# Patient Record
Sex: MALE | Race: OTHER | NOT HISPANIC OR LATINO | URBAN - METROPOLITAN AREA
[De-identification: names, ages, dates, MRNs, and addresses within clinical notes are randomized per-mention and may not be internally consistent; named-entity substitution may affect disease eponyms.]

---

## 2022-09-24 ENCOUNTER — INPATIENT (INPATIENT)
Facility: HOSPITAL | Age: 72
LOS: 12 days | Discharge: LEFT BEFORE DISCHARGE | End: 2022-10-07
Attending: INTERNAL MEDICINE | Admitting: INTERNAL MEDICINE

## 2022-09-24 VITALS
DIASTOLIC BLOOD PRESSURE: 70 MMHG | WEIGHT: 176.37 LBS | HEART RATE: 98 BPM | OXYGEN SATURATION: 98 % | RESPIRATION RATE: 17 BRPM | TEMPERATURE: 98 F | SYSTOLIC BLOOD PRESSURE: 113 MMHG

## 2022-09-24 LAB
ALBUMIN SERPL ELPH-MCNC: 2.5 G/DL — LOW (ref 3.5–5.2)
ALP SERPL-CCNC: 90 U/L — SIGNIFICANT CHANGE UP (ref 30–115)
ALT FLD-CCNC: 28 U/L — SIGNIFICANT CHANGE UP (ref 0–41)
ANION GAP SERPL CALC-SCNC: 9 MMOL/L — SIGNIFICANT CHANGE UP (ref 7–14)
ANISOCYTOSIS BLD QL: SLIGHT — SIGNIFICANT CHANGE UP
APTT BLD: 26.4 SEC — LOW (ref 27–39.2)
AST SERPL-CCNC: 31 U/L — SIGNIFICANT CHANGE UP (ref 0–41)
BASOPHILS # BLD AUTO: 0 K/UL — SIGNIFICANT CHANGE UP (ref 0–0.2)
BASOPHILS NFR BLD AUTO: 0 % — SIGNIFICANT CHANGE UP (ref 0–1)
BILIRUB DIRECT SERPL-MCNC: <0.2 MG/DL — SIGNIFICANT CHANGE UP (ref 0–0.3)
BILIRUB INDIRECT FLD-MCNC: SIGNIFICANT CHANGE UP MG/DL (ref 0.2–1.2)
BILIRUB SERPL-MCNC: <0.2 MG/DL — SIGNIFICANT CHANGE UP (ref 0.2–1.2)
BLD GP AB SCN SERPL QL: SIGNIFICANT CHANGE UP
BUN SERPL-MCNC: 21 MG/DL — HIGH (ref 10–20)
CALCIUM SERPL-MCNC: 8 MG/DL — LOW (ref 8.4–10.5)
CHLORIDE SERPL-SCNC: 91 MMOL/L — LOW (ref 98–110)
CO2 SERPL-SCNC: 34 MMOL/L — HIGH (ref 17–32)
CREAT SERPL-MCNC: 1 MG/DL — SIGNIFICANT CHANGE UP (ref 0.7–1.5)
EGFR: 79 ML/MIN/1.73M2 — SIGNIFICANT CHANGE UP
EOSINOPHIL # BLD AUTO: 0 K/UL — SIGNIFICANT CHANGE UP (ref 0–0.7)
EOSINOPHIL NFR BLD AUTO: 0 % — SIGNIFICANT CHANGE UP (ref 0–8)
GLUCOSE BLDC GLUCOMTR-MCNC: 178 MG/DL — HIGH (ref 70–99)
GLUCOSE SERPL-MCNC: 245 MG/DL — HIGH (ref 70–99)
HCT VFR BLD CALC: 13.9 % — LOW (ref 42–52)
HCT VFR BLD CALC: 20.1 % — LOW (ref 42–52)
HGB BLD-MCNC: 4.8 G/DL — CRITICAL LOW (ref 14–18)
HGB BLD-MCNC: 6.7 G/DL — CRITICAL LOW (ref 14–18)
INR BLD: 1.11 RATIO — SIGNIFICANT CHANGE UP (ref 0.65–1.3)
LACTATE SERPL-SCNC: 3.8 MMOL/L — HIGH (ref 0.7–2)
LACTATE SERPL-SCNC: 6.4 MMOL/L — CRITICAL HIGH (ref 0.7–2)
LIDOCAIN IGE QN: 38 U/L — SIGNIFICANT CHANGE UP (ref 7–60)
LYMPHOCYTES # BLD AUTO: 1.18 K/UL — LOW (ref 1.2–3.4)
LYMPHOCYTES # BLD AUTO: 9 % — LOW (ref 20.5–51.1)
MACROCYTES BLD QL: SLIGHT — SIGNIFICANT CHANGE UP
MANUAL SMEAR VERIFICATION: SIGNIFICANT CHANGE UP
MCHC RBC-ENTMCNC: 30.6 PG — SIGNIFICANT CHANGE UP (ref 27–31)
MCHC RBC-ENTMCNC: 32.4 PG — HIGH (ref 27–31)
MCHC RBC-ENTMCNC: 33.3 G/DL — SIGNIFICANT CHANGE UP (ref 32–37)
MCHC RBC-ENTMCNC: 34.5 G/DL — SIGNIFICANT CHANGE UP (ref 32–37)
MCV RBC AUTO: 91.8 FL — SIGNIFICANT CHANGE UP (ref 80–94)
MCV RBC AUTO: 93.9 FL — SIGNIFICANT CHANGE UP (ref 80–94)
MONOCYTES # BLD AUTO: 0.53 K/UL — SIGNIFICANT CHANGE UP (ref 0.1–0.6)
MONOCYTES NFR BLD AUTO: 4 % — SIGNIFICANT CHANGE UP (ref 1.7–9.3)
NEUTROPHILS # BLD AUTO: 11.43 K/UL — HIGH (ref 1.4–6.5)
NEUTROPHILS NFR BLD AUTO: 87 % — HIGH (ref 42.2–75.2)
NRBC # BLD: 0 /100 WBCS — SIGNIFICANT CHANGE UP (ref 0–0)
NRBC # BLD: 0 /100 — SIGNIFICANT CHANGE UP (ref 0–0)
NRBC # BLD: SIGNIFICANT CHANGE UP /100 WBCS (ref 0–0)
PLAT MORPH BLD: NORMAL — SIGNIFICANT CHANGE UP
PLATELET # BLD AUTO: 295 K/UL — SIGNIFICANT CHANGE UP (ref 130–400)
PLATELET # BLD AUTO: 305 K/UL — SIGNIFICANT CHANGE UP (ref 130–400)
POLYCHROMASIA BLD QL SMEAR: SIGNIFICANT CHANGE UP
POTASSIUM SERPL-MCNC: 4 MMOL/L — SIGNIFICANT CHANGE UP (ref 3.5–5)
POTASSIUM SERPL-SCNC: 4 MMOL/L — SIGNIFICANT CHANGE UP (ref 3.5–5)
PROT SERPL-MCNC: 4.5 G/DL — LOW (ref 6–8)
PROTHROM AB SERPL-ACNC: 12.7 SEC — SIGNIFICANT CHANGE UP (ref 9.95–12.87)
RBC # BLD: 1.48 M/UL — LOW (ref 4.7–6.1)
RBC # BLD: 2.19 M/UL — LOW (ref 4.7–6.1)
RBC # FLD: 14 % — SIGNIFICANT CHANGE UP (ref 11.5–14.5)
RBC # FLD: 14.4 % — SIGNIFICANT CHANGE UP (ref 11.5–14.5)
RBC BLD AUTO: ABNORMAL
ROULEAUX BLD QL SMEAR: PRESENT — SIGNIFICANT CHANGE UP
SARS-COV-2 RNA SPEC QL NAA+PROBE: SIGNIFICANT CHANGE UP
SODIUM SERPL-SCNC: 134 MMOL/L — LOW (ref 135–146)
WBC # BLD: 13.14 K/UL — HIGH (ref 4.8–10.8)
WBC # BLD: 9.97 K/UL — SIGNIFICANT CHANGE UP (ref 4.8–10.8)
WBC # FLD AUTO: 13.14 K/UL — HIGH (ref 4.8–10.8)
WBC # FLD AUTO: 9.97 K/UL — SIGNIFICANT CHANGE UP (ref 4.8–10.8)

## 2022-09-24 PROCEDURE — 93010 ELECTROCARDIOGRAM REPORT: CPT

## 2022-09-24 PROCEDURE — 71045 X-RAY EXAM CHEST 1 VIEW: CPT | Mod: 26

## 2022-09-24 PROCEDURE — 99291 CRITICAL CARE FIRST HOUR: CPT

## 2022-09-24 PROCEDURE — 74177 CT ABD & PELVIS W/CONTRAST: CPT | Mod: 26,MA

## 2022-09-24 RX ORDER — FOLIC ACID 0.8 MG
1 TABLET ORAL DAILY
Refills: 0 | Status: DISCONTINUED | OUTPATIENT
Start: 2022-09-24 | End: 2022-10-07

## 2022-09-24 RX ORDER — CEFTRIAXONE 500 MG/1
1000 INJECTION, POWDER, FOR SOLUTION INTRAMUSCULAR; INTRAVENOUS ONCE
Refills: 0 | Status: COMPLETED | OUTPATIENT
Start: 2022-09-24 | End: 2022-09-24

## 2022-09-24 RX ORDER — ONDANSETRON 8 MG/1
4 TABLET, FILM COATED ORAL EVERY 8 HOURS
Refills: 0 | Status: DISCONTINUED | OUTPATIENT
Start: 2022-09-24 | End: 2022-10-07

## 2022-09-24 RX ORDER — SENNA PLUS 8.6 MG/1
2 TABLET ORAL AT BEDTIME
Refills: 0 | Status: DISCONTINUED | OUTPATIENT
Start: 2022-09-24 | End: 2022-10-07

## 2022-09-24 RX ORDER — PANTOPRAZOLE SODIUM 20 MG/1
40 TABLET, DELAYED RELEASE ORAL EVERY 12 HOURS
Refills: 0 | Status: DISCONTINUED | OUTPATIENT
Start: 2022-09-24 | End: 2022-10-03

## 2022-09-24 RX ORDER — OCTREOTIDE ACETATE 200 UG/ML
50 INJECTION, SOLUTION INTRAVENOUS; SUBCUTANEOUS ONCE
Refills: 0 | Status: COMPLETED | OUTPATIENT
Start: 2022-09-24 | End: 2022-09-24

## 2022-09-24 RX ORDER — ATORVASTATIN CALCIUM 80 MG/1
20 TABLET, FILM COATED ORAL AT BEDTIME
Refills: 0 | Status: DISCONTINUED | OUTPATIENT
Start: 2022-09-24 | End: 2022-10-07

## 2022-09-24 RX ORDER — PANTOPRAZOLE SODIUM 20 MG/1
80 TABLET, DELAYED RELEASE ORAL ONCE
Refills: 0 | Status: COMPLETED | OUTPATIENT
Start: 2022-09-24 | End: 2022-09-24

## 2022-09-24 RX ORDER — METRONIDAZOLE 500 MG
500 TABLET ORAL ONCE
Refills: 0 | Status: COMPLETED | OUTPATIENT
Start: 2022-09-24 | End: 2022-09-24

## 2022-09-24 RX ORDER — THIAMINE MONONITRATE (VIT B1) 100 MG
100 TABLET ORAL DAILY
Refills: 0 | Status: COMPLETED | OUTPATIENT
Start: 2022-09-24 | End: 2022-09-27

## 2022-09-24 RX ORDER — LANOLIN ALCOHOL/MO/W.PET/CERES
3 CREAM (GRAM) TOPICAL AT BEDTIME
Refills: 0 | Status: DISCONTINUED | OUTPATIENT
Start: 2022-09-24 | End: 2022-10-07

## 2022-09-24 RX ORDER — OCTREOTIDE ACETATE 200 UG/ML
50 INJECTION, SOLUTION INTRAVENOUS; SUBCUTANEOUS
Qty: 500 | Refills: 0 | Status: DISCONTINUED | OUTPATIENT
Start: 2022-09-24 | End: 2022-09-27

## 2022-09-24 RX ORDER — CEFTRIAXONE 500 MG/1
1000 INJECTION, POWDER, FOR SOLUTION INTRAMUSCULAR; INTRAVENOUS EVERY 24 HOURS
Refills: 0 | Status: DISCONTINUED | OUTPATIENT
Start: 2022-09-25 | End: 2022-09-25

## 2022-09-24 RX ORDER — SODIUM CHLORIDE 9 MG/ML
1000 INJECTION, SOLUTION INTRAVENOUS ONCE
Refills: 0 | Status: COMPLETED | OUTPATIENT
Start: 2022-09-24 | End: 2022-09-24

## 2022-09-24 RX ORDER — CHLORHEXIDINE GLUCONATE 213 G/1000ML
1 SOLUTION TOPICAL DAILY
Refills: 0 | Status: DISCONTINUED | OUTPATIENT
Start: 2022-09-24 | End: 2022-10-07

## 2022-09-24 RX ORDER — METRONIDAZOLE 500 MG
500 TABLET ORAL EVERY 8 HOURS
Refills: 0 | Status: DISCONTINUED | OUTPATIENT
Start: 2022-09-24 | End: 2022-09-29

## 2022-09-24 RX ADMIN — OCTREOTIDE ACETATE 10 MICROGRAM(S)/HR: 200 INJECTION, SOLUTION INTRAVENOUS; SUBCUTANEOUS at 21:25

## 2022-09-24 RX ADMIN — ATORVASTATIN CALCIUM 20 MILLIGRAM(S): 80 TABLET, FILM COATED ORAL at 23:24

## 2022-09-24 RX ADMIN — CEFTRIAXONE 100 MILLIGRAM(S): 500 INJECTION, POWDER, FOR SOLUTION INTRAMUSCULAR; INTRAVENOUS at 11:25

## 2022-09-24 RX ADMIN — SODIUM CHLORIDE 1000 MILLILITER(S): 9 INJECTION, SOLUTION INTRAVENOUS at 11:26

## 2022-09-24 RX ADMIN — PANTOPRAZOLE SODIUM 40 MILLIGRAM(S): 20 TABLET, DELAYED RELEASE ORAL at 18:10

## 2022-09-24 RX ADMIN — Medication 100 MILLIGRAM(S): at 11:26

## 2022-09-24 RX ADMIN — PANTOPRAZOLE SODIUM 80 MILLIGRAM(S): 20 TABLET, DELAYED RELEASE ORAL at 12:00

## 2022-09-24 RX ADMIN — Medication 100 MILLIGRAM(S): at 21:15

## 2022-09-24 RX ADMIN — OCTREOTIDE ACETATE 50 MICROGRAM(S): 200 INJECTION, SOLUTION INTRAVENOUS; SUBCUTANEOUS at 17:43

## 2022-09-24 NOTE — CONSULT NOTE ADULT - SUBJECTIVE AND OBJECTIVE BOX
Gastroenterology Consultation:    Patient is a 72y old  Male who presents with a chief complaint of       Admitted on: 09-24-22      HPI:        Prior EGD:    Prior Colonoscopy:      PAST MEDICAL & SURGICAL HISTORY:        FAMILY HISTORY:      Social History:  Tobacco:  Alcohol:  Drugs:    Home Medications:        MEDICATIONS  (STANDING):  cefTRIAXone   IVPB 1000 milliGRAM(s) IV Intermittent once  lactated ringers Bolus 1000 milliLiter(s) IV Bolus once  metroNIDAZOLE  IVPB 500 milliGRAM(s) IV Intermittent Once  pantoprazole  Injectable 80 milliGRAM(s) IV Push Once    MEDICATIONS  (PRN):      Allergies  No Known Allergies      Review of Systems:   Constitutional:  No Fever, No Chills  ENT/Mouth:  No Hearing Changes,  No Difficulty Swallowing  Eyes:  No Eye Pain, No Vision Changes  Cardiovascular:  No Chest Pain, No Palpitations  Respiratory:  No Cough, No Dyspnea  Gastrointestinal:  As described in HPI  Musculoskeletal:  No Joint Swelling, No Back Pain  Skin:  No Skin Lesions, No Jaundice  Neuro:  No Syncope, No Dizziness  Heme/Lymph:  No Bruising, No Bleeding.          Physical Examination:  T(C): 36.9 (09-24-22 @ 08:45), Max: 36.9 (09-24-22 @ 08:45)  HR: 98 (09-24-22 @ 08:45) (98 - 98)  BP: 113/70 (09-24-22 @ 08:45) (113/70 - 113/70)  RR: 17 (09-24-22 @ 08:45) (17 - 17)  SpO2: 98% (09-24-22 @ 08:45) (98% - 98%)    Weight (kg): 80 (09-24-22 @ 08:45)        GENERAL: AAOx3, no acute distress.  HEAD:  Atraumatic, Normocephalic  EYES: conjunctiva and sclera clear  NECK: Supple, no JVD or thyromegaly  CHEST/LUNG: Clear to auscultation bilaterally; No wheeze, rhonchi, or rales  HEART: Regular rate and rhythm; normal S1, S2, No murmurs.  ABDOMEN: Soft, nontender, nondistended; Bowel sounds present  NEUROLOGY: No asterixis or tremor.   SKIN: Intact, no jaundice        Data:                        4.8    13.14 )-----------( 305      ( 24 Sep 2022 09:17 )             13.9     Hgb Trend:  4.8  09-24-22 @ 09:17      09-24    134<L>  |  91<L>  |  21<H>  ----------------------------<  245<H>  4.0   |  34<H>  |  1.0    Ca    8.0<L>      24 Sep 2022 09:17    TPro  4.5<L>  /  Alb  2.5<L>  /  TBili  <0.2  /  DBili  <0.2  /  AST  31  /  ALT  28  /  AlkPhos  90  09-24    Liver panel trend:  TBili <0.2   /   AST 31   /   ALT 28   /   AlkP 90   /   Tptn 4.5   /   Alb 2.5    /   DBili <0.2      09-24      PT/INR - ( 24 Sep 2022 09:17 )   PT: 12.70 sec;   INR: 1.11 ratio         PTT - ( 24 Sep 2022 09:17 )  PTT:26.4 sec        Radiology:  CT Abdomen and Pelvis w/ IV Cont:   ACC: [id number] EXAM:  CT ABDOMEN AND PELVIS IC                          PROCEDURE DATE:  09/24/2022          INTERPRETATION:  CLINICAL HISTORY: Abdominal pain.    TECHNIQUE: Contiguous axial CT images were obtained from the lower chest   to the pubic symphysis following administration of Optiray intravenous   contrast. Oral contrast was not administered. Reformatted images in the   coronal and sagittal planes were acquired.    COMPARISON: None.      FINDINGS:    LOWER CHEST: Subsegmental atelectasis    HEPATOBILIARY: Unremarkable.    SPLEEN: Unremarkable.    PANCREAS: Findings of necrotizing pancreatitis -patchy hypoenhancement of   the pancreatic parenchyma with multiple loculated peripancreatic fluid   collections. Examples include a 7.8 x3.8 cm collection with gas (4/88),   in the lesser sac adherent to the pancreas, and additional 6.3 x 2.9 cm   collection more anteriorly (4/96), and additional 2.5 x 1.7 cm collection   along the greater curvature of the stomach with gas (4/69). Additional   collection more inferiorly anterior to the transverse colon measuring 3.6   x 2.6 cm (4/123). Gas within these collections may represent   superinfection. Additional areas of hypoenhancement in the pancreatic   head, may also represent sequela of necrotizing pancreatitis, however   follow-up is recommended. Calcifications of the pancreatic head.    ADRENAL GLANDS: 0.8 cm left adrenal myelolipoma.    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 Bilateral   renal cysts and additional hypodensities too small to characterize. No   hydronephrosis.    ABDOMINOPELVIC NODES: Unremarkable    PELVIC ORGANS: Unremarkable.    PERITONEUM/MESENTERY/BOWEL:  No evidence of bowel obstruction.   Unremarkable appendix.    BONES/SOFT TISSUES: Degenerative changes of the spine.    OTHER: Atherosclerotic calcifications      IMPRESSION:    Findings of necrotizing pancreatitis. Multiple peripancreatic fluid   collections as described. Gas within some of these collections may   represent superinfection.    Additional areas of hypoenhancement in the pancreatic head, may also   represent sequela of necrotizing pancreatitis, however follow-up is   recommended.    Additional findings in the body of the report.    --- End of Report ---            JONY JEAN MD; Attending Radiologist  This document has been electronically signed. Sep 24 2022 11:32AM (09-24-22 @ 10:32)       Gastroenterology Consultation:    Patient is a 72y old  Male who presents with a chief complaint of       Admitted on: 09-24-22      HPI:  72-year-old gentleman with past medical history of hypertension, hyperlipidemia, extensive alcohol use in which he is currently actively using, and as of recent history of pancreatitis requiring hospitalization who presents to [facility] for rectal bleeding.  Patient notes that today he developed a large volume bloody bowel movement along with an episode of nausea and vomiting in which he also noted red blood.  Patient became dizzy after this episode and almost passed out.  Wife was present to supplement the history.  Patient was brought to the ER and still notes dizziness.  Patient had abdominal imaging in which she was found to have necrosis of the pancreas. Of note patient was admitted June 8 to [facility] in [place] in total for 1 week in which she was treated for an uncomplicated pancreatitis.  Patient has never had prior documented pancreatitis.  Patient's wife notes that he has been having on and off intermittent epigastric pain more so towards the left upper quadrant and this has been going on for years.  Patient has been treating this pain with over-the-counter NSAIDs at times.  Patient and wife deny him having prior endoscopic evaluation.    In the ED patient mainly complains of nausea.  Patient currently denies any abdominal pain to me.    Prior EGD: never    Prior Colonoscopy: never      PAST MEDICAL & SURGICAL HISTORY:        FAMILY HISTORY:  -ve    Social History:  Tobacco: -ve  Alcohol: daily drinker for many years (pint of vodka daily)  Drugs: -ve    Home Medications:        MEDICATIONS  (STANDING):  cefTRIAXone   IVPB 1000 milliGRAM(s) IV Intermittent once  lactated ringers Bolus 1000 milliLiter(s) IV Bolus once  metroNIDAZOLE  IVPB 500 milliGRAM(s) IV Intermittent Once  pantoprazole  Injectable 80 milliGRAM(s) IV Push Once    MEDICATIONS  (PRN):      Allergies  No Known Allergies      Review of Systems:   Constitutional:  No Fever, No Chills  ENT/Mouth:  No Hearing Changes,  No Difficulty Swallowing  Eyes:  No Eye Pain, No Vision Changes  Cardiovascular:  No Chest Pain, No Palpitations  Respiratory:  No Cough, No Dyspnea  Gastrointestinal:  As described in HPI  Musculoskeletal:  No Joint Swelling, No Back Pain  Skin:  No Skin Lesions, No Jaundice  Neuro:  No Syncope, No Dizziness  Heme/Lymph:  No Bruising, No Bleeding.          Physical Examination:  T(C): 36.9 (09-24-22 @ 08:45), Max: 36.9 (09-24-22 @ 08:45)  HR: 98 (09-24-22 @ 08:45) (98 - 98)  BP: 113/70 (09-24-22 @ 08:45) (113/70 - 113/70)  RR: 17 (09-24-22 @ 08:45) (17 - 17)  SpO2: 98% (09-24-22 @ 08:45) (98% - 98%)    Weight (kg): 80 (09-24-22 @ 08:45)        GENERAL: AAOx3, no acute distress.  HEAD:  Atraumatic, Normocephalic  EYES: conjunctiva and sclera clear  NECK: Supple, no JVD or thyromegaly  CHEST/LUNG: Clear to auscultation bilaterally; No wheeze, rhonchi, or rales  HEART: Regular rate and rhythm; normal S1, S2, No murmurs.  ABDOMEN: Soft, nontender, nondistended; Bowel sounds present  NEUROLOGY: No asterixis or tremor.   SKIN: Intact, no jaundice  KRISTOPHER: maroon colored stool         Data:                        4.8    13.14 )-----------( 305      ( 24 Sep 2022 09:17 )             13.9     Hgb Trend:  4.8  09-24-22 @ 09:17      09-24    134<L>  |  91<L>  |  21<H>  ----------------------------<  245<H>  4.0   |  34<H>  |  1.0    Ca    8.0<L>      24 Sep 2022 09:17    TPro  4.5<L>  /  Alb  2.5<L>  /  TBili  <0.2  /  DBili  <0.2  /  AST  31  /  ALT  28  /  AlkPhos  90  09-24    Liver panel trend:  TBili <0.2   /   AST 31   /   ALT 28   /   AlkP 90   /   Tptn 4.5   /   Alb 2.5    /   DBili <0.2      09-24      PT/INR - ( 24 Sep 2022 09:17 )   PT: 12.70 sec;   INR: 1.11 ratio         PTT - ( 24 Sep 2022 09:17 )  PTT:26.4 sec        Radiology:  CT Abdomen and Pelvis w/ IV Cont:   ACC: [id number] EXAM:  CT ABDOMEN AND PELVIS IC                          PROCEDURE DATE:  09/24/2022          INTERPRETATION:  CLINICAL HISTORY: Abdominal pain.    TECHNIQUE: Contiguous axial CT images were obtained from the lower chest   to the pubic symphysis following administration of Optiray intravenous   contrast. Oral contrast was not administered. Reformatted images in the   coronal and sagittal planes were acquired.    COMPARISON: None.      FINDINGS:    LOWER CHEST: Subsegmental atelectasis    HEPATOBILIARY: Unremarkable.    SPLEEN: Unremarkable.    PANCREAS: Findings of necrotizing pancreatitis -patchy hypoenhancement of   the pancreatic parenchyma with multiple loculated peripancreatic fluid   collections. Examples include a 7.8 x3.8 cm collection with gas (4/88),   in the lesser sac adherent to the pancreas, and additional 6.3 x 2.9 cm   collection more anteriorly (4/96), and additional 2.5 x 1.7 cm collection   along the greater curvature of the stomach with gas (4/69). Additional   collection more inferiorly anterior to the transverse colon measuring 3.6   x 2.6 cm (4/123). Gas within these collections may represent   superinfection. Additional areas of hypoenhancement in the pancreatic   head, may also represent sequela of necrotizing pancreatitis, however   follow-up is recommended. Calcifications of the pancreatic head.    ADRENAL GLANDS: 0.8 cm left adrenal myelolipoma.    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 Bilateral   renal cysts and additional hypodensities too small to characterize. No   hydronephrosis.    ABDOMINOPELVIC NODES: Unremarkable    PELVIC ORGANS: Unremarkable.    PERITONEUM/MESENTERY/BOWEL:  No evidence of bowel obstruction.   Unremarkable appendix.    BONES/SOFT TISSUES: Degenerative changes of the spine.    OTHER: Atherosclerotic calcifications      IMPRESSION:    Findings of necrotizing pancreatitis. Multiple peripancreatic fluid   collections as described. Gas within some of these collections may   represent superinfection.    Additional areas of hypoenhancement in the pancreatic head, may also   represent sequela of necrotizing pancreatitis, however follow-up is   recommended.    Additional findings in the body of the report.    --- End of Report ---            JONY JEAN MD; Attending Radiologist  This document has been electronically signed. Sep 24 2022 11:32AM (09-24-22 @ 10:32)

## 2022-09-24 NOTE — H&P ADULT - ASSESSMENT
IMPRESSION:    suspected UGIB- r.o variceal s/p 2 units of blood   necrotising pancreatitis   ETOH hx  HTN   HLD     PLAN:    CNS: CIWA protocol symptom triggered. SBRT. thiamine, folate. MDV    HEENT: Oral care    PULMONARY:  HOB @ 45 degrees.  Aspiration precautions     CARDIOVASCULAR: keep map > 65. hold bp meds. send CE. echo     GI: Clears liquid diet. Protonix IV BID. octreotide. ceftriaxone and flagyl. [facility] consult. Surgical FU.  Bowel regimen. elastase. calprotectin    RENAL:  Follow up lytes.  Correct as needed    INFECTIOUS DISEASE: check procal, blood cultures. id consult     HEMATOLOGICAL:  hold AC. SCDs for now. keep active type and screen. HGB > 8     ENDOCRINE:  Follow up FS.  Insulin protocol if needed.    MUSCULOSKELETAL: IAT    MICU

## 2022-09-24 NOTE — ED PROVIDER NOTE - OBJECTIVE STATEMENT
71 yo M with PMH of HTN, alcoholic induced pancreatitis presents to ED for 1 episode of rectal bleeding. Pt states he was using the bathroom today and had one episode of bright red blood per rectum. This is the first time this has happened. Pt has never had a colonoscopy. Pt has had generalized abdominal discomfort. Pt has felt weak. Pt states that over the past 2 days he has had low blood pressure the past 2 days. EMS arrived and found patient to be hypotensive and they gave NS (ACR unavailable at this time). No SOB, CP, palpitations, fever or chills. 71 yo M with PMH of HTN, alcoholic induced pancreatitis [place] 8 [place] [facility] [place] to ED for 1 episode of rectal bleeding. Pt states he was using the bathroom today and had one episode of bright red blood per rectum. This is the first time this has happened. Pt has never had a colonoscopy. Pt has had generalized abdominal discomfort. Pt has felt weak. Pt states that over the past 2 days he has had low blood pressure the past 2 days. EMS arrived and found patient to be hypotensive and they gave NS (ACR unavailable at this time). No SOB, CP, palpitations, fever or chills.

## 2022-09-24 NOTE — CONSULT NOTE ADULT - SUBJECTIVE AND OBJECTIVE BOX
Patient is a 72-year-old gentleman with past medical history of hypertension, hyperlipidemia, extensive alcohol use in which he is currently actively using, and as of recent history of pancreatitis requiring hospitalization who presents to [facility] for rectal bleeding.  Patient notes that today he developed a large volume bloody bowel movement along with an episode of nausea and vomiting in which he also noted red blood.  Patient became dizzy after this episode and almost passed out.  Wife was present to supplement the history.  Patient was brought to the ER and still notes dizziness.  Patient had abdominal imaging in which she was found to have necrosis of the pancreas.  Surgery consulted for pancreatic necrosis.    Of note patient was admitted June 8 to [facility] in [place] in total for 1 week in which she was treated for an uncomplicated pancreatitis.  Patient has never had prior documented pancreatitis.  Patient's wife notes that he has been having on and off intermittent epigastric pain more so towards the left upper quadrant and this has been going on for years.  Patient has been treating this pain with over-the-counter NSAIDs at times.  Patient and wife deny him having prior endoscopic evaluation.    In the ED patient mainly complains of nausea.  Patient currently denies any abdominal pain to me.      PAST MEDICAL & SURGICAL HISTORY:  HTN  HLD  Pancreatitis ( First Episode June 8th 2022- [facility])  ETOH use and abuse      Family Hx:  Father: Non Contributory   Mother: Non Contributory    Social History  Denies Current Tobacco use  Admits to current ETOH use- wife notes extensive use but would not quantify   Denies Current Illicit Drug use     MEDICATIONS  (STANDING):  cefTRIAXone   IVPB 1000 milliGRAM(s) IV Intermittent once  lactated ringers Bolus 1000 milliLiter(s) IV Bolus once  metroNIDAZOLE  IVPB 500 milliGRAM(s) IV Intermittent Once  pantoprazole  Injectable 80 milliGRAM(s) IV Push Once    Home Meds:  Lisinopril  Amlodipine  Atorvastatin     Allergies  No Known Allergies      Review of Systems  General:  Denies Fatigue, Denies Fever, Denies Weakness ,Denies Weight Loss   HEENT: Denies Trouble Swallowing ,Denies  Sore Throat , Denies Change in hearing/vision/speech ,Denies Dizziness    Cardio: Denies  Chest Pain , Palpitations    Respiratory: Denies worsening of SOB, Denies Cough  Abdomen: See detailed HPI  Neuro: Denies Headache Denies Dizziness, Denies Paresthesias  MSK: Denies pain in Bones/Joints/Muscles   Psych: Patient denies depression, denies suicidal or homicidal ideations  Integ: Patient Denies rash, or new skin lesions       Vital Signs Last 24 Hrs  T(C): 36.9 (24 Sep 2022 08:45), Max: 36.9 (24 Sep 2022 08:45)  T(F): 98.4 (24 Sep 2022 08:45), Max: 98.4 (24 Sep 2022 08:45)  HR: 98 (24 Sep 2022 08:45) (98 - 98)  BP: 113/70 (24 Sep 2022 08:45) (113/70 - 113/70)  BP(mean): --  RR: 17 (24 Sep 2022 08:45) (17 - 17)  SpO2: 98% (24 Sep 2022 08:45) (98% - 98%)    Parameters below as of 24 Sep 2022 08:45  Patient On (Oxygen Delivery Method): room air      Physical Exam  Gen: NAD  HEENT: NC/AT, Mucosal Membranes Dry, tongue fasciculation   Cardio: S1/S2 No S3/S4, Regular  Resp: CTA B/L  Abdomen: Soft, ND/NT, Rectal exam with good tone, residual scant Melena but vault relatively empty   Neuro: AAOx3  Extremities: FROM x 4  Skin: No jaundice     Labs:                            4.8    13.14 )-----------( 305      ( 24 Sep 2022 09:17 )             13.9       Auto Neutrophil %: 87.0 % (09-24-22 @ 09:17)    09-24    134<L>  |  91<L>  |  21<H>  ----------------------------<  245<H>  4.0   |  34<H>  |  1.0      Calcium, Total Serum: 8.0 mg/dL (09-24-22 @ 09:17)      LFTs:             4.5  | <0.2 | 31       ------------------[90      ( 24 Sep 2022 09:17 )  2.5  | <0.2 | 28          Lipase:38     Amylase:x         Lactate, Blood: 6.4 mmol/L (09-24-22 @ 09:17)      Coags:     12.70  ----< 1.11    ( 24 Sep 2022 09:17 )     26.4          RADIOLOGY & ADDITIONAL STUDIES:  CT Abdomen and Pelvis w/ IV Cont 09.24.22   IMPRESSION:    Findings of necrotizing pancreatitis. Multiple peripancreatic fluid   collections as described. Gas within some of these collections may   represent superinfection.    Additional areas of hypoenhancement in the pancreatic head, may also   represent sequela of necrotizing pancreatitis, however follow-up is   recommended.   GENERAL SURGERY CONSULT NOTE    Patient: CHRIS KHAN , 72y ([birth year])Male   MRN: [id number]  Location: [facility] ER Hold 030 A  Visit: 09-24-22 Inpatient  Date: 09-24-22 @ 13:28    HPI:  Patient is a 72-year-old gentleman with past medical history of hypertension, hyperlipidemia, extensive alcohol use in which he is currently actively using, and as of recent history of pancreatitis requiring hospitalization who presents to [facility] for rectal bleeding.  Patient notes that today he developed a large volume bloody bowel movement along with an episode of nausea and vomiting in which he also noted red blood.  Patient became dizzy after this episode and almost passed out.  Wife was present to supplement the history.  Patient was brought to the ER and still notes dizziness.  Patient had abdominal imaging in which she was found to have necrosis of the pancreas.  Surgery consulted for pancreatic necrosis.    Of note patient was admitted June 8 to [facility] in [place] in total for 1 week in which she was treated for an uncomplicated pancreatitis.  Patient has never had prior documented pancreatitis.  Patient's wife notes that he has been having on and off intermittent epigastric pain more so towards the left upper quadrant and this has been going on for years.  Patient has been treating this pain with over-the-counter NSAIDs at times.  Patient and wife deny him having prior endoscopic evaluation.    In the ED patient mainly complains of nausea.  Patient currently denies any abdominal pain to me.      PAST MEDICAL & SURGICAL HISTORY:  HTN  HLD  Pancreatitis ( First Episode June 8th 2022- [facility])  ETOH use and abuse    Family Hx:  Father: Non Contributory   Mother: Non Contributory    Social History  Denies Current Tobacco use  Admits to current ETOH use- wife notes extensive use but would not quantify   Denies Current Illicit Drug use     MEDICATIONS  (STANDING):  cefTRIAXone   IVPB 1000 milliGRAM(s) IV Intermittent once  lactated ringers Bolus 1000 milliLiter(s) IV Bolus once  metroNIDAZOLE  IVPB 500 milliGRAM(s) IV Intermittent Once  pantoprazole  Injectable 80 milliGRAM(s) IV Push Once    Home Meds:  Lisinopril  Amlodipine  Atorvastatin     Allergies  No Known Allergies    Review of Systems  General:  Denies Fatigue, Denies Fever, Denies Weakness ,Denies Weight Loss   HEENT: Denies Trouble Swallowing ,Denies  Sore Throat , Denies Change in hearing/vision/speech ,Denies Dizziness    Cardio: Denies  Chest Pain , Palpitations    Respiratory: Denies worsening of SOB, Denies Cough  Abdomen: See detailed HPI  Neuro: Denies Headache Denies Dizziness, Denies Paresthesias  MSK: Denies pain in Bones/Joints/Muscles   Psych: Patient denies depression, denies suicidal or homicidal ideations  Integ: Patient Denies rash, or new skin lesions       Vital Signs Last 24 Hrs  T(C): 36.9 (24 Sep 2022 08:45), Max: 36.9 (24 Sep 2022 08:45)  T(F): 98.4 (24 Sep 2022 08:45), Max: 98.4 (24 Sep 2022 08:45)  HR: 98 (24 Sep 2022 08:45) (98 - 98)  BP: 113/70 (24 Sep 2022 08:45) (113/70 - 113/70)  BP(mean): --  RR: 17 (24 Sep 2022 08:45) (17 - 17)  SpO2: 98% (24 Sep 2022 08:45) (98% - 98%)    Parameters below as of 24 Sep 2022 08:45  Patient On (Oxygen Delivery Method): room air      Physical Exam  Gen: NAD  HEENT: NC/AT, Mucosal Membranes Dry, tongue fasciculation   Cardio: S1/S2 No S3/S4, Regular  Resp: CTA B/L  Abdomen: Soft, ND/NT, Rectal exam with good tone, residual scant Melena but vault relatively empty   Neuro: AAOx3  Extremities: FROM x 4  Skin: No jaundice     Labs:                        4.8    13.14 )-----------( 305      ( 24 Sep 2022 09:17 )             13.9       Auto Neutrophil %: 87.0 % (09-24-22 @ 09:17)    09-24    134<L>  |  91<L>  |  21<H>  ----------------------------<  245<H>  4.0   |  34<H>  |  1.0    Calcium, Total Serum: 8.0 mg/dL (09-24-22 @ 09:17)    LFTs:             4.5  | <0.2 | 31       ------------------[90      ( 24 Sep 2022 09:17 )  2.5  | <0.2 | 28          Lipase:38     Amylase:x         Lactate, Blood: 6.4 mmol/L (09-24-22 @ 09:17)    Coags:     12.70  ----< 1.11    ( 24 Sep 2022 09:17 )     26.4        RADIOLOGY & ADDITIONAL STUDIES:  CT Abdomen and Pelvis w/ IV Cont 09.24.22   IMPRESSION:    Findings of necrotizing pancreatitis. Multiple peripancreatic fluid   collections as described. Gas within some of these collections may   represent superinfection.    Additional areas of hypoenhancement in the pancreatic head, may also   represent sequela of necrotizing pancreatitis, however follow-up is   recommended.

## 2022-09-24 NOTE — ED ADULT NURSE REASSESSMENT NOTE - NS ED NURSE REASSESS COMMENT FT1
REPORT GIVEN TO FAISAL GUZMAN, @ 2203, PT AAOX4, PLACED ON CONT CARDIAC MONITOR VSS, NAD, TRANSPORTED TO CCU WITH TRANSPORTER AND NURSE.

## 2022-09-24 NOTE — ED ADULT TRIAGE NOTE - CHIEF COMPLAINT QUOTE
Pt BIBA complaining of rectal bleeding x 1 episode and abdominal pain. Pt BIBA complaining of rectal bleeding x 1 episode and abdominal pain. IVF given by EMS

## 2022-09-24 NOTE — H&P ADULT - NSHPPHYSICALEXAM_GEN_ALL_CORE
GENERAL: NAD, lying in bed comfortably  CHEST/LUNG: Clear to auscultation bilaterally; No rales, rhonchi, wheezing, or rubs. Unlabored respirations  HEART: Regular rate and rhythm; No murmurs, rubs, or gallops  ABDOMEN: Bowel sounds present; Soft, Nontender, Nondistended.   EXTREMITIES:  2+ Peripheral Pulses, brisk capillary refill. No clubbing, cyanosis, or edema  NERVOUS SYSTEM:  Alert & Oriented X3, speech clear. No deficits   MSK: FROM all 4 extremities, full and equal strength  SKIN: No rashes or lesions

## 2022-09-24 NOTE — H&P ADULT - HISTORY OF PRESENT ILLNESS
72-year-old gentleman with PMHX  of HTN, HLD , recent hx of pancreatitis, extensive alcohol use daily, presents for episodes of Hemetemesis and hematochezia. g.  Patient notes that today he developed a large volume bloody bowel movement along with an episode of blood vomitting.  Patient became dizzy after this episode and almost passed out.  Wife was present to supplement the history.  Patient was brought to the ER and still notes dizziness.  Patient had abdominal imaging in which she was found to have necrosis of the pancreas.  Surgery consulted for pancreatic necrosis.    Of note patient was admitted June 8 to [place] in [place] in total for 1 week in which she was treated for an uncomplicated pancreatitis.  Patient has never had prior documented pancreatitis.  Patient's wife notes that he has been having on and off intermittent epigastric pain more so towards the left upper quadrant and this has been going on for years.  Patient has been treating this pain with over-the-counter NSAIDs at times.   He drinking 5 ounces of vodka a day. Patient and wife deny him having prior endoscopic evaluation.    In the ED   T(F): 100.1 (24 Sep 2022 16:50), Max: 100.1 (24 Sep 2022 16:50)  HR: 95 (24 Sep 2022 16:50) (73 - 98)  BP: 112/58 (24 Sep 2022 16:50) (112/58 - 133/81)      LABS:  cret                        6.7    9.97  )-----------( 295      ( 24 Sep 2022 17:30 )             20.1     09-24    134<L>  |  91<L>  |  21<H>  ----------------------------<  245<H>  4.0   |  34<H>  |  1.0    Ca    8.0<L>      24 Sep 2022 09:17    TPro  4.5<L>  /  Alb  2.5<L>  /  TBili  <0.2  /  DBili  <0.2  /  AST  31  /  ALT  28  /  AlkPhos  90  09-24    PT/INR - ( 24 Sep 2022 09:17 )   PT: 12.70 sec;   INR: 1.11 ratio         PTT - ( 24 Sep 2022 09:17 )  PTT:26.4 sec      < from: CT Abdomen and Pelvis w/ IV Cont (09.24.22 @ 10:32) >  Findings of necrotizing pancreatitis. Multiple peripancreatic fluid   collections as described. Gas within some of these collections may   represent superinfection.    < end of copied text >

## 2022-09-24 NOTE — CONSULT NOTE ADULT - ASSESSMENT
# painless Hematochezia:  - Hb on admission 4.8, MCV 98    recommendations:  - Active TnS, would give 2 units of PRBC now as is symptomatic  - Consider MICU eval  - PPI BID  - Octreotide drip   - ABx coverage    Pancreatic Necrosis  - Admitted June 8th [facility] in [facility] [place]  - Likely CT findings are a sequela from prior attack as has no current pain, and exam is reassuring  - Lipase 38  - Not septic, asymptomatic so surgical intervention not being considered at this time      ETOH Hx  - Needs to be on CIWA protocol when admitted       monitor H/H  Target Hb>7  monitor for alcohol withdrawal  ABx coverage   72-year-old gentleman with past medical history of hypertension, hyperlipidemia, extensive alcohol use in which he is currently actively using, and as of recent history of pancreatitis requiring hospitalization who presents to [facility] for rectal bleeding.    # Hematochezia:  # Acute normocytic anemia:  # Alcohol abuse:   - Hb on admission 4.8, MCV 98  - hemodynamically stable  - KRISTOPHER: Maroon colored stool  - no blood thinners  - never had EGD/Colonoscopy     Rec:  - Keep clear liquid   - Maintain active Type and screen  - Trend H&H BID  - Please place x2 18G IVs  - Please start IV fluids (SBP >90)   - will need Colonoscopy and EGD  - If any unstable bleed please get stat CTA and call IR  - Consider MICU eval  - PPI BID  - Octreotide drip     # Pancreatic Necrosis  - Admitted June 8th [facility] in [facility]  - Likely CT findings are a sequela from prior attack as has no current pain, and exam is reassuring  - Lipase 38  - ABx coverage  - will need evaluation by advance team for EUS +/- necrosectomy     # ETOH Hx  - Needs to be on CIWA protocol when admitted

## 2022-09-24 NOTE — ED ADULT NURSE REASSESSMENT NOTE - REASSESS COMMUNICATION
Notified ICU physician about temp - 100.1, after and pre-blood transfusion, He stated that it's ok./ED physician notified

## 2022-09-24 NOTE — CONSULT NOTE ADULT - ASSESSMENT
Patient is a 72-year-old gentleman with past medical history of hypertension, hyperlipidemia, extensive alcohol use in which he is currently actively using, and as of recent history of pancreatitis requiring hospitalization who presents to [facility] for rectal bleeding.  Patient notes that today he developed a large volume bloody bowel movement along with an episode of nausea and vomiting in which he also noted red blood.  Patient became dizzy after this episode and almost passed out.  Wife was present to supplement the history.  Patient was brought to the ER and still notes dizziness.  Patient had abdominal imaging in which she was found to have necrosis of the pancreas.  Surgery consulted for pancreatic necrosis. Of note patient was admitted June 8 to [place] in [place] in total for 1 week in which she was treated for an uncomplicated pancreatitis.  Patient has never had prior documented pancreatitis.  Patient's wife notes that he has been having on and off intermittent epigastric pain more so towards the left upper quadrant and this has been going on for years.  Patient has been treating this pain with over-the-counter NSAIDs at times.  Patient and wife deny him having prior endoscopic evaluation.In the ED patient is dizzy and upon posturing becomes very weak ( Likely Orthostatic). Given history and description of bleeding episode that occurred today would consider medical ICU admission and would give 2 units of PRBC.  Given history of significant alcohol use would also start PPI drip and consider octreotide despite no varices noted on CT and despite unremarkable views of the liver.  Patient needs to be monitored utilizing CIWA protocol as has a high risk of developing alcohol withdrawal given extensive use.  From a pancreatic perspective collections noted and largely asymptomatic.  Patient is not septic and has no abdominal pain on exam currently.  Would not consider surgery as an option given largely asymptomatic.  We will consult advanced GI for follow-up on these necrotic collections as well as calcifications of the head of the pancreas.  Patient will ultimately benefit from endoscopic ultrasound evaluation of the pancreas, as well as evaluation of the functionality of the pancreas which if clinical scenario remains the same can be worked up as an outpatient.    Hematemesis/ Melena with Hematochezia  - Active TnS, would give 2 units of PRBC now as is symptomatic  - Consider MICU eval  - Start Protonix drip, consider Octreotide drip and cephalosporin  ( Despite No Cirrhotic appearance of the Liver on CT- extensive ETOH Hx and significant bleeding at home)   - GI consult    Pancreatic Necrosis  - Admitted June 8th [facility] in [place]  - Likely CT findings are a sequela from prior attack as has no current pain, and exam is reassuring  - Lipase 38  - Not septic, asymptomatic so surgical intervention not being considered at this time  - Advanced GI team aware of case and will follow on Monday - Will ultimately benefit from though EUS evaluation of Pancreas- (can also consider sampling of necrosis and further endoscopic drainage if becomes septic), markers ( do not get now) and evaluation of functionality of the Pancreas ( Fecal Elastase. calprotectin)     ETOH Hx  - Needs to be on CIWA protocol when admitted                ASSESSMENT:  Patient is a 72-year-old gentleman with past medical history of hypertension, hyperlipidemia, extensive alcohol use in which he is currently actively using, and as of recent history of pancreatitis requiring hospitalization who presents to [facility] for rectal bleeding.  Patient notes that today he developed a large volume bloody bowel movement along with an episode of nausea and vomiting in which he also noted red blood.  Patient became dizzy after this episode and almost passed out.  Wife was present to supplement the history.  Patient was brought to the ER and still notes dizziness.  Patient had abdominal imaging in which she was found to have necrosis of the pancreas.  Surgery consulted for pancreatic necrosis. Of note patient was admitted June 8 to [facility] in [place] in total for 1 week in which she was treated for an uncomplicated pancreatitis.  Patient has never had prior documented pancreatitis.  Patient's wife notes that he has been having on and off intermittent epigastric pain more so towards the left upper quadrant and this has been going on for years.  Patient has been treating this pain with over-the-counter NSAIDs at times.  Patient and wife deny him having prior endoscopic evaluation.In the ED patient is dizzy and upon posturing becomes very weak ( Likely Orthostatic). Given history and description of bleeding episode that occurred today would consider medical ICU admission and would give 2 units of PRBC.  Given history of significant alcohol use would also start PPI drip and consider octreotide despite no varices noted on CT and despite unremarkable views of the liver.  Patient needs to be monitored utilizing CIWA protocol as has a high risk of developing alcohol withdrawal given extensive use.  From a pancreatic perspective collections noted and largely asymptomatic.  Patient is not septic and has no abdominal pain on exam currently.  Would not consider surgery as an option given largely asymptomatic.  We will consult advanced GI for follow-up on these necrotic collections as well as calcifications of the head of the pancreas.  Patient will ultimately benefit from endoscopic ultrasound evaluation of the pancreas, as well as evaluation of the functionality of the pancreas which if clinical scenario remains the same can be worked up as an outpatient.    Hematemesis/ Melena with Hematochezia  - Active TnS, would give 2 units of PRBC now as is symptomatic  - Consider MICU eval  - Start Protonix drip, consider Octreotide drip and cephalosporin  ( Despite No Cirrhotic appearance of the Liver on CT- extensive ETOH Hx and significant bleeding at home)   - GI consult    Pancreatic Necrosis  - Admitted June 8th [facility] in [facility]  - Likely CT findings are a sequela from prior attack as has no current pain, and exam is reassuring  - Lipase 38  - Not septic, asymptomatic so surgical intervention not being considered at this time  - Advanced GI team aware of case and will follow on Monday - Will ultimately benefit from though EUS evaluation of Pancreas- (can also consider sampling of necrosis and further endoscopic drainage if becomes septic), markers ( do not get now) and evaluation of functionality of the Pancreas ( Fecal Elastase. calprotectin)     ETOH Hx  - Needs to be on CIWA protocol when admitted     Above plan discussed with Attending Surgeon Dr. Steward, patient, patient family, and Primary team  09-24-22    Green Team Spectra: [phone]

## 2022-09-24 NOTE — ED PROVIDER NOTE - CLINICAL SUMMARY MEDICAL DECISION MAKING FREE TEXT BOX
73 yo M presented to ED for GI bleed. PT was found to have hgb of 4.8 and blood was ordered. gi was consulted. pt was also found to have necrotizing pancreatitis and antibiotics and surgery was consulted. pt was started on fluids and made npo . icu was consulted and pt admitted for further evaluation and management

## 2022-09-24 NOTE — PATIENT PROFILE ADULT - FALL HARM RISK - HARM RISK INTERVENTIONS
Assistance with ambulation/Assistance OOB with selected safe patient handling equipment/Communicate Risk of Fall with Harm to all staff/Monitor for mental status changes/Monitor gait and stability/Reinforce activity limits and safety measures with patient and family/Tailored Fall Risk Interventions/Toileting schedule using arm’s reach rule for commode and bathroom/Use of alarms - bed, chair and/or voice tab/Visual Cue: Yellow wristband and red socks/Bed in lowest position, wheels locked, appropriate side rails in place/Call bell, personal items and telephone in reach/Instruct patient to call for assistance before getting out of bed or chair/Non-slip footwear when patient is out of bed/[place] to call system/Physically safe environment - no spills, clutter or unnecessary equipment/Purposeful Proactive Rounding/Room/bathroom lighting operational, light cord in reach

## 2022-09-24 NOTE — ED PROVIDER NOTE - PHYSICAL EXAMINATION
Const: Well nourished, well developed, appears stated age  Eyes: PERRL, no conjunctival injection  HENT:  Neck supple without meningismus   CV: RRR, Warm, well-perfused extremities  RESP: CTA B/L, no tachypnea   GI: soft, non-tender, non-distended  Rectal (nurse nancy) empty rectal vault no blood   MSK: No gross deformities appreciated  Skin: Warm, dry. No rashes  Neuro: Alert, CNs II-XII grossly intact. Sensation and motor function of extremities grossly intact.  Psych: Appropriate mood and affect.

## 2022-09-24 NOTE — ED PROVIDER NOTE - NS ED ROS FT
Review of Systems   Constitutional:  No Weight Change, No Fever, No Chills, + weakness    ENT/Mouth:  No Nasal Congestion, No Hoarseness, No sore throat, No Rhinorrhea, No Swallowing Difficulty  Eyes:  No Eye Pain, No Swelling, No Redness, No Discharge, No Vision Changes  Cardiovascular:  No Chest Pain, No palpitations No Dyspnea on Exertion, No Orthopnea  Respiratory:  No SOB, No Cough, No Wheezing  Gastrointestinal:  No Nausea, No Vomiting, No Diarrhea, No Constipation, + abdominal pain,+ red blood per rectum  Genitourinary:  No Dysuria, No Urinary Frequency, No Hematuria, No Urinary Incontinence,  Musculoskeletal:  No Arthralgias, No Myalgias, No Joint Swelling, No Joint Stiffness,  Skin:  No rashes

## 2022-09-25 LAB
ALBUMIN SERPL ELPH-MCNC: 2.5 G/DL — LOW (ref 3.5–5.2)
ALP SERPL-CCNC: 85 U/L — SIGNIFICANT CHANGE UP (ref 30–115)
ALT FLD-CCNC: 18 U/L — SIGNIFICANT CHANGE UP (ref 0–41)
ANION GAP SERPL CALC-SCNC: 5 MMOL/L — LOW (ref 7–14)
ANION GAP SERPL CALC-SCNC: 9 MMOL/L — SIGNIFICANT CHANGE UP (ref 7–14)
APTT BLD: 28 SEC — SIGNIFICANT CHANGE UP (ref 27–39.2)
AST SERPL-CCNC: 18 U/L — SIGNIFICANT CHANGE UP (ref 0–41)
BASOPHILS # BLD AUTO: 0.04 K/UL — SIGNIFICANT CHANGE UP (ref 0–0.2)
BASOPHILS NFR BLD AUTO: 0.5 % — SIGNIFICANT CHANGE UP (ref 0–1)
BILIRUB SERPL-MCNC: 0.5 MG/DL — SIGNIFICANT CHANGE UP (ref 0.2–1.2)
BLD GP AB SCN SERPL QL: SIGNIFICANT CHANGE UP
BUN SERPL-MCNC: 11 MG/DL — SIGNIFICANT CHANGE UP (ref 10–20)
BUN SERPL-MCNC: 17 MG/DL — SIGNIFICANT CHANGE UP (ref 10–20)
CALCIUM SERPL-MCNC: 7.6 MG/DL — LOW (ref 8.4–10.5)
CALCIUM SERPL-MCNC: 7.8 MG/DL — LOW (ref 8.4–10.5)
CHLORIDE SERPL-SCNC: 95 MMOL/L — LOW (ref 98–110)
CHLORIDE SERPL-SCNC: 95 MMOL/L — LOW (ref 98–110)
CHOLEST SERPL-MCNC: 96 MG/DL — SIGNIFICANT CHANGE UP
CO2 SERPL-SCNC: 26 MMOL/L — SIGNIFICANT CHANGE UP (ref 17–32)
CO2 SERPL-SCNC: 33 MMOL/L — HIGH (ref 17–32)
CREAT SERPL-MCNC: 0.8 MG/DL — SIGNIFICANT CHANGE UP (ref 0.7–1.5)
CREAT SERPL-MCNC: 0.9 MG/DL — SIGNIFICANT CHANGE UP (ref 0.7–1.5)
EGFR: 90 ML/MIN/1.73M2 — SIGNIFICANT CHANGE UP
EGFR: 93 ML/MIN/1.73M2 — SIGNIFICANT CHANGE UP
EOSINOPHIL # BLD AUTO: 0.01 K/UL — SIGNIFICANT CHANGE UP (ref 0–0.7)
EOSINOPHIL NFR BLD AUTO: 0.1 % — SIGNIFICANT CHANGE UP (ref 0–8)
GLUCOSE SERPL-MCNC: 156 MG/DL — HIGH (ref 70–99)
GLUCOSE SERPL-MCNC: 190 MG/DL — HIGH (ref 70–99)
HCT VFR BLD CALC: 19.7 % — LOW (ref 42–52)
HCT VFR BLD CALC: 21.5 % — LOW (ref 42–52)
HCT VFR BLD CALC: 23.2 % — LOW (ref 42–52)
HCT VFR BLD CALC: 24.6 % — LOW (ref 42–52)
HDLC SERPL-MCNC: 29 MG/DL — LOW
HGB BLD-MCNC: 6.8 G/DL — CRITICAL LOW (ref 14–18)
HGB BLD-MCNC: 7.5 G/DL — LOW (ref 14–18)
HGB BLD-MCNC: 8.3 G/DL — LOW (ref 14–18)
HGB BLD-MCNC: 8.6 G/DL — LOW (ref 14–18)
IMM GRANULOCYTES NFR BLD AUTO: 0.7 % — HIGH (ref 0.1–0.3)
INR BLD: 1.04 RATIO — SIGNIFICANT CHANGE UP (ref 0.65–1.3)
LIPID PNL WITH DIRECT LDL SERPL: 46 MG/DL — SIGNIFICANT CHANGE UP
LYMPHOCYTES # BLD AUTO: 1.49 K/UL — SIGNIFICANT CHANGE UP (ref 1.2–3.4)
LYMPHOCYTES # BLD AUTO: 16.9 % — LOW (ref 20.5–51.1)
MAGNESIUM SERPL-MCNC: 2.1 MG/DL — SIGNIFICANT CHANGE UP (ref 1.8–2.4)
MAGNESIUM SERPL-MCNC: 2.5 MG/DL — HIGH (ref 1.8–2.4)
MCHC RBC-ENTMCNC: 30.9 PG — SIGNIFICANT CHANGE UP (ref 27–31)
MCHC RBC-ENTMCNC: 31 PG — SIGNIFICANT CHANGE UP (ref 27–31)
MCHC RBC-ENTMCNC: 31.2 PG — HIGH (ref 27–31)
MCHC RBC-ENTMCNC: 31.8 PG — HIGH (ref 27–31)
MCHC RBC-ENTMCNC: 34.5 G/DL — SIGNIFICANT CHANGE UP (ref 32–37)
MCHC RBC-ENTMCNC: 34.9 G/DL — SIGNIFICANT CHANGE UP (ref 32–37)
MCHC RBC-ENTMCNC: 35 G/DL — SIGNIFICANT CHANGE UP (ref 32–37)
MCHC RBC-ENTMCNC: 35.8 G/DL — SIGNIFICANT CHANGE UP (ref 32–37)
MCV RBC AUTO: 88.8 FL — SIGNIFICANT CHANGE UP (ref 80–94)
MCV RBC AUTO: 88.9 FL — SIGNIFICANT CHANGE UP (ref 80–94)
MCV RBC AUTO: 89.1 FL — SIGNIFICANT CHANGE UP (ref 80–94)
MCV RBC AUTO: 89.5 FL — SIGNIFICANT CHANGE UP (ref 80–94)
MONOCYTES # BLD AUTO: 0.85 K/UL — HIGH (ref 0.1–0.6)
MONOCYTES NFR BLD AUTO: 9.6 % — HIGH (ref 1.7–9.3)
NEUTROPHILS # BLD AUTO: 6.39 K/UL — SIGNIFICANT CHANGE UP (ref 1.4–6.5)
NEUTROPHILS NFR BLD AUTO: 72.2 % — SIGNIFICANT CHANGE UP (ref 42.2–75.2)
NON HDL CHOLESTEROL: 67 MG/DL — SIGNIFICANT CHANGE UP
NRBC # BLD: 0 /100 WBCS — SIGNIFICANT CHANGE UP (ref 0–0)
PLATELET # BLD AUTO: 235 K/UL — SIGNIFICANT CHANGE UP (ref 130–400)
PLATELET # BLD AUTO: 240 K/UL — SIGNIFICANT CHANGE UP (ref 130–400)
PLATELET # BLD AUTO: 246 K/UL — SIGNIFICANT CHANGE UP (ref 130–400)
PLATELET # BLD AUTO: 271 K/UL — SIGNIFICANT CHANGE UP (ref 130–400)
POTASSIUM SERPL-MCNC: 4 MMOL/L — SIGNIFICANT CHANGE UP (ref 3.5–5)
POTASSIUM SERPL-MCNC: 4.1 MMOL/L — SIGNIFICANT CHANGE UP (ref 3.5–5)
POTASSIUM SERPL-SCNC: 4 MMOL/L — SIGNIFICANT CHANGE UP (ref 3.5–5)
POTASSIUM SERPL-SCNC: 4.1 MMOL/L — SIGNIFICANT CHANGE UP (ref 3.5–5)
PROT SERPL-MCNC: 4.6 G/DL — LOW (ref 6–8)
PROTHROM AB SERPL-ACNC: 12 SEC — SIGNIFICANT CHANGE UP (ref 9.95–12.87)
RBC # BLD: 2.2 M/UL — LOW (ref 4.7–6.1)
RBC # BLD: 2.42 M/UL — LOW (ref 4.7–6.1)
RBC # BLD: 2.61 M/UL — LOW (ref 4.7–6.1)
RBC # BLD: 2.76 M/UL — LOW (ref 4.7–6.1)
RBC # FLD: 13.8 % — SIGNIFICANT CHANGE UP (ref 11.5–14.5)
RBC # FLD: 13.9 % — SIGNIFICANT CHANGE UP (ref 11.5–14.5)
RBC # FLD: 14 % — SIGNIFICANT CHANGE UP (ref 11.5–14.5)
RBC # FLD: 14.1 % — SIGNIFICANT CHANGE UP (ref 11.5–14.5)
SODIUM SERPL-SCNC: 130 MMOL/L — LOW (ref 135–146)
SODIUM SERPL-SCNC: 133 MMOL/L — LOW (ref 135–146)
TRIGL SERPL-MCNC: 101 MG/DL — SIGNIFICANT CHANGE UP
WBC # BLD: 10.23 K/UL — SIGNIFICANT CHANGE UP (ref 4.8–10.8)
WBC # BLD: 11.14 K/UL — HIGH (ref 4.8–10.8)
WBC # BLD: 8.84 K/UL — SIGNIFICANT CHANGE UP (ref 4.8–10.8)
WBC # BLD: 9 K/UL — SIGNIFICANT CHANGE UP (ref 4.8–10.8)
WBC # FLD AUTO: 10.23 K/UL — SIGNIFICANT CHANGE UP (ref 4.8–10.8)
WBC # FLD AUTO: 11.14 K/UL — HIGH (ref 4.8–10.8)
WBC # FLD AUTO: 8.84 K/UL — SIGNIFICANT CHANGE UP (ref 4.8–10.8)
WBC # FLD AUTO: 9 K/UL — SIGNIFICANT CHANGE UP (ref 4.8–10.8)

## 2022-09-25 PROCEDURE — 99291 CRITICAL CARE FIRST HOUR: CPT

## 2022-09-25 PROCEDURE — 71045 X-RAY EXAM CHEST 1 VIEW: CPT | Mod: 26

## 2022-09-25 PROCEDURE — 93306 TTE W/DOPPLER COMPLETE: CPT | Mod: 26

## 2022-09-25 PROCEDURE — 74174 CTA ABD&PLVS W/CONTRAST: CPT | Mod: 26

## 2022-09-25 RX ORDER — ACETAMINOPHEN 500 MG
650 TABLET ORAL ONCE
Refills: 0 | Status: COMPLETED | OUTPATIENT
Start: 2022-09-25 | End: 2022-09-25

## 2022-09-25 RX ORDER — CEFTRIAXONE 500 MG/1
1000 INJECTION, POWDER, FOR SOLUTION INTRAMUSCULAR; INTRAVENOUS ONCE
Refills: 0 | Status: COMPLETED | OUTPATIENT
Start: 2022-09-25 | End: 2022-09-25

## 2022-09-25 RX ORDER — SOD SULF/SODIUM/NAHCO3/KCL/PEG
4000 SOLUTION, RECONSTITUTED, ORAL ORAL ONCE
Refills: 0 | Status: COMPLETED | OUTPATIENT
Start: 2022-09-25 | End: 2022-09-25

## 2022-09-25 RX ORDER — CEFTRIAXONE 500 MG/1
2000 INJECTION, POWDER, FOR SOLUTION INTRAMUSCULAR; INTRAVENOUS EVERY 24 HOURS
Refills: 0 | Status: DISCONTINUED | OUTPATIENT
Start: 2022-09-26 | End: 2022-09-29

## 2022-09-25 RX ADMIN — OCTREOTIDE ACETATE 10 MICROGRAM(S)/HR: 200 INJECTION, SOLUTION INTRAVENOUS; SUBCUTANEOUS at 17:12

## 2022-09-25 RX ADMIN — ATORVASTATIN CALCIUM 20 MILLIGRAM(S): 80 TABLET, FILM COATED ORAL at 21:02

## 2022-09-25 RX ADMIN — CHLORHEXIDINE GLUCONATE 1 APPLICATION(S): 213 SOLUTION TOPICAL at 11:42

## 2022-09-25 RX ADMIN — Medication 100 MILLIGRAM(S): at 15:47

## 2022-09-25 RX ADMIN — Medication 100 MILLIGRAM(S): at 21:02

## 2022-09-25 RX ADMIN — CEFTRIAXONE 100 MILLIGRAM(S): 500 INJECTION, POWDER, FOR SOLUTION INTRAMUSCULAR; INTRAVENOUS at 11:17

## 2022-09-25 RX ADMIN — Medication 650 MILLIGRAM(S): at 21:02

## 2022-09-25 RX ADMIN — Medication 1 TABLET(S): at 11:16

## 2022-09-25 RX ADMIN — PANTOPRAZOLE SODIUM 40 MILLIGRAM(S): 20 TABLET, DELAYED RELEASE ORAL at 17:12

## 2022-09-25 RX ADMIN — Medication 100 MILLIGRAM(S): at 11:16

## 2022-09-25 RX ADMIN — Medication 1 MILLIGRAM(S): at 11:16

## 2022-09-25 RX ADMIN — Medication 4000 MILLILITER(S): at 19:59

## 2022-09-25 RX ADMIN — Medication 100 MILLIGRAM(S): at 05:53

## 2022-09-25 RX ADMIN — PANTOPRAZOLE SODIUM 40 MILLIGRAM(S): 20 TABLET, DELAYED RELEASE ORAL at 05:53

## 2022-09-25 RX ADMIN — Medication 20 MILLIGRAM(S): at 19:59

## 2022-09-25 RX ADMIN — SENNA PLUS 2 TABLET(S): 8.6 TABLET ORAL at 21:03

## 2022-09-25 RX ADMIN — Medication 650 MILLIGRAM(S): at 19:59

## 2022-09-25 NOTE — PROGRESS NOTE ADULT - SUBJECTIVE AND OBJECTIVE BOX
Gastroenterology progress note:     Patient is a 72y old  Male who presents with a chief complaint of UGIB (25 Sep 2022 09:39)       Admitted on: 09-24-22    We are following the patient for: hematochezia and pancreatic necrosis        Interval History:    No acute events overnight.   remains on clear  denies abdominal pain       PAST MEDICAL & SURGICAL HISTORY:  HTN (hypertension)          MEDICATIONS  (STANDING):  atorvastatin 20 milliGRAM(s) Oral at bedtime  cefTRIAXone   IVPB 1000 milliGRAM(s) IV Intermittent every 24 hours  chlorhexidine 2% Cloths 1 Application(s) Topical daily  folic acid 1 milliGRAM(s) Oral daily  metroNIDAZOLE  IVPB 500 milliGRAM(s) IV Intermittent every 8 hours  multivitamin 1 Tablet(s) Oral daily  octreotide  Infusion 50 MICROgram(s)/Hr (10 mL/Hr) IV Continuous <Continuous>  pantoprazole  Injectable 40 milliGRAM(s) IV Push every 12 hours  senna 2 Tablet(s) Oral at bedtime  thiamine 100 milliGRAM(s) Oral daily    MEDICATIONS  (PRN):  aluminum hydroxide/magnesium hydroxide/simethicone Suspension 30 milliLiter(s) Oral every 4 hours PRN Dyspepsia  LORazepam     Tablet 2 milliGRAM(s) Oral every 4 hours PRN Symptom-triggered: 2 point increase in CIWA -Ar score and a total score of 7 or LESS  melatonin 3 milliGRAM(s) Oral at bedtime PRN Insomnia  ondansetron Injectable 4 milliGRAM(s) IV Push every 8 hours PRN Nausea and/or Vomiting      Allergies  No Known Allergies      Review of Systems:   Cardiovascular:  No Chest Pain, No Palpitations  Respiratory:  No Cough, No Dyspnea  Gastrointestinal:  As described in HPI  Skin:  No Skin Lesions, No Jaundice  Neuro:  No Syncope, No Dizziness    Physical Examination:  T(C): 36.1 (09-25-22 @ 12:00), Max: 37.8 (09-24-22 @ 16:50)  HR: 79 (09-25-22 @ 11:25) (69 - 95)  BP: 134/77 (09-25-22 @ 11:25) (99/62 - 134/77)  RR: 18 (09-25-22 @ 11:25) (15 - 33)  SpO2: 94% (09-25-22 @ 11:25) (91% - 99%)  Weight (kg): 69 (09-24-22 @ 22:20)    09-24-22 @ 07:01  -  09-25-22 @ 07:00  --------------------------------------------------------  IN: 540 mL / OUT: 150 mL / NET: 390 mL    09-25-22 @ 07:01  -  09-25-22 @ 11:47  --------------------------------------------------------  IN: 50 mL / OUT: 200 mL / NET: -150 mL        GENERAL: AAOx3, no acute distress.  HEAD:  Atraumatic, Normocephalic  EYES: conjunctiva and sclera clear  NECK: Supple, no JVD or thyromegaly  CHEST/LUNG: Clear to auscultation bilaterally; No wheeze, rhonchi, or rales  HEART: Regular rate and rhythm; normal S1, S2, No murmurs.  ABDOMEN: Soft, nontender, nondistended; Bowel sounds present  NEUROLOGY: No asterixis or tremor.   SKIN: Intact, no jaundice     Data:                        8.3    8.84  )-----------( 240      ( 25 Sep 2022 06:55 )             23.2     Hgb trend:  8.3  09-25-22 @ 06:55  6.8  09-25-22 @ 00:39  6.7  09-24-22 @ 17:30  4.8  09-24-22 @ 09:17      09-24-22 @ 07:01  -  09-25-22 @ 07:00  --------------------------------------------------------  IN: 350 mL      09-25    133<L>  |  95<L>  |  17  ----------------------------<  156<H>  4.1   |  33<H>  |  0.9    Ca    7.6<L>      25 Sep 2022 06:55  Mg     2.5     09-25    TPro  4.6<L>  /  Alb  2.5<L>  /  TBili  0.5  /  DBili  x   /  AST  18  /  ALT  18  /  AlkPhos  85  09-25    Liver panel trend:  TBili 0.5   /   AST 18   /   ALT 18   /   AlkP 85   /   Tptn 4.6   /   Alb 2.5    /   DBili --      09-25  TBili <0.2   /   AST 31   /   ALT 28   /   AlkP 90   /   Tptn 4.5   /   Alb 2.5    /   DBili <0.2      09-24      PT/INR - ( 24 Sep 2022 09:17 )   PT: 12.70 sec;   INR: 1.11 ratio         PTT - ( 24 Sep 2022 09:17 )  PTT:26.4 sec       Radiology:

## 2022-09-25 NOTE — PROGRESS NOTE ADULT - ASSESSMENT
72-year-old w PMH of hypertension, hyperlipidemia, extensive alcohol use in which he is currently actively using, and as of recent history of pancreatitis requiring hospitalization who presents to [facility] for rectal bleeding.      PLAN:  -care by primary team  -monitor H&H   -transfuse as needed  -no acute surgical intervention  -GI following  -CT Angio Abd/Pelvis  -[facility] protocol    spectra [phone]

## 2022-09-25 NOTE — PROGRESS NOTE ADULT - SUBJECTIVE AND OBJECTIVE BOX
HPI:  72-year-old gentleman with PMHX  of HTN, HLD , recent hx of pancreatitis, extensive alcohol use daily, presents for episodes of Hemetemesis and hematochezia. g.  Patient notes that today he developed a large volume bloody bowel movement along with an episode of blood vomitting.  Patient became dizzy after this episode and almost passed out.  Wife was present to supplement the history.  Patient was brought to the ER and still notes dizziness.  Patient had abdominal imaging in which she was found to have necrosis of the pancreas.  Surgery consulted for pancreatic necrosis.    Of note patient was admitted June 8 to [facility] in [place] in total for 1 week in which she was treated for an uncomplicated pancreatitis.  Patient has never had prior documented pancreatitis.  Patient's wife notes that he has been having on and off intermittent epigastric pain more so towards the left upper quadrant and this has been going on for years.  Patient has been treating this pain with over-the-counter NSAIDs at times.   He drinking 5 ounces of vodka a day. Patient and wife deny him having prior endoscopic evaluation.    In the ED   T(F): 100.1 (24 Sep 2022 16:50), Max: 100.1 (24 Sep 2022 16:50)  HR: 95 (24 Sep 2022 16:50) (73 - 98)  BP: 112/58 (24 Sep 2022 16:50) (112/58 - 133/81)      LABS:  cret                        6.7    9.97  )-----------( 295      ( 24 Sep 2022 17:30 )             20.1     09-24    134<L>  |  91<L>  |  21<H>  ----------------------------<  245<H>  4.0   |  34<H>  |  1.0    Ca    8.0<L>      24 Sep 2022 09:17    TPro  4.5<L>  /  Alb  2.5<L>  /  TBili  <0.2  /  DBili  <0.2  /  AST  31  /  ALT  28  /  AlkPhos  90  09-24    PT/INR - ( 24 Sep 2022 09:17 )   PT: 12.70 sec;   INR: 1.11 ratio         PTT - ( 24 Sep 2022 09:17 )  PTT:26.4 sec      < from: CT Abdomen and Pelvis w/ IV Cont (09.24.22 @ 10:32) >  Findings of necrotizing pancreatitis. Multiple peripancreatic fluid   collections as described. Gas within some of these collections may   represent superinfection.    < end of copied text >   (24 Sep 2022 18:11)      INTERVAL HISTORY:    Yesterday when admitted, his hgb was 4.8. Transfused 2 units PRBC.    PAST MEDICAL & SURGICAL HISTORY  HTN (hypertension)    ALLERGIES:  No Known Allergies      MEDICATIONS:  MEDICATIONS  (STANDING):  atorvastatin 20 milliGRAM(s) Oral at bedtime  cefTRIAXone   IVPB 1000 milliGRAM(s) IV Intermittent every 24 hours  chlorhexidine 2% Cloths 1 Application(s) Topical daily  folic acid 1 milliGRAM(s) Oral daily  metroNIDAZOLE  IVPB 500 milliGRAM(s) IV Intermittent every 8 hours  multivitamin 1 Tablet(s) Oral daily  octreotide  Infusion 50 MICROgram(s)/Hr (10 mL/Hr) IV Continuous <Continuous>  pantoprazole  Injectable 40 milliGRAM(s) IV Push every 12 hours  senna 2 Tablet(s) Oral at bedtime  thiamine 100 milliGRAM(s) Oral daily    MEDICATIONS  (PRN):  aluminum hydroxide/magnesium hydroxide/simethicone Suspension 30 milliLiter(s) Oral every 4 hours PRN Dyspepsia  LORazepam     Tablet 2 milliGRAM(s) Oral every 4 hours PRN Symptom-triggered: 2 point increase in CIWA -Ar score and a total score of 7 or LESS  melatonin 3 milliGRAM(s) Oral at bedtime PRN Insomnia  ondansetron Injectable 4 milliGRAM(s) IV Push every 8 hours PRN Nausea and/or Vomiting      HOME MEDICATIONS:  Home Medications:  AMLODIPINE BESYLATE 5MG TAB:  (24 Sep 2022 18:16)  ATORVASTATIN CALCIUM 20MG TAB:  (24 Sep 2022 18:16)  LISINOPRIL 20 MG TABLET: TAKE 1 TABLET BY MOUTH EVERY DAY (24 Sep 2022 18:16)        OBJECTIVE:  ICU Vital Signs Last 24 Hrs  T(C): 37.4 (24 Sep 2022 22:20), Max: 37.8 (24 Sep 2022 16:50)  T(F): 99.3 (24 Sep 2022 22:20), Max: 100.1 (24 Sep 2022 16:50)  HR: 80 (24 Sep 2022 23:00) (73 - 98)  BP: 129/63 (24 Sep 2022 23:00) (112/58 - 133/81)  BP(mean): 90 (24 Sep 2022 23:00) (90 - 91)  ABP: --  ABP(mean): --  RR: 33 (24 Sep 2022 23:00) (15 - 33)  SpO2: 93% (24 Sep 2022 23:00) (93% - 99%)    O2 Parameters below as of 24 Sep 2022 23:00  Patient On (Oxygen Delivery Method): room air      Adult Advanced Hemodynamics Last 24 Hrs  CVP(mm Hg): --  CVP(cm H2O): --  CO: --  CI: --  PA: --  PA(mean): --  PCWP: --  SVR: --  SVRI: --  PVR: --  PVRI: --  I&O's Summary    24 Sep 2022 07:01  -  25 Sep 2022 00:06  --------------------------------------------------------  IN: 20 mL / OUT: 0 mL / NET: 20 mL      Daily Height in cm: 172.72 (24 Sep 2022 22:20)    Daily     PHYSICAL EXAM:  NEURO: patient is awake , alert and oriented  GEN: Not in acute distress  NECK: supple  LUNGS: Clear to auscultation bilaterally   CARDIOVASCULAR: S1/S2 present, RRR , no murmurs  ABD: Soft, non-tender, non-distended, +BS  EXT: No KOFI      LABS:                        6.7    9.97  )-----------( 295      ( 24 Sep 2022 17:30 )             20.1     09-24    134<L>  |  91<L>  |  21<H>  ----------------------------<  245<H>  4.0   |  34<H>  |  1.0    Ca    8.0<L>      24 Sep 2022 09:17    TPro  4.5<L>  /  Alb  2.5<L>  /  TBili  <0.2  /  DBili  <0.2  /  AST  31  /  ALT  28  /  AlkPhos  90  09-24    PT/INR - ( 24 Sep 2022 09:17 )   PT: 12.70 sec;   INR: 1.11 ratio         PTT - ( 24 Sep 2022 09:17 )  PTT:26.4 sec  Lactate, Blood: 3.8 mmol/L *H* (09-24-22 @ 14:59)  Lactate, Blood: 6.4 mmol/L *HH* (09-24-22 @ 09:17)        RADIOLOGY:  CT Abdomen and Pelvis w/ IV Cont (09.24.22 @ 10:32)  Findings of necrotizing pancreatitis. Multiple peripancreatic fluid   collections as described. Gas within some of these collections may   represent superinfection.    Additional areas of hypoenhancement in the pancreatic head, may also   represent sequela of necrotizing pancreatitis, however follow-up is   recommended.    ECG:    TELEMETRY EVENTS:       HPI:  72-year-old gentleman with PMHX  of HTN, HLD , recent hx of pancreatitis, extensive alcohol use daily, presents for episodes of Hemetemesis and hematochezia. g.  Patient notes that today he developed a large volume bloody bowel movement along with an episode of blood vomitting.  Patient became dizzy after this episode and almost passed out.  Wife was present to supplement the history.  Patient was brought to the ER and still notes dizziness.  Patient had abdominal imaging in which she was found to have necrosis of the pancreas.  Surgery consulted for pancreatic necrosis.    Of note patient was admitted June 8 to [facility] in [place] in total for 1 week in which she was treated for an uncomplicated pancreatitis.  Patient has never had prior documented pancreatitis.  Patient's wife notes that he has been having on and off intermittent epigastric pain more so towards the left upper quadrant and this has been going on for years.  Patient has been treating this pain with over-the-counter NSAIDs at times.   He drinking 5 ounces of vodka a day. Patient and wife deny him having prior endoscopic evaluation.    In the ED   T(F): 100.1 (24 Sep 2022 16:50), Max: 100.1 (24 Sep 2022 16:50)  HR: 95 (24 Sep 2022 16:50) (73 - 98)  BP: 112/58 (24 Sep 2022 16:50) (112/58 - 133/81)      LABS:  cret                        6.7    9.97  )-----------( 295      ( 24 Sep 2022 17:30 )             20.1     09-24    134<L>  |  91<L>  |  21<H>  ----------------------------<  245<H>  4.0   |  34<H>  |  1.0    Ca    8.0<L>      24 Sep 2022 09:17    TPro  4.5<L>  /  Alb  2.5<L>  /  TBili  <0.2  /  DBili  <0.2  /  AST  31  /  ALT  28  /  AlkPhos  90  09-24    PT/INR - ( 24 Sep 2022 09:17 )   PT: 12.70 sec;   INR: 1.11 ratio         PTT - ( 24 Sep 2022 09:17 )  PTT:26.4 sec      < from: CT Abdomen and Pelvis w/ IV Cont (09.24.22 @ 10:32) >  Findings of necrotizing pancreatitis. Multiple peripancreatic fluid   collections as described. Gas within some of these collections may   represent superinfection.    < end of copied text >   (24 Sep 2022 18:11)      INTERVAL HISTORY:    Yesterday when admitted, his hgb was 4.8. Transfused 2 units PRBC. Repeat hgb 6.8. 3rd unit PRBC transfused    PAST MEDICAL & SURGICAL HISTORY  HTN (hypertension)    ALLERGIES:  No Known Allergies      MEDICATIONS:  MEDICATIONS  (STANDING):  atorvastatin 20 milliGRAM(s) Oral at bedtime  cefTRIAXone   IVPB 1000 milliGRAM(s) IV Intermittent every 24 hours  chlorhexidine 2% Cloths 1 Application(s) Topical daily  folic acid 1 milliGRAM(s) Oral daily  metroNIDAZOLE  IVPB 500 milliGRAM(s) IV Intermittent every 8 hours  multivitamin 1 Tablet(s) Oral daily  octreotide  Infusion 50 MICROgram(s)/Hr (10 mL/Hr) IV Continuous <Continuous>  pantoprazole  Injectable 40 milliGRAM(s) IV Push every 12 hours  senna 2 Tablet(s) Oral at bedtime  thiamine 100 milliGRAM(s) Oral daily    MEDICATIONS  (PRN):  aluminum hydroxide/magnesium hydroxide/simethicone Suspension 30 milliLiter(s) Oral every 4 hours PRN Dyspepsia  LORazepam     Tablet 2 milliGRAM(s) Oral every 4 hours PRN Symptom-triggered: 2 point increase in CIWA -Ar score and a total score of 7 or LESS  melatonin 3 milliGRAM(s) Oral at bedtime PRN Insomnia  ondansetron Injectable 4 milliGRAM(s) IV Push every 8 hours PRN Nausea and/or Vomiting      HOME MEDICATIONS:  Home Medications:  AMLODIPINE BESYLATE 5MG TAB:  (24 Sep 2022 18:16)  ATORVASTATIN CALCIUM 20MG TAB:  (24 Sep 2022 18:16)  LISINOPRIL 20 MG TABLET: TAKE 1 TABLET BY MOUTH EVERY DAY (24 Sep 2022 18:16)        OBJECTIVE:  ICU Vital Signs Last 24 Hrs  T(C): 37.4 (24 Sep 2022 22:20), Max: 37.8 (24 Sep 2022 16:50)  T(F): 99.3 (24 Sep 2022 22:20), Max: 100.1 (24 Sep 2022 16:50)  HR: 80 (24 Sep 2022 23:00) (73 - 98)  BP: 129/63 (24 Sep 2022 23:00) (112/58 - 133/81)  BP(mean): 90 (24 Sep 2022 23:00) (90 - 91)  ABP: --  ABP(mean): --  RR: 33 (24 Sep 2022 23:00) (15 - 33)  SpO2: 93% (24 Sep 2022 23:00) (93% - 99%)    O2 Parameters below as of 24 Sep 2022 23:00  Patient On (Oxygen Delivery Method): room air      Adult Advanced Hemodynamics Last 24 Hrs  CVP(mm Hg): --  CVP(cm H2O): --  CO: --  CI: --  PA: --  PA(mean): --  PCWP: --  SVR: --  SVRI: --  PVR: --  PVRI: --  I&O's Summary    24 Sep 2022 07:01  -  25 Sep 2022 00:06  --------------------------------------------------------  IN: 20 mL / OUT: 0 mL / NET: 20 mL      Daily Height in cm: 172.72 (24 Sep 2022 22:20)    Daily     PHYSICAL EXAM:  NEURO: patient is awake , alert and oriented  GEN: Not in acute distress  NECK: supple  LUNGS: Clear to auscultation bilaterally   CARDIOVASCULAR: S1/S2 present, RRR , no murmurs  ABD: Soft, non-tender, non-distended, +BS  EXT: No KOFI      LABS:                        6.7    9.97  )-----------( 295      ( 24 Sep 2022 17:30 )             20.1     09-24    134<L>  |  91<L>  |  21<H>  ----------------------------<  245<H>  4.0   |  34<H>  |  1.0    Ca    8.0<L>      24 Sep 2022 09:17    TPro  4.5<L>  /  Alb  2.5<L>  /  TBili  <0.2  /  DBili  <0.2  /  AST  31  /  ALT  28  /  AlkPhos  90  09-24    PT/INR - ( 24 Sep 2022 09:17 )   PT: 12.70 sec;   INR: 1.11 ratio         PTT - ( 24 Sep 2022 09:17 )  PTT:26.4 sec  Lactate, Blood: 3.8 mmol/L *H* (09-24-22 @ 14:59)  Lactate, Blood: 6.4 mmol/L *HH* (09-24-22 @ 09:17)        RADIOLOGY:  CT Abdomen and Pelvis w/ IV Cont (09.24.22 @ 10:32)  Findings of necrotizing pancreatitis. Multiple peripancreatic fluid   collections as described. Gas within some of these collections may   represent superinfection.    Additional areas of hypoenhancement in the pancreatic head, may also   represent sequela of necrotizing pancreatitis, however follow-up is   recommended.    ECG:    TELEMETRY EVENTS:       HPI:  72-year-old gentleman with PMHX  of HTN, HLD , recent hx of pancreatitis, extensive alcohol use daily, presents for episodes of Hemetemesis and hematochezia. g.  Patient notes that today he developed a large volume bloody bowel movement along with an episode of blood vomitting.  Patient became dizzy after this episode and almost passed out.  Wife was present to supplement the history.  Patient was brought to the ER and still notes dizziness.  Patient had abdominal imaging in which she was found to have necrosis of the pancreas.  Surgery consulted for pancreatic necrosis.    Of note patient was admitted June 8 to [facility] in [place] in total for 1 week in which she was treated for an uncomplicated pancreatitis.  Patient has never had prior documented pancreatitis.  Patient's wife notes that he has been having on and off intermittent epigastric pain more so towards the left upper quadrant and this has been going on for years.  Patient has been treating this pain with over-the-counter NSAIDs at times.   He drinking 5 ounces of vodka a day. Patient and wife deny him having prior endoscopic evaluation.    In the ED   T(F): 100.1 (24 Sep 2022 16:50), Max: 100.1 (24 Sep 2022 16:50)  HR: 95 (24 Sep 2022 16:50) (73 - 98)  BP: 112/58 (24 Sep 2022 16:50) (112/58 - 133/81)      LABS:  cret                        6.7    9.97  )-----------( 295      ( 24 Sep 2022 17:30 )             20.1     09-24    134<L>  |  91<L>  |  21<H>  ----------------------------<  245<H>  4.0   |  34<H>  |  1.0    Ca    8.0<L>      24 Sep 2022 09:17    TPro  4.5<L>  /  Alb  2.5<L>  /  TBili  <0.2  /  DBili  <0.2  /  AST  31  /  ALT  28  /  AlkPhos  90  09-24    PT/INR - ( 24 Sep 2022 09:17 )   PT: 12.70 sec;   INR: 1.11 ratio         PTT - ( 24 Sep 2022 09:17 )  PTT:26.4 sec      < from: CT Abdomen and Pelvis w/ IV Cont (09.24.22 @ 10:32) >  Findings of necrotizing pancreatitis. Multiple peripancreatic fluid   collections as described. Gas within some of these collections may   represent superinfection.    < end of copied text >   (24 Sep 2022 18:11)      INTERVAL HISTORY:    Yesterday when admitted, his hgb was 4.8. Transfused 2 units PRBC. Repeat hgb 6.8. 3rd unit PRBC transfused. f/u AM CBC. No other acute events overnight    PAST MEDICAL & SURGICAL HISTORY  HTN (hypertension)    ALLERGIES:  No Known Allergies      MEDICATIONS:  MEDICATIONS  (STANDING):  atorvastatin 20 milliGRAM(s) Oral at bedtime  cefTRIAXone   IVPB 1000 milliGRAM(s) IV Intermittent every 24 hours  chlorhexidine 2% Cloths 1 Application(s) Topical daily  folic acid 1 milliGRAM(s) Oral daily  metroNIDAZOLE  IVPB 500 milliGRAM(s) IV Intermittent every 8 hours  multivitamin 1 Tablet(s) Oral daily  octreotide  Infusion 50 MICROgram(s)/Hr (10 mL/Hr) IV Continuous <Continuous>  pantoprazole  Injectable 40 milliGRAM(s) IV Push every 12 hours  senna 2 Tablet(s) Oral at bedtime  thiamine 100 milliGRAM(s) Oral daily    MEDICATIONS  (PRN):  aluminum hydroxide/magnesium hydroxide/simethicone Suspension 30 milliLiter(s) Oral every 4 hours PRN Dyspepsia  LORazepam     Tablet 2 milliGRAM(s) Oral every 4 hours PRN Symptom-triggered: 2 point increase in CIWA -Ar score and a total score of 7 or LESS  melatonin 3 milliGRAM(s) Oral at bedtime PRN Insomnia  ondansetron Injectable 4 milliGRAM(s) IV Push every 8 hours PRN Nausea and/or Vomiting      HOME MEDICATIONS:  Home Medications:  AMLODIPINE BESYLATE 5MG TAB:  (24 Sep 2022 18:16)  ATORVASTATIN CALCIUM 20MG TAB:  (24 Sep 2022 18:16)  LISINOPRIL 20 MG TABLET: TAKE 1 TABLET BY MOUTH EVERY DAY (24 Sep 2022 18:16)        OBJECTIVE:  ICU Vital Signs Last 24 Hrs  T(C): 37.4 (24 Sep 2022 22:20), Max: 37.8 (24 Sep 2022 16:50)  T(F): 99.3 (24 Sep 2022 22:20), Max: 100.1 (24 Sep 2022 16:50)  HR: 80 (24 Sep 2022 23:00) (73 - 98)  BP: 129/63 (24 Sep 2022 23:00) (112/58 - 133/81)  BP(mean): 90 (24 Sep 2022 23:00) (90 - 91)  ABP: --  ABP(mean): --  RR: 33 (24 Sep 2022 23:00) (15 - 33)  SpO2: 93% (24 Sep 2022 23:00) (93% - 99%)    O2 Parameters below as of 24 Sep 2022 23:00  Patient On (Oxygen Delivery Method): room air      Adult Advanced Hemodynamics Last 24 Hrs  CVP(mm Hg): --  CVP(cm H2O): --  CO: --  CI: --  PA: --  PA(mean): --  PCWP: --  SVR: --  SVRI: --  PVR: --  PVRI: --  I&O's Summary    24 Sep 2022 07:01  -  25 Sep 2022 00:06  --------------------------------------------------------  IN: 20 mL / OUT: 0 mL / NET: 20 mL      Daily Height in cm: 172.72 (24 Sep 2022 22:20)    Daily     PHYSICAL EXAM:  NEURO: patient is awake , alert and oriented  GEN: Not in acute distress  NECK: supple  LUNGS: Clear to auscultation bilaterally   CARDIOVASCULAR: S1/S2 present, RRR , no murmurs  ABD: Soft, non-tender, non-distended, +BS  EXT: No KOFI      LABS:                        6.7    9.97  )-----------( 295      ( 24 Sep 2022 17:30 )             20.1     09-24    134<L>  |  91<L>  |  21<H>  ----------------------------<  245<H>  4.0   |  34<H>  |  1.0    Ca    8.0<L>      24 Sep 2022 09:17    TPro  4.5<L>  /  Alb  2.5<L>  /  TBili  <0.2  /  DBili  <0.2  /  AST  31  /  ALT  28  /  AlkPhos  90  09-24    PT/INR - ( 24 Sep 2022 09:17 )   PT: 12.70 sec;   INR: 1.11 ratio         PTT - ( 24 Sep 2022 09:17 )  PTT:26.4 sec  Lactate, Blood: 3.8 mmol/L *H* (09-24-22 @ 14:59)  Lactate, Blood: 6.4 mmol/L *HH* (09-24-22 @ 09:17)        RADIOLOGY:  CT Abdomen and Pelvis w/ IV Cont (09.24.22 @ 10:32)  Findings of necrotizing pancreatitis. Multiple peripancreatic fluid   collections as described. Gas within some of these collections may   represent superinfection.    Additional areas of hypoenhancement in the pancreatic head, may also   represent sequela of necrotizing pancreatitis, however follow-up is   recommended.    ECG:    TELEMETRY EVENTS:    No tele events overnight   HPI:  72-year-old gentleman with PMHX  of HTN, HLD , recent hx of pancreatitis, extensive alcohol use daily, presents for episodes of Hemetemesis and hematochezia. g.  Patient notes that today he developed a large volume bloody bowel movement along with an episode of blood vomitting.  Patient became dizzy after this episode and almost passed out.  Wife was present to supplement the history.  Patient was brought to the ER and still notes dizziness.  Patient had abdominal imaging in which she was found to have necrosis of the pancreas.  Surgery consulted for pancreatic necrosis.    Of note patient was admitted June 8 to [facility] in [place] in total for 1 week in which she was treated for an uncomplicated pancreatitis.  Patient has never had prior documented pancreatitis.  Patient's wife notes that he has been having on and off intermittent epigastric pain more so towards the left upper quadrant and this has been going on for years.  Patient has been treating this pain with over-the-counter NSAIDs at times.   He drinking 5 ounces of vodka a day. Patient and wife deny him having prior endoscopic evaluation.    In the ED   T(F): 100.1 (24 Sep 2022 16:50), Max: 100.1 (24 Sep 2022 16:50)  HR: 95 (24 Sep 2022 16:50) (73 - 98)  BP: 112/58 (24 Sep 2022 16:50) (112/58 - 133/81)      LABS:  cret                        6.7    9.97  )-----------( 295      ( 24 Sep 2022 17:30 )             20.1     09-24    134<L>  |  91<L>  |  21<H>  ----------------------------<  245<H>  4.0   |  34<H>  |  1.0    Ca    8.0<L>      24 Sep 2022 09:17    TPro  4.5<L>  /  Alb  2.5<L>  /  TBili  <0.2  /  DBili  <0.2  /  AST  31  /  ALT  28  /  AlkPhos  90  09-24    PT/INR - ( 24 Sep 2022 09:17 )   PT: 12.70 sec;   INR: 1.11 ratio         PTT - ( 24 Sep 2022 09:17 )  PTT:26.4 sec      < from: CT Abdomen and Pelvis w/ IV Cont (09.24.22 @ 10:32) >  Findings of necrotizing pancreatitis. Multiple peripancreatic fluid   collections as described. Gas within some of these collections may   represent superinfection.    < end of copied text >   (24 Sep 2022 18:11)      INTERVAL HISTORY:    Yesterday when admitted, his hgb was 4.8. Transfused 2 units PRBC. Repeat hgb 6.8. 3rd unit PRBC transfused. f/u AM CBC. No other acute events overnight    PAST MEDICAL & SURGICAL HISTORY  HTN (hypertension)    ALLERGIES:  No Known Allergies      MEDICATIONS:  MEDICATIONS  (STANDING):  atorvastatin 20 milliGRAM(s) Oral at bedtime  cefTRIAXone   IVPB 1000 milliGRAM(s) IV Intermittent every 24 hours  chlorhexidine 2% Cloths 1 Application(s) Topical daily  folic acid 1 milliGRAM(s) Oral daily  metroNIDAZOLE  IVPB 500 milliGRAM(s) IV Intermittent every 8 hours  multivitamin 1 Tablet(s) Oral daily  octreotide  Infusion 50 MICROgram(s)/Hr (10 mL/Hr) IV Continuous <Continuous>  pantoprazole  Injectable 40 milliGRAM(s) IV Push every 12 hours  senna 2 Tablet(s) Oral at bedtime  thiamine 100 milliGRAM(s) Oral daily    MEDICATIONS  (PRN):  aluminum hydroxide/magnesium hydroxide/simethicone Suspension 30 milliLiter(s) Oral every 4 hours PRN Dyspepsia  LORazepam     Tablet 2 milliGRAM(s) Oral every 4 hours PRN Symptom-triggered: 2 point increase in CIWA -Ar score and a total score of 7 or LESS  melatonin 3 milliGRAM(s) Oral at bedtime PRN Insomnia  ondansetron Injectable 4 milliGRAM(s) IV Push every 8 hours PRN Nausea and/or Vomiting      HOME MEDICATIONS:  Home Medications:  AMLODIPINE BESYLATE 5MG TAB:  (24 Sep 2022 18:16)  ATORVASTATIN CALCIUM 20MG TAB:  (24 Sep 2022 18:16)  LISINOPRIL 20 MG TABLET: TAKE 1 TABLET BY MOUTH EVERY DAY (24 Sep 2022 18:16)        OBJECTIVE:  ICU Vital Signs Last 24 Hrs  T(C): 37.4 (24 Sep 2022 22:20), Max: 37.8 (24 Sep 2022 16:50)  T(F): 99.3 (24 Sep 2022 22:20), Max: 100.1 (24 Sep 2022 16:50)  HR: 80 (24 Sep 2022 23:00) (73 - 98)  BP: 129/63 (24 Sep 2022 23:00) (112/58 - 133/81)  BP(mean): 90 (24 Sep 2022 23:00) (90 - 91)  ABP: --  ABP(mean): --  RR: 33 (24 Sep 2022 23:00) (15 - 33)  SpO2: 93% (24 Sep 2022 23:00) (93% - 99%)    O2 Parameters below as of 24 Sep 2022 23:00  Patient On (Oxygen Delivery Method): room air      24 Sep 2022 07:01  -  25 Sep 2022 00:06  --------------------------------------------------------  IN: 20 mL / OUT: 0 mL / NET: 20 mL      Daily Height in cm: 172.72 (24 Sep 2022 22:20)    Daily     PHYSICAL EXAM:  NEURO: patient is awake , alert and oriented  GEN: Not in acute distress  NECK: supple  LUNGS: Clear to auscultation bilaterally   CARDIOVASCULAR: S1/S2 present, RRR , no murmurs  ABD: Soft, non-tender, non-distended, +BS  EXT: No KOFI      LABS:                        6.7    9.97  )-----------( 295      ( 24 Sep 2022 17:30 )             20.1     09-24    134<L>  |  91<L>  |  21<H>  ----------------------------<  245<H>  4.0   |  34<H>  |  1.0    Ca    8.0<L>      24 Sep 2022 09:17    TPro  4.5<L>  /  Alb  2.5<L>  /  TBili  <0.2  /  DBili  <0.2  /  AST  31  /  ALT  28  /  AlkPhos  90  09-24    PT/INR - ( 24 Sep 2022 09:17 )   PT: 12.70 sec;   INR: 1.11 ratio         PTT - ( 24 Sep 2022 09:17 )  PTT:26.4 sec  Lactate, Blood: 3.8 mmol/L *H* (09-24-22 @ 14:59)  Lactate, Blood: 6.4 mmol/L *HH* (09-24-22 @ 09:17)        RADIOLOGY:  CT Abdomen and Pelvis w/ IV Cont (09.24.22 @ 10:32)  Findings of necrotizing pancreatitis. Multiple peripancreatic fluid   collections as described. Gas within some of these collections may   represent superinfection.    Additional areas of hypoenhancement in the pancreatic head, may also   represent sequela of necrotizing pancreatitis, however follow-up is   recommended.    Echo:    1. Left ventricular ejection fraction, by visual estimation, is 65 to   70%.   2. Normal global left ventricular systolic function.   3. Mildly dilated left atrium.   4. Mild anterior mitral leaflet prolapse.   5. Moderate mitral regurgitation.    ECG:    TELEMETRY EVENTS:    No tele events overnight

## 2022-09-25 NOTE — CONSULT NOTE ADULT - SUBJECTIVE AND OBJECTIVE BOX
CHRIS KHAN  72y, Male  Allergy: No Known Allergies      CHIEF COMPLAINT: UGIB (25 Sep 2022 00:05)      LOS  1d    HPI:  72-year-old gentleman with PMHX  of HTN, HLD , recent hx of pancreatitis, extensive alcohol use daily, presents for episodes of Hemetemesis and hematochezia. g.  Patient notes that today he developed a large volume bloody bowel movement along with an episode of blood vomitting.  Patient became dizzy after this episode and almost passed out.  Wife was present to supplement the history.  Patient was brought to the ER and still notes dizziness.  Patient had abdominal imaging in which she was found to have necrosis of the pancreas.  Surgery consulted for pancreatic necrosis.    Of note patient was admitted June 8 to [facility] in [place] in total for 1 week in which she was treated for an uncomplicated pancreatitis.  Patient has never had prior documented pancreatitis.  Patient's wife notes that he has been having on and off intermittent epigastric pain more so towards the left upper quadrant and this has been going on for years.  Patient has been treating this pain with over-the-counter NSAIDs at times.   He drinking 5 ounces of vodka a day. Patient and wife deny him having prior endoscopic evaluation.    In the ED  T(F): 100.1 (24 Sep 2022 16:50), Max: 100.1 (24 Sep 2022 16:50)  HR: 95 (24 Sep 2022 16:50) (73 - 98)  BP: 112/58 (24 Sep 2022 16:50) (112/58 - 133/81)      LABS:  cret                        6.7    9.97  )-----------( 295      ( 24 Sep 2022 17:30 )             20.1    09-24    134<L>  |  91<L>  |  21<H>  ----------------------------<  245<H>  4.0   |  34<H>  |  1.0    Ca    8.0<L>      24 Sep 2022 09:17    TPro  4.5<L>  /  Alb  2.5<L>  /  TBili  <0.2  /  DBili  <0.2  /  AST  31  /  ALT  28  /  AlkPhos  90  09-24    PT/INR - ( 24 Sep 2022 09:17 )   PT: 12.70 sec;   INR: 1.11 ratio         PTT - ( 24 Sep 2022 09:17 )  PTT:26.4 sec      < from: CT Abdomen and Pelvis w/ IV Cont (09.24.22 @ 10:32) >  Findings of necrotizing pancreatitis. Multiple peripancreatic fluid  collections as described. Gas within some of these collections may  represent superinfection.    < end of copied text >   (24 Sep 2022 18:11)      INFECTIOUS DISEASE HISTORY:  History as above.    PAST MEDICAL & SURGICAL HISTORY:  HTN (hypertension)          FAMILY HISTORY  Family history reviewed and non-contributory      SOCIAL HISTORY  Social History:        ROS  General: Denies rigors, nightsweats  HEENT: Denies headache, rhinorrhea, sore throat, eye pain  CV: Denies CP, palpitations  PULM: Denies wheezing, hemoptysis  GI: Denies hematemesis, hematochezia, melena  : Denies discharge, hematuria  MSK: Denies arthralgias, myalgias  SKIN: Denies rash, lesions  NEURO: Denies paresthesias, weakness  PSYCH: Denies depression, anxiety    VITALS:  T(F): 98.2, Max: 100.1 (09-24-22 @ 16:50)  HR: 73  BP: 119/64  RR: 20Vital Signs Last 24 Hrs  T(C): 36.8 (25 Sep 2022 03:00), Max: 37.8 (24 Sep 2022 16:50)  T(F): 98.2 (25 Sep 2022 03:00), Max: 100.1 (24 Sep 2022 16:50)  HR: 73 (25 Sep 2022 03:00) (72 - 98)  BP: 119/64 (25 Sep 2022 03:00) (99/62 - 133/81)  BP(mean): 86 (25 Sep 2022 03:00) (76 - 92)  RR: 20 (25 Sep 2022 03:00) (15 - 33)  SpO2: 92% (25 Sep 2022 03:00) (91% - 99%)    Parameters below as of 25 Sep 2022 03:00  Patient On (Oxygen Delivery Method): room air        PHYSICAL EXAM:  Gen: NAD, resting in bed  HEENT: Normocephalic, atraumatic  Neck: supple, no lymphadenopathy  CV: Regular rate & regular rhythm  Lungs: decreased BS at bases, no fremitus  Abdomen: Soft, BS present  Ext: Warm, well perfused  Neuro: non focal, awake  Skin: no rash, no erythema  Lines: no phlebitis    TESTS & MEASUREMENTS:                        6.8    9.00  )-----------( 246      ( 25 Sep 2022 00:39 )             19.7    09-24    134<L>  |  91<L>  |  21<H>  ----------------------------<  245<H>  4.0   |  34<H>  |  1.0    Ca    8.0<L>      24 Sep 2022 09:17    TPro  4.5<L>  /  Alb  2.5<L>  /  TBili  <0.2  /  DBili  <0.2  /  AST  31  /  ALT  28  /  AlkPhos  90  09-24      LIVER FUNCTIONS - ( 24 Sep 2022 09:17 )  Alb: 2.5 g/dL / Pro: 4.5 g/dL / ALK PHOS: 90 U/L / ALT: 28 U/L / AST: 31 U/L / GGT: x                Lactate, Blood: 3.8 mmol/L (09-24-22 @ 14:59)  Lactate, Blood: 6.4 mmol/L (09-24-22 @ 09:17)      INFECTIOUS DISEASES TESTING  COVID-19 PCR: NotDetec (09-24-22 @ 09:17)      RADIOLOGY & ADDITIONAL TESTS:  I have personally reviewed the last Chest xray  CXR  Xray Chest 1 View- PORTABLE-Urgent:  ACC: [id number] EXAM:  XR CHEST PORTABLE URGENT 1V                          PROCEDURE DATE:  09/24/2022          INTERPRETATION:  STUDY INDICATION: anemia    Comparison: None.    Technique/Positioning: Frontal view of the chest were obtained.    Findings:    Support devices: None.    Cardiac/mediastinum/hilum: Within normal limits.    Lung parenchyma/Pleura: No consolidation, effusion or pneumothorax.    Skeleton/soft tissues:  No radiographically evident acute displaced  fracture within the limitations of this exam.    Impression:    No acute abnormality on frontal chest radiograph.    --- End of Report ---            JOVANNI MCGREGOR MD; Attending Radiologist  This document has been electronically signed. Sep 24 2022  2:27PM (09-24-22 @ 09:55)      CT  CT Abdomen and Pelvis w/ IV Cont:  ACC: [id number] EXAM:  CT ABDOMEN AND PELVIS IC                          PROCEDURE DATE:  09/24/2022          INTERPRETATION:  CLINICAL HISTORY: Abdominal pain.    TECHNIQUE: Contiguous axial CT images were obtained from the lower chest  to the pubic symphysis following administration of Optiray intravenous  contrast. Oral contrast was not administered. Reformatted images in the  coronal and sagittal planes were acquired.    COMPARISON: None.      FINDINGS:    LOWER CHEST: Subsegmental atelectasis    HEPATOBILIARY: Unremarkable.    SPLEEN: Unremarkable.    PANCREAS: Findings of necrotizing pancreatitis -patchy hypoenhancement of  the pancreatic parenchyma with multiple loculated peripancreatic fluid  collections. Examples include a 7.8 x3.8 cm collection with gas (4/88),  in the lesser sac adherent to the pancreas, and additional 6.3 x 2.9 cm  collection more anteriorly (4/96), and additional 2.5 x 1.7 cm collection  along the greater curvature of the stomach with gas (4/69). Additional  collection more inferiorly anterior to the transverse colon measuring 3.6  x 2.6 cm (4/123). Gas within these collections may represent  superinfection. Additional areas of hypoenhancement in the pancreatic  head, may also represent sequela of necrotizing pancreatitis, however  follow-up is recommended. Calcifications of the pancreatic head.    ADRENAL GLANDS: 0.8 cm left adrenal myelolipoma.    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 Bilateral  renal cysts and additional hypodensities too small to characterize. No  hydronephrosis.    ABDOMINOPELVIC NODES: Unremarkable    PELVIC ORGANS: Unremarkable.    PERITONEUM/MESENTERY/BOWEL:  No evidence of bowel obstruction.  Unremarkable appendix.    BONES/SOFT TISSUES: Degenerative changes of the spine.    OTHER: Atherosclerotic calcifications      IMPRESSION:    Findings of necrotizing pancreatitis. Multiple peripancreatic fluid  collections as described. Gas within some of these collections may  represent superinfection.    Additional areas of hypoenhancement in the pancreatic head, may also  represent sequela of necrotizing pancreatitis, however follow-up is  recommended.    Additional findings in the body of the report.    --- End of Report ---            JONY JEAN MD; Attending Radiologist  This document has been electronically signed. Sep 24 2022 11:32AM (09-24-22 @ 10:32)      CARDIOLOGY TESTING      MEDICATIONS  atorvastatin 20 Oral at bedtime  cefTRIAXone   IVPB 1000 IV Intermittent every 24 hours  chlorhexidine 2% Cloths 1 Topical daily  folic acid 1 Oral daily  metroNIDAZOLE  IVPB 500 IV Intermittent every 8 hours  multivitamin 1 Oral daily  octreotide  Infusion 50 IV Continuous <Continuous>  pantoprazole  Injectable 40 IV Push every 12 hours  senna 2 Oral at bedtime  thiamine 100 Oral daily      Weight  Weight (kg): 69 (09-24-22 @ 22:20)    ANTIBIOTICS:  cefTRIAXone   IVPB 1000 milliGRAM(s) IV Intermittent every 24 hours  metroNIDAZOLE  IVPB 500 milliGRAM(s) IV Intermittent every 8 hours      ALLERGIES:  No Known Allergies

## 2022-09-25 NOTE — CONSULT NOTE ADULT - ASSESSMENT
ASSESSMENT  72-year-old gentleman with PMHX  of HTN, HLD , recent hx of pancreatitis, extensive alcohol use daily, presents for episodes of Hemetemesis and hematochezia.    IMPRESSION  #Necrotizing Pancreatitis  - CT Abdomen and Pelvis w/ IV Cont (09.24.22 @ 10:32): Findings of necrotizing pancreatitis -patchy hypoenhancement of the pancreatic parenchyma with multiple loculated peripancreatic fluid  collections. Examples include a 7.8 x3.8 cm collection with gas (4/88),  in the lesser sac adherent to the pancreas, and additional 6.3 x 2.9 cm  collection more anteriorly (4/96), and additional 2.5 x 1.7 cm collection  along the greater curvature of the stomach with gas (4/69). Additional  collection more inferiorly anterior to the transverse colon measuring 3.6  x 2.6 cm (4/123). Gas within these collections may represent  superinfection.    #Acute Anemia/UGIB    #Alcohol use disorder  #Obesity BMI (kg/m2): 23.1  #Abx allergy: NKDA      RECOMMENDATIONS  - no worsening WBC, no fevesr while on current regimen   - continue ceftriaxone - increase to 2g daily   - contiue flagyl 500 mg TID   - GI evaluation for EUS for possible drainage of pancreatic collections  - repeat blood cx, change to zosyn only  if febrile     Please call or message on [employer] Teams if with any questions.  Spectra [phone]
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
## 2022-09-25 NOTE — PROGRESS NOTE ADULT - SUBJECTIVE AND OBJECTIVE BOX
GENERAL SURGERY PROGRESS NOTE    Patient: CHRIS KHAN , 72y ([birth year])Male   MRN: [id number]  Location: [facility] A  Visit: 09-24-22 Inpatient  Date: 09-25-22 @ 03:37      Procedure/Dx/Injuries: necrotic pancreatitis    Events of past 24 hours: admitted, GI evaluated    PAST MEDICAL & SURGICAL HISTORY:  HTN (hypertension)          Vitals:   T(F): 98.2 (09-25-22 @ 03:00), Max: 100.1 (09-24-22 @ 16:50)  HR: 73 (09-25-22 @ 03:00)  BP: 119/64 (09-25-22 @ 03:00)  RR: 20 (09-25-22 @ 03:00)  SpO2: 92% (09-25-22 @ 03:00)      Diet, Clear Liquid      Fluids:     I & O's:        PHYSICAL EXAM:  General Appearance: AAOX3, NAD  Heart: RRR  Lungs: CTAX2  Abdomen:  soft, non tender, non distended  MSK/Extremities:  mobile    MEDICATIONS  (STANDING):  atorvastatin 20 milliGRAM(s) Oral at bedtime  cefTRIAXone   IVPB 1000 milliGRAM(s) IV Intermittent every 24 hours  chlorhexidine 2% Cloths 1 Application(s) Topical daily  folic acid 1 milliGRAM(s) Oral daily  metroNIDAZOLE  IVPB 500 milliGRAM(s) IV Intermittent every 8 hours  multivitamin 1 Tablet(s) Oral daily  octreotide  Infusion 50 MICROgram(s)/Hr (10 mL/Hr) IV Continuous <Continuous>  pantoprazole  Injectable 40 milliGRAM(s) IV Push every 12 hours  senna 2 Tablet(s) Oral at bedtime  thiamine 100 milliGRAM(s) Oral daily    MEDICATIONS  (PRN):  aluminum hydroxide/magnesium hydroxide/simethicone Suspension 30 milliLiter(s) Oral every 4 hours PRN Dyspepsia  LORazepam     Tablet 2 milliGRAM(s) Oral every 4 hours PRN Symptom-triggered: 2 point increase in CIWA -Ar score and a total score of 7 or LESS  melatonin 3 milliGRAM(s) Oral at bedtime PRN Insomnia  ondansetron Injectable 4 milliGRAM(s) IV Push every 8 hours PRN Nausea and/or Vomiting      DVT PROPHYLAXIS:   GI PROPHYLAXIS: pantoprazole  Injectable 40 milliGRAM(s) IV Push every 12 hours    ANTICOAGULATION:   ANTIBIOTICS:  cefTRIAXone   IVPB 1000 milliGRAM(s)  metroNIDAZOLE  IVPB 500 milliGRAM(s)            LAB/STUDIES:  Labs:  CAPILLARY BLOOD GLUCOSE      POCT Blood Glucose.: 178 mg/dL (24 Sep 2022 22:20)                          6.8    9.00  )-----------( 246      ( 25 Sep 2022 00:39 )             19.7       Auto Neutrophil %: 87.0 % (09-24-22 @ 09:17)    09-24    134<L>  |  91<L>  |  21<H>  ----------------------------<  245<H>  4.0   |  34<H>  |  1.0      Calcium, Total Serum: 8.0 mg/dL (09-24-22 @ 09:17)      LFTs:             4.5  | <0.2 | 31       ------------------[90      ( 24 Sep 2022 09:17 )  2.5  | <0.2 | 28          Lipase:38     Amylase:x         Lactate, Blood: 3.8 mmol/L (09-24-22 @ 14:59)  Lactate, Blood: 6.4 mmol/L (09-24-22 @ 09:17)      Coags:     12.70  ----< 1.11    ( 24 Sep 2022 09:17 )     26.4                            IMAGING:      ACCESS/ DEVICES:  [x ] Peripheral IV  [ ] Central Venous Line	[ ] R	[ ] L	[ ] IJ	[ ] Fem	[ ] SC	Placed:   [ ] Arterial Line		[ ] R	[ ] L	[ ] Fem	[ ] Rad	[ ] Ax	Placed:   [ ] PICC:					[ ] Mediport  [ ] Urinary Catheter,  Date Placed:   [ ] Chest tube: [ ] Right, [ ] Left  [ ] BANDAR/Porter Drains

## 2022-09-25 NOTE — CONSULT NOTE ADULT - ASSESSMENT
IMPRESSION:  GIB  necrotizing pancreatitis  ETOH abuse  blood loss anemia:    SUGGEST:   ICU admit  serial HH  keep Hgb >7  GI management  surg f/u  IR evaluation  abx  NPO  will need endoscopy  CIWA protocol   watch DTs

## 2022-09-25 NOTE — CONSULT NOTE ADULT - SUBJECTIVE AND OBJECTIVE BOX
HPI:  72-year-old gentleman with PMHX  of HTN, HLD , recent hx of pancreatitis, extensive alcohol use daily, presents for episodes of Hemetemesis and hematochezia. g.  Patient notes that today he developed a large volume bloody bowel movement along with an episode of blood vomitting.  Patient became dizzy after this episode and almost passed out.  Wife was present to supplement the history.  Patient was brought to the ER and still notes dizziness.  Patient had abdominal imaging in which she was found to have necrosis of the pancreas.  Surgery consulted for pancreatic necrosis.    Of note patient was admitted June 8 to [facility] in [place] in total for 1 week in which she was treated for an uncomplicated pancreatitis.  Patient has never had prior documented pancreatitis.  Patient's wife notes that he has been having on and off intermittent epigastric pain more so towards the left upper quadrant and this has been going on for years.  Patient has been treating this pain with over-the-counter NSAIDs at times.   He drinking 5 ounces of vodka a day. Patient and wife deny him having prior endoscopic evaluation.    In the ED   T(F): 100.1 (24 Sep 2022 16:50), Max: 100.1 (24 Sep 2022 16:50)  HR: 95 (24 Sep 2022 16:50) (73 - 98)  BP: 112/58 (24 Sep 2022 16:50) (112/58 - 133/81)      LABS:  cret                        6.7    9.97  )-----------( 295      ( 24 Sep 2022 17:30 )             20.1     09-24    134<L>  |  91<L>  |  21<H>  ----------------------------<  245<H>  4.0   |  34<H>  |  1.0    Ca    8.0<L>      24 Sep 2022 09:17    TPro  4.5<L>  /  Alb  2.5<L>  /  TBili  <0.2  /  DBili  <0.2  /  AST  31  /  ALT  28  /  AlkPhos  90  09-24    PT/INR - ( 24 Sep 2022 09:17 )   PT: 12.70 sec;   INR: 1.11 ratio         PTT - ( 24 Sep 2022 09:17 )  PTT:26.4 sec      < from: CT Abdomen and Pelvis w/ IV Cont (09.24.22 @ 10:32) >  Findings of necrotizing pancreatitis. Multiple peripancreatic fluid   collections as described. Gas within some of these collections may   represent superinfection.    < end of copied text >   (24 Sep 2022 18:11)         I reviewed the radiology tests and hospital record including the ED chart.    I reviewed the other consultants comments that are available in the chart.    CC/ HPI Patient is a 72y old  Male who presents with a chief complaint of UGIB (25 Sep 2022 03:36)      ANEMIA; PANCREATITIS, NECROTIZING      HTN (hypertension)    Anemia    GI BLEED        Pancreatitis, necrotizing            FAMILY HISTORY:      No Known Allergies      Soc:  see Hpi.    Home prescriptions  AMLODIPINE BESYLATE 5MG TAB:   ATORVASTATIN CALCIUM 20MG TAB:   LISINOPRIL 20 MG TABLET: TAKE 1 TABLET BY MOUTH EVERY DAY      MEDICATIONS  (STANDING):  atorvastatin 20 milliGRAM(s) Oral at bedtime  cefTRIAXone   IVPB 1000 milliGRAM(s) IV Intermittent every 24 hours  chlorhexidine 2% Cloths 1 Application(s) Topical daily  folic acid 1 milliGRAM(s) Oral daily  metroNIDAZOLE  IVPB 500 milliGRAM(s) IV Intermittent every 8 hours  multivitamin 1 Tablet(s) Oral daily  octreotide  Infusion 50 MICROgram(s)/Hr (10 mL/Hr) IV Continuous <Continuous>  pantoprazole  Injectable 40 milliGRAM(s) IV Push every 12 hours  senna 2 Tablet(s) Oral at bedtime  thiamine 100 milliGRAM(s) Oral daily    MEDICATIONS  (PRN):  aluminum hydroxide/magnesium hydroxide/simethicone Suspension 30 milliLiter(s) Oral every 4 hours PRN Dyspepsia  LORazepam     Tablet 2 milliGRAM(s) Oral every 4 hours PRN Symptom-triggered: 2 point increase in CIWA -Ar score and a total score of 7 or LESS  melatonin 3 milliGRAM(s) Oral at bedtime PRN Insomnia  ondansetron Injectable 4 milliGRAM(s) IV Push every 8 hours PRN Nausea and/or Vomiting      lactated ringers Bolus:   1000 milliLiter(s), IV Bolus, once, infuse over 60 Minute(s), Stop After 1 Doses  Provider's Contact #: [phone]      T(C): 36.7 (09-25-22 @ 05:00), Max: 37.8 (09-24-22 @ 16:50)  HR: 73 (09-25-22 @ 07:00) (70 - 98)  BP: 132/68 (09-25-22 @ 07:00) (99/62 - 133/81)  RR: 21 (09-25-22 @ 07:00) (15 - 33)  SpO2: 95% (09-25-22 @ 07:00) (91% - 99%)  ICU Vital Signs Last 24 Hrs  T(C): 36.7 (25 Sep 2022 05:00), Max: 37.8 (24 Sep 2022 16:50)  T(F): 98 (25 Sep 2022 05:00), Max: 100.1 (24 Sep 2022 16:50)  HR: 73 (25 Sep 2022 07:00) (70 - 98)  BP: 132/68 (25 Sep 2022 07:00) (99/62 - 133/81)  BP(mean): 95 (25 Sep 2022 07:00) (76 - 95)  RR: 21 (25 Sep 2022 07:00) (15 - 33)  SpO2: 95% (25 Sep 2022 07:00) (91% - 99%)    O2 Parameters below as of 25 Sep 2022 08:00  Patient On (Oxygen Delivery Method): room air            I&O's Summary    24 Sep 2022 07:01  -  25 Sep 2022 07:00  --------------------------------------------------------  IN: 540 mL / OUT: 150 mL / NET: 390 mL    25 Sep 2022 07:01  -  25 Sep 2022 07:33  --------------------------------------------------------  IN: 10 mL / OUT: 200 mL / NET: -190 mL        I&O's Detail    24 Sep 2022 07:01  -  25 Sep 2022 07:00  --------------------------------------------------------  IN:    IV PiggyBack: 100 mL    Octreotide: 90 mL    PRBCs (Packed Red Blood Cells): 350 mL  Total IN: 540 mL    OUT:    Voided (mL): 150 mL  Total OUT: 150 mL    Total NET: 390 mL      25 Sep 2022 07:01  -  25 Sep 2022 07:33  --------------------------------------------------------  IN:    Octreotide: 10 mL  Total IN: 10 mL    OUT:    Voided (mL): 200 mL  Total OUT: 200 mL    Total NET: -190 mL          Drug Dosing Weight  Height (cm): 172.7 (24 Sep 2022 22:20)  Weight (kg): 69 (24 Sep 2022 22:20)  BMI (kg/m2): 23.1 (24 Sep 2022 22:20)  BSA (m2): 1.82 (24 Sep 2022 22:20)    LABS:                          6.8    9.00  )-----------( 246      ( 25 Sep 2022 00:39 )             19.7       09-24    134<L>  |  91<L>  |  21<H>  ----------------------------<  245<H>  4.0   |  34<H>  |  1.0    Ca    8.0<L>      24 Sep 2022 09:17    TPro  4.5<L>  /  Alb  2.5<L>  /  TBili  <0.2  /  DBili  <0.2  /  AST  31  /  ALT  28  /  AlkPhos  90  09-24      LIVER FUNCTIONS - ( 24 Sep 2022 09:17 )  Alb: 2.5 g/dL / Pro: 4.5 g/dL / ALK PHOS: 90 U/L / ALT: 28 U/L / AST: 31 U/L / GGT: x                           Lactate, Blood: 3.8 mmol/L (09-24-22 @ 14:59)  Lactate, Blood: 6.4 mmol/L (09-24-22 @ 09:17)          COVID-19 PCR: Suni (24 Sep 2022 09:17)          cefTRIAXone   IVPB 1000 milliGRAM(s) IV Intermittent every 24 hours  metroNIDAZOLE  IVPB 500 milliGRAM(s) IV Intermittent every 8 hours        RADIOLOGY:  I have personally reviewed all chest and other pertinent radiology films.         REVIEW OF SYSTEMS:   see Hpi.    PHYSICAL EXAM:       · ENMT:   Airway patent,   Nasal mucosa clear.  Mouth with normal mucosa.   No thrush    · EYES:   Clear bilaterally,   pupils equal,   round and reactive to light.    · CARDIAC:   Normal rate,   regular rhythm.    Heart sounds S1, S2.   no thrills or bruits on palpitation  normal  cardiac impulse  No murmurs, no rubs or gallops on auscultation  no edema        CAROTID:   normal systolic impulse  no bruits    · RESPIRATORY:   no w/r/r/,   normal chest expansion  not tachypneic,  no retractions or use of accessory muscles  palpation of chest is normal with no fremitus  percussion of chest demonstrates no hyperresonance or dullness    · GASTROINTESTINAL:  Abdomen soft,   non-tender,   no guarding,   + BS  liver spleen not palpable      · MUSCULOSKELETAL:   range of motion is not limited,  no clubbing, cyanosis  no petechiae      · SKIN:   Skin normal color for race,   warm,   dry and intact.       · HEME LYMPH:   no splenomegaly.  No cervical  lymphadenopathy.  no inguinal lymphadenopathy

## 2022-09-25 NOTE — PROGRESS NOTE ADULT - ATTENDING COMMENTS
Patient seen and examined with GI Fellow Agree with HPI/ PMH/ Soc History / Meds ROS/ PE. Labs and imaging reviewed. Agree with assessment and Plan Per GI Fellow note.

## 2022-09-25 NOTE — PROGRESS NOTE ADULT - ASSESSMENT
IMPRESSION:    suspected UGIB- r.o variceal s/p 2 units of blood   necrotising pancreatitis   ETOH hx  HTN   HLD     PLAN:    CNS: CIWA protocol symptom triggered. SBRT. thiamine, folate. MDV    HEENT: Oral care    PULMONARY:  HOB @ 45 degrees.  Aspiration precautions     CARDIOVASCULAR: keep map > 65. hold bp meds. f/u CE. f/u echo     GI: Clears liquid diet. Protonix IV BID. octreotide drip. ceftriaxone and flagyl. Advance GI consult. Surgical FU.  Bowel regimen. elastase. calprotectin  -f/u CTA A/P per surgery    RENAL:  Follow up lytes.  Correct as needed    INFECTIOUS DISEASE: f/u procal, blood cultures. id consult     HEMATOLOGICAL:  hold AC. SCDs for now. keep active type and screen. HGB > 8   - Hgb 4.8 in the ED. Transfused 2u PRBC. F/u hgb 6.7  - Trend H/H BID    ENDOCRINE:  Follow up FS.  Insulin protocol if needed.    MUSCULOSKELETAL: IAT   IMPRESSION:    suspected UGIB- r.o variceal s/p 2 units of blood   necrotising pancreatitis   ETOH hx  HTN   HLD     PLAN:    CNS: CIWA protocol symptom triggered. SBRT. thiamine, folate. MDV    HEENT: Oral care    PULMONARY:  HOB @ 45 degrees.  Aspiration precautions     CARDIOVASCULAR: keep map > 65. hold bp meds. f/u CE. f/u echo     GI: Clears liquid diet. Protonix IV BID. octreotide drip. ceftriaxone and flagyl. Advance GI consult. Surgical FU.  Bowel regimen. elastase. calprotectin  -f/u CTA A/P per surgery  - IR consult for pancreatic tail cyst    RENAL:  Follow up lytes.  Correct as needed    INFECTIOUS DISEASE: f/u procal, blood cultures. id consult     HEMATOLOGICAL:  hold AC. SCDs for now. keep active type and screen. HGB > 8   - Hgb 4.8 in the ED. Transfused 3u PRBC. F/u hgb 8.3  - Trend H/H BID    ENDOCRINE:  Follow up FS.  Insulin protocol if needed.    MUSCULOSKELETAL: IAT

## 2022-09-26 LAB
A1C WITH ESTIMATED AVERAGE GLUCOSE RESULT: 5.7 % — HIGH (ref 4–5.6)
ALBUMIN SERPL ELPH-MCNC: 2.5 G/DL — LOW (ref 3.5–5.2)
ALP SERPL-CCNC: 85 U/L — SIGNIFICANT CHANGE UP (ref 30–115)
ALT FLD-CCNC: 13 U/L — SIGNIFICANT CHANGE UP (ref 0–41)
ANION GAP SERPL CALC-SCNC: 12 MMOL/L — SIGNIFICANT CHANGE UP (ref 7–14)
ANION GAP SERPL CALC-SCNC: 7 MMOL/L — SIGNIFICANT CHANGE UP (ref 7–14)
APTT BLD: 28.5 SEC — SIGNIFICANT CHANGE UP (ref 27–39.2)
AST SERPL-CCNC: 14 U/L — SIGNIFICANT CHANGE UP (ref 0–41)
BASOPHILS # BLD AUTO: 0.03 K/UL — SIGNIFICANT CHANGE UP (ref 0–0.2)
BASOPHILS NFR BLD AUTO: 0.3 % — SIGNIFICANT CHANGE UP (ref 0–1)
BILIRUB SERPL-MCNC: 0.4 MG/DL — SIGNIFICANT CHANGE UP (ref 0.2–1.2)
BUN SERPL-MCNC: 6 MG/DL — LOW (ref 10–20)
BUN SERPL-MCNC: 8 MG/DL — LOW (ref 10–20)
CALCIUM SERPL-MCNC: 7.8 MG/DL — LOW (ref 8.4–10.4)
CALCIUM SERPL-MCNC: 8.2 MG/DL — LOW (ref 8.4–10.4)
CHLORIDE SERPL-SCNC: 98 MMOL/L — SIGNIFICANT CHANGE UP (ref 98–110)
CHLORIDE SERPL-SCNC: 99 MMOL/L — SIGNIFICANT CHANGE UP (ref 98–110)
CO2 SERPL-SCNC: 23 MMOL/L — SIGNIFICANT CHANGE UP (ref 17–32)
CO2 SERPL-SCNC: 28 MMOL/L — SIGNIFICANT CHANGE UP (ref 17–32)
CREAT SERPL-MCNC: 0.7 MG/DL — SIGNIFICANT CHANGE UP (ref 0.7–1.5)
CREAT SERPL-MCNC: 0.8 MG/DL — SIGNIFICANT CHANGE UP (ref 0.7–1.5)
EGFR: 93 ML/MIN/1.73M2 — SIGNIFICANT CHANGE UP
EGFR: 97 ML/MIN/1.73M2 — SIGNIFICANT CHANGE UP
EOSINOPHIL # BLD AUTO: 0.04 K/UL — SIGNIFICANT CHANGE UP (ref 0–0.7)
EOSINOPHIL NFR BLD AUTO: 0.4 % — SIGNIFICANT CHANGE UP (ref 0–8)
ESTIMATED AVERAGE GLUCOSE: 117 MG/DL — HIGH (ref 68–114)
GLUCOSE SERPL-MCNC: 131 MG/DL — HIGH (ref 70–99)
GLUCOSE SERPL-MCNC: 148 MG/DL — HIGH (ref 70–99)
HAV IGM SER-ACNC: SIGNIFICANT CHANGE UP
HBV CORE IGM SER-ACNC: SIGNIFICANT CHANGE UP
HBV SURFACE AG SER-ACNC: SIGNIFICANT CHANGE UP
HCT VFR BLD CALC: 21.8 % — LOW (ref 42–52)
HCT VFR BLD CALC: 23.2 % — LOW (ref 42–52)
HCT VFR BLD CALC: 26.7 % — LOW (ref 42–52)
HCV AB S/CO SERPL IA: 0.03 COI — SIGNIFICANT CHANGE UP
HCV AB S/CO SERPL IA: 0.08 S/CO — SIGNIFICANT CHANGE UP (ref 0–0.99)
HCV AB SERPL-IMP: SIGNIFICANT CHANGE UP
HCV AB SERPL-IMP: SIGNIFICANT CHANGE UP
HGB BLD-MCNC: 7.8 G/DL — LOW (ref 14–18)
HGB BLD-MCNC: 8.3 G/DL — LOW (ref 14–18)
HGB BLD-MCNC: 9.2 G/DL — LOW (ref 14–18)
IMM GRANULOCYTES NFR BLD AUTO: 0.8 % — HIGH (ref 0.1–0.3)
INR BLD: 1.16 RATIO — SIGNIFICANT CHANGE UP (ref 0.65–1.3)
LYMPHOCYTES # BLD AUTO: 1.44 K/UL — SIGNIFICANT CHANGE UP (ref 1.2–3.4)
LYMPHOCYTES # BLD AUTO: 12.7 % — LOW (ref 20.5–51.1)
MAGNESIUM SERPL-MCNC: 1.9 MG/DL — SIGNIFICANT CHANGE UP (ref 1.8–2.4)
MCHC RBC-ENTMCNC: 31.3 PG — HIGH (ref 27–31)
MCHC RBC-ENTMCNC: 31.3 PG — HIGH (ref 27–31)
MCHC RBC-ENTMCNC: 31.6 PG — HIGH (ref 27–31)
MCHC RBC-ENTMCNC: 34.5 G/DL — SIGNIFICANT CHANGE UP (ref 32–37)
MCHC RBC-ENTMCNC: 35.8 G/DL — SIGNIFICANT CHANGE UP (ref 32–37)
MCHC RBC-ENTMCNC: 35.8 G/DL — SIGNIFICANT CHANGE UP (ref 32–37)
MCV RBC AUTO: 87.5 FL — SIGNIFICANT CHANGE UP (ref 80–94)
MCV RBC AUTO: 87.6 FL — SIGNIFICANT CHANGE UP (ref 80–94)
MCV RBC AUTO: 91.8 FL — SIGNIFICANT CHANGE UP (ref 80–94)
MONOCYTES # BLD AUTO: 1.05 K/UL — HIGH (ref 0.1–0.6)
MONOCYTES NFR BLD AUTO: 9.3 % — SIGNIFICANT CHANGE UP (ref 1.7–9.3)
NEUTROPHILS # BLD AUTO: 8.68 K/UL — HIGH (ref 1.4–6.5)
NEUTROPHILS NFR BLD AUTO: 76.5 % — HIGH (ref 42.2–75.2)
NRBC # BLD: 0 /100 WBCS — SIGNIFICANT CHANGE UP (ref 0–0)
PLATELET # BLD AUTO: 240 K/UL — SIGNIFICANT CHANGE UP (ref 130–400)
PLATELET # BLD AUTO: 246 K/UL — SIGNIFICANT CHANGE UP (ref 130–400)
PLATELET # BLD AUTO: 285 K/UL — SIGNIFICANT CHANGE UP (ref 130–400)
POTASSIUM SERPL-MCNC: 3.8 MMOL/L — SIGNIFICANT CHANGE UP (ref 3.5–5)
POTASSIUM SERPL-MCNC: 4.7 MMOL/L — SIGNIFICANT CHANGE UP (ref 3.5–5)
POTASSIUM SERPL-SCNC: 3.8 MMOL/L — SIGNIFICANT CHANGE UP (ref 3.5–5)
POTASSIUM SERPL-SCNC: 4.7 MMOL/L — SIGNIFICANT CHANGE UP (ref 3.5–5)
PROCALCITONIN SERPL-MCNC: 25.9 NG/ML — HIGH (ref 0.02–0.1)
PROT SERPL-MCNC: 4.7 G/DL — LOW (ref 6–8)
PROTHROM AB SERPL-ACNC: 13.3 SEC — HIGH (ref 9.95–12.87)
RBC # BLD: 2.49 M/UL — LOW (ref 4.7–6.1)
RBC # BLD: 2.65 M/UL — LOW (ref 4.7–6.1)
RBC # BLD: 2.91 M/UL — LOW (ref 4.7–6.1)
RBC # FLD: 13.5 % — SIGNIFICANT CHANGE UP (ref 11.5–14.5)
RBC # FLD: 13.6 % — SIGNIFICANT CHANGE UP (ref 11.5–14.5)
RBC # FLD: 13.6 % — SIGNIFICANT CHANGE UP (ref 11.5–14.5)
SODIUM SERPL-SCNC: 133 MMOL/L — LOW (ref 135–146)
SODIUM SERPL-SCNC: 134 MMOL/L — LOW (ref 135–146)
TROPONIN T SERPL-MCNC: <0.01 NG/ML — SIGNIFICANT CHANGE UP
WBC # BLD: 10.46 K/UL — SIGNIFICANT CHANGE UP (ref 4.8–10.8)
WBC # BLD: 10.84 K/UL — HIGH (ref 4.8–10.8)
WBC # BLD: 11.33 K/UL — HIGH (ref 4.8–10.8)
WBC # FLD AUTO: 10.46 K/UL — SIGNIFICANT CHANGE UP (ref 4.8–10.8)
WBC # FLD AUTO: 10.84 K/UL — HIGH (ref 4.8–10.8)
WBC # FLD AUTO: 11.33 K/UL — HIGH (ref 4.8–10.8)

## 2022-09-26 PROCEDURE — 99233 SBSQ HOSP IP/OBS HIGH 50: CPT

## 2022-09-26 PROCEDURE — 71045 X-RAY EXAM CHEST 1 VIEW: CPT | Mod: 26

## 2022-09-26 PROCEDURE — 99447 NTRPROF PH1/NTRNET/EHR 11-20: CPT | Mod: 59

## 2022-09-26 RX ORDER — ACETAMINOPHEN 500 MG
325 TABLET ORAL ONCE
Refills: 0 | Status: COMPLETED | OUTPATIENT
Start: 2022-09-26 | End: 2022-09-26

## 2022-09-26 RX ORDER — SODIUM CHLORIDE 9 MG/ML
1000 INJECTION, SOLUTION INTRAVENOUS
Refills: 0 | Status: DISCONTINUED | OUTPATIENT
Start: 2022-09-26 | End: 2022-09-28

## 2022-09-26 RX ORDER — SOD SULF/SODIUM/NAHCO3/KCL/PEG
4000 SOLUTION, RECONSTITUTED, ORAL ORAL ONCE
Refills: 0 | Status: COMPLETED | OUTPATIENT
Start: 2022-09-26 | End: 2022-09-26

## 2022-09-26 RX ADMIN — Medication 1 MILLIGRAM(S): at 11:42

## 2022-09-26 RX ADMIN — ATORVASTATIN CALCIUM 20 MILLIGRAM(S): 80 TABLET, FILM COATED ORAL at 22:45

## 2022-09-26 RX ADMIN — Medication 4000 MILLILITER(S): at 16:29

## 2022-09-26 RX ADMIN — Medication 100 MILLIGRAM(S): at 22:45

## 2022-09-26 RX ADMIN — Medication 325 MILLIGRAM(S): at 18:00

## 2022-09-26 RX ADMIN — Medication 20 MILLIGRAM(S): at 19:48

## 2022-09-26 RX ADMIN — CHLORHEXIDINE GLUCONATE 1 APPLICATION(S): 213 SOLUTION TOPICAL at 11:42

## 2022-09-26 RX ADMIN — PANTOPRAZOLE SODIUM 40 MILLIGRAM(S): 20 TABLET, DELAYED RELEASE ORAL at 05:11

## 2022-09-26 RX ADMIN — SODIUM CHLORIDE 100 MILLILITER(S): 9 INJECTION, SOLUTION INTRAVENOUS at 06:51

## 2022-09-26 RX ADMIN — Medication 325 MILLIGRAM(S): at 17:00

## 2022-09-26 RX ADMIN — Medication 1 TABLET(S): at 11:42

## 2022-09-26 RX ADMIN — SENNA PLUS 2 TABLET(S): 8.6 TABLET ORAL at 22:45

## 2022-09-26 RX ADMIN — Medication 100 MILLIGRAM(S): at 05:10

## 2022-09-26 RX ADMIN — Medication 100 MILLIGRAM(S): at 11:42

## 2022-09-26 RX ADMIN — PANTOPRAZOLE SODIUM 40 MILLIGRAM(S): 20 TABLET, DELAYED RELEASE ORAL at 17:16

## 2022-09-26 RX ADMIN — Medication 100 MILLIGRAM(S): at 13:25

## 2022-09-26 RX ADMIN — CEFTRIAXONE 100 MILLIGRAM(S): 500 INJECTION, POWDER, FOR SOLUTION INTRAMUSCULAR; INTRAVENOUS at 13:25

## 2022-09-26 NOTE — CHART NOTE - NSCHARTNOTEFT_GEN_A_CORE
EGD and colonoscopy rescheduled tomorrow   patient didn't finish the prep yesterday     clear liquids diet today   NPO after midnight   D/C AM Labs unless clinically required  ORDER bowel prep - 1 Gallon of Golytely, dulcolax 20mg at night

## 2022-09-26 NOTE — PROGRESS NOTE ADULT - ASSESSMENT
ASSESSMENT:  72-year-old w PMH of hypertension, hyperlipidemia, extensive alcohol use in which he is currently actively using, and as of recent history of pancreatitis requiring hospitalization who presents to [facility] for rectal bleeding. CT findings suspicious for active GI hemorrhage and necrotizing pancreatitis.     PLAN:  - No acute surgical intervention indicated at this time, also do not recommend necrosectomy - patient has benign abd exam  - F/u EGD/Colonoscopy with GI today  - Monitor H&H   - Transfuse as needed (Hgb > 7)  - GI following  - CT Angio Abd/Pelvis  - CIWA protocol  - Care by primary team    GREEN SURGERY  SPECTRA [id number] ASSESSMENT:  72-year-old w PMH of hypertension, hyperlipidemia, extensive alcohol use in which he is currently actively using, and as of recent history of pancreatitis requiring hospitalization who presents to [facility] for rectal bleeding. 9/25 CT findings demonstrate no active GI hemorrhage and findings suspicious for necrotizing pancreatitis.     PLAN:  - No acute surgical intervention indicated at this time, also do not recommend necrosectomy - patient has benign abd exam  - F/u EGD/Colonoscopy with GI today  - Monitor H&H   - Transfuse as needed (Hgb > 7)  - GI following  - [facility] protocol  - Care by primary team    GREEN SURGERY  SPECTRA [id number]

## 2022-09-26 NOTE — CONSULT NOTE ADULT - NSCONSULTADDITIONALINFOA_GEN_ALL_CORE
Attending Attestation: 72-year-old male with past medical history of recent pancreatitis presenting with peripancreatic fluid collection. After review of the images there is a suboptimal percutaneous window. Recommend advance GI for further evaluation.
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
## 2022-09-26 NOTE — CONSULT NOTE ADULT - SUBJECTIVE AND OBJECTIVE BOX
INTERVENTIONAL RADIOLOGY CONSULT:     Procedure Requested: Peripancreatic fluid collection drainage    HPI:  72-year-old gentleman with PMHX  of HTN, HLD , recent hx of pancreatitis, extensive alcohol use daily, presents for episodes of Hemetemesis and hematochezia. g.  Patient notes that today he developed a large volume bloody bowel movement along with an episode of blood vomitting.  Patient became dizzy after this episode and almost passed out.  Wife was present to supplement the history.  Patient was brought to the ER and still notes dizziness.  Patient had abdominal imaging in which she was found to have necrosis of the pancreas.  Surgery consulted for pancreatic necrosis.    Of note patient was admitted June 8 to [facility] in [place] in total for 1 week in which she was treated for an uncomplicated pancreatitis.  Patient has never had prior documented pancreatitis.  Patient's wife notes that he has been having on and off intermittent epigastric pain more so towards the left upper quadrant and this has been going on for years.  Patient has been treating this pain with over-the-counter NSAIDs at times.   He drinking 5 ounces of vodka a day. Patient and wife deny him having prior endoscopic evaluation.    In the ED   T(F): 100.1 (24 Sep 2022 16:50), Max: 100.1 (24 Sep 2022 16:50)  HR: 95 (24 Sep 2022 16:50) (73 - 98)  BP: 112/58 (24 Sep 2022 16:50) (112/58 - 133/81)       (24 Sep 2022 18:11)      PAST MEDICAL & SURGICAL HISTORY:  HTN (hypertension)      MEDICATIONS  (STANDING):  atorvastatin 20 milliGRAM(s) Oral at bedtime  cefTRIAXone   IVPB 2000 milliGRAM(s) IV Intermittent every 24 hours  chlorhexidine 2% Cloths 1 Application(s) Topical daily  folic acid 1 milliGRAM(s) Oral daily  lactated ringers. 1000 milliLiter(s) (100 mL/Hr) IV Continuous <Continuous>  metroNIDAZOLE  IVPB 500 milliGRAM(s) IV Intermittent every 8 hours  multivitamin 1 Tablet(s) Oral daily  octreotide  Infusion 50 MICROgram(s)/Hr (10 mL/Hr) IV Continuous <Continuous>  pantoprazole  Injectable 40 milliGRAM(s) IV Push every 12 hours  senna 2 Tablet(s) Oral at bedtime  thiamine 100 milliGRAM(s) Oral daily    MEDICATIONS  (PRN):  aluminum hydroxide/magnesium hydroxide/simethicone Suspension 30 milliLiter(s) Oral every 4 hours PRN Dyspepsia  LORazepam     Tablet 2 milliGRAM(s) Oral every 4 hours PRN Symptom-triggered: 2 point increase in CIWA -Ar score and a total score of 7 or LESS  melatonin 3 milliGRAM(s) Oral at bedtime PRN Insomnia  ondansetron Injectable 4 milliGRAM(s) IV Push every 8 hours PRN Nausea and/or Vomiting      Allergies  No Known Allergies    Intolerances      FAMILY HISTORY:      Physical Exam:   Vital Signs Last 24 Hrs  T(C): 36.6 (26 Sep 2022 08:00), Max: 38 (25 Sep 2022 20:00)  T(F): 97.8 (26 Sep 2022 08:00), Max: 100.4 (25 Sep 2022 20:00)  HR: 79 (26 Sep 2022 09:00) (72 - 84)  BP: 128/74 (26 Sep 2022 09:00) (105/59 - 141/67)  BP(mean): 96 (26 Sep 2022 09:00) (76 - 101)  RR: 19 (26 Sep 2022 09:00) (17 - 26)  SpO2: 94% (26 Sep 2022 09:00) (93% - 96%)    Parameters below as of 26 Sep 2022 09:00  Patient On (Oxygen Delivery Method): room air        Labs:                         8.3    11.33 )-----------( 246      ( 26 Sep 2022 04:27 )             23.2     09-26    133<L>  |  98  |  8<L>  ----------------------------<  148<H>  3.8   |  28  |  0.8    Ca    7.8<L>      26 Sep 2022 04:27  Mg     1.9     09-26    TPro  4.7<L>  /  Alb  2.5<L>  /  TBili  0.4  /  DBili  x   /  AST  14  /  ALT  13  /  AlkPhos  85  09-26    PT/INR - ( 25 Sep 2022 22:27 )   PT: 12.00 sec;   INR: 1.04 ratio         PTT - ( 25 Sep 2022 22:27 )  PTT:28.0 sec    Pertinent labs:                      8.3    11.33 )-----------( 246      ( 26 Sep 2022 04:27 )             23.2       09-26    133<L>  |  98  |  8<L>  ----------------------------<  148<H>  3.8   |  28  |  0.8    Ca    7.8<L>      26 Sep 2022 04:27  Mg     1.9     09-26    TPro  4.7<L>  /  Alb  2.5<L>  /  TBili  0.4  /  DBili  x   /  AST  14  /  ALT  13  /  AlkPhos  85  09-26      PT/INR - ( 25 Sep 2022 22:27 )   PT: 12.00 sec;   INR: 1.04 ratio         PTT - ( 25 Sep 2022 22:27 )  PTT:28.0 sec    Radiology & Additional Studies:   Radiology imaging reviewed.       ASSESSMENT/ PLAN:           Thank you for the courtesy of this consult, please call [phone] with any further questions.    INTERVENTIONAL RADIOLOGY CONSULT:     Procedure Requested: Peripancreatic fluid collection drainage    HPI:  72-year-old gentleman with PMHX  of HTN, HLD , recent hx of pancreatitis, extensive alcohol use daily, presents for episodes of Hemetemesis and hematochezia. g.  Patient notes that today he developed a large volume bloody bowel movement along with an episode of blood vomitting.  Patient became dizzy after this episode and almost passed out.  Wife was present to supplement the history.  Patient was brought to the ER and still notes dizziness.  Patient had abdominal imaging in which she was found to have necrosis of the pancreas.  Surgery consulted for pancreatic necrosis.    Of note patient was admitted June 8 to [facility] in [place] in total for 1 week in which she was treated for an uncomplicated pancreatitis.  Patient has never had prior documented pancreatitis.  Patient's wife notes that he has been having on and off intermittent epigastric pain more so towards the left upper quadrant and this has been going on for years.  Patient has been treating this pain with over-the-counter NSAIDs at times.   He drinking 5 ounces of vodka a day. Patient and wife deny him having prior endoscopic evaluation.    In the ED   T(F): 100.1 (24 Sep 2022 16:50), Max: 100.1 (24 Sep 2022 16:50)  HR: 95 (24 Sep 2022 16:50) (73 - 98)  BP: 112/58 (24 Sep 2022 16:50) (112/58 - 133/81)    (24 Sep 2022 18:11)      PAST MEDICAL & SURGICAL HISTORY:  HTN (hypertension)      MEDICATIONS  (STANDING):  atorvastatin 20 milliGRAM(s) Oral at bedtime  cefTRIAXone   IVPB 2000 milliGRAM(s) IV Intermittent every 24 hours  chlorhexidine 2% Cloths 1 Application(s) Topical daily  folic acid 1 milliGRAM(s) Oral daily  lactated ringers. 1000 milliLiter(s) (100 mL/Hr) IV Continuous <Continuous>  metroNIDAZOLE  IVPB 500 milliGRAM(s) IV Intermittent every 8 hours  multivitamin 1 Tablet(s) Oral daily  octreotide  Infusion 50 MICROgram(s)/Hr (10 mL/Hr) IV Continuous <Continuous>  pantoprazole  Injectable 40 milliGRAM(s) IV Push every 12 hours  senna 2 Tablet(s) Oral at bedtime  thiamine 100 milliGRAM(s) Oral daily    MEDICATIONS  (PRN):  aluminum hydroxide/magnesium hydroxide/simethicone Suspension 30 milliLiter(s) Oral every 4 hours PRN Dyspepsia  LORazepam     Tablet 2 milliGRAM(s) Oral every 4 hours PRN Symptom-triggered: 2 point increase in CIWA -Ar score and a total score of 7 or LESS  melatonin 3 milliGRAM(s) Oral at bedtime PRN Insomnia  ondansetron Injectable 4 milliGRAM(s) IV Push every 8 hours PRN Nausea and/or Vomiting      Allergies  No Known Allergies    Intolerances      FAMILY HISTORY:      Physical Exam:   Vital Signs Last 24 Hrs  T(C): 36.6 (26 Sep 2022 08:00), Max: 38 (25 Sep 2022 20:00)  T(F): 97.8 (26 Sep 2022 08:00), Max: 100.4 (25 Sep 2022 20:00)  HR: 79 (26 Sep 2022 09:00) (72 - 84)  BP: 128/74 (26 Sep 2022 09:00) (105/59 - 141/67)  BP(mean): 96 (26 Sep 2022 09:00) (76 - 101)  RR: 19 (26 Sep 2022 09:00) (17 - 26)  SpO2: 94% (26 Sep 2022 09:00) (93% - 96%)    Parameters below as of 26 Sep 2022 09:00  Patient On (Oxygen Delivery Method): room air        Labs:                         8.3    11.33 )-----------( 246      ( 26 Sep 2022 04:27 )             23.2     09-26    133<L>  |  98  |  8<L>  ----------------------------<  148<H>  3.8   |  28  |  0.8    Ca    7.8<L>      26 Sep 2022 04:27  Mg     1.9     09-26    TPro  4.7<L>  /  Alb  2.5<L>  /  TBili  0.4  /  DBili  x   /  AST  14  /  ALT  13  /  AlkPhos  85  09-26    PT/INR - ( 25 Sep 2022 22:27 )   PT: 12.00 sec;   INR: 1.04 ratio         PTT - ( 25 Sep 2022 22:27 )  PTT:28.0 sec    Pertinent labs:                      8.3    11.33 )-----------( 246      ( 26 Sep 2022 04:27 )             23.2       09-26    133<L>  |  98  |  8<L>  ----------------------------<  148<H>  3.8   |  28  |  0.8    Ca    7.8<L>      26 Sep 2022 04:27  Mg     1.9     09-26    TPro  4.7<L>  /  Alb  2.5<L>  /  TBili  0.4  /  DBili  x   /  AST  14  /  ALT  13  /  AlkPhos  85  09-26      PT/INR - ( 25 Sep 2022 22:27 )   PT: 12.00 sec;   INR: 1.04 ratio         PTT - ( 25 Sep 2022 22:27 )  PTT:28.0 sec    Radiology & Additional Studies:   Radiology imaging reviewed.       ASSESSMENT/ PLAN:     73 y/o M with PMH of recent pancreatitis presenting for hematochezia/hematemesis. Imaging showed peripancreatic fluid collection.    -IR consulted for peripancreatic fluid collection drainage  -Imaging reviewed:       Thank you for the courtesy of this consult, please call [phone] with any further questions.    INTERVENTIONAL RADIOLOGY CONSULT:     Procedure Requested: Peripancreatic fluid collection drainage    HPI:  72-year-old gentleman with PMHX  of HTN, HLD , recent hx of pancreatitis, extensive alcohol use daily, presents for episodes of Hemetemesis and hematochezia. g.  Patient notes that today he developed a large volume bloody bowel movement along with an episode of blood vomitting.  Patient became dizzy after this episode and almost passed out.  Wife was present to supplement the history.  Patient was brought to the ER and still notes dizziness.  Patient had abdominal imaging in which she was found to have necrosis of the pancreas.  Surgery consulted for pancreatic necrosis.    Of note patient was admitted June 8 to [facility] in [place] in total for 1 week in which she was treated for an uncomplicated pancreatitis.  Patient has never had prior documented pancreatitis.  Patient's wife notes that he has been having on and off intermittent epigastric pain more so towards the left upper quadrant and this has been going on for years.  Patient has been treating this pain with over-the-counter NSAIDs at times.   He drinking 5 ounces of vodka a day. Patient and wife deny him having prior endoscopic evaluation.    In the ED   T(F): 100.1 (24 Sep 2022 16:50), Max: 100.1 (24 Sep 2022 16:50)  HR: 95 (24 Sep 2022 16:50) (73 - 98)  BP: 112/58 (24 Sep 2022 16:50) (112/58 - 133/81)    (24 Sep 2022 18:11)      PAST MEDICAL & SURGICAL HISTORY:  HTN (hypertension)      MEDICATIONS  (STANDING):  atorvastatin 20 milliGRAM(s) Oral at bedtime  cefTRIAXone   IVPB 2000 milliGRAM(s) IV Intermittent every 24 hours  chlorhexidine 2% Cloths 1 Application(s) Topical daily  folic acid 1 milliGRAM(s) Oral daily  lactated ringers. 1000 milliLiter(s) (100 mL/Hr) IV Continuous <Continuous>  metroNIDAZOLE  IVPB 500 milliGRAM(s) IV Intermittent every 8 hours  multivitamin 1 Tablet(s) Oral daily  octreotide  Infusion 50 MICROgram(s)/Hr (10 mL/Hr) IV Continuous <Continuous>  pantoprazole  Injectable 40 milliGRAM(s) IV Push every 12 hours  senna 2 Tablet(s) Oral at bedtime  thiamine 100 milliGRAM(s) Oral daily    MEDICATIONS  (PRN):  aluminum hydroxide/magnesium hydroxide/simethicone Suspension 30 milliLiter(s) Oral every 4 hours PRN Dyspepsia  LORazepam     Tablet 2 milliGRAM(s) Oral every 4 hours PRN Symptom-triggered: 2 point increase in CIWA -Ar score and a total score of 7 or LESS  melatonin 3 milliGRAM(s) Oral at bedtime PRN Insomnia  ondansetron Injectable 4 milliGRAM(s) IV Push every 8 hours PRN Nausea and/or Vomiting      Allergies  No Known Allergies    Intolerances      FAMILY HISTORY:      Physical Exam:   Vital Signs Last 24 Hrs  T(C): 36.6 (26 Sep 2022 08:00), Max: 38 (25 Sep 2022 20:00)  T(F): 97.8 (26 Sep 2022 08:00), Max: 100.4 (25 Sep 2022 20:00)  HR: 79 (26 Sep 2022 09:00) (72 - 84)  BP: 128/74 (26 Sep 2022 09:00) (105/59 - 141/67)  BP(mean): 96 (26 Sep 2022 09:00) (76 - 101)  RR: 19 (26 Sep 2022 09:00) (17 - 26)  SpO2: 94% (26 Sep 2022 09:00) (93% - 96%)    Parameters below as of 26 Sep 2022 09:00  Patient On (Oxygen Delivery Method): room air        Labs:                         8.3    11.33 )-----------( 246      ( 26 Sep 2022 04:27 )             23.2     09-26    133<L>  |  98  |  8<L>  ----------------------------<  148<H>  3.8   |  28  |  0.8    Ca    7.8<L>      26 Sep 2022 04:27  Mg     1.9     09-26    TPro  4.7<L>  /  Alb  2.5<L>  /  TBili  0.4  /  DBili  x   /  AST  14  /  ALT  13  /  AlkPhos  85  09-26    PT/INR - ( 25 Sep 2022 22:27 )   PT: 12.00 sec;   INR: 1.04 ratio         PTT - ( 25 Sep 2022 22:27 )  PTT:28.0 sec    Pertinent labs:                      8.3    11.33 )-----------( 246      ( 26 Sep 2022 04:27 )             23.2       09-26    133<L>  |  98  |  8<L>  ----------------------------<  148<H>  3.8   |  28  |  0.8    Ca    7.8<L>      26 Sep 2022 04:27  Mg     1.9     09-26    TPro  4.7<L>  /  Alb  2.5<L>  /  TBili  0.4  /  DBili  x   /  AST  14  /  ALT  13  /  AlkPhos  85  09-26      PT/INR - ( 25 Sep 2022 22:27 )   PT: 12.00 sec;   INR: 1.04 ratio         PTT - ( 25 Sep 2022 22:27 )  PTT:28.0 sec    Radiology & Additional Studies:   Radiology imaging reviewed.       ASSESSMENT/ PLAN:     73 y/o M with PMH of recent pancreatitis/necrotizing pancreatitis presenting for hematochezia/hematemesis. Imaging showed peripancreatic fluid collection.    -IR consulted for peripancreatic fluid collection drainage  -Imaging reviewed-Multiple peripancreatic fluid collections on CT  -There is a suboptimal percutaneous window for IR management  -No IR intervention at this time  -Patient would likely benefit from Advanced GI consult    Thank you for the courtesy of this consult, please call [phone] with any further questions.    INTERVENTIONAL RADIOLOGY CONSULT:     Procedure Requested: Peripancreatic fluid collection drainage    HPI:  72-year-old gentleman with PMHX  of HTN, HLD , recent hx of pancreatitis, extensive alcohol use daily, presents for episodes of Hemetemesis and hematochezia. g.  Patient notes that today he developed a large volume bloody bowel movement along with an episode of blood vomitting.  Patient became dizzy after this episode and almost passed out.  Wife was present to supplement the history.  Patient was brought to the ER and still notes dizziness.  Patient had abdominal imaging in which she was found to have necrosis of the pancreas.  Surgery consulted for pancreatic necrosis.72-year-old male with past medical history of recent pancreatitis presenting with peripancreatic fluid collection.  After review of the images there is a suboptimal percutaneous window. Recommend advance GI for further evaluation.        Of note patient was admitted June 8 to [facility] in [place] in total for 1 week in which she was treated for an uncomplicated pancreatitis.  Patient has never had prior documented pancreatitis.  Patient's wife notes that he has been having on and off intermittent epigastric pain more so towards the left upper quadrant and this has been going on for years.  Patient has been treating this pain with over-the-counter NSAIDs at times.   He drinking 5 ounces of vodka a day. Patient and wife deny him having prior endoscopic evaluation.    In the ED   T(F): 100.1 (24 Sep 2022 16:50), Max: 100.1 (24 Sep 2022 16:50)  HR: 95 (24 Sep 2022 16:50) (73 - 98)  BP: 112/58 (24 Sep 2022 16:50) (112/58 - 133/81)    (24 Sep 2022 18:11)      PAST MEDICAL & SURGICAL HISTORY:  HTN (hypertension)      MEDICATIONS  (STANDING):  atorvastatin 20 milliGRAM(s) Oral at bedtime  cefTRIAXone   IVPB 2000 milliGRAM(s) IV Intermittent every 24 hours  chlorhexidine 2% Cloths 1 Application(s) Topical daily  folic acid 1 milliGRAM(s) Oral daily  lactated ringers. 1000 milliLiter(s) (100 mL/Hr) IV Continuous <Continuous>  metroNIDAZOLE  IVPB 500 milliGRAM(s) IV Intermittent every 8 hours  multivitamin 1 Tablet(s) Oral daily  octreotide  Infusion 50 MICROgram(s)/Hr (10 mL/Hr) IV Continuous <Continuous>  pantoprazole  Injectable 40 milliGRAM(s) IV Push every 12 hours  senna 2 Tablet(s) Oral at bedtime  thiamine 100 milliGRAM(s) Oral daily    MEDICATIONS  (PRN):  aluminum hydroxide/magnesium hydroxide/simethicone Suspension 30 milliLiter(s) Oral every 4 hours PRN Dyspepsia  LORazepam     Tablet 2 milliGRAM(s) Oral every 4 hours PRN Symptom-triggered: 2 point increase in CIWA -Ar score and a total score of 7 or LESS  melatonin 3 milliGRAM(s) Oral at bedtime PRN Insomnia  ondansetron Injectable 4 milliGRAM(s) IV Push every 8 hours PRN Nausea and/or Vomiting      Allergies  No Known Allergies    Intolerances      FAMILY HISTORY:      Physical Exam:   Vital Signs Last 24 Hrs  T(C): 36.6 (26 Sep 2022 08:00), Max: 38 (25 Sep 2022 20:00)  T(F): 97.8 (26 Sep 2022 08:00), Max: 100.4 (25 Sep 2022 20:00)  HR: 79 (26 Sep 2022 09:00) (72 - 84)  BP: 128/74 (26 Sep 2022 09:00) (105/59 - 141/67)  BP(mean): 96 (26 Sep 2022 09:00) (76 - 101)  RR: 19 (26 Sep 2022 09:00) (17 - 26)  SpO2: 94% (26 Sep 2022 09:00) (93% - 96%)    Parameters below as of 26 Sep 2022 09:00  Patient On (Oxygen Delivery Method): room air        Labs:                         8.3    11.33 )-----------( 246      ( 26 Sep 2022 04:27 )             23.2     09-26    133<L>  |  98  |  8<L>  ----------------------------<  148<H>  3.8   |  28  |  0.8    Ca    7.8<L>      26 Sep 2022 04:27  Mg     1.9     09-26    TPro  4.7<L>  /  Alb  2.5<L>  /  TBili  0.4  /  DBili  x   /  AST  14  /  ALT  13  /  AlkPhos  85  09-26    PT/INR - ( 25 Sep 2022 22:27 )   PT: 12.00 sec;   INR: 1.04 ratio         PTT - ( 25 Sep 2022 22:27 )  PTT:28.0 sec    Pertinent labs:                      8.3    11.33 )-----------( 246      ( 26 Sep 2022 04:27 )             23.2       09-26    133<L>  |  98  |  8<L>  ----------------------------<  148<H>  3.8   |  28  |  0.8    Ca    7.8<L>      26 Sep 2022 04:27  Mg     1.9     09-26    TPro  4.7<L>  /  Alb  2.5<L>  /  TBili  0.4  /  DBili  x   /  AST  14  /  ALT  13  /  AlkPhos  85  09-26      PT/INR - ( 25 Sep 2022 22:27 )   PT: 12.00 sec;   INR: 1.04 ratio         PTT - ( 25 Sep 2022 22:27 )  PTT:28.0 sec    Radiology & Additional Studies:   Radiology imaging reviewed.       ASSESSMENT/ PLAN:     73 y/o M with PMH of recent pancreatitis/necrotizing pancreatitis presenting for hematochezia/hematemesis. Imaging showed peripancreatic fluid collection.    -IR consulted for peripancreatic fluid collection drainage  -Imaging reviewed-Multiple peripancreatic fluid collections on CT  -There is a suboptimal percutaneous window for IR management  -No IR intervention at this time  -Patient would likely benefit from Advanced GI consult    Thank you for the courtesy of this consult, please call [phone] with any further questions.

## 2022-09-26 NOTE — PROGRESS NOTE ADULT - ASSESSMENT
IMPRESSION:  GIB  necrotizing pancreatitis  ETOH abuse  blood loss anemia:    IMPRESSION:        PLAN:    CNS: thiamine and folate   [facility] protocol     HEENT: oral care     PULMONARY: keep pox > 92 %     CARDIOVASCULAR: continue IV fluid   echo     GI: GI prophylaxis.  iv octreotide , PPI   GI flow up for scope     RENAL: follow bmp, is and os     INFECTIOUS DISEASE: on abx follow ID   repeat bld cx     HEMATOLOGICAL:  DVT prophylaxis. sequential   follow h/h     ENDOCRINE:  Follow up FS.  Insulin protocol if needed.    oob to chair   MICU         CRITICAL CARE TIME SPENT: ***

## 2022-09-26 NOTE — PROGRESS NOTE ADULT - SUBJECTIVE AND OBJECTIVE BOX
GENERAL SURGERY PROGRESS NOTE    Hospital Day: 3    24 Hour Events:  Patient prepped with golytely and 20 mg of dulcolax.    No acute events.     Vitals:  T(F): 97.6 (09-26-22 @ 12:00), Max: 100.4 (09-25-22 @ 20:00)  HR: 78 (09-26-22 @ 12:00)  BP: 147/74 (09-26-22 @ 12:00)  RR: 22 (09-26-22 @ 12:00)  SpO2: 96% (09-26-22 @ 12:00)    Diet, NPO after Midnight:      NPO Start Date: 26-Sep-2022,   NPO Start Time: 23:59  Except Medications  Diet, Clear Liquid    Fluids: lactated ringers.: Solution, 1000 milliLiter(s) infuse at 100 mL/Hr    I & O's:    09-25-22 @ 07:01  -  09-26-22 @ 07:00  --------------------------------------------------------  IN:    IV PiggyBack: 250 mL    Lactated Ringers: 100 mL    Octreotide: 240 mL    Oral Fluid: 120 mL  Total IN: 710 mL    OUT:    Blood Loss (mL): 1 mL    Voided (mL): 1050 mL  Total OUT: 1051 mL    Total NET: -341 mL    PHYSICAL EXAM:  General: NAD  Cardiac: RRR, S1/S2 identified, nmrg  Respiratory: unlabored breathing at rest, clear to auscultation bilaterally  Abdomen: Soft, non-distended, non-tender, no rebound, no guarding.  Musculoskeletal: FROM in b/l UE and LE  Neuro: Sensation grossly intact and equal throughout, no focal deficits  Skin: Warm/dry, normal color, no jaundice    MEDICATIONS  (STANDING):  atorvastatin 20 milliGRAM(s) Oral at bedtime  cefTRIAXone   IVPB 2000 milliGRAM(s) IV Intermittent every 24 hours  chlorhexidine 2% Cloths 1 Application(s) Topical daily  folic acid 1 milliGRAM(s) Oral daily  lactated ringers. 1000 milliLiter(s) (100 mL/Hr) IV Continuous <Continuous>  metroNIDAZOLE  IVPB 500 milliGRAM(s) IV Intermittent every 8 hours  multivitamin 1 Tablet(s) Oral daily  octreotide  Infusion 50 MICROgram(s)/Hr (10 mL/Hr) IV Continuous <Continuous>  pantoprazole  Injectable 40 milliGRAM(s) IV Push every 12 hours  polyethylene glycol/electrolyte Solution. 4000 milliLiter(s) Oral once  senna 2 Tablet(s) Oral at bedtime  thiamine 100 milliGRAM(s) Oral daily    MEDICATIONS  (PRN):  aluminum hydroxide/magnesium hydroxide/simethicone Suspension 30 milliLiter(s) Oral every 4 hours PRN Dyspepsia  LORazepam     Tablet 2 milliGRAM(s) Oral every 4 hours PRN Symptom-triggered: 2 point increase in CIWA -Ar score and a total score of 7 or LESS  melatonin 3 milliGRAM(s) Oral at bedtime PRN Insomnia  ondansetron Injectable 4 milliGRAM(s) IV Push every 8 hours PRN Nausea and/or Vomiting    DVT PROPHYLAXIS:   GI PROPHYLAXIS: pantoprazole  Injectable 40 milliGRAM(s) IV Push every 12 hours    ANTICOAGULATION:   ANTIBIOTICS:  cefTRIAXone   IVPB 2000 milliGRAM(s)  metroNIDAZOLE  IVPB 500 milliGRAM(s)    LAB/STUDIES:  Labs:  CAPILLARY BLOOD GLUCOSE                        8.3    11.33 )-----------( 246      ( 26 Sep 2022 04:27 )             23.2       Auto Neutrophil %: 76.5 % (09-26-22 @ 04:27)  Auto Immature Granulocyte %: 0.8 % (09-26-22 @ 04:27)    09-26    133<L>  |  98  |  8<L>  ----------------------------<  148<H>  3.8   |  28  |  0.8      Calcium, Total Serum: 7.8 mg/dL (09-26-22 @ 04:27)    LFTs:             4.7  | 0.4  | 14       ------------------[85      ( 26 Sep 2022 04:27 )  2.5  | x    | 13          Lipase:x      Amylase:x         Lactate, Blood: 3.8 mmol/L (09-24-22 @ 14:59)  Lactate, Blood: 6.4 mmol/L (09-24-22 @ 09:17)      Coags:     12.00  ----< 1.04    ( 25 Sep 2022 22:27 )     28.0

## 2022-09-26 NOTE — PROGRESS NOTE ADULT - SUBJECTIVE AND OBJECTIVE BOX
Patient is a 72y old  Male who presents with a chief complaint of UGIB (25 Sep 2022 11:47)      Over Night Events:  Patient seen and examined.   on octreotide and IV fluid   s/p 3 unit prbc   follow GI   ROS:  See HPI    PHYSICAL EXAM    ICU Vital Signs Last 24 Hrs  T(C): 37.1 (26 Sep 2022 06:00), Max: 38 (25 Sep 2022 20:00)  T(F): 98.7 (26 Sep 2022 06:00), Max: 100.4 (25 Sep 2022 20:00)  HR: 74 (26 Sep 2022 06:00) (69 - 84)  BP: 121/68 (26 Sep 2022 06:00) (105/59 - 141/67)  BP(mean): 89 (26 Sep 2022 06:00) (76 - 101)  ABP: --  ABP(mean): --  RR: 17 (26 Sep 2022 06:00) (17 - 26)  SpO2: 95% (26 Sep 2022 06:00) (92% - 96%)    O2 Parameters below as of 26 Sep 2022 06:00  Patient On (Oxygen Delivery Method): room air            General: AOx3  HEENT:   azael            Lymph Nodes: NO cervical LN   Lungs: Bilateral BS  Cardiovascular: Regular   Abdomen: Soft, Positive BS  Extremities: No clubbing   Skin: warm   Neurological: no focal  Musculoskeletal: move all ext     I&O's Detail    25 Sep 2022 07:01  -  26 Sep 2022 07:00  --------------------------------------------------------  IN:    IV PiggyBack: 250 mL    Octreotide: 240 mL    Oral Fluid: 120 mL  Total IN: 610 mL    OUT:    Blood Loss (mL): 1 mL    Voided (mL): 1050 mL  Total OUT: 1051 mL    Total NET: -441 mL          LABS:                          8.3    11.33 )-----------( 246      ( 26 Sep 2022 04:27 )             23.2         26 Sep 2022 04:27    133    |  98     |  8      ----------------------------<  148    3.8     |  28     |  0.8      Ca    7.8        26 Sep 2022 04:27  Mg     1.9       26 Sep 2022 04:27    TPro  4.7    /  Alb  2.5    /  TBili  0.4    /  DBili  x      /  AST  14     /  ALT  13     /  AlkPhos  85     26 Sep 2022 04:27  Amylase x     lipase x                                                 PT/INR - ( 25 Sep 2022 22:27 )   PT: 12.00 sec;   INR: 1.04 ratio         PTT - ( 25 Sep 2022 22:27 )  PTT:28.0 sec                                           Lactate, Blood: 3.8 mmol/L (09-24-22 @ 14:59)  Lactate, Blood: 6.4 mmol/L (09-24-22 @ 09:17)                                                                                                                                               MEDICATIONS  (STANDING):  atorvastatin 20 milliGRAM(s) Oral at bedtime  cefTRIAXone   IVPB 2000 milliGRAM(s) IV Intermittent every 24 hours  chlorhexidine 2% Cloths 1 Application(s) Topical daily  folic acid 1 milliGRAM(s) Oral daily  lactated ringers. 1000 milliLiter(s) (100 mL/Hr) IV Continuous <Continuous>  metroNIDAZOLE  IVPB 500 milliGRAM(s) IV Intermittent every 8 hours  multivitamin 1 Tablet(s) Oral daily  octreotide  Infusion 50 MICROgram(s)/Hr (10 mL/Hr) IV Continuous <Continuous>  pantoprazole  Injectable 40 milliGRAM(s) IV Push every 12 hours  senna 2 Tablet(s) Oral at bedtime  thiamine 100 milliGRAM(s) Oral daily    MEDICATIONS  (PRN):  aluminum hydroxide/magnesium hydroxide/simethicone Suspension 30 milliLiter(s) Oral every 4 hours PRN Dyspepsia  LORazepam     Tablet 2 milliGRAM(s) Oral every 4 hours PRN Symptom-triggered: 2 point increase in CIWA -Ar score and a total score of 7 or LESS  melatonin 3 milliGRAM(s) Oral at bedtime PRN Insomnia  ondansetron Injectable 4 milliGRAM(s) IV Push every 8 hours PRN Nausea and/or Vomiting          Xrays:  TLC:  OG:  ET tube:                                                                                    small left atelectasis    ECHO:  [facility] ICU:

## 2022-09-26 NOTE — PROGRESS NOTE ADULT - SUBJECTIVE AND OBJECTIVE BOX
HPI:  72-year-old gentleman with PMHX  of HTN, HLD , recent hx of pancreatitis, extensive alcohol use daily, presents for episodes of Hemetemesis and hematochezia. g.  Patient notes that today he developed a large volume bloody bowel movement along with an episode of blood vomitting.  Patient became dizzy after this episode and almost passed out.  Wife was present to supplement the history.  Patient was brought to the ER and still notes dizziness.  Patient had abdominal imaging in which she was found to have necrosis of the pancreas.  Surgery consulted for pancreatic necrosis.    Of note patient was admitted  to [facility] in [place] in total for 1 week in which she was treated for an uncomplicated pancreatitis.  Patient has never had prior documented pancreatitis.  Patient's wife notes that he has been having on and off intermittent epigastric pain more so towards the left upper quadrant and this has been going on for years.  Patient has been treating this pain with over-the-counter NSAIDs at times.   He drinking 5 ounces of vodka a day. Patient and wife deny him having prior endoscopic evaluation.    In the ED   T(F): 100.1 (24 Sep 2022 16:50), Max: 100.1 (24 Sep 2022 16:50)  HR: 95 (24 Sep 2022 16:50) (73 - 98)  BP: 112/58 (24 Sep 2022 16:50) (112/58 - 133/81)      LABS:  cret                        6.7    9.97  )-----------( 295      ( 24 Sep 2022 17:30 )             20.1     09-24    134<L>  |  91<L>  |  21<H>  ----------------------------<  245<H>  4.0   |  34<H>  |  1.0    Ca    8.0<L>      24 Sep 2022 09:17    TPro  4.5<L>  /  Alb  2.5<L>  /  TBili  <0.2  /  DBili  <0.2  /  AST  31  /  ALT  28  /  AlkPhos  90  09-24    PT/INR - ( 24 Sep 2022 09:17 )   PT: 12.70 sec;   INR: 1.11 ratio         PTT - ( 24 Sep 2022 09:17 )  PTT:26.4 sec      < from: CT Abdomen and Pelvis w/ IV Cont (22 @ 10:32) >  Findings of necrotizing pancreatitis. Multiple peripancreatic fluid   collections as described. Gas within some of these collections may   represent superinfection.    < end of copied text >   (24 Sep 2022 18:11)      INTERVAL HISTORY:  No acute events overnight. Was to get EGD/colonoscopy today but did not finish the bowel prep due to a misunderstanding. Will remain on clears today and try the bowel prep tonight in preparation for EGD/colonoscopy tomorrow. Pt otherwise feels well.    PAST MEDICAL & SURGICAL HISTORY  HTN (hypertension)      ALLERGIES:  No Known Allergies      MEDICATIONS:  MEDICATIONS  (STANDING):  atorvastatin 20 milliGRAM(s) Oral at bedtime  cefTRIAXone   IVPB 2000 milliGRAM(s) IV Intermittent every 24 hours  chlorhexidine 2% Cloths 1 Application(s) Topical daily  folic acid 1 milliGRAM(s) Oral daily  lactated ringers. 1000 milliLiter(s) (100 mL/Hr) IV Continuous <Continuous>  metroNIDAZOLE  IVPB 500 milliGRAM(s) IV Intermittent every 8 hours  multivitamin 1 Tablet(s) Oral daily  octreotide  Infusion 50 MICROgram(s)/Hr (10 mL/Hr) IV Continuous <Continuous>  pantoprazole  Injectable 40 milliGRAM(s) IV Push every 12 hours  polyethylene glycol/electrolyte Solution. 4000 milliLiter(s) Oral once  senna 2 Tablet(s) Oral at bedtime  thiamine 100 milliGRAM(s) Oral daily    MEDICATIONS  (PRN):  aluminum hydroxide/magnesium hydroxide/simethicone Suspension 30 milliLiter(s) Oral every 4 hours PRN Dyspepsia  LORazepam     Tablet 2 milliGRAM(s) Oral every 4 hours PRN Symptom-triggered: 2 point increase in CIWA -Ar score and a total score of 7 or LESS  melatonin 3 milliGRAM(s) Oral at bedtime PRN Insomnia  ondansetron Injectable 4 milliGRAM(s) IV Push every 8 hours PRN Nausea and/or Vomiting      HOME MEDICATIONS:  Home Medications:  AMLODIPINE BESYLATE 5MG TAB:  (24 Sep 2022 18:16)  ATORVASTATIN CALCIUM 20MG TAB:  (24 Sep 2022 18:16)  LISINOPRIL 20 MG TABLET: TAKE 1 TABLET BY MOUTH EVERY DAY (24 Sep 2022 18:16)    OBJECTIVE:  ICU Vital Signs Last 24 Hrs  T(C): 36.4 (26 Sep 2022 12:00), Max: 38 (25 Sep 2022 20:00)  T(F): 97.6 (26 Sep 2022 12:00), Max: 100.4 (25 Sep 2022 20:00)  HR: 77 (26 Sep 2022 13:) (72 - 84)  BP: 128/73 (26 Sep 2022 13:) (105/59 - 147/74)  BP(mean): 95 (26 Sep 2022 13:) (76 - 104)  ABP: --  ABP(mean): --  RR: 24 (26 Sep 2022 13:) (17 - 26)  SpO2: 97% (26 Sep 2022 13:) (93% - 97%)    O2 Parameters below as of 26 Sep 2022 13:  Patient On (Oxygen Delivery Method): room air    Adult Advanced Hemodynamics Last 24 Hrs  CVP(mm Hg): --  CVP(cm H2O): --  CO: --  CI: --  PA: --  PA(mean): --  PCWP: --  SVR: --  SVRI: --  PVR: --  PVRI: --  I&O's Summary    25 Sep 2022 07:01  -  26 Sep 2022 07:00  --------------------------------------------------------  IN: 710 mL / OUT: 1051 mL / NET: -341 mL    26 Sep 2022 07:01  -  26 Sep 2022 13:37  --------------------------------------------------------  IN: 1040 mL / OUT: 550 mL / NET: 490 mL      Daily     Daily Weight in k.2 (26 Sep 2022 06:00)    PHYSICAL EXAM:  NEURO: patient is awake , alert and oriented  GEN: Not in acute distress  NECK: supple  LUNGS: Clear to auscultation bilaterally   CARDIOVASCULAR: S1/S2 present, RRR , no murmurs  ABD: Soft, non-tender, non-distended, +BS  EXT: No KOFI      LABS:                        8.3    11.33 )-----------( 246      ( 26 Sep 2022 04:27 )             23.2         133<L>  |  98  |  8<L>  ----------------------------<  148<H>  3.8   |  28  |  0.8    Ca    7.8<L>      26 Sep 2022 04:27  Mg     1.9         TPro  4.7<L>  /  Alb  2.5<L>  /  TBili  0.4  /  DBili  x   /  AST  14  /  ALT  13  /  AlkPhos  85      PT/INR - ( 25 Sep 2022 22:27 )   PT: 12.00 sec;   INR: 1.04 ratio         PTT - ( 25 Sep 2022 22:27 )  PTT:28.0 sec       Chol 96 LDL -- HDL 29<L> Trig 101      RADIOLOGY:  -CXR:   -TTE:    1. Left ventricular ejection fraction, by visual estimation, is 65 to 70%.   2. Normal global left ventricular systolic function.   3. Mildly dilated left atrium.   4. Mild anterior mitral leaflet prolapse.   5. Moderate mitral regurgitation.    -STRESS TEST:  -CATHETERIZATION:    CTA A/P   IMPRESSION:  Since 2022 at 10:19 AM:  1.  No evidence of active gastrointestinal hemorrhage.  2.  Findings of necrotizing pancreatitis with numerous peripancreatic   collections not significantly changed on this short interval follow-up.   See prior report for further details.  3.  New small bilateral pleural effusions.      EC/24  Sinus rhythm with occasional Premature atrial complexes  Right bundle branch block  Left anterior fascicular block    TELEMETRY EVENTS:  No tele events overnight

## 2022-09-27 ENCOUNTER — TRANSCRIPTION ENCOUNTER (OUTPATIENT)
Age: 72
End: 2022-09-27

## 2022-09-27 DIAGNOSIS — K86.89 OTHER SPECIFIED DISEASES OF PANCREAS: ICD-10-CM

## 2022-09-27 LAB
ALBUMIN SERPL ELPH-MCNC: 2.5 G/DL — LOW (ref 3.5–5.2)
ALP SERPL-CCNC: 91 U/L — SIGNIFICANT CHANGE UP (ref 30–115)
ALT FLD-CCNC: 10 U/L — SIGNIFICANT CHANGE UP (ref 0–41)
ANION GAP SERPL CALC-SCNC: 18 MMOL/L — HIGH (ref 7–14)
AST SERPL-CCNC: 18 U/L — SIGNIFICANT CHANGE UP (ref 0–41)
BASOPHILS # BLD AUTO: 0.03 K/UL — SIGNIFICANT CHANGE UP (ref 0–0.2)
BASOPHILS # BLD AUTO: 0.04 K/UL — SIGNIFICANT CHANGE UP (ref 0–0.2)
BASOPHILS NFR BLD AUTO: 0.3 % — SIGNIFICANT CHANGE UP (ref 0–1)
BASOPHILS NFR BLD AUTO: 0.4 % — SIGNIFICANT CHANGE UP (ref 0–1)
BILIRUB SERPL-MCNC: 0.3 MG/DL — SIGNIFICANT CHANGE UP (ref 0.2–1.2)
BUN SERPL-MCNC: 7 MG/DL — LOW (ref 10–20)
CALCIUM SERPL-MCNC: 8.1 MG/DL — LOW (ref 8.4–10.5)
CHLORIDE SERPL-SCNC: 100 MMOL/L — SIGNIFICANT CHANGE UP (ref 98–110)
CO2 SERPL-SCNC: 18 MMOL/L — SIGNIFICANT CHANGE UP (ref 17–32)
CREAT SERPL-MCNC: 0.7 MG/DL — SIGNIFICANT CHANGE UP (ref 0.7–1.5)
EGFR: 97 ML/MIN/1.73M2 — SIGNIFICANT CHANGE UP
EOSINOPHIL # BLD AUTO: 0.07 K/UL — SIGNIFICANT CHANGE UP (ref 0–0.7)
EOSINOPHIL # BLD AUTO: 0.09 K/UL — SIGNIFICANT CHANGE UP (ref 0–0.7)
EOSINOPHIL NFR BLD AUTO: 0.7 % — SIGNIFICANT CHANGE UP (ref 0–8)
EOSINOPHIL NFR BLD AUTO: 0.8 % — SIGNIFICANT CHANGE UP (ref 0–8)
GLUCOSE SERPL-MCNC: 109 MG/DL — HIGH (ref 70–99)
HCT VFR BLD CALC: 24.4 % — LOW (ref 42–52)
HCT VFR BLD CALC: 26.2 % — LOW (ref 42–52)
HGB BLD-MCNC: 8.4 G/DL — LOW (ref 14–18)
HGB BLD-MCNC: 8.8 G/DL — LOW (ref 14–18)
IMM GRANULOCYTES NFR BLD AUTO: 1 % — HIGH (ref 0.1–0.3)
IMM GRANULOCYTES NFR BLD AUTO: 1.1 % — HIGH (ref 0.1–0.3)
LYMPHOCYTES # BLD AUTO: 1.06 K/UL — LOW (ref 1.2–3.4)
LYMPHOCYTES # BLD AUTO: 1.28 K/UL — SIGNIFICANT CHANGE UP (ref 1.2–3.4)
LYMPHOCYTES # BLD AUTO: 11.3 % — LOW (ref 20.5–51.1)
LYMPHOCYTES # BLD AUTO: 11.8 % — LOW (ref 20.5–51.1)
MAGNESIUM SERPL-MCNC: 1.7 MG/DL — LOW (ref 1.8–2.4)
MCHC RBC-ENTMCNC: 31.1 PG — HIGH (ref 27–31)
MCHC RBC-ENTMCNC: 31.1 PG — HIGH (ref 27–31)
MCHC RBC-ENTMCNC: 33.6 G/DL — SIGNIFICANT CHANGE UP (ref 32–37)
MCHC RBC-ENTMCNC: 34.4 G/DL — SIGNIFICANT CHANGE UP (ref 32–37)
MCV RBC AUTO: 90.4 FL — SIGNIFICANT CHANGE UP (ref 80–94)
MCV RBC AUTO: 92.6 FL — SIGNIFICANT CHANGE UP (ref 80–94)
MONOCYTES # BLD AUTO: 0.76 K/UL — HIGH (ref 0.1–0.6)
MONOCYTES # BLD AUTO: 1.01 K/UL — HIGH (ref 0.1–0.6)
MONOCYTES NFR BLD AUTO: 10.8 % — HIGH (ref 1.7–9.3)
MONOCYTES NFR BLD AUTO: 7 % — SIGNIFICANT CHANGE UP (ref 1.7–9.3)
NEUTROPHILS # BLD AUTO: 7.08 K/UL — HIGH (ref 1.4–6.5)
NEUTROPHILS # BLD AUTO: 8.53 K/UL — HIGH (ref 1.4–6.5)
NEUTROPHILS NFR BLD AUTO: 75.8 % — HIGH (ref 42.2–75.2)
NEUTROPHILS NFR BLD AUTO: 79 % — HIGH (ref 42.2–75.2)
NRBC # BLD: 0 /100 WBCS — SIGNIFICANT CHANGE UP (ref 0–0)
NRBC # BLD: 0 /100 WBCS — SIGNIFICANT CHANGE UP (ref 0–0)
PLATELET # BLD AUTO: 236 K/UL — SIGNIFICANT CHANGE UP (ref 130–400)
PLATELET # BLD AUTO: 270 K/UL — SIGNIFICANT CHANGE UP (ref 130–400)
POTASSIUM SERPL-MCNC: 4.8 MMOL/L — SIGNIFICANT CHANGE UP (ref 3.5–5)
POTASSIUM SERPL-SCNC: 4.8 MMOL/L — SIGNIFICANT CHANGE UP (ref 3.5–5)
PROT SERPL-MCNC: 5.2 G/DL — LOW (ref 6–8)
RBC # BLD: 2.7 M/UL — LOW (ref 4.7–6.1)
RBC # BLD: 2.83 M/UL — LOW (ref 4.7–6.1)
RBC # FLD: 13.4 % — SIGNIFICANT CHANGE UP (ref 11.5–14.5)
RBC # FLD: 13.9 % — SIGNIFICANT CHANGE UP (ref 11.5–14.5)
SODIUM SERPL-SCNC: 136 MMOL/L — SIGNIFICANT CHANGE UP (ref 135–146)
TROPONIN T SERPL-MCNC: <0.01 NG/ML — SIGNIFICANT CHANGE UP
WBC # BLD: 10.81 K/UL — HIGH (ref 4.8–10.8)
WBC # BLD: 9.35 K/UL — SIGNIFICANT CHANGE UP (ref 4.8–10.8)
WBC # FLD AUTO: 10.81 K/UL — HIGH (ref 4.8–10.8)
WBC # FLD AUTO: 9.35 K/UL — SIGNIFICANT CHANGE UP (ref 4.8–10.8)

## 2022-09-27 PROCEDURE — 71045 X-RAY EXAM CHEST 1 VIEW: CPT | Mod: 26

## 2022-09-27 PROCEDURE — 45378 DIAGNOSTIC COLONOSCOPY: CPT | Mod: XS

## 2022-09-27 PROCEDURE — 43255 EGD CONTROL BLEEDING ANY: CPT

## 2022-09-27 PROCEDURE — 99233 SBSQ HOSP IP/OBS HIGH 50: CPT

## 2022-09-27 RX ADMIN — OCTREOTIDE ACETATE 10 MICROGRAM(S)/HR: 200 INJECTION, SOLUTION INTRAVENOUS; SUBCUTANEOUS at 02:10

## 2022-09-27 RX ADMIN — Medication 1 MILLIGRAM(S): at 11:47

## 2022-09-27 RX ADMIN — Medication 100 MILLIGRAM(S): at 11:46

## 2022-09-27 RX ADMIN — Medication 100 MILLIGRAM(S): at 21:27

## 2022-09-27 RX ADMIN — CHLORHEXIDINE GLUCONATE 1 APPLICATION(S): 213 SOLUTION TOPICAL at 11:46

## 2022-09-27 RX ADMIN — CEFTRIAXONE 100 MILLIGRAM(S): 500 INJECTION, POWDER, FOR SOLUTION INTRAMUSCULAR; INTRAVENOUS at 13:30

## 2022-09-27 RX ADMIN — PANTOPRAZOLE SODIUM 40 MILLIGRAM(S): 20 TABLET, DELAYED RELEASE ORAL at 05:07

## 2022-09-27 RX ADMIN — PANTOPRAZOLE SODIUM 40 MILLIGRAM(S): 20 TABLET, DELAYED RELEASE ORAL at 21:14

## 2022-09-27 RX ADMIN — Medication 100 MILLIGRAM(S): at 13:30

## 2022-09-27 RX ADMIN — Medication 100 MILLIGRAM(S): at 05:07

## 2022-09-27 RX ADMIN — SODIUM CHLORIDE 100 MILLILITER(S): 9 INJECTION, SOLUTION INTRAVENOUS at 05:07

## 2022-09-27 RX ADMIN — Medication 1 TABLET(S): at 11:46

## 2022-09-27 NOTE — PROGRESS NOTE ADULT - ASSESSMENT
IMPRESSION:  GIB  necrotizing pancreatitis  ETOH abuse  blood loss anemia:    IMPRESSION:        PLAN:    CNS: thiamine and folate   [facility] protocol     HEENT: oral care     PULMONARY: keep pox > 92 %     CARDIOVASCULAR: continue IV fluid   echo     GI: GI prophylaxis.  iv octreotide , PPI   GI flow up for scope     RENAL: follow bmp, is and os     INFECTIOUS DISEASE: on abx follow ID   repeat bld cx   procal elevated repeat in 48 hrs   HEMATOLOGICAL:  DVT prophylaxis. sequential   follow h/h     ENDOCRINE:  Follow up FS.  Insulin protocol if needed.    oob to chair   if h/h remain stable no on scope to active bleed downgrade to floor if off octreotide         CRITICAL CARE TIME SPENT: ***
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
## 2022-09-27 NOTE — CHART NOTE - NSCHARTNOTEFT_GEN_A_CORE
EGD impression:                   Ulcer in the gastroesophageal junction.  	Irregularity in the Z-line and gastroesophageal junction.  	Ulcers in the antrum.  	Erythema in the antrum and stomach body compatible with erosive gastritis.  	large Ulcer in the duodenal bulb with visible vessel. (Injection, Thermal Therapy).  	clean based smaller Ulcers in the duodenal bulb.    Colonoscopy Impressions:    	Mild diverticulosis of the sigmoid colon.  	Internal and external hemorrhoids.  	The colon and TI were otherwise unremarkable but prep was inadequate to evaluate for polyps < 1 cm.      Plan     Continue IV PPI q12h for now  Please keep the patient NPO today   Monitor Hb q12h  Monitor for recurrent bleeding   Patient will need PO PPI q12h x 8 weeks at least then once daily after    Repeat EGD in 8 weeks    Alcohol cessation recommended    AVOID NSAIDs and tobacco  Screening colonoscopy with primary GI physician    Will consult advanced GI for EUS evaluation of WOPN seen on CT for possible LAMS drainage
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
## 2022-09-27 NOTE — PROGRESS NOTE ADULT - SUBJECTIVE AND OBJECTIVE BOX
GENERAL SURGERY PROGRESS NOTE    Patient: CHRIS KHAN , 72y ([birth year])Male   MRN: [id number]  Location: [facility] 102 A  Visit: 09-24-22 Inpatient  Date: 09-27-22 @ 00:24    Procedure/Dx/Injuries: patient completed the colonoscopy prep.     Events of past 24 hours: necrotizing pancreatitis    PAST MEDICAL & SURGICAL HISTORY:  HTN (hypertension)    Vitals:   T(F): 98.9 (09-26-22 @ 20:00), Max: 100.5 (09-26-22 @ 18:00)  HR: 74 (09-26-22 @ 22:00)  BP: 117/69 (09-26-22 @ 22:00)  RR: 23 (09-26-22 @ 22:00)  SpO2: 95% (09-26-22 @ 22:00)      Diet, NPO after Midnight:      NPO Start Date: 26-Sep-2022,   NPO Start Time: 23:59  Except Medications  Diet, NPO after Midnight:      NPO Start Date: 26-Sep-2022,   NPO Start Time: 23:59  Except Medications  Diet, Clear Liquid    Fluids: lactated ringers.: Solution, 1000 milliLiter(s) infuse at 100 mL/Hr      I & O's:    09-25-22 @ 07:01  -  09-26-22 @ 07:00  --------------------------------------------------------  IN:    IV PiggyBack: 250 mL    Lactated Ringers: 100 mL    Octreotide: 240 mL    Oral Fluid: 120 mL  Total IN: 710 mL    OUT:    Blood Loss (mL): 1 mL    Voided (mL): 1050 mL  Total OUT: 1051 mL    Total NET: -341 mL    Bowel Movement: : [] YES [] NO  Flatus: : [] YES [] NO    PHYSICAL EXAM:  General: NAD, AAOx3,   Cardiac: RRR S1, S2,   Respiratory: CTAB,   Abdomen: Soft, non-distended, non-tender,   Vascular: extremities well perfused    MEDICATIONS  (STANDING):  atorvastatin 20 milliGRAM(s) Oral at bedtime  cefTRIAXone   IVPB 2000 milliGRAM(s) IV Intermittent every 24 hours  chlorhexidine 2% Cloths 1 Application(s) Topical daily  folic acid 1 milliGRAM(s) Oral daily  lactated ringers. 1000 milliLiter(s) (100 mL/Hr) IV Continuous <Continuous>  metroNIDAZOLE  IVPB 500 milliGRAM(s) IV Intermittent every 8 hours  multivitamin 1 Tablet(s) Oral daily  octreotide  Infusion 50 MICROgram(s)/Hr (10 mL/Hr) IV Continuous <Continuous>  pantoprazole  Injectable 40 milliGRAM(s) IV Push every 12 hours  senna 2 Tablet(s) Oral at bedtime  thiamine 100 milliGRAM(s) Oral daily    MEDICATIONS  (PRN):  aluminum hydroxide/magnesium hydroxide/simethicone Suspension 30 milliLiter(s) Oral every 4 hours PRN Dyspepsia  LORazepam     Tablet 2 milliGRAM(s) Oral every 4 hours PRN Symptom-triggered: 2 point increase in CIWA -Ar score and a total score of 7 or LESS  melatonin 3 milliGRAM(s) Oral at bedtime PRN Insomnia  ondansetron Injectable 4 milliGRAM(s) IV Push every 8 hours PRN Nausea and/or Vomiting    DVT PROPHYLAXIS:   GI PROPHYLAXIS: pantoprazole  Injectable 40 milliGRAM(s) IV Push every 12 hours    ANTICOAGULATION:   ANTIBIOTICS:  cefTRIAXone   IVPB 2000 milliGRAM(s)  metroNIDAZOLE  IVPB 500 milliGRAM(s)    LAB/STUDIES:  Labs:  CAPILLARY BLOOD GLUCOSE                        9.2    10.46 )-----------( 285      ( 26 Sep 2022 16:40 )             26.7       Auto Neutrophil %: 76.5 % (09-26-22 @ 04:27)  Auto Immature Granulocyte %: 0.8 % (09-26-22 @ 04:27)    09-26    134<L>  |  99  |  6<L>  ----------------------------<  131<H>  4.7   |  23  |  0.7      Calcium, Total Serum: 8.2 mg/dL (09-26-22 @ 16:40)      LFTs:             4.7  | 0.4  | 14       ------------------[85      ( 26 Sep 2022 04:27 )  2.5  | x    | 13          Lipase:x      Amylase:x         Lactate, Blood: 3.8 mmol/L (09-24-22 @ 14:59)  Lactate, Blood: 6.4 mmol/L (09-24-22 @ 09:17)      Coags:     13.30  ----< 1.16    ( 26 Sep 2022 16:40 )     28.5        CARDIAC MARKERS ( 26 Sep 2022 16:40 )  x     / <0.01 ng/mL / x     / x     / x        IMAGING:  < from: Xray Chest 1 View- PORTABLE-Routine (Xray Chest 1 View- PORTABLE-Routine in AM.) (09.26.22 @ 06:01) >  FINDINGS/  IMPRESSION:    Mild left basal effusion/opacities similar to prior exam. Right effusion   not well delineated on the frontal view. No pneumothorax.    Stable cardiomediastinal silhouette.    Unchanged osseous structures.    < end of copied text >      ACCESS/ DEVICES:  [x ] Peripheral IV  [ ] Central Venous Line	[ ] R	[ ] L	[ ] IJ	[ ] Fem	[ ] SC	Placed:   [ ] Arterial Line		[ ] R	[ ] L	[ ] Fem	[ ] Rad	[ ] Ax	Placed:   [ ] PICC:					[ ] Mediport  [ ] Urinary Catheter,  Date Placed:   [ ] Chest tube: [ ] Right, [ ] Left  [ ] BANDAR/Porter Drains

## 2022-09-27 NOTE — ASU PREOP CHECKLIST - NSWEIGHTCALCTOOLDRUG_GEN_A_CORE
Airway    Date/Time: 12/3/2021 12:51 PM  Performed by: Marilin Vazquez M.D.  Authorized by: Marilin Vazquez M.D.     Location:  OR  Urgency:  Elective  Difficult Airway: No    Indications for Airway Management:  Anesthesia      Spontaneous Ventilation: absent    Sedation Level:  Deep  Preoxygenated: Yes    Patient Position:  Sniffing  Mask Difficulty Assessment:  1 - vent by mask  Final Airway Type:  Endotracheal airway  Final Endotracheal Airway:  ETT  Cuffed: Yes    Technique Used for Successful ETT Placement:  Direct laryngoscopy    Insertion Site:  Oral  Blade Type:  Rich  Laryngoscope Blade/Videolaryngoscope Blade Size:  3  ETT Size (mm):  7.0  Measured from:  Teeth  ETT to Teeth (cm):  21  Placement Verified by: auscultation and capnometry    Cormack-Lehane Classification:  Grade I - full view of glottis  Number of Attempts at Approach:  1          
 used

## 2022-09-27 NOTE — PROGRESS NOTE ADULT - ASSESSMENT
ASSESSMENT:  72y M w/ necrotizing pancreatitis     PLAN:   No acute surgical intervention indicated at this time,  F/u EGD/Colonoscopy with GI today  Trend CBC, transfuse as needed  per IR there is a suboptimal window for peripancreatic fluid collection drainage      spectra [id number]

## 2022-09-27 NOTE — PROGRESS NOTE ADULT - SUBJECTIVE AND OBJECTIVE BOX
Patient is a 72y old  Male who presents with a chief complaint of UGIB (27 Sep 2022 00:24)      Over Night Events:  Patient seen and examined.   still pending scope     ROS:  See HPI    PHYSICAL EXAM    ICU Vital Signs Last 24 Hrs  T(C): 37.2 (27 Sep 2022 07:27), Max: 38.1 (26 Sep 2022 18:00)  T(F): 99 (27 Sep 2022 07:27), Max: 100.5 (26 Sep 2022 18:00)  HR: 72 (27 Sep 2022 07:27) (70 - 85)  BP: 115/72 (27 Sep 2022 07:27) (111/66 - 153/78)  BP(mean): 90 (27 Sep 2022 07:00) (84 - 109)  ABP: --  ABP(mean): --  RR: 20 (27 Sep 2022 07:27) (19 - 26)  SpO2: 96% (27 Sep 2022 07:27) (94% - 97%)    O2 Parameters below as of 27 Sep 2022 05:00  Patient On (Oxygen Delivery Method): room air            General: AOx3  HEENT:     azael           Lymph Nodes: NO cervical LN   Lungs: Bilateral BS  Cardiovascular: Regular   Abdomen: Soft, Positive BS  Extremities: No clubbing   Skin: warm   Neurological: no focal   Musculoskeletal: move all ext     I&O's Detail    26 Sep 2022 07:01  -  27 Sep 2022 07:00  --------------------------------------------------------  IN:    IV PiggyBack: 350 mL    Lactated Ringers: 2400 mL    Octreotide: 240 mL    Oral Fluid: 420 mL  Total IN: 3410 mL    OUT:    Voided (mL): 900 mL  Total OUT: 900 mL    Total NET: 2510 mL          LABS:                          8.4    9.35  )-----------( 236      ( 27 Sep 2022 05:33 )             24.4         26 Sep 2022 16:40    134    |  99     |  6      ----------------------------<  131    4.7     |  23     |  0.7      Ca    8.2        26 Sep 2022 16:40  Mg     1.9       26 Sep 2022 04:27                                               PT/INR - ( 26 Sep 2022 16:40 )   PT: 13.30 sec;   INR: 1.16 ratio         PTT - ( 26 Sep 2022 16:40 )  PTT:28.5 sec                                           Lactate, Blood: 3.8 mmol/L (09-24-22 @ 14:59)  Lactate, Blood: 6.4 mmol/L (09-24-22 @ 09:17)    CARDIAC MARKERS ( 27 Sep 2022 05:33 )  x     / <0.01 ng/mL / x     / x     / x      CARDIAC MARKERS ( 26 Sep 2022 16:40 )  x     / <0.01 ng/mL / x     / x     / x                                                            Culture - Blood (collected 25 Sep 2022 17:09)  Source: .Blood Blood  Preliminary Report (27 Sep 2022 01:01):    No growth to date.    Culture - Blood (collected 25 Sep 2022 17:09)  Source: .Blood Blood  Preliminary Report (27 Sep 2022 01:01):    No growth to date.                                                                                           MEDICATIONS  (STANDING):  atorvastatin 20 milliGRAM(s) Oral at bedtime  cefTRIAXone   IVPB 2000 milliGRAM(s) IV Intermittent every 24 hours  chlorhexidine 2% Cloths 1 Application(s) Topical daily  folic acid 1 milliGRAM(s) Oral daily  lactated ringers. 1000 milliLiter(s) (100 mL/Hr) IV Continuous <Continuous>  metroNIDAZOLE  IVPB 500 milliGRAM(s) IV Intermittent every 8 hours  multivitamin 1 Tablet(s) Oral daily  octreotide  Infusion 50 MICROgram(s)/Hr (10 mL/Hr) IV Continuous <Continuous>  pantoprazole  Injectable 40 milliGRAM(s) IV Push every 12 hours  senna 2 Tablet(s) Oral at bedtime  thiamine 100 milliGRAM(s) Oral daily    MEDICATIONS  (PRN):  aluminum hydroxide/magnesium hydroxide/simethicone Suspension 30 milliLiter(s) Oral every 4 hours PRN Dyspepsia  LORazepam     Tablet 2 milliGRAM(s) Oral every 4 hours PRN Symptom-triggered: 2 point increase in CIWA -Ar score and a total score of 7 or LESS  melatonin 3 milliGRAM(s) Oral at bedtime PRN Insomnia  ondansetron Injectable 4 milliGRAM(s) IV Push every 8 hours PRN Nausea and/or Vomiting          Xrays:  TLC:  OG:  ET tube:                                                                                    no infiltrate    ECHO:  [facility] ICU:

## 2022-09-27 NOTE — PROGRESS NOTE ADULT - SUBJECTIVE AND OBJECTIVE BOX
HPI:  72-year-old gentleman with PMHX  of HTN, HLD , recent hx of pancreatitis, extensive alcohol use daily, presents for episodes of Hemetemesis and hematochezia. g.  Patient notes that today he developed a large volume bloody bowel movement along with an episode of blood vomitting.  Patient became dizzy after this episode and almost passed out.  Wife was present to supplement the history.  Patient was brought to the ER and still notes dizziness.  Patient had abdominal imaging in which she was found to have necrosis of the pancreas.  Surgery consulted for pancreatic necrosis.    Of note patient was admitted June 8 to [facility] in [place] in total for 1 week in which she was treated for an uncomplicated pancreatitis.  Patient has never had prior documented pancreatitis.  Patient's wife notes that he has been having on and off intermittent epigastric pain more so towards the left upper quadrant and this has been going on for years.  Patient has been treating this pain with over-the-counter NSAIDs at times.   He drinking 5 ounces of vodka a day. Patient and wife deny him having prior endoscopic evaluation.    In the ED   T(F): 100.1 (24 Sep 2022 16:50), Max: 100.1 (24 Sep 2022 16:50)  HR: 95 (24 Sep 2022 16:50) (73 - 98)  BP: 112/58 (24 Sep 2022 16:50) (112/58 - 133/81)      LABS:  cret                        6.7    9.97  )-----------( 295      ( 24 Sep 2022 17:30 )             20.1     09-24    134<L>  |  91<L>  |  21<H>  ----------------------------<  245<H>  4.0   |  34<H>  |  1.0    Ca    8.0<L>      24 Sep 2022 09:17    TPro  4.5<L>  /  Alb  2.5<L>  /  TBili  <0.2  /  DBili  <0.2  /  AST  31  /  ALT  28  /  AlkPhos  90  09-24    PT/INR - ( 24 Sep 2022 09:17 )   PT: 12.70 sec;   INR: 1.11 ratio         PTT - ( 24 Sep 2022 09:17 )  PTT:26.4 sec      < from: CT Abdomen and Pelvis w/ IV Cont (09.24.22 @ 10:32) >  Findings of necrotizing pancreatitis. Multiple peripancreatic fluid   collections as described. Gas within some of these collections may   represent superinfection.    < end of copied text >   (24 Sep 2022 18:11)      INTERVAL HISTORY:    No acute events overnight. Did not want to continue drinking bowel prep. GI will still take patient as he was on clears since 2 nights ago.    PAST MEDICAL & SURGICAL HISTORY  HTN (hypertension)        ALLERGIES:  No Known Allergies      MEDICATIONS:  MEDICATIONS  (STANDING):  atorvastatin 20 milliGRAM(s) Oral at bedtime  cefTRIAXone   IVPB 2000 milliGRAM(s) IV Intermittent every 24 hours  chlorhexidine 2% Cloths 1 Application(s) Topical daily  folic acid 1 milliGRAM(s) Oral daily  lactated ringers. 1000 milliLiter(s) (100 mL/Hr) IV Continuous <Continuous>  metroNIDAZOLE  IVPB 500 milliGRAM(s) IV Intermittent every 8 hours  multivitamin 1 Tablet(s) Oral daily  pantoprazole  Injectable 40 milliGRAM(s) IV Push every 12 hours  senna 2 Tablet(s) Oral at bedtime  thiamine 100 milliGRAM(s) Oral daily    MEDICATIONS  (PRN):  aluminum hydroxide/magnesium hydroxide/simethicone Suspension 30 milliLiter(s) Oral every 4 hours PRN Dyspepsia  LORazepam     Tablet 2 milliGRAM(s) Oral every 4 hours PRN Symptom-triggered: 2 point increase in CIWA -Ar score and a total score of 7 or LESS  melatonin 3 milliGRAM(s) Oral at bedtime PRN Insomnia  ondansetron Injectable 4 milliGRAM(s) IV Push every 8 hours PRN Nausea and/or Vomiting      HOME MEDICATIONS:  Home Medications:  AMLODIPINE BESYLATE 5MG TAB:  (24 Sep 2022 18:16)  ATORVASTATIN CALCIUM 20MG TAB:  (24 Sep 2022 18:16)  LISINOPRIL 20 MG TABLET: TAKE 1 TABLET BY MOUTH EVERY DAY (24 Sep 2022 18:16)        OBJECTIVE:  ICU Vital Signs Last 24 Hrs  T(C): 37.3 (27 Sep 2022 08:00), Max: 38.1 (26 Sep 2022 18:00)  T(F): 99.2 (27 Sep 2022 08:00), Max: 100.5 (26 Sep 2022 18:00)  HR: 78 (27 Sep 2022 09:00) (70 - 85)  BP: 134/70 (27 Sep 2022 09:00) (111/66 - 153/78)  BP(mean): 96 (27 Sep 2022 09:00) (84 - 109)  ABP: --  ABP(mean): --  RR: 23 (27 Sep 2022 09:00) (19 - 26)  SpO2: 95% (27 Sep 2022 09:00) (94% - 97%)    O2 Parameters below as of 27 Sep 2022 09:00  Patient On (Oxygen Delivery Method): room air    Adult Advanced Hemodynamics Last 24 Hrs  CVP(mm Hg): --  CVP(cm H2O): --  CO: --  CI: --  PA: --  PA(mean): --  PCWP: --  SVR: --  SVRI: --  PVR: --  PVRI: --  I&O's Summary    26 Sep 2022 07:01  -  27 Sep 2022 07:00  --------------------------------------------------------  IN: 3410 mL / OUT: 900 mL / NET: 2510 mL    27 Sep 2022 07:01  -  27 Sep 2022 09:50  --------------------------------------------------------  IN: 220 mL / OUT: 0 mL / NET: 220 mL      Daily Height in cm: 172.72 (27 Sep 2022 07:27)    Daily     PHYSICAL EXAM:  NEURO: patient is awake , alert and oriented  GEN: Not in acute distress  NECK: supple  LUNGS: Clear to auscultation bilaterally   CARDIOVASCULAR: S1/S2 present, RRR , no murmurs  ABD: Soft, non-tender, non-distended, +BS  EXT: No KFOI    LABS:                        8.4    9.35  )-----------( 236      ( 27 Sep 2022 05:33 )             24.4     09-26    134<L>  |  99  |  6<L>  ----------------------------<  131<H>  4.7   |  23  |  0.7    Ca    8.2<L>      26 Sep 2022 16:40  Mg     1.9     09-26    TPro  4.7<L>  /  Alb  2.5<L>  /  TBili  0.4  /  DBili  x   /  AST  14  /  ALT  13  /  AlkPhos  85  09-26    PT/INR - ( 26 Sep 2022 16:40 )   PT: 13.30 sec;   INR: 1.16 ratio         PTT - ( 26 Sep 2022 16:40 )  PTT:28.5 sec  Troponin T, Serum: <0.01 ng/mL (09-27-22 @ 05:33)  Troponin T, Serum: <0.01 ng/mL (09-26-22 @ 16:40)    CARDIAC MARKERS ( 27 Sep 2022 05:33 )  x     / <0.01 ng/mL / x     / x     / x      CARDIAC MARKERS ( 26 Sep 2022 16:40 )  x     / <0.01 ng/mL / x     / x     / x            Troponin trend:      09-25 Chol 96 LDL -- HDL 29<L> Trig 101      RADIOLOGY:  -CXR:  -TTE: 9/25   1. Left ventricular ejection fraction, by visual estimation, is 65 to   70%.   2. Normal global left ventricular systolic function.   3. Mildly dilated left atrium.   4. Mild anterior mitral leaflet prolapse.   5. Moderate mitral regurgitation.    -STRESS TEST:  -CATHETERIZATION:    ECG:    TELEMETRY EVENTS:  No tele events overnight

## 2022-09-28 ENCOUNTER — TRANSCRIPTION ENCOUNTER (OUTPATIENT)
Age: 72
End: 2022-09-28

## 2022-09-28 LAB
ALBUMIN SERPL ELPH-MCNC: 2.6 G/DL — LOW (ref 3.5–5.2)
ALP SERPL-CCNC: 90 U/L — SIGNIFICANT CHANGE UP (ref 30–115)
ALT FLD-CCNC: 9 U/L — SIGNIFICANT CHANGE UP (ref 0–41)
ANION GAP SERPL CALC-SCNC: 15 MMOL/L — HIGH (ref 7–14)
AST SERPL-CCNC: 13 U/L — SIGNIFICANT CHANGE UP (ref 0–41)
BASOPHILS # BLD AUTO: 0.03 K/UL — SIGNIFICANT CHANGE UP (ref 0–0.2)
BASOPHILS NFR BLD AUTO: 0.3 % — SIGNIFICANT CHANGE UP (ref 0–1)
BILIRUB SERPL-MCNC: 0.3 MG/DL — SIGNIFICANT CHANGE UP (ref 0.2–1.2)
BUN SERPL-MCNC: 7 MG/DL — LOW (ref 10–20)
CALCIUM SERPL-MCNC: 8 MG/DL — LOW (ref 8.4–10.4)
CHLORIDE SERPL-SCNC: 99 MMOL/L — SIGNIFICANT CHANGE UP (ref 98–110)
CO2 SERPL-SCNC: 21 MMOL/L — SIGNIFICANT CHANGE UP (ref 17–32)
CREAT SERPL-MCNC: 0.6 MG/DL — LOW (ref 0.7–1.5)
EGFR: 102 ML/MIN/1.73M2 — SIGNIFICANT CHANGE UP
EOSINOPHIL # BLD AUTO: 0.06 K/UL — SIGNIFICANT CHANGE UP (ref 0–0.7)
EOSINOPHIL NFR BLD AUTO: 0.6 % — SIGNIFICANT CHANGE UP (ref 0–8)
GLUCOSE SERPL-MCNC: 116 MG/DL — HIGH (ref 70–99)
HCT VFR BLD CALC: 22.1 % — LOW (ref 42–52)
HCT VFR BLD CALC: 23.4 % — LOW (ref 42–52)
HCT VFR BLD CALC: 25.8 % — LOW (ref 42–52)
HGB BLD-MCNC: 7.6 G/DL — LOW (ref 14–18)
HGB BLD-MCNC: 8 G/DL — LOW (ref 14–18)
HGB BLD-MCNC: 8.7 G/DL — LOW (ref 14–18)
IMM GRANULOCYTES NFR BLD AUTO: 1.2 % — HIGH (ref 0.1–0.3)
LYMPHOCYTES # BLD AUTO: 1.21 K/UL — SIGNIFICANT CHANGE UP (ref 1.2–3.4)
LYMPHOCYTES # BLD AUTO: 12.9 % — LOW (ref 20.5–51.1)
MAGNESIUM SERPL-MCNC: 2.4 MG/DL — SIGNIFICANT CHANGE UP (ref 1.8–2.4)
MCHC RBC-ENTMCNC: 30.8 PG — SIGNIFICANT CHANGE UP (ref 27–31)
MCHC RBC-ENTMCNC: 31.3 PG — HIGH (ref 27–31)
MCHC RBC-ENTMCNC: 31.3 PG — HIGH (ref 27–31)
MCHC RBC-ENTMCNC: 33.7 G/DL — SIGNIFICANT CHANGE UP (ref 32–37)
MCHC RBC-ENTMCNC: 34.2 G/DL — SIGNIFICANT CHANGE UP (ref 32–37)
MCHC RBC-ENTMCNC: 34.4 G/DL — SIGNIFICANT CHANGE UP (ref 32–37)
MCV RBC AUTO: 90 FL — SIGNIFICANT CHANGE UP (ref 80–94)
MCV RBC AUTO: 90.9 FL — SIGNIFICANT CHANGE UP (ref 80–94)
MCV RBC AUTO: 92.8 FL — SIGNIFICANT CHANGE UP (ref 80–94)
MONOCYTES # BLD AUTO: 0.72 K/UL — HIGH (ref 0.1–0.6)
MONOCYTES NFR BLD AUTO: 7.7 % — SIGNIFICANT CHANGE UP (ref 1.7–9.3)
NEUTROPHILS # BLD AUTO: 7.25 K/UL — HIGH (ref 1.4–6.5)
NEUTROPHILS NFR BLD AUTO: 77.3 % — HIGH (ref 42.2–75.2)
NRBC # BLD: 0 /100 WBCS — SIGNIFICANT CHANGE UP (ref 0–0)
PLATELET # BLD AUTO: 284 K/UL — SIGNIFICANT CHANGE UP (ref 130–400)
PLATELET # BLD AUTO: 298 K/UL — SIGNIFICANT CHANGE UP (ref 130–400)
PLATELET # BLD AUTO: 356 K/UL — SIGNIFICANT CHANGE UP (ref 130–400)
POTASSIUM SERPL-MCNC: 4.1 MMOL/L — SIGNIFICANT CHANGE UP (ref 3.5–5)
POTASSIUM SERPL-SCNC: 4.1 MMOL/L — SIGNIFICANT CHANGE UP (ref 3.5–5)
PROCALCITONIN SERPL-MCNC: 2.73 NG/ML — HIGH (ref 0.02–0.1)
PROT SERPL-MCNC: 5.3 G/DL — LOW (ref 6–8)
RBC # BLD: 2.43 M/UL — LOW (ref 4.7–6.1)
RBC # BLD: 2.6 M/UL — LOW (ref 4.7–6.1)
RBC # BLD: 2.78 M/UL — LOW (ref 4.7–6.1)
RBC # FLD: 13.5 % — SIGNIFICANT CHANGE UP (ref 11.5–14.5)
RBC # FLD: 13.8 % — SIGNIFICANT CHANGE UP (ref 11.5–14.5)
RBC # FLD: 13.8 % — SIGNIFICANT CHANGE UP (ref 11.5–14.5)
SODIUM SERPL-SCNC: 135 MMOL/L — SIGNIFICANT CHANGE UP (ref 135–146)
WBC # BLD: 8.03 K/UL — SIGNIFICANT CHANGE UP (ref 4.8–10.8)
WBC # BLD: 8.9 K/UL — SIGNIFICANT CHANGE UP (ref 4.8–10.8)
WBC # BLD: 9.38 K/UL — SIGNIFICANT CHANGE UP (ref 4.8–10.8)
WBC # FLD AUTO: 8.03 K/UL — SIGNIFICANT CHANGE UP (ref 4.8–10.8)
WBC # FLD AUTO: 8.9 K/UL — SIGNIFICANT CHANGE UP (ref 4.8–10.8)
WBC # FLD AUTO: 9.38 K/UL — SIGNIFICANT CHANGE UP (ref 4.8–10.8)

## 2022-09-28 PROCEDURE — 99233 SBSQ HOSP IP/OBS HIGH 50: CPT | Mod: 25

## 2022-09-28 PROCEDURE — 43240 EGD W/TRANSMURAL DRAIN CYST: CPT | Mod: XU

## 2022-09-28 PROCEDURE — 99233 SBSQ HOSP IP/OBS HIGH 50: CPT

## 2022-09-28 RX ORDER — SODIUM CHLORIDE 9 MG/ML
1000 INJECTION, SOLUTION INTRAVENOUS
Refills: 0 | Status: DISCONTINUED | OUTPATIENT
Start: 2022-09-28 | End: 2022-09-29

## 2022-09-28 RX ORDER — PIPERACILLIN AND TAZOBACTAM 4; .5 G/20ML; G/20ML
3.38 INJECTION, POWDER, LYOPHILIZED, FOR SOLUTION INTRAVENOUS ONCE
Refills: 0 | Status: COMPLETED | OUTPATIENT
Start: 2022-09-28 | End: 2022-09-28

## 2022-09-28 RX ORDER — PIPERACILLIN AND TAZOBACTAM 4; .5 G/20ML; G/20ML
3.38 INJECTION, POWDER, LYOPHILIZED, FOR SOLUTION INTRAVENOUS ONCE
Refills: 0 | Status: COMPLETED | OUTPATIENT
Start: 2022-09-28 | End: 2022-09-29

## 2022-09-28 RX ORDER — PIPERACILLIN AND TAZOBACTAM 4; .5 G/20ML; G/20ML
3.38 INJECTION, POWDER, LYOPHILIZED, FOR SOLUTION INTRAVENOUS EVERY 6 HOURS
Refills: 0 | Status: DISCONTINUED | OUTPATIENT
Start: 2022-09-29 | End: 2022-10-03

## 2022-09-28 RX ORDER — MAGNESIUM SULFATE 500 MG/ML
2 VIAL (ML) INJECTION ONCE
Refills: 0 | Status: COMPLETED | OUTPATIENT
Start: 2022-09-28 | End: 2022-09-28

## 2022-09-28 RX ADMIN — CHLORHEXIDINE GLUCONATE 1 APPLICATION(S): 213 SOLUTION TOPICAL at 11:32

## 2022-09-28 RX ADMIN — Medication 100 MILLIGRAM(S): at 21:34

## 2022-09-28 RX ADMIN — SODIUM CHLORIDE 75 MILLILITER(S): 9 INJECTION, SOLUTION INTRAVENOUS at 08:30

## 2022-09-28 RX ADMIN — Medication 1 MILLIGRAM(S): at 11:32

## 2022-09-28 RX ADMIN — PANTOPRAZOLE SODIUM 40 MILLIGRAM(S): 20 TABLET, DELAYED RELEASE ORAL at 05:17

## 2022-09-28 RX ADMIN — Medication 100 MILLIGRAM(S): at 05:17

## 2022-09-28 RX ADMIN — Medication 1 TABLET(S): at 11:31

## 2022-09-28 RX ADMIN — ATORVASTATIN CALCIUM 20 MILLIGRAM(S): 80 TABLET, FILM COATED ORAL at 21:34

## 2022-09-28 RX ADMIN — PIPERACILLIN AND TAZOBACTAM 200 GRAM(S): 4; .5 INJECTION, POWDER, LYOPHILIZED, FOR SOLUTION INTRAVENOUS at 21:38

## 2022-09-28 RX ADMIN — Medication 25 GRAM(S): at 06:44

## 2022-09-28 RX ADMIN — Medication 100 MILLIGRAM(S): at 14:11

## 2022-09-28 RX ADMIN — CEFTRIAXONE 100 MILLIGRAM(S): 500 INJECTION, POWDER, FOR SOLUTION INTRAMUSCULAR; INTRAVENOUS at 14:10

## 2022-09-28 NOTE — SBIRT NOTE ADULT - NSSBIRTBRIEFINTDET_GEN_A_CORE
Psychoeducation and supportive counseling provided. Pt reports past treatment, declines referrals from [facility]. CATCH team following
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
## 2022-09-28 NOTE — SBIRT NOTE ADULT - NSSBIRTDRGPOSREINDET_GEN_A_CORE
Pt denies past or current drug use, does not require any intervention. Supportive counseling provided regarding impact of drugs on health and wellbeing.

## 2022-09-28 NOTE — PROGRESS NOTE ADULT - SUBJECTIVE AND OBJECTIVE BOX
Patient is a 72y old  Male who presents with a chief complaint of UGIB (27 Sep 2022 09:49)      Over Night Events:  Patient seen and examined.   s/p EGD and colonoscopy   result reviewed multiple ulceration   injection  thermotherapy    h/h   ROS:  See HPI    PHYSICAL EXAM    ICU Vital Signs Last 24 Hrs  T(C): 36.9 (28 Sep 2022 04:00), Max: 37.5 (27 Sep 2022 12:00)  T(F): 98.4 (28 Sep 2022 04:00), Max: 99.5 (27 Sep 2022 12:00)  HR: 82 (28 Sep 2022 07:00) (69 - 82)  BP: 116/66 (28 Sep 2022 07:00) (109/62 - 156/76)  BP(mean): 84 (28 Sep 2022 07:00) (80 - 108)  ABP: --  ABP(mean): --  RR: 25 (28 Sep 2022 07:00) (17 - 25)  SpO2: 97% (28 Sep 2022 07:00) (94% - 98%)    O2 Parameters below as of 28 Sep 2022 07:00  Patient On (Oxygen Delivery Method): room air            General: AOx3  HEENT:  azael              Lymph Nodes: NO cervical LN   Lungs: Bilateral BS  Cardiovascular: Regular   Abdomen: Soft, Positive BS  Extremities: No clubbing   Skin: warm   Neurological: azael  Musculoskeletal: move all ext     I&O's Detail    27 Sep 2022 07:01  -  28 Sep 2022 07:00  --------------------------------------------------------  IN:    IV PiggyBack: 150 mL    Lactated Ringers: 1600 mL    Octreotide: 50 mL  Total IN: 1800 mL    OUT:    Voided (mL): 750 mL  Total OUT: 750 mL    Total NET: 1050 mL      28 Sep 2022 07:01  -  28 Sep 2022 07:34  --------------------------------------------------------  IN:    Lactated Ringers: 75 mL  Total IN: 75 mL    OUT:  Total OUT: 0 mL    Total NET: 75 mL          LABS:                          7.6    8.90  )-----------( 284      ( 27 Sep 2022 23:33 )             22.1         27 Sep 2022 22:10    136    |  100    |  7      ----------------------------<  109    4.8     |  18     |  0.7      Ca    8.1        27 Sep 2022 22:10  Mg     1.7       27 Sep 2022 22:10    TPro  5.2    /  Alb  2.5    /  TBili  0.3    /  DBili  x      /  AST  18     /  ALT  10     /  AlkPhos  91     27 Sep 2022 22:10  Amylase x     lipase x                                                 PT/INR - ( 26 Sep 2022 16:40 )   PT: 13.30 sec;   INR: 1.16 ratio         PTT - ( 26 Sep 2022 16:40 )  PTT:28.5 sec                                             CARDIAC MARKERS ( 27 Sep 2022 05:33 )  x     / <0.01 ng/mL / x     / x     / x      CARDIAC MARKERS ( 26 Sep 2022 16:40 )  x     / <0.01 ng/mL / x     / x     / x                                                            Culture - Blood (collected 25 Sep 2022 17:09)  Source: .Blood Blood  Preliminary Report (27 Sep 2022 01:01):    No growth to date.    Culture - Blood (collected 25 Sep 2022 17:09)  Source: .Blood Blood  Preliminary Report (27 Sep 2022 01:01):    No growth to date.                                                                                           MEDICATIONS  (STANDING):  atorvastatin 20 milliGRAM(s) Oral at bedtime  cefTRIAXone   IVPB 2000 milliGRAM(s) IV Intermittent every 24 hours  chlorhexidine 2% Cloths 1 Application(s) Topical daily  folic acid 1 milliGRAM(s) Oral daily  lactated ringers. 1000 milliLiter(s) (75 mL/Hr) IV Continuous <Continuous>  metroNIDAZOLE  IVPB 500 milliGRAM(s) IV Intermittent every 8 hours  multivitamin 1 Tablet(s) Oral daily  pantoprazole  Injectable 40 milliGRAM(s) IV Push every 12 hours  senna 2 Tablet(s) Oral at bedtime    MEDICATIONS  (PRN):  aluminum hydroxide/magnesium hydroxide/simethicone Suspension 30 milliLiter(s) Oral every 4 hours PRN Dyspepsia  LORazepam     Tablet 2 milliGRAM(s) Oral every 4 hours PRN Symptom-triggered: 2 point increase in CIWA -Ar score and a total score of 7 or LESS  melatonin 3 milliGRAM(s) Oral at bedtime PRN Insomnia  ondansetron Injectable 4 milliGRAM(s) IV Push every 8 hours PRN Nausea and/or Vomiting          Xrays:  TLC:  OG:  ET tube:                                                                                       ECHO:  CAM ICU:

## 2022-09-28 NOTE — PROGRESS NOTE ADULT - ASSESSMENT
IMPRESSION:    suspected UGIB- r.o variceal s/p 2 units of blood   necrotising pancreatitis   ETOH hx  HTN   HLD     PLAN:    CNS:   - CIWA protocol symptom triggered   - c/w thiamine, folate    HEENT: Oral care    PULMONARY:  HOB @ 45 degrees. keep pox>92%. Aspiration precautions     CARDIOVASCULAR: keep map > 65.   - Echo 9/25: EF 65-70%. Mildly dilated LA, mild ant mitral leaflet prolapse, mod MVR    GI: NPO  - Protonix IV BID. Bowel regimen.   - d/c octreotide per GI  - f/u elastase. calprotectin - pending collection  - Surgery: no intervention at this time  - IR: no intervention at this time, suboptimal window  - GI: EGD/Colonoscopy 9/27:   ---- EGD: GE junction ulcer, ulcer in antrum, erosive gastritis, large ulcer in duodenal bulb with visible vessel (injection, thermal therapy), clean based smaller ulcer in duodenal bulb.   ---- Colonoscopy: mild diverticulosis of sigmoid colon, internal and external hemorrhoids  >>c/w IV PPI q12h x 8weeks on discharge, keep NPO for today, monitor hgb q12 (CBC q12), repeat EGD in 8 weeks. rec alcohol cessation, avoid NSAIDs  - Advanced GI team will follow for EUS +/- necrosectomy     RENAL:  Follow up lytes.  Correct as needed  - Monitor I&Os  - F/u afternoon BMP    INFECTIOUS DISEASE:   - procal 25.9  - f/u blood cultures - no growth  - Per ID, c/w ceftriaxone 2g QD, flagyl 500mg TID  - f/u repeat procal 9/28 AM  - repeat blood cx    HEMATOLOGICAL: s/p 3 units PRBC  - hold AC. SCDs for now.   - trend H/H. Keep active type and screen. Keep Hgb > 8     ENDOCRINE:  Follow up FS.  Insulin protocol if needed.    MUSCULOSKELETAL: IAT, OOB to chair    DISPO: if H/H stable and no intervention by GI downgrade to floor this evening

## 2022-09-28 NOTE — PROGRESS NOTE ADULT - SUBJECTIVE AND OBJECTIVE BOX
Procedure/ diagnosis:   suspected UGIB  necrotising pancreatitis   ETOH hx  HTN   HLD       PAST MEDICAL & SURGICAL HISTORY:  HTN (hypertension)    24H EVENTS    Vital Signs Last 24 Hrs  T(C): 36.8 (28 Sep 2022 08:00), Max: 37.5 (27 Sep 2022 12:00)  T(F): 98.3 (28 Sep 2022 08:00), Max: 99.5 (27 Sep 2022 12:00)  HR: 78 (28 Sep 2022 09:00) (69 - 82)  BP: 120/64 (28 Sep 2022 09:00) (109/62 - 156/76)  BP(mean): 86 (28 Sep 2022 09:) (80 - 108)  RR: 20 (28 Sep 2022 09:) (17 - 25)  SpO2: 96% (28 Sep 2022 09:00) (94% - 98%)    Parameters below as of 28 Sep 2022 09:00  Patient On (Oxygen Delivery Method): room air    I&O's Detail    27 Sep 2022 07:01  -  28 Sep 2022 07:00  --------------------------------------------------------  IN:    IV PiggyBack: 150 mL    Lactated Ringers: 1600 mL    Octreotide: 50 mL  Total IN: 1800 mL    OUT:    Voided (mL): 750 mL  Total OUT: 750 mL    Total NET: 1050 mL      28 Sep 2022 07:01  -  28 Sep 2022 11:39  --------------------------------------------------------  IN:    dextrose 5% + lactated ringers: 150 mL    Lactated Ringers: 75 mL  Total IN: 225 mL    OUT:  Total OUT: 0 mL    Total NET: 225 mL                 8.0    8.03  )-----------( 298      (  @ 07:55 )             23.4                8.7    9.38  )-----------( 356      (  @ 04:18 )             25.8                7.6    8.90  )-----------( 284      (  @ 23:33 )             22.1                8.8    10.81 )-----------( 270      (  @ 22:10 )             26.2                8.4    9.35  )-----------( 236      (  @ 05:33 )             24.4                9.2    10.46 )-----------( 285      (  @ 16:40 )             26.7                    135   |  99    |  7                  Ca: 8.0    BMP:   ----------------------------< 116    M.4   (22 @ 04:18)             4.1    |  21    | 0.6                Ph: x        LFT:     TPro: 5.3 / Alb: 2.6 / TBili: 0.3 / DBili: x / AST: 13 / ALT: 9 / AlkPhos: 90   (22 @ 04:18)          PT/INR - ( 26 Sep 2022 16:40 )   PT: 13.30 sec;   INR: 1.16 ratio         PTT - ( 26 Sep 2022 16:40 )  PTT:28.5 sec    CARDIAC MARKERS ( 27 Sep 2022 05:33 )  x     / <0.01 ng/mL / x     / x     / x      CARDIAC MARKERS ( 26 Sep 2022 16:40 )  x     / <0.01 ng/mL / x     / x     / x          Culture - Blood (collected 25 Sep 2022 17:09)  Source: .Blood Blood  Preliminary Report (27 Sep 2022 01:01):    No growth to date.    Culture - Blood (collected 25 Sep 2022 17:09)  Source: .Blood Blood  Preliminary Report (27 Sep 2022 01:01):    No growth to date.      MEDICATIONS  (STANDING):  atorvastatin 20 milliGRAM(s) Oral at bedtime  cefTRIAXone   IVPB 2000 milliGRAM(s) IV Intermittent every 24 hours  chlorhexidine 2% Cloths 1 Application(s) Topical daily  dextrose 5% + lactated ringers. 1000 milliLiter(s) (75 mL/Hr) IV Continuous <Continuous>  folic acid 1 milliGRAM(s) Oral daily  metroNIDAZOLE  IVPB 500 milliGRAM(s) IV Intermittent every 8 hours  multivitamin 1 Tablet(s) Oral daily  pantoprazole  Injectable 40 milliGRAM(s) IV Push every 12 hours  senna 2 Tablet(s) Oral at bedtime    MEDICATIONS  (PRN):  aluminum hydroxide/magnesium hydroxide/simethicone Suspension 30 milliLiter(s) Oral every 4 hours PRN Dyspepsia  LORazepam     Tablet 2 milliGRAM(s) Oral every 4 hours PRN Symptom-triggered: 2 point increase in CIWA -Ar score and a total score of 7 or LESS  melatonin 3 milliGRAM(s) Oral at bedtime PRN Insomnia  ondansetron Injectable 4 milliGRAM(s) IV Push every 8 hours PRN Nausea and/or Vomiting    EGD impression:                   Ulcer in the gastroesophageal junction.  	Irregularity in the Z-line and gastroesophageal junction.  	Ulcers in the antrum.  	Erythema in the antrum and stomach body compatible with erosive gastritis.  	large Ulcer in the duodenal bulb with visible vessel. (Injection, Thermal Therapy).  	clean based smaller Ulcers in the duodenal bulb.    Colonoscopy Impressions:    	Mild diverticulosis of the sigmoid colon.  	Internal and external hemorrhoids.  	The colon and TI were otherwise unremarkable but prep was inadequate to evaluate for polyps < 1 cm.          Diet, NPO:   Except Medications (22 @ 18:55)      PHYSICAL EXAM:  General Appearance: pt in NAD  Chest: + air entry bilat  CV: S1, S2, RRR  Abdomen: Soft, NT, ND  no rebound tenderness no guarding  Extremities: + PROM  Neuro: A&Ox3

## 2022-09-28 NOTE — PROGRESS NOTE ADULT - ATTENDING COMMENTS
I edited the note .     pt w/ large duodenal bulb ulcer s/p hemostasis w/ heat probe and epi injection - hb stable and no objective evidence of bleed at this time.  rest of recs as above.    Time-based billing (NON-critical care).   35 minutes spent on total encounter; more than 50% of the visit was spent counseling and / or coordinating care by the attending physician.  The necessity of the time spent during the encounter on this date of service was due to:     Coordination of care.

## 2022-09-28 NOTE — PROGRESS NOTE ADULT - ASSESSMENT
A/P 71y/o male with necrotising pancreatitis, upper GI bleed s/p above EGD and colonoscopy findings   pt has no complaints   pt scheduled for EUS +/- necrosectomy with advanced GI today  no surgical intervention @ this time.  trend CBC  f/u GI   continue care per primary team   surgery will follow

## 2022-09-28 NOTE — PROGRESS NOTE ADULT - ASSESSMENT
IMPRESSION:  GIB  necrotizing pancreatitis  ETOH abuse  blood loss anemia:    IMPRESSION:        PLAN:    CNS: thiamine and folate   [facility] protocol     HEENT: oral care     PULMONARY: keep pox > 92 %     CARDIOVASCULAR: continue IV fluid while add dextrose       GI: GI prophylaxis. , PPI follow GI regarding feed and if they will proceed with EUS   GI flow up for scope     RENAL: follow bmp, is and os     INFECTIOUS DISEASE: on abx follow ID   repeat bld cx   procal elevated repeat in 48 hrs   HEMATOLOGICAL:  DVT prophylaxis. sequential   follow h/h stat cbc    ENDOCRINE:  Follow up FS.  Insulin protocol if needed.    oob to chair   if h/h stable and no intervention by GI downgrade to floor evening         CRITICAL CARE TIME SPENT: ***

## 2022-09-28 NOTE — PROGRESS NOTE ADULT - SUBJECTIVE AND OBJECTIVE BOX
Gastroenterology progress note:     Patient is a 72y old  Male who presents with a chief complaint of UGIB (28 Sep 2022 07:33)       Admitted on: 09-24-22    We are following the patient for anemia and hematochezia     No acute events overnight.   no blood per rectum   denies pain   remains NPO     PAST MEDICAL & SURGICAL HISTORY:  HTN (hypertension)          MEDICATIONS  (STANDING):  atorvastatin 20 milliGRAM(s) Oral at bedtime  cefTRIAXone   IVPB 2000 milliGRAM(s) IV Intermittent every 24 hours  chlorhexidine 2% Cloths 1 Application(s) Topical daily  dextrose 5% + lactated ringers. 1000 milliLiter(s) (75 mL/Hr) IV Continuous <Continuous>  folic acid 1 milliGRAM(s) Oral daily  metroNIDAZOLE  IVPB 500 milliGRAM(s) IV Intermittent every 8 hours  multivitamin 1 Tablet(s) Oral daily  pantoprazole  Injectable 40 milliGRAM(s) IV Push every 12 hours  senna 2 Tablet(s) Oral at bedtime    MEDICATIONS  (PRN):  aluminum hydroxide/magnesium hydroxide/simethicone Suspension 30 milliLiter(s) Oral every 4 hours PRN Dyspepsia  LORazepam     Tablet 2 milliGRAM(s) Oral every 4 hours PRN Symptom-triggered: 2 point increase in CIWA -Ar score and a total score of 7 or LESS  melatonin 3 milliGRAM(s) Oral at bedtime PRN Insomnia  ondansetron Injectable 4 milliGRAM(s) IV Push every 8 hours PRN Nausea and/or Vomiting      Allergies  No Known Allergies      Review of Systems:   Cardiovascular:  No Chest Pain, No Palpitations  Respiratory:  No Cough, No Dyspnea  Gastrointestinal:  As described in HPI  Skin:  No Skin Lesions, No Jaundice  Neuro:  No Syncope, No Dizziness    Physical Examination:  T(C): 36.8 (09-28-22 @ 08:00), Max: 37.5 (09-27-22 @ 12:00)  HR: 79 (09-28-22 @ 08:00) (69 - 82)  BP: 123/62 (09-28-22 @ 08:00) (109/62 - 156/76)  RR: 22 (09-28-22 @ 08:00) (17 - 25)  SpO2: 97% (09-28-22 @ 08:00) (94% - 98%)  Weight (kg): 70 (09-27-22 @ 16:45)    09-27-22 @ 07:01  -  09-28-22 @ 07:00  --------------------------------------------------------  IN: 1800 mL / OUT: 750 mL / NET: 1050 mL    09-28-22 @ 07:01  -  09-28-22 @ 08:50  --------------------------------------------------------  IN: 75 mL / OUT: 0 mL / NET: 75 mL        GENERAL: AAOx3, no acute distress.  HEAD:  Atraumatic, Normocephalic  EYES: conjunctiva and sclera clear  NECK: Supple, no JVD or thyromegaly  CHEST/LUNG: Clear to auscultation bilaterally; No wheeze, rhonchi, or rales  HEART: Regular rate and rhythm; normal S1, S2, No murmurs.  ABDOMEN: Soft, nontender, nondistended; Bowel sounds present  NEUROLOGY: No asterixis or tremor.   SKIN: Intact, no jaundice     Data:                        8.0    8.03  )-----------( 298      ( 28 Sep 2022 07:55 )             23.4     Hgb trend:  8.0  09-28-22 @ 07:55  7.6  09-27-22 @ 23:33  8.8  09-27-22 @ 22:10  8.4  09-27-22 @ 05:33  9.2  09-26-22 @ 16:40  8.3  09-26-22 @ 04:27  7.8  09-26-22 @ 01:44  7.5  09-25-22 @ 22:27  8.6  09-25-22 @ 18:48      09-25-22 @ 07:01  -  09-26-22 @ 07:00  --------------------------------------------------------  IN: 0 mL      09-27    136  |  100  |  7<L>  ----------------------------<  109<H>  4.8   |  18  |  0.7    Ca    8.1<L>      27 Sep 2022 22:10  Mg     1.7     09-27    TPro  5.2<L>  /  Alb  2.5<L>  /  TBili  0.3  /  DBili  x   /  AST  18  /  ALT  10  /  AlkPhos  91  09-27    Liver panel trend:  TBili 0.3   /   AST 18   /   ALT 10   /   AlkP 91   /   Tptn 5.2   /   Alb 2.5    /   DBili --      09-27  TBili 0.4   /   AST 14   /   ALT 13   /   AlkP 85   /   Tptn 4.7   /   Alb 2.5    /   DBili --      09-26  TBili 0.5   /   AST 18   /   ALT 18   /   AlkP 85   /   Tptn 4.6   /   Alb 2.5    /   DBili --      09-25  TBili <0.2   /   AST 31   /   ALT 28   /   AlkP 90   /   Tptn 4.5   /   Alb 2.5    /   DBili <0.2      09-24      PT/INR - ( 26 Sep 2022 16:40 )   PT: 13.30 sec;   INR: 1.16 ratio         PTT - ( 26 Sep 2022 16:40 )  PTT:28.5 sec    Culture - Blood (collected 25 Sep 2022 17:09)  Source: .Blood Blood  Preliminary Report (27 Sep 2022 01:01):    No growth to date.    Culture - Blood (collected 25 Sep 2022 17:09)  Source: .Blood Blood  Preliminary Report (27 Sep 2022 01:01):    No growth to date.         Radiology:    < from: EGD-Colonoscopy (09.27.22 @ 16:30) >  EGD Findings:    Esophagus Excavated lesions A single non-bleeding ulcer that started at 15 cm  from the incisor was found in the gastroesophageal junction.    Mucosa Localized continuous irregularity of the mucosa with no bleeding was  noted in the Z-line and gastroesophageal junction. These findings raise  suspicion for Cano's intestinal metaplasia.    Stomach Excavated lesions Multiple superficial non-bleeding ulcers ranging in  size from 4 mm to 8 mm were found in the antrum.    Mucosa Diffuse discontinuous erythema of the mucosa with no bleeding was noted  in the antrum and stomach body. These findings are compatiblewith erosive  gastritis.    Duodenum Excavated lesions A single cratered 40 mm ulcer was found in the  duodenal bulb. A visible vessel suggested recent bleeding.3 4 ml epinephrine  1/12348 injections were successfully applied for hemostasis. Mono polar cautery  unit was successfully applied for hemostasis.    Multiple superficial non-bleeding ulcers ranging in size from 10 mm to 15 mm  were found in the duodenal bulb.    < end of copied text >  < from: EGD-Colonoscopy (09.27.22 @ 16:30) >  Colonoscopy Findings:    Excavated lesions Multiple non-bleeding diverticula with small openings were  seen in the sigmoid colon. Diverticulosis appeared to be of mild severity.    Protruding lesions Medium non-bleeding internal and external hemorrhoids were  noted.    Additional findings The colon and TI were otherwise unremarkable but prep was  inadequate to evaluate for polyps < 1 cm.

## 2022-09-28 NOTE — PROGRESS NOTE ADULT - SUBJECTIVE AND OBJECTIVE BOX
HPI:  72-year-old gentleman with PMHX  of HTN, HLD , recent hx of pancreatitis, extensive alcohol use daily, presents for episodes of Hemetemesis and hematochezia. g.  Patient notes that today he developed a large volume bloody bowel movement along with an episode of blood vomitting.  Patient became dizzy after this episode and almost passed out.  Wife was present to supplement the history.  Patient was brought to the ER and still notes dizziness.  Patient had abdominal imaging in which she was found to have necrosis of the pancreas.  Surgery consulted for pancreatic necrosis.    Of note patient was admitted  to [facility] in [place] in total for 1 week in which she was treated for an uncomplicated pancreatitis.  Patient has never had prior documented pancreatitis.  Patient's wife notes that he has been having on and off intermittent epigastric pain more so towards the left upper quadrant and this has been going on for years.  Patient has been treating this pain with over-the-counter NSAIDs at times.   He drinking 5 ounces of vodka a day. Patient and wife deny him having prior endoscopic evaluation.    In the ED   T(F): 100.1 (24 Sep 2022 16:50), Max: 100.1 (24 Sep 2022 16:50)  HR: 95 (24 Sep 2022 16:50) (73 - 98)  BP: 112/58 (24 Sep 2022 16:50) (112/58 - 133/81)      LABS:  cret                        6.7    9.97  )-----------( 295      ( 24 Sep 2022 17:30 )             20.1     09-24    134<L>  |  91<L>  |  21<H>  ----------------------------<  245<H>  4.0   |  34<H>  |  1.0    Ca    8.0<L>      24 Sep 2022 09:17    TPro  4.5<L>  /  Alb  2.5<L>  /  TBili  <0.2  /  DBili  <0.2  /  AST  31  /  ALT  28  /  AlkPhos  90  09-24    PT/INR - ( 24 Sep 2022 09:17 )   PT: 12.70 sec;   INR: 1.11 ratio         PTT - ( 24 Sep 2022 09:17 )  PTT:26.4 sec      < from: CT Abdomen and Pelvis w/ IV Cont (22 @ 10:32) >  Findings of necrotizing pancreatitis. Multiple peripancreatic fluid   collections as described. Gas within some of these collections may   represent superinfection.    < end of copied text >   (24 Sep 2022 18:11)      INTERVAL HISTORY:  No acute events overnight    PAST MEDICAL & SURGICAL HISTORY  HTN (hypertension)        ALLERGIES:  No Known Allergies      MEDICATIONS:  MEDICATIONS  (STANDING):  atorvastatin 20 milliGRAM(s) Oral at bedtime  cefTRIAXone   IVPB 2000 milliGRAM(s) IV Intermittent every 24 hours  chlorhexidine 2% Cloths 1 Application(s) Topical daily  dextrose 5% + lactated ringers. 1000 milliLiter(s) (75 mL/Hr) IV Continuous <Continuous>  folic acid 1 milliGRAM(s) Oral daily  metroNIDAZOLE  IVPB 500 milliGRAM(s) IV Intermittent every 8 hours  multivitamin 1 Tablet(s) Oral daily  pantoprazole  Injectable 40 milliGRAM(s) IV Push every 12 hours  senna 2 Tablet(s) Oral at bedtime    MEDICATIONS  (PRN):  aluminum hydroxide/magnesium hydroxide/simethicone Suspension 30 milliLiter(s) Oral every 4 hours PRN Dyspepsia  LORazepam     Tablet 2 milliGRAM(s) Oral every 4 hours PRN Symptom-triggered: 2 point increase in CIWA -Ar score and a total score of 7 or LESS  melatonin 3 milliGRAM(s) Oral at bedtime PRN Insomnia  ondansetron Injectable 4 milliGRAM(s) IV Push every 8 hours PRN Nausea and/or Vomiting      HOME MEDICATIONS:  Home Medications:  AMLODIPINE BESYLATE 5MG TAB:  (24 Sep 2022 18:16)  ATORVASTATIN CALCIUM 20MG TAB:  (24 Sep 2022 18:16)  LISINOPRIL 20 MG TABLET: TAKE 1 TABLET BY MOUTH EVERY DAY (24 Sep 2022 18:16)    OBJECTIVE:  ICU Vital Signs Last 24 Hrs  T(C): 36.8 (28 Sep 2022 08:00), Max: 37.5 (27 Sep 2022 12:00)  T(F): 98.3 (28 Sep 2022 08:00), Max: 99.5 (27 Sep 2022 12:00)  HR: 78 (28 Sep 2022 09:00) (69 - 82)  BP: 120/64 (28 Sep 2022 09:00) (109/62 - 156/76)  BP(mean): 86 (28 Sep 2022 09:00) (80 - 108)  ABP: --  ABP(mean): --  RR: 20 (28 Sep 2022 09:00) (17 - 25)  SpO2: 96% (28 Sep 2022 09:00) (94% - 98%)    O2 Parameters below as of 28 Sep 2022 09:00  Patient On (Oxygen Delivery Method): room air    Adult Advanced Hemodynamics Last 24 Hrs  CVP(mm Hg): --  CVP(cm H2O): --  CO: --  CI: --  PA: --  PA(mean): --  PCWP: --  SVR: --  SVRI: --  PVR: --  PVRI: --  I&O's Summary    27 Sep 2022 07:01  -  28 Sep 2022 07:00  --------------------------------------------------------  IN: 1800 mL / OUT: 750 mL / NET: 1050 mL    28 Sep 2022 07:01  -  28 Sep 2022 10:07  --------------------------------------------------------  IN: 225 mL / OUT: 0 mL / NET: 225 mL      Daily Height in cm: 172.72 (27 Sep 2022 16:45)    Daily Weight in k.7 (28 Sep 2022 06:00)    PHYSICAL EXAM:  NEURO: patient is awake , alert and oriented  GEN: Not in acute distress  NECK: supple  LUNGS: Clear to auscultation bilaterally   CARDIOVASCULAR: S1/S2 present, RRR , no murmurs  ABD: Soft, non-tender, non-distended, +BS  EXT: No KOFI    LABS:                        8.0    8.03  )-----------( 298      ( 28 Sep 2022 07:55 )             23.4     -    135  |  99  |  7<L>  ----------------------------<  116<H>  4.1   |  21  |  0.6<L>    Ca    8.0<L>      28 Sep 2022 04:18  Mg     2.4         TPro  5.3<L>  /  Alb  2.6<L>  /  TBili  0.3  /  DBili  x   /  AST  13  /  ALT  9   /  AlkPhos  90      PT/INR - ( 26 Sep 2022 16:40 )   PT: 13.30 sec;   INR: 1.16 ratio         PTT - ( 26 Sep 2022 16:40 )  PTT:28.5 sec    CARDIAC MARKERS ( 27 Sep 2022 05:33 )  x     / <0.01 ng/mL / x     / x     / x      CARDIAC MARKERS ( 26 Sep 2022 16:40 )  x     / <0.01 ng/mL / x     / x     / x            Troponin trend:       Chol 96 LDL -- HDL 29<L> Trig 101      RADIOLOGY:  -CXR:  -TTE:  -STRESS TEST:  -CATHETERIZATION:    ECG:    TELEMETRY EVENTS:  No tele events overnight

## 2022-09-28 NOTE — CHART NOTE - NSCHARTNOTEFT_GEN_A_CORE
EUS guided drainage of WON  EUS showed 7 cm x 6 cm fluid collection with debris consistent with walled off necrosis  Successful placement of lumen apposing metal stent (Axios) stent 15mm x 10mm     Plan:   - IV PPI BID  - IV antibiotics with Zosyn  - Monitor CBC   - Clear liquid diet EUS guided drainage of WON  EUS showed 7 cm x 6 cm fluid collection with debris consistent with walled off necrosis with drainage of pus  Successful placement of lumen apposing metal stent (Axios) stent 15mm x 10mm     Plan:   - IV PPI BID  - IV antibiotics with Zosyn  - Monitor CBC   - Clear liquid diet EUS guided drainage of WON  EUS showed 7 cm x 6 cm fluid collection with debris consistent with walled off necrosis   Successful placement of lumen apposing metal stent (Axios) stent 15mm x 10mm with drainage of pus    Plan:   - IV PPI BID  - IV antibiotics with Zosyn  - Monitor CBC   - Clear liquid diet

## 2022-09-29 LAB
ALBUMIN SERPL ELPH-MCNC: 2.3 G/DL — LOW (ref 3.5–5.2)
ALP SERPL-CCNC: 74 U/L — SIGNIFICANT CHANGE UP (ref 30–115)
ALT FLD-CCNC: 6 U/L — SIGNIFICANT CHANGE UP (ref 0–41)
ANION GAP SERPL CALC-SCNC: 9 MMOL/L — SIGNIFICANT CHANGE UP (ref 7–14)
AST SERPL-CCNC: 10 U/L — SIGNIFICANT CHANGE UP (ref 0–41)
BASOPHILS # BLD AUTO: 0.03 K/UL — SIGNIFICANT CHANGE UP (ref 0–0.2)
BASOPHILS NFR BLD AUTO: 0.5 % — SIGNIFICANT CHANGE UP (ref 0–1)
BILIRUB SERPL-MCNC: 0.3 MG/DL — SIGNIFICANT CHANGE UP (ref 0.2–1.2)
BLD GP AB SCN SERPL QL: SIGNIFICANT CHANGE UP
BUN SERPL-MCNC: 6 MG/DL — LOW (ref 10–20)
CALCIUM SERPL-MCNC: 7.4 MG/DL — LOW (ref 8.4–10.5)
CHLORIDE SERPL-SCNC: 104 MMOL/L — SIGNIFICANT CHANGE UP (ref 98–110)
CO2 SERPL-SCNC: 24 MMOL/L — SIGNIFICANT CHANGE UP (ref 17–32)
CREAT SERPL-MCNC: 0.6 MG/DL — LOW (ref 0.7–1.5)
EGFR: 102 ML/MIN/1.73M2 — SIGNIFICANT CHANGE UP
EOSINOPHIL # BLD AUTO: 0.1 K/UL — SIGNIFICANT CHANGE UP (ref 0–0.7)
EOSINOPHIL NFR BLD AUTO: 1.5 % — SIGNIFICANT CHANGE UP (ref 0–8)
GLUCOSE SERPL-MCNC: 155 MG/DL — HIGH (ref 70–99)
HCT VFR BLD CALC: 24 % — LOW (ref 42–52)
HCT VFR BLD CALC: 25.9 % — LOW (ref 42–52)
HGB BLD-MCNC: 8.4 G/DL — LOW (ref 14–18)
HGB BLD-MCNC: 9 G/DL — LOW (ref 14–18)
IMM GRANULOCYTES NFR BLD AUTO: 1.1 % — HIGH (ref 0.1–0.3)
LYMPHOCYTES # BLD AUTO: 1.16 K/UL — LOW (ref 1.2–3.4)
LYMPHOCYTES # BLD AUTO: 17.6 % — LOW (ref 20.5–51.1)
MAGNESIUM SERPL-MCNC: 1.9 MG/DL — SIGNIFICANT CHANGE UP (ref 1.8–2.4)
MCHC RBC-ENTMCNC: 30.8 PG — SIGNIFICANT CHANGE UP (ref 27–31)
MCHC RBC-ENTMCNC: 30.9 PG — SIGNIFICANT CHANGE UP (ref 27–31)
MCHC RBC-ENTMCNC: 34.7 G/DL — SIGNIFICANT CHANGE UP (ref 32–37)
MCHC RBC-ENTMCNC: 35 G/DL — SIGNIFICANT CHANGE UP (ref 32–37)
MCV RBC AUTO: 88.2 FL — SIGNIFICANT CHANGE UP (ref 80–94)
MCV RBC AUTO: 88.7 FL — SIGNIFICANT CHANGE UP (ref 80–94)
MONOCYTES # BLD AUTO: 0.77 K/UL — HIGH (ref 0.1–0.6)
MONOCYTES NFR BLD AUTO: 11.7 % — HIGH (ref 1.7–9.3)
NEUTROPHILS # BLD AUTO: 4.45 K/UL — SIGNIFICANT CHANGE UP (ref 1.4–6.5)
NEUTROPHILS NFR BLD AUTO: 67.6 % — SIGNIFICANT CHANGE UP (ref 42.2–75.2)
NRBC # BLD: 0 /100 WBCS — SIGNIFICANT CHANGE UP (ref 0–0)
NRBC # BLD: 0 /100 WBCS — SIGNIFICANT CHANGE UP (ref 0–0)
PLATELET # BLD AUTO: 318 K/UL — SIGNIFICANT CHANGE UP (ref 130–400)
PLATELET # BLD AUTO: 350 K/UL — SIGNIFICANT CHANGE UP (ref 130–400)
POTASSIUM SERPL-MCNC: 3.9 MMOL/L — SIGNIFICANT CHANGE UP (ref 3.5–5)
POTASSIUM SERPL-SCNC: 3.9 MMOL/L — SIGNIFICANT CHANGE UP (ref 3.5–5)
PROT SERPL-MCNC: 4.5 G/DL — LOW (ref 6–8)
RBC # BLD: 2.72 M/UL — LOW (ref 4.7–6.1)
RBC # BLD: 2.92 M/UL — LOW (ref 4.7–6.1)
RBC # FLD: 14.7 % — HIGH (ref 11.5–14.5)
RBC # FLD: 14.8 % — HIGH (ref 11.5–14.5)
SODIUM SERPL-SCNC: 137 MMOL/L — SIGNIFICANT CHANGE UP (ref 135–146)
WBC # BLD: 6.58 K/UL — SIGNIFICANT CHANGE UP (ref 4.8–10.8)
WBC # BLD: 7.65 K/UL — SIGNIFICANT CHANGE UP (ref 4.8–10.8)
WBC # FLD AUTO: 6.58 K/UL — SIGNIFICANT CHANGE UP (ref 4.8–10.8)
WBC # FLD AUTO: 7.65 K/UL — SIGNIFICANT CHANGE UP (ref 4.8–10.8)

## 2022-09-29 PROCEDURE — 99221 1ST HOSP IP/OBS SF/LOW 40: CPT

## 2022-09-29 PROCEDURE — 71045 X-RAY EXAM CHEST 1 VIEW: CPT | Mod: 26

## 2022-09-29 PROCEDURE — 99233 SBSQ HOSP IP/OBS HIGH 50: CPT | Mod: 25

## 2022-09-29 PROCEDURE — 99233 SBSQ HOSP IP/OBS HIGH 50: CPT

## 2022-09-29 RX ADMIN — PIPERACILLIN AND TAZOBACTAM 25 GRAM(S): 4; .5 INJECTION, POWDER, LYOPHILIZED, FOR SOLUTION INTRAVENOUS at 14:14

## 2022-09-29 RX ADMIN — CHLORHEXIDINE GLUCONATE 1 APPLICATION(S): 213 SOLUTION TOPICAL at 11:33

## 2022-09-29 RX ADMIN — ATORVASTATIN CALCIUM 20 MILLIGRAM(S): 80 TABLET, FILM COATED ORAL at 21:44

## 2022-09-29 RX ADMIN — PIPERACILLIN AND TAZOBACTAM 25 GRAM(S): 4; .5 INJECTION, POWDER, LYOPHILIZED, FOR SOLUTION INTRAVENOUS at 06:44

## 2022-09-29 RX ADMIN — PIPERACILLIN AND TAZOBACTAM 25 GRAM(S): 4; .5 INJECTION, POWDER, LYOPHILIZED, FOR SOLUTION INTRAVENOUS at 01:15

## 2022-09-29 RX ADMIN — Medication 1 TABLET(S): at 11:42

## 2022-09-29 RX ADMIN — SENNA PLUS 2 TABLET(S): 8.6 TABLET ORAL at 21:44

## 2022-09-29 RX ADMIN — PANTOPRAZOLE SODIUM 40 MILLIGRAM(S): 20 TABLET, DELAYED RELEASE ORAL at 17:17

## 2022-09-29 RX ADMIN — PANTOPRAZOLE SODIUM 40 MILLIGRAM(S): 20 TABLET, DELAYED RELEASE ORAL at 06:53

## 2022-09-29 RX ADMIN — Medication 1 MILLIGRAM(S): at 11:43

## 2022-09-29 RX ADMIN — Medication 100 MILLIGRAM(S): at 06:45

## 2022-09-29 RX ADMIN — PIPERACILLIN AND TAZOBACTAM 25 GRAM(S): 4; .5 INJECTION, POWDER, LYOPHILIZED, FOR SOLUTION INTRAVENOUS at 19:00

## 2022-09-29 NOTE — PROGRESS NOTE ADULT - SUBJECTIVE AND OBJECTIVE BOX
GENERAL SURGERY PROGRESS NOTE    Patient: CHRIS KHAN , 72y ([birth year])Male   MRN: [id number]  Location: [address]  Visit: 09-24-22 Inpatient  Date: 09-29-22 @ 21:47    Vitals:   T(F): 98.9 (09-30-22 @ 21:38), Max: 98.9 (09-30-22 @ 21:38)  HR: 75 (09-30-22 @ 21:38)  BP: 106/59 (09-30-22 @ 21:38)  RR: 19 (09-30-22 @ 21:38)  SpO2: 97% (09-30-22 @ 11:14)          Fluids:     I & O's:    09-29-22 @ 07:01  -  09-30-22 @ 07:00  --------------------------------------------------------  IN:    IV PiggyBack: 100 mL    Oral Fluid: 600 mL  Total IN: 700 mL    OUT:    Voided (mL): 350 mL  Total OUT: 350 mL    Total NET: 350 mL      PHYSICAL EXAM:  General: NAD, AAOx3, calm and cooperative  HEENT: NCAT, NORI, EOMI, Trachea ML, Neck supple  Cardiac: RRR S1, S2, no Murmurs, rubs or gallops  Respiratory: CTAB, normal respiratory effort, breath sounds equal BL, no wheeze, rhonchi or crackles  Abdomen: Soft, non-distended, non-tender, no rebound, no guarding. +BS.  Musculoskeletal: Strength 5/5 BL UE/LE, ROM intact, compartments soft  Neuro: Sensation grossly intact and equal throughout, no focal deficits  Vascular: Pulses 2+ throughout, extremities well perfused  Skin: Warm/dry, normal color, no jaundice      MEDICATIONS  (STANDING):  atorvastatin 20 milliGRAM(s) Oral at bedtime  chlorhexidine 2% Cloths 1 Application(s) Topical daily  folic acid 1 milliGRAM(s) Oral daily  multivitamin 1 Tablet(s) Oral daily  pantoprazole  Injectable 40 milliGRAM(s) IV Push every 12 hours  piperacillin/tazobactam IVPB.. 3.375 Gram(s) IV Intermittent every 6 hours  senna 2 Tablet(s) Oral at bedtime  thiamine IVPB 500 milliGRAM(s) IV Intermittent every 8 hours    MEDICATIONS  (PRN):  aluminum hydroxide/magnesium hydroxide/simethicone Suspension 30 milliLiter(s) Oral every 4 hours PRN Dyspepsia  LORazepam     Tablet 2 milliGRAM(s) Oral every 4 hours PRN for CIWA score 8 or above  melatonin 3 milliGRAM(s) Oral at bedtime PRN Insomnia  ondansetron Injectable 4 milliGRAM(s) IV Push every 8 hours PRN Nausea and/or Vomiting      DVT PROPHYLAXIS:   GI PROPHYLAXIS: pantoprazole  Injectable 40 milliGRAM(s) IV Push every 12 hours    ANTICOAGULATION:   ANTIBIOTICS:  piperacillin/tazobactam IVPB.. 3.375 Gram(s)            LAB/STUDIES:  Labs:  CAPILLARY BLOOD GLUCOSE                              10.0   7.47  )-----------( 418      ( 30 Sep 2022 18:00 )             28.6       Auto Neutrophil %: 66.2 % (09-30-22 @ 07:36)  Auto Immature Granulocyte %: 0.9 % (09-30-22 @ 07:36)    09-30    139  |  104  |  3<L>  ----------------------------<  125<H>  4.3   |  24  |  0.6<L>      Calcium, Total Serum: 7.9 mg/dL (09-30-22 @ 07:36)      LFTs:             4.7  | 0.2  | 14       ------------------[73      ( 30 Sep 2022 07:36 )  2.3  | x    | 6           Lipase:x      Amylase:x             Coags:      Culture - Body Fluid with Gram Stain (collected 28 Sep 2022 17:50)  Source: Paracentesis None  Gram Stain (30 Sep 2022 02:04):    Rare polymorphonuclear leukocytes seen per low power field    No organisms seen per oil power field    Limited volume of specimen received, Unable to centrifuge/concentrate    specimen. Culture results may be compromised.    Culture - Body Fluid with Gram Stain (collected 28 Sep 2022 17:50)  Source: Paracentesis None  Gram Stain (30 Sep 2022 02:04):    No polymorphonuclear cells seen per low power field    No organisms seen per oil power field  Preliminary Report (30 Sep 2022 18:32):    No growth      IMAGING:  < from: CT Angio Abdomen and Pelvis w/ IV Cont (09.25.22 @ 11:12) >  IMPRESSION:  Since 9/24/2022 at 10:19 AM:  1.  No evidence of active gastrointestinal hemorrhage.  2.  Findings of necrotizing pancreatitis with numerous peripancreatic   collections not significantly changed on this short interval follow-up.   See prior report for further details.  3.  New small bilateral pleural effusions.    < end of copied text >      ACCESS/ DEVICES:  [ X] Peripheral IV

## 2022-09-29 NOTE — PROGRESS NOTE ADULT - ASSESSMENT
71y/o male with necrotising pancreatitis, upper GI bleed, found to have large duodenal ulcer with visible vessel. After stabilization from GI bleeding patient had EUS drainage of collection by utilizing a trans-gastric luminal opposing stent.      - pt has no complaints    - GI planning for EGD with necrosectomy later this admission   - no surgical intervention @ this time.   - Trend hgb, transfuse as needed   - Continue care per primary team     If you have any remaining concerns or questions, please contact us.   [id number]

## 2022-09-29 NOTE — PROGRESS NOTE ADULT - ASSESSMENT
IMPRESSION:    GI ulcers  necrotising pancreatitis   ETOH hx  HTN   HLD     PLAN:    CNS:   - CIWA protocol symptom triggered   - c/w thiamine, folate    HEENT: Oral care    PULMONARY:  HOB @ 45 degrees. keep pox>92%. Aspiration precautions     CARDIOVASCULAR: keep map > 65.   - Echo 9/25: EF 65-70%. Mildly dilated LA, mild ant mitral leaflet prolapse, mod MVR    GI: NPO  - Protonix IV BID. Bowel regimen.   - f/u elastase. calprotectin - pending collection  - Surgery: no intervention at this time  - IR: no intervention at this time, suboptimal window  - GI: EGD/Colonoscopy 9/27:   ---- EGD: GE junction ulcer, ulcer in antrum, erosive gastritis, large ulcer in duodenal bulb with visible vessel (injection, thermal therapy), clean based smaller ulcer in duodenal bulb.   ---- Colonoscopy: mild diverticulosis of sigmoid colon, internal and external hemorrhoids  >>c/w IV PPI q12h x 8weeks on discharge, keep NPO for today, monitor hgb q12 (CBC q12), repeat EGD in 8 weeks. rec alcohol cessation, avoid NSAIDs  >EUS 9/28:  ---- EUS showed 7 cm x 6 cm fluid collection with debris consistent with walled off necrosis  Successful placement of lumen apposing metal stent (Axios) stent 15mm x 10mm   ---- rec full liquid diet, start zosyn, c/w PPI BID  - f/u advanced gi for outpatient necrosectomy     RENAL:  Follow up lytes.  Correct as needed  - Monitor I&Os    INFECTIOUS DISEASE:   - f/u blood cultures - no growth  - d/c ceftriaxone 2g QD, flagyl 500mg TID  - f/u repeat procal 9/28 AM - 2.73  - started zosyn per GI    HEMATOLOGICAL: s/p 3 units PRBC  - hold AC. SCDs for now.   - trend H/H. Keep active type and screen. Keep Hgb > 8     ENDOCRINE:  Follow up FS.  Insulin protocol if needed.    MUSCULOSKELETAL: IAT, OOB to chair

## 2022-09-29 NOTE — CONSULT NOTE ADULT - SUBJECTIVE AND OBJECTIVE BOX
Per primary team, patient for CATCH team SW for referrals only. Primary team can offer patient naltrexone 50 mg daily (on-label) and gabapentin 300 mg TID (off-label) to assist patient with alcohol cravings, as liver and renal function is WNL. Please re-consult addiction medicine if needed.    Vitals    Vital Signs Last 24 Hrs  T(C): 36.3 (29 Sep 2022 12:00), Max: 37 (28 Sep 2022 18:30)  T(F): 97.4 (29 Sep 2022 12:00), Max: 98.6 (28 Sep 2022 18:30)  HR: 70 (29 Sep 2022 12:00) (64 - 98)  BP: 147/70 (29 Sep 2022 12:00) (110/65 - 147/70)  BP(mean): 99 (29 Sep 2022 12:00) (81 - 99)  RR: 24 (29 Sep 2022 12:00) (11 - 30)  SpO2: 98% (29 Sep 2022 12:00) (93% - 98%)    Parameters below as of 29 Sep 2022 12:00  Patient On (Oxygen Delivery Method): room air    Labs    AST 10  ALT 6  Cr 0.6    Plan    1. Alcohol Use Disorder  - clear addiction medicine consult, as primary team needs CATCH team coordination for referrals at this time only  - please change symptom-triggered alcohol withdrawal management instructions to lorazepam 2 mg q4h PRN for CIWA scores 8 and above (rather than current instruction for 2 point increase in CIWA for scores 7 or less)  - primary team can offer patient naltrexone 50 mg daily (on-label) and gabapentin 300 mg TID (off-label) to assist patient with alcohol cravings, as liver and renal function is WNL  - CATCH team coordination for psychosocial/discharge resources/referrals   Per primary team, patient for CATCH team SW for referrals only. Primary team can offer patient naltrexone 50 mg daily (on-label) and gabapentin 300 mg TID (off-label) to assist patient with alcohol cravings, as liver and renal function is WNL. Please re-consult addiction medicine if needed.    Vitals    Vital Signs Last 24 Hrs  T(C): 36.3 (29 Sep 2022 12:00), Max: 37 (28 Sep 2022 18:30)  T(F): 97.4 (29 Sep 2022 12:00), Max: 98.6 (28 Sep 2022 18:30)  HR: 70 (29 Sep 2022 12:00) (64 - 98)  BP: 147/70 (29 Sep 2022 12:00) (110/65 - 147/70)  BP(mean): 99 (29 Sep 2022 12:00) (81 - 99)  RR: 24 (29 Sep 2022 12:00) (11 - 30)  SpO2: 98% (29 Sep 2022 12:00) (93% - 98%)    Parameters below as of 29 Sep 2022 12:00  Patient On (Oxygen Delivery Method): room air    Labs    AST 10  ALT 6  Cr 0.6    Plan    1. Alcohol Use Disorder  - clear addiction medicine consult, as primary team needs CATCH team coordination for referrals at this time only  - please change symptom-triggered alcohol withdrawal management instructions to lorazepam 2 mg q4h PRN for CIWA scores 8 and above (rather than current instruction for 2 point increase in CIWA for scores 7 or less)  - recommend thiamine 500 mg IVP q8h x 9 doses  - primary team can offer patient naltrexone 50 mg daily (on-label) and gabapentin 300 mg TID (off-label) to assist patient with alcohol cravings, as liver and renal function is WNL  - CATCH team coordination for psychosocial/discharge resources/referrals

## 2022-09-29 NOTE — PROGRESS NOTE ADULT - SUBJECTIVE AND OBJECTIVE BOX
Patient is a 72y old  Male who presents with a chief complaint of UGIB (28 Sep 2022 11:38)        Over Night Events:  S/p EUS, found large fluid collection s/p stent.  Feels good.      ROS:     All pertinent ROS are negative except HPI         PHYSICAL EXAM    ICU Vital Signs Last 24 Hrs  T(C): 36.5 (29 Sep 2022 06:00), Max: 37 (28 Sep 2022 18:30)  T(F): 97.7 (29 Sep 2022 06:00), Max: 98.6 (28 Sep 2022 18:30)  HR: 69 (29 Sep 2022 06:00) (64 - 86)  BP: 123/70 (29 Sep 2022 06:00) (111/64 - 137/77)  BP(mean): 92 (29 Sep 2022 06:00) (79 - 96)  RR: 18 (29 Sep 2022 06:00) (15 - 27)  SpO2: 96% (29 Sep 2022 06:00) (93% - 98%)    O2 Parameters below as of 29 Sep 2022 06:00  Patient On (Oxygen Delivery Method): room air            CONSTITUTIONAL:  Ill appearing.  Well nourished.  NAD    ENT:   Airway patent  Mouth with normal mucosa.   No thrush    EYES:   Pupils equal,   Round and reactive to light.    CARDIAC:   Normal rate,   Regular rhythm.    No edema    RESPIRATORY:   No wheezing  Bilateral BS  Normal chest expansion  Not tachypneic,  No use of accessory muscles    GASTROINTESTINAL:  Abdomen soft,   Non-tender,   No guarding,   + BS    MUSCULOSKELETAL:   Range of motion is not limited,  No clubbing, cyanosis    NEUROLOGICAL:   Alert and oriented   No motor deficits.    SKIN:   Warm and dry    PSYCHIATRIC:   Normal mood and affect.   No apparent risk to self or others.      09-28-22 @ 07:01  -  09-29-22 @ 07:00  --------------------------------------------------------  IN:    dextrose 5% + lactated ringers: 1650 mL    IV PiggyBack: 250 mL    Lactated Ringers: 75 mL    PRBCs (Packed Red Blood Cells): 317 mL  Total IN: 2292 mL    OUT:    Voided (mL): 1375 mL  Total OUT: 1375 mL    Total NET: 917 mL          LABS:                            8.4    6.58  )-----------( 350      ( 29 Sep 2022 04:54 )             24.0                                               09-29    137  |  104  |  6<L>  ----------------------------<  155<H>  3.9   |  24  |  0.6<L>    Ca    7.4<L>      29 Sep 2022 04:54  Mg     1.9     09-29    TPro  4.5<L>  /  Alb  2.3<L>  /  TBili  0.3  /  DBili  x   /  AST  10  /  ALT  6   /  AlkPhos  74  09-29                                                                                           LIVER FUNCTIONS - ( 29 Sep 2022 04:54 )  Alb: 2.3 g/dL / Pro: 4.5 g/dL / ALK PHOS: 74 U/L / ALT: 6 U/L / AST: 10 U/L / GGT: x                                                  Culture - Blood (collected 27 Sep 2022 12:16)  Source: .Blood None  Preliminary Report (28 Sep 2022 23:01):    No growth to date.                                                                                           MEDICATIONS  (STANDING):  atorvastatin 20 milliGRAM(s) Oral at bedtime  cefTRIAXone   IVPB 2000 milliGRAM(s) IV Intermittent every 24 hours  chlorhexidine 2% Cloths 1 Application(s) Topical daily  dextrose 5% + lactated ringers. 1000 milliLiter(s) (75 mL/Hr) IV Continuous <Continuous>  folic acid 1 milliGRAM(s) Oral daily  metroNIDAZOLE  IVPB 500 milliGRAM(s) IV Intermittent every 8 hours  multivitamin 1 Tablet(s) Oral daily  pantoprazole  Injectable 40 milliGRAM(s) IV Push every 12 hours  piperacillin/tazobactam IVPB.. 3.375 Gram(s) IV Intermittent every 6 hours  senna 2 Tablet(s) Oral at bedtime    MEDICATIONS  (PRN):  aluminum hydroxide/magnesium hydroxide/simethicone Suspension 30 milliLiter(s) Oral every 4 hours PRN Dyspepsia  LORazepam     Tablet 2 milliGRAM(s) Oral every 4 hours PRN Symptom-triggered: 2 point increase in CIWA -Ar score and a total score of 7 or LESS  melatonin 3 milliGRAM(s) Oral at bedtime PRN Insomnia  ondansetron Injectable 4 milliGRAM(s) IV Push every 8 hours PRN Nausea and/or Vomiting

## 2022-09-29 NOTE — PROGRESS NOTE ADULT - SUBJECTIVE AND OBJECTIVE BOX
HPI:  72-year-old gentleman with PMHX  of HTN, HLD , recent hx of pancreatitis, extensive alcohol use daily, presents for episodes of Hemetemesis and hematochezia. g.  Patient notes that today he developed a large volume bloody bowel movement along with an episode of blood vomitting.  Patient became dizzy after this episode and almost passed out.  Wife was present to supplement the history.  Patient was brought to the ER and still notes dizziness.  Patient had abdominal imaging in which she was found to have necrosis of the pancreas.  Surgery consulted for pancreatic necrosis.    Of note patient was admitted June 8 to [place] in [place] in total for 1 week in which she was treated for an uncomplicated pancreatitis.  Patient has never had prior documented pancreatitis.  Patient's wife notes that he has been having on and off intermittent epigastric pain more so towards the left upper quadrant and this has been going on for years.  Patient has been treating this pain with over-the-counter NSAIDs at times.   He drinking 5 ounces of vodka a day. Patient and wife deny him having prior endoscopic evaluation.    In the ED   T(F): 100.1 (24 Sep 2022 16:50), Max: 100.1 (24 Sep 2022 16:50)  HR: 95 (24 Sep 2022 16:50) (73 - 98)  BP: 112/58 (24 Sep 2022 16:50) (112/58 - 133/81)      LABS:  cret                        6.7    9.97  )-----------( 295      ( 24 Sep 2022 17:30 )             20.1     09-24    134<L>  |  91<L>  |  21<H>  ----------------------------<  245<H>  4.0   |  34<H>  |  1.0    Ca    8.0<L>      24 Sep 2022 09:17    TPro  4.5<L>  /  Alb  2.5<L>  /  TBili  <0.2  /  DBili  <0.2  /  AST  31  /  ALT  28  /  AlkPhos  90  09-24    PT/INR - ( 24 Sep 2022 09:17 )   PT: 12.70 sec;   INR: 1.11 ratio         PTT - ( 24 Sep 2022 09:17 )  PTT:26.4 sec      < from: CT Abdomen and Pelvis w/ IV Cont (09.24.22 @ 10:32) >  Findings of necrotizing pancreatitis. Multiple peripancreatic fluid   collections as described. Gas within some of these collections may   represent superinfection.    < end of copied text >   (24 Sep 2022 18:11)      INTERVAL HISTORY:  No acute events overnight    PAST MEDICAL & SURGICAL HISTORY  HTN (hypertension)        ALLERGIES:  No Known Allergies      MEDICATIONS:  MEDICATIONS  (STANDING):  atorvastatin 20 milliGRAM(s) Oral at bedtime  chlorhexidine 2% Cloths 1 Application(s) Topical daily  dextrose 5% + lactated ringers. 1000 milliLiter(s) (75 mL/Hr) IV Continuous <Continuous>  folic acid 1 milliGRAM(s) Oral daily  multivitamin 1 Tablet(s) Oral daily  pantoprazole  Injectable 40 milliGRAM(s) IV Push every 12 hours  piperacillin/tazobactam IVPB.. 3.375 Gram(s) IV Intermittent every 6 hours  senna 2 Tablet(s) Oral at bedtime    MEDICATIONS  (PRN):  aluminum hydroxide/magnesium hydroxide/simethicone Suspension 30 milliLiter(s) Oral every 4 hours PRN Dyspepsia  LORazepam     Tablet 2 milliGRAM(s) Oral every 4 hours PRN for CIWA score 8 or above  melatonin 3 milliGRAM(s) Oral at bedtime PRN Insomnia  ondansetron Injectable 4 milliGRAM(s) IV Push every 8 hours PRN Nausea and/or Vomiting      HOME MEDICATIONS:  Home Medications:  AMLODIPINE BESYLATE 5MG TAB:  (24 Sep 2022 18:16)  ATORVASTATIN CALCIUM 20MG TAB:  (24 Sep 2022 18:16)  LISINOPRIL 20 MG TABLET: TAKE 1 TABLET BY MOUTH EVERY DAY (24 Sep 2022 18:16)        OBJECTIVE:  ICU Vital Signs Last 24 Hrs  T(C): 36.3 (29 Sep 2022 12:00), Max: 37 (28 Sep 2022 18:30)  T(F): 97.4 (29 Sep 2022 12:00), Max: 98.6 (28 Sep 2022 18:30)  HR: 70 (29 Sep 2022 12:00) (64 - 98)  BP: 147/70 (29 Sep 2022 12:00) (110/65 - 147/70)  BP(mean): 99 (29 Sep 2022 12:00) (81 - 99)  ABP: --  ABP(mean): --  RR: 24 (29 Sep 2022 12:00) (11 - 30)  SpO2: 98% (29 Sep 2022 12:00) (93% - 98%)    O2 Parameters below as of 29 Sep 2022 12:00  Patient On (Oxygen Delivery Method): room air            Adult Advanced Hemodynamics Last 24 Hrs  CVP(mm Hg): --  CVP(cm H2O): --  CO: --  CI: --  PA: --  PA(mean): --  PCWP: --  SVR: --  SVRI: --  PVR: --  PVRI: --  I&O's Summary    28 Sep 2022 07:01  -  29 Sep 2022 07:00  --------------------------------------------------------  IN: 2292 mL / OUT: 1375 mL / NET: 917 mL    29 Sep 2022 07:01  -  29 Sep 2022 13:13  --------------------------------------------------------  IN: 480 mL / OUT: 350 mL / NET: 130 mL      Daily Height in cm: 172.72 (28 Sep 2022 16:10)    Daily     PHYSICAL EXAM:  NEURO: patient is awake , alert and oriented  GEN: Not in acute distress  NECK: supple  LUNGS: Clear to auscultation bilaterally   CARDIOVASCULAR: S1/S2 present, RRR , no murmurs or rubs, no carotid bruits,  + PP bilaterally  ABD: Soft, non-tender, non-distended, +BS  EXT: No KOFI  SKIN: Intact  ACCESS Site:    LABS:                        8.4    6.58  )-----------( 350      ( 29 Sep 2022 04:54 )             24.0     09-29    137  |  104  |  6<L>  ----------------------------<  155<H>  3.9   |  24  |  0.6<L>    Ca    7.4<L>      29 Sep 2022 04:54  Mg     1.9     09-29    TPro  4.5<L>  /  Alb  2.3<L>  /  TBili  0.3  /  DBili  x   /  AST  10  /  ALT  6   /  AlkPhos  74  09-29              Troponin trend:      09-25 Chol 96 LDL -- HDL 29<L> Trig 101      RADIOLOGY:  -CXR:  -TTE:  -STRESS TEST:  -CATHETERIZATION:    ECG:    TELEMETRY EVENTS:       HPI:  72-year-old gentleman with PMHX  of HTN, HLD , recent hx of pancreatitis, extensive alcohol use daily, presents for episodes of Hemetemesis and hematochezia. g.  Patient notes that today he developed a large volume bloody bowel movement along with an episode of blood vomitting.  Patient became dizzy after this episode and almost passed out.  Wife was present to supplement the history.  Patient was brought to the ER and still notes dizziness.  Patient had abdominal imaging in which she was found to have necrosis of the pancreas.  Surgery consulted for pancreatic necrosis.    Of note patient was admitted June 8 to [place] in [place] in total for 1 week in which she was treated for an uncomplicated pancreatitis.  Patient has never had prior documented pancreatitis.  Patient's wife notes that he has been having on and off intermittent epigastric pain more so towards the left upper quadrant and this has been going on for years.  Patient has been treating this pain with over-the-counter NSAIDs at times.   He drinking 5 ounces of vodka a day. Patient and wife deny him having prior endoscopic evaluation.    In the ED   T(F): 100.1 (24 Sep 2022 16:50), Max: 100.1 (24 Sep 2022 16:50)  HR: 95 (24 Sep 2022 16:50) (73 - 98)  BP: 112/58 (24 Sep 2022 16:50) (112/58 - 133/81)      LABS:  cret                        6.7    9.97  )-----------( 295      ( 24 Sep 2022 17:30 )             20.1     09-24    134<L>  |  91<L>  |  21<H>  ----------------------------<  245<H>  4.0   |  34<H>  |  1.0    Ca    8.0<L>      24 Sep 2022 09:17    TPro  4.5<L>  /  Alb  2.5<L>  /  TBili  <0.2  /  DBili  <0.2  /  AST  31  /  ALT  28  /  AlkPhos  90  09-24    PT/INR - ( 24 Sep 2022 09:17 )   PT: 12.70 sec;   INR: 1.11 ratio         PTT - ( 24 Sep 2022 09:17 )  PTT:26.4 sec      < from: CT Abdomen and Pelvis w/ IV Cont (09.24.22 @ 10:32) >  Findings of necrotizing pancreatitis. Multiple peripancreatic fluid   collections as described. Gas within some of these collections may   represent superinfection.    < end of copied text >   (24 Sep 2022 18:11)      INTERVAL HISTORY:  No acute events overnight    PAST MEDICAL & SURGICAL HISTORY  HTN (hypertension)        ALLERGIES:  No Known Allergies      MEDICATIONS:  MEDICATIONS  (STANDING):  atorvastatin 20 milliGRAM(s) Oral at bedtime  chlorhexidine 2% Cloths 1 Application(s) Topical daily  dextrose 5% + lactated ringers. 1000 milliLiter(s) (75 mL/Hr) IV Continuous <Continuous>  folic acid 1 milliGRAM(s) Oral daily  multivitamin 1 Tablet(s) Oral daily  pantoprazole  Injectable 40 milliGRAM(s) IV Push every 12 hours  piperacillin/tazobactam IVPB.. 3.375 Gram(s) IV Intermittent every 6 hours  senna 2 Tablet(s) Oral at bedtime    MEDICATIONS  (PRN):  aluminum hydroxide/magnesium hydroxide/simethicone Suspension 30 milliLiter(s) Oral every 4 hours PRN Dyspepsia  LORazepam     Tablet 2 milliGRAM(s) Oral every 4 hours PRN for CIWA score 8 or above  melatonin 3 milliGRAM(s) Oral at bedtime PRN Insomnia  ondansetron Injectable 4 milliGRAM(s) IV Push every 8 hours PRN Nausea and/or Vomiting      HOME MEDICATIONS:  Home Medications:  AMLODIPINE BESYLATE 5MG TAB:  (24 Sep 2022 18:16)  ATORVASTATIN CALCIUM 20MG TAB:  (24 Sep 2022 18:16)  LISINOPRIL 20 MG TABLET: TAKE 1 TABLET BY MOUTH EVERY DAY (24 Sep 2022 18:16)        OBJECTIVE:  ICU Vital Signs Last 24 Hrs  T(C): 36.3 (29 Sep 2022 12:00), Max: 37 (28 Sep 2022 18:30)  T(F): 97.4 (29 Sep 2022 12:00), Max: 98.6 (28 Sep 2022 18:30)  HR: 70 (29 Sep 2022 12:00) (64 - 98)  BP: 147/70 (29 Sep 2022 12:00) (110/65 - 147/70)  BP(mean): 99 (29 Sep 2022 12:00) (81 - 99)  ABP: --  ABP(mean): --  RR: 24 (29 Sep 2022 12:00) (11 - 30)  SpO2: 98% (29 Sep 2022 12:00) (93% - 98%)    O2 Parameters below as of 29 Sep 2022 12:00  Patient On (Oxygen Delivery Method): room air            Adult Advanced Hemodynamics Last 24 Hrs  CVP(mm Hg): --  CVP(cm H2O): --  CO: --  CI: --  PA: --  PA(mean): --  PCWP: --  SVR: --  SVRI: --  PVR: --  PVRI: --  I&O's Summary    28 Sep 2022 07:01  -  29 Sep 2022 07:00  --------------------------------------------------------  IN: 2292 mL / OUT: 1375 mL / NET: 917 mL    29 Sep 2022 07:01  -  29 Sep 2022 13:13  --------------------------------------------------------  IN: 480 mL / OUT: 350 mL / NET: 130 mL      Daily Height in cm: 172.72 (28 Sep 2022 16:10)    Daily     PHYSICAL EXAM:  NEURO: patient is awake , alert and oriented  GEN: Not in acute distress  NECK: supple  LUNGS: Clear to auscultation bilaterally   CARDIOVASCULAR: S1/S2 present, RRR , no murmurs  ABD: Soft, non-tender, non-distended, +BS  EXT: No KOFI  SKIN: Intact    LABS:                        8.4    6.58  )-----------( 350      ( 29 Sep 2022 04:54 )             24.0     09-29    137  |  104  |  6<L>  ----------------------------<  155<H>  3.9   |  24  |  0.6<L>    Ca    7.4<L>      29 Sep 2022 04:54  Mg     1.9     09-29    TPro  4.5<L>  /  Alb  2.3<L>  /  TBili  0.3  /  DBili  x   /  AST  10  /  ALT  6   /  AlkPhos  74  09-29    Troponin trend:      09-25 Chol 96 LDL -- HDL 29<L> Trig 101      RADIOLOGY:  -CXR: No radiographic evidence of acute cardiopulmonary disease.    -TTE:  -STRESS TEST:  -CATHETERIZATION:    ECG:    TELEMETRY EVENTS:  No tele events overnight
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
## 2022-09-29 NOTE — PROGRESS NOTE ADULT - ATTENDING COMMENTS
patient seen and examined   agree above note   follow h/h   advacne diet as per GI   if no intervention by GI   downgrade to floor

## 2022-09-29 NOTE — PROGRESS NOTE ADULT - ASSESSMENT
IMPRESSION:  GIB, s/p EGD colonoscopy w/ thermal therapy  necrotizing pancreatitis, s/p EUS with drainage of fluid collection and stent placement  ETOH abuse  blood loss anemia:    IMPRESSION:        PLAN:    CNS: thiamine and folate   [facility] protocol     HEENT: oral care     PULMONARY: keep pox > 92 %     CARDIOVASCULAR: Goal directed fluid management.    GI: GI prophylaxis PPI. Feeding per GI, currently clear liquid diet    RENAL: f/u lytes    INFECTIOUS DISEASE: F/u cultures. Procal downtrending. On zosyn per GI    HEMATOLOGICAL:  DVT prophylaxis. sequential. Follow CBC    ENDOCRINE:  Follow up FS.  Insulin protocol if needed.    oob to chair   if h/h stable and OK by GI downgrade to floor evening    IMPRESSION:  GIB, s/p EGD colonoscopy w/ thermal therapy  necrotizing pancreatitis, s/p EUS with drainage of fluid collection and stent placement  ETOH abuse  blood loss anemia:    IMPRESSION:        PLAN:    CNS: thiamine and folate   [facility] protocol     HEENT: oral care     PULMONARY: keep pox > 92 %     CARDIOVASCULAR:  keep is = os     GI: GI prophylaxis PPI. Feeding per GI, currently clear liquid diet    RENAL: f/u lytes    INFECTIOUS DISEASE: F/u cultures. Procal downtrending. On zosyn per GI    HEMATOLOGICAL:  DVT prophylaxis. sequential. Follow CBC    ENDOCRINE:  Follow up FS.  Insulin protocol if needed.    oob to chair   if h/h stable and OK by GI downgrade to floor evening

## 2022-09-29 NOTE — PROGRESS NOTE ADULT - SUBJECTIVE AND OBJECTIVE BOX
Patient is a 72-year-old gentleman with past medical history of hypertension, hyperlipidemia, extensive alcohol use in which he is currently actively using, and as of recent history of pancreatitis requiring hospitalization who presents to [facility] for rectal bleeding.  Of note patient was admitted June 8 to [place] in [place] in total for 1 week in which she was treated for an uncomplicated pancreatitis.  Patient admitted and had general GI work-up revealing large Duodenal ulcer with visible vessel. After stabilization from GI bleeding patient had EUS drainage of Collection by utilizing a trans-gastric luminal opposing stent. Patient admitted to CCU no overnight events, spouse present. Feels well without pain.       PAST MEDICAL & SURGICAL HISTORY:  HTN  HLD  Pancreatitis ( First Episode June 8th 2022- [facility])  ETOH use and abuse      MEDICATIONS  (STANDING):  cefTRIAXone   IVPB 1000 milliGRAM(s) IV Intermittent once  lactated ringers Bolus 1000 milliLiter(s) IV Bolus once  metroNIDAZOLE  IVPB 500 milliGRAM(s) IV Intermittent Once  pantoprazole  Injectable 80 milliGRAM(s) IV Push Once    Home Meds:  Lisinopril  Amlodipine  Atorvastatin     Allergies  No Known Allergies    Review of Systems  General:  Denies Fatigue, Denies Fever, Denies Weakness ,Denies Weight Loss   HEENT: Denies Trouble Swallowing ,Denies  Sore Throat , Denies Change in hearing/vision/speech ,Denies Dizziness    Cardio: Denies  Chest Pain , Palpitations    Respiratory: Denies worsening of SOB, Denies Cough  Abdomen: See detailed HPI  Neuro: Denies Headache Denies Dizziness, Denies Paresthesias  MSK: Denies pain in Bones/Joints/Muscles   Psych: Patient denies depression, denies suicidal or homicidal ideations  Integ: Patient Denies rash, or new skin lesions       Vital Signs Last 24 Hrs  T(C): 36.4 (29 Sep 2022 09:00), Max: 37 (28 Sep 2022 18:30)  T(F): 97.5 (29 Sep 2022 09:00), Max: 98.6 (28 Sep 2022 18:30)  HR: 98 (29 Sep 2022 09:00) (64 - 98)  BP: 110/65 (29 Sep 2022 09:00) (110/65 - 137/77)  BP(mean): 81 (29 Sep 2022 09:00) (79 - 96)  RR: 30 (29 Sep 2022 09:00) (11 - 30)  SpO2: 95% (29 Sep 2022 09:00) (93% - 98%)    Parameters below as of 29 Sep 2022 09:00  Patient On (Oxygen Delivery Method): room air      Physical Exam  Gen: NAD  HEENT: NC/AT, Mucosal Membranes Moist  Cardio: S1/S2 No S3/S4, Regular  Resp: CTA B/L  Abdomen: Soft, ND/NT,   Neuro: AAOx3  Extremities: FROM x 4  Skin: No jaundice       Labs:                        8.4    6.58  )-----------( 350      ( 29 Sep 2022 04:54 )             24.0     09-29    137  |  104  |  6<L>  ----------------------------<  155<H>  3.9   |  24  |  0.6<L>    Ca    7.4<L>      29 Sep 2022 04:54  Mg     1.9     09-29    TPro  4.5<L>  /  Alb  2.3<L>  /  TBili  0.3  /  DBili  x   /  AST  10  /  ALT  6   /  AlkPhos  74  09-29         RADIOLOGY & ADDITIONAL STUDIES:  CT Abdomen and Pelvis w/ IV Cont 09.24.22   IMPRESSION:    Findings of necrotizing pancreatitis. Multiple peripancreatic fluid   collections as described. Gas within some of these collections may   represent superinfection.    Additional areas of hypoenhancement in the pancreatic head, may also   represent sequela of necrotizing pancreatitis, however follow-up is   recommended.

## 2022-09-29 NOTE — CHART NOTE - NSCHARTNOTEFT_GEN_A_CORE
73yo Mw/ a PMH of HTN, HLD, recent hx of pancreatitis, and extensive daily alcohol use presents for episodes of hematemesis and hematochezia.   On the day of admission, pt had a large bloody BM along with an episode of bloody vomiting. Patient became dizzy and almost passed out. Abd imaging showed pancreatic necrosis, for which surgery was consulted for.     Of note, pt was recently admitted to [facility] for 1 week in June for uncomplicated pancreatitis. He had no hx of pancreatitis but has had intermittent epigastric/LUQ pain for years. He drinks about 5oz of vodka a day.     Hgb 4.8 in the ED. Transfused 2u PRBC. F/u hgb 6.7. Transfused 3rd unit -> 8.3    EGD/Colonoscopy 9/27:   -EGD: GE junction ulcer, ulcer in antrum, erosive gastritis, large ulcer in duodenal bulb with visible vessel (injection, thermal therapy), clean based smaller ulcer in duodenal bulb.   -Colonoscopy: mild diverticulosis of sigmoid colon, internal and external hemorrhoids  >>c/w IV PPI q12h x 8weeks on discharge, keep NPO for today, monitor hgb q12 (CBC q12), repeat EGD in 8 weeks. rec alcohol cessation, avoid NSAIDs    >EUS 9/28:  -EUS showed 7 cm x 6 cm fluid collection with debris consistent with walled off necrosis  Successful placement of lumen apposing metal stent ([employer]) stent 15mm x 10mm   -rec clear liquid diet and now advanced to full liquid diet, start zosyn, c/w PPI BID    #GI bleed  #ulcers at GE junction, antrum, duodenal bulb with visible vessel s/p injection and thermal therapy  #erosive gastritis  #necrotising pancreatitis  - presented with hematemasis and hematochezia. has not had episodes of either since  - s/p 3 units PRBC for hgb 4.8  - EGD showed GE junction ulcer, ulcer in antrum, erosive gastritis, large ulcer in duodenal bulb with visible vessel (injection, thermal therapy), clean based smaller ulcer in duodenal bulb.   - colonoscopy showed mild diverticulosis of sigmoid colon, internal and external hemorrhoids  - Advanced GI did EUS showing 7 cm x 6 cm fluid collection with debris consistent with walled off necrosis  - May plan for advanced GI to do necrosectomy outpatient   - full liquid diet now  - c/w zosyn  - protonix iv bid, continue ppi for 8 weeks after discharge, monitor hgb q12, repeat EGD in 8 weeks     #H/O EtOH abuse  - CIWA protocol  - Addiction medicine/CATCH team following  - Recommend offering naltrexone and gabapentin for anti-craving  - thiamine, folate    #HLD  - atorvastatin    #HTN  - controlled off medications

## 2022-09-29 NOTE — CONSULT NOTE ADULT - CONSULT REQUESTED DATE/TIME
29-Sep-2022 12:15
24-Sep-2022 12:13
26-Sep-2022 10:36
24-Sep-2022 12:56
25-Sep-2022 07:33
25-Sep-2022 09:43

## 2022-09-29 NOTE — CONSULT NOTE ADULT - CONSULT REASON
Hematochezia
Pancreatic Necrosis
pancreatitis
alcohol use disorder
Necrotizing Pancreatitis
Peripancreatic fluid collection drainage

## 2022-09-30 LAB
ALBUMIN SERPL ELPH-MCNC: 2.3 G/DL — LOW (ref 3.5–5.2)
ALP SERPL-CCNC: 73 U/L — SIGNIFICANT CHANGE UP (ref 30–115)
ALT FLD-CCNC: 6 U/L — SIGNIFICANT CHANGE UP (ref 0–41)
ANION GAP SERPL CALC-SCNC: 11 MMOL/L — SIGNIFICANT CHANGE UP (ref 7–14)
AST SERPL-CCNC: 14 U/L — SIGNIFICANT CHANGE UP (ref 0–41)
BASOPHILS # BLD AUTO: 0.03 K/UL — SIGNIFICANT CHANGE UP (ref 0–0.2)
BASOPHILS NFR BLD AUTO: 0.5 % — SIGNIFICANT CHANGE UP (ref 0–1)
BILIRUB SERPL-MCNC: 0.2 MG/DL — SIGNIFICANT CHANGE UP (ref 0.2–1.2)
BUN SERPL-MCNC: 3 MG/DL — LOW (ref 10–20)
CALCIUM SERPL-MCNC: 7.9 MG/DL — LOW (ref 8.4–10.5)
CHLORIDE SERPL-SCNC: 104 MMOL/L — SIGNIFICANT CHANGE UP (ref 98–110)
CO2 SERPL-SCNC: 24 MMOL/L — SIGNIFICANT CHANGE UP (ref 17–32)
CREAT SERPL-MCNC: 0.6 MG/DL — LOW (ref 0.7–1.5)
EGFR: 102 ML/MIN/1.73M2 — SIGNIFICANT CHANGE UP
EOSINOPHIL # BLD AUTO: 0.19 K/UL — SIGNIFICANT CHANGE UP (ref 0–0.7)
EOSINOPHIL NFR BLD AUTO: 3 % — SIGNIFICANT CHANGE UP (ref 0–8)
GLUCOSE SERPL-MCNC: 125 MG/DL — HIGH (ref 70–99)
HCT VFR BLD CALC: 25.2 % — LOW (ref 42–52)
HCT VFR BLD CALC: 28.6 % — LOW (ref 42–52)
HGB BLD-MCNC: 10 G/DL — LOW (ref 14–18)
HGB BLD-MCNC: 8.6 G/DL — LOW (ref 14–18)
IMM GRANULOCYTES NFR BLD AUTO: 0.9 % — HIGH (ref 0.1–0.3)
LYMPHOCYTES # BLD AUTO: 1.32 K/UL — SIGNIFICANT CHANGE UP (ref 1.2–3.4)
LYMPHOCYTES # BLD AUTO: 20.5 % — SIGNIFICANT CHANGE UP (ref 20.5–51.1)
MAGNESIUM SERPL-MCNC: 2 MG/DL — SIGNIFICANT CHANGE UP (ref 1.8–2.4)
MCHC RBC-ENTMCNC: 30.9 PG — SIGNIFICANT CHANGE UP (ref 27–31)
MCHC RBC-ENTMCNC: 31 PG — SIGNIFICANT CHANGE UP (ref 27–31)
MCHC RBC-ENTMCNC: 34.1 G/DL — SIGNIFICANT CHANGE UP (ref 32–37)
MCHC RBC-ENTMCNC: 35 G/DL — SIGNIFICANT CHANGE UP (ref 32–37)
MCV RBC AUTO: 88.5 FL — SIGNIFICANT CHANGE UP (ref 80–94)
MCV RBC AUTO: 90.6 FL — SIGNIFICANT CHANGE UP (ref 80–94)
MONOCYTES # BLD AUTO: 0.57 K/UL — SIGNIFICANT CHANGE UP (ref 0.1–0.6)
MONOCYTES NFR BLD AUTO: 8.9 % — SIGNIFICANT CHANGE UP (ref 1.7–9.3)
NEUTROPHILS # BLD AUTO: 4.27 K/UL — SIGNIFICANT CHANGE UP (ref 1.4–6.5)
NEUTROPHILS NFR BLD AUTO: 66.2 % — SIGNIFICANT CHANGE UP (ref 42.2–75.2)
NRBC # BLD: 0 /100 WBCS — SIGNIFICANT CHANGE UP (ref 0–0)
NRBC # BLD: 0 /100 WBCS — SIGNIFICANT CHANGE UP (ref 0–0)
PLATELET # BLD AUTO: 415 K/UL — HIGH (ref 130–400)
PLATELET # BLD AUTO: 418 K/UL — HIGH (ref 130–400)
POTASSIUM SERPL-MCNC: 4.3 MMOL/L — SIGNIFICANT CHANGE UP (ref 3.5–5)
POTASSIUM SERPL-SCNC: 4.3 MMOL/L — SIGNIFICANT CHANGE UP (ref 3.5–5)
PROT SERPL-MCNC: 4.7 G/DL — LOW (ref 6–8)
RBC # BLD: 2.78 M/UL — LOW (ref 4.7–6.1)
RBC # BLD: 3.23 M/UL — LOW (ref 4.7–6.1)
RBC # FLD: 14.7 % — HIGH (ref 11.5–14.5)
RBC # FLD: 15.3 % — HIGH (ref 11.5–14.5)
SODIUM SERPL-SCNC: 139 MMOL/L — SIGNIFICANT CHANGE UP (ref 135–146)
WBC # BLD: 6.44 K/UL — SIGNIFICANT CHANGE UP (ref 4.8–10.8)
WBC # BLD: 7.47 K/UL — SIGNIFICANT CHANGE UP (ref 4.8–10.8)
WBC # FLD AUTO: 6.44 K/UL — SIGNIFICANT CHANGE UP (ref 4.8–10.8)
WBC # FLD AUTO: 7.47 K/UL — SIGNIFICANT CHANGE UP (ref 4.8–10.8)

## 2022-09-30 PROCEDURE — 99233 SBSQ HOSP IP/OBS HIGH 50: CPT

## 2022-09-30 PROCEDURE — 99233 SBSQ HOSP IP/OBS HIGH 50: CPT | Mod: 25

## 2022-09-30 RX ORDER — THIAMINE MONONITRATE (VIT B1) 100 MG
500 TABLET ORAL EVERY 8 HOURS
Refills: 0 | Status: COMPLETED | OUTPATIENT
Start: 2022-09-30 | End: 2022-10-03

## 2022-09-30 RX ADMIN — Medication 105 MILLIGRAM(S): at 21:08

## 2022-09-30 RX ADMIN — Medication 105 MILLIGRAM(S): at 16:14

## 2022-09-30 RX ADMIN — CHLORHEXIDINE GLUCONATE 1 APPLICATION(S): 213 SOLUTION TOPICAL at 13:01

## 2022-09-30 RX ADMIN — PIPERACILLIN AND TAZOBACTAM 25 GRAM(S): 4; .5 INJECTION, POWDER, LYOPHILIZED, FOR SOLUTION INTRAVENOUS at 01:01

## 2022-09-30 RX ADMIN — PIPERACILLIN AND TAZOBACTAM 25 GRAM(S): 4; .5 INJECTION, POWDER, LYOPHILIZED, FOR SOLUTION INTRAVENOUS at 05:56

## 2022-09-30 RX ADMIN — Medication 1 TABLET(S): at 13:02

## 2022-09-30 RX ADMIN — Medication 1 MILLIGRAM(S): at 13:02

## 2022-09-30 RX ADMIN — PANTOPRAZOLE SODIUM 40 MILLIGRAM(S): 20 TABLET, DELAYED RELEASE ORAL at 05:56

## 2022-09-30 RX ADMIN — ATORVASTATIN CALCIUM 20 MILLIGRAM(S): 80 TABLET, FILM COATED ORAL at 21:07

## 2022-09-30 RX ADMIN — PIPERACILLIN AND TAZOBACTAM 25 GRAM(S): 4; .5 INJECTION, POWDER, LYOPHILIZED, FOR SOLUTION INTRAVENOUS at 12:30

## 2022-09-30 RX ADMIN — SENNA PLUS 2 TABLET(S): 8.6 TABLET ORAL at 21:08

## 2022-09-30 RX ADMIN — PANTOPRAZOLE SODIUM 40 MILLIGRAM(S): 20 TABLET, DELAYED RELEASE ORAL at 18:09

## 2022-09-30 RX ADMIN — PIPERACILLIN AND TAZOBACTAM 25 GRAM(S): 4; .5 INJECTION, POWDER, LYOPHILIZED, FOR SOLUTION INTRAVENOUS at 18:09

## 2022-09-30 NOTE — PROGRESS NOTE ADULT - NS ATTEND AMEND GEN_ALL_CORE FT
Patient seen and examined, case discussed in teaching rounds with the [facility] PA and team, I have made changes to the note as necessary; assessment plan discussed as above
Patient seen and examined, case discussed in teaching rounds with the [facility] PA and team, I have made changes to the note as necessary; assessment plan discussed as above

## 2022-09-30 NOTE — PROGRESS NOTE ADULT - SUBJECTIVE AND OBJECTIVE BOX
GENERAL SURGERY PROGRESS NOTE    Hospital Day: 7    Vitals:  T(F): 98.2 (09-30-22 @ 11:14), Max: 98.2 (09-30-22 @ 11:14)  HR: 78 (09-30-22 @ 11:14)  BP: 108/59 (09-30-22 @ 11:14)  RR: 18 (09-30-22 @ 11:14)  SpO2: 97% (09-30-22 @ 11:14)    Fluids:     I & O's:    09-29-22 @ 07:01  -  09-30-22 @ 07:00  --------------------------------------------------------  IN:    IV PiggyBack: 100 mL    Oral Fluid: 600 mL  Total IN: 700 mL    OUT:    Voided (mL): 350 mL  Total OUT: 350 mL    Total NET: 350 mL    PHYSICAL EXAM:  General: NAD  Cardiac: RRR, S1/S2 identified, nmrg  Respiratory: unlabored breathing at rest, clear to auscultation bilaterally  Abdomen: Soft, non-distended, non-tender, no rebound, no guarding.  Musculoskeletal: FROM in b/l UE and LE  Neuro: Sensation grossly intact and equal throughout, no focal deficits  Skin: Warm/dry, normal color, no jaundice    MEDICATIONS  (STANDING):  atorvastatin 20 milliGRAM(s) Oral at bedtime  chlorhexidine 2% Cloths 1 Application(s) Topical daily  folic acid 1 milliGRAM(s) Oral daily  multivitamin 1 Tablet(s) Oral daily  pantoprazole  Injectable 40 milliGRAM(s) IV Push every 12 hours  piperacillin/tazobactam IVPB.. 3.375 Gram(s) IV Intermittent every 6 hours  senna 2 Tablet(s) Oral at bedtime  thiamine IVPB 500 milliGRAM(s) IV Intermittent every 8 hours    MEDICATIONS  (PRN):  aluminum hydroxide/magnesium hydroxide/simethicone Suspension 30 milliLiter(s) Oral every 4 hours PRN Dyspepsia  LORazepam     Tablet 2 milliGRAM(s) Oral every 4 hours PRN for CIWA score 8 or above  melatonin 3 milliGRAM(s) Oral at bedtime PRN Insomnia  ondansetron Injectable 4 milliGRAM(s) IV Push every 8 hours PRN Nausea and/or Vomiting    DVT PROPHYLAXIS:   GI PROPHYLAXIS: pantoprazole  Injectable 40 milliGRAM(s) IV Push every 12 hours    ANTICOAGULATION:   ANTIBIOTICS:  piperacillin/tazobactam IVPB.. 3.375 Gram(s)    LAB/STUDIES:  Labs:  CAPILLARY BLOOD GLUCOSE               8.6    6.44  )-----------( 415      ( 30 Sep 2022 07:36 )             25.2       Auto Neutrophil %: 66.2 % (09-30-22 @ 07:36)  Auto Immature Granulocyte %: 0.9 % (09-30-22 @ 07:36)    09-30    139  |  104  |  3<L>  ----------------------------<  125<H>  4.3   |  24  |  0.6<L>    Calcium, Total Serum: 7.9 mg/dL (09-30-22 @ 07:36)    LFTs:             4.7  | 0.2  | 14       ------------------[73      ( 30 Sep 2022 07:36 )  2.3  | x    | 6           Lipase:x      Amylase:x        Culture - Body Fluid with Gram Stain (collected 28 Sep 2022 17:50)  Source: Paracentesis None  Gram Stain (30 Sep 2022 02:04):    Rare polymorphonuclear leukocytes seen per low power field    No organisms seen per oil power field    Limited volume of specimen received, Unable to centrifuge/concentrate    specimen. Culture results may be compromised.    Culture - Body Fluid with Gram Stain (collected 28 Sep 2022 17:50)  Source: Paracentesis None  Gram Stain (30 Sep 2022 02:04):    No polymorphonuclear cells seen per low power field    No organisms seen per oil power field

## 2022-09-30 NOTE — PROGRESS NOTE ADULT - ASSESSMENT
71yo Mw/ a PMH of HTN, HLD, recent hx of pancreatitis, and extensive daily alcohol use presents for episodes of hematemesis and hematochezia.   On the day of admission, pt had a large bloody BM along with an episode of bloody vomiting. Patient became dizzy and almost passed out. Abd imaging showed pancreatic necrosis, for which surgery was consulted for.     # Acute blood loss anemia due to GI bleed  #ulcers at GE junction, antrum, duodenal bulb with visible vessel s/p injection and thermal therapy  #erosive gastritis  #necrotising pancreatitis  - presented with hematemasis and hematochezia. has not had episodes of either since  - s/p 3 units PRBC for hgb 4.8  - EGD showed GE junction ulcer, ulcer in antrum, erosive gastritis, large ulcer in duodenal bulb with visible vessel (injection, thermal therapy), clean based smaller ulcer in duodenal bulb.   - colonoscopy showed mild diverticulosis of sigmoid colon, internal and external hemorrhoids  - [facility] did EUS showing 7 cm x 6 cm fluid collection with debris consistent with walled off necrosis  - Possible EGD with Necrosectomy on Monday or Tuesday .   - advance diet as tolerated.   - c/w zosyn  - protonix iv bid, continue ppi for 8 weeks after discharge, monitor hgb q12, repeat EGD in 8 weeks       #H/O EtOH abuse  - [facility] protocol  - Addiction medicine/CATCH team following  - Recommend offering naltrexone and gabapentin for anti-craving  - thiamine, folate    #HLD  - atorvastatin    #HTN  - controlled off medications.    DVT: Lovenox  GI: PPI 40 BID  code full  Dispo acute      #Progress Note Handoff  Pending (specify):  EGD w/ Necrosectomy Monday or Tuesday   Family discussion: with the wife at bedside.   Disposition: Home

## 2022-09-30 NOTE — PROGRESS NOTE ADULT - SUBJECTIVE AND OBJECTIVE BOX
CHRIS KHAN  72y  Male      Patient is a 72y old  Male who presents with a chief complaint of UGIB, necrotizing pancreatitis.       INTERVAL HPI/OVERNIGHT EVENTS:      ******************************* REVIEW OF SYSTEMS:**********************************************    All other review of systems negative    *********************** VITALS ******************************************    T(F): 98.2 (09-30-22 @ 11:14)  HR: 78 (09-30-22 @ 11:14) (70 - 78)  BP: 108/59 (09-30-22 @ 11:14) (94/50 - 120/68)  RR: 18 (09-30-22 @ 11:14) (18 - 18)  SpO2: 97% (09-30-22 @ 11:14) (96% - 98%)    09-29-22 @ 07:01  -  09-30-22 @ 07:00  --------------------------------------------------------  IN: 700 mL / OUT: 350 mL / NET: 350 mL    09-30-22 @ 07:01  -  09-30-22 @ 15:32  --------------------------------------------------------  IN: 915 mL / OUT: 0 mL / NET: 915 mL            09-29-22 @ 07:01  -  09-30-22 @ 07:00  --------------------------------------------------------  IN: 700 mL / OUT: 350 mL / NET: 350 mL    09-30-22 @ 07:01  -  09-30-22 @ 15:32  --------------------------------------------------------  IN: 915 mL / OUT: 0 mL / NET: 915 mL        ******************************** PHYSICAL EXAM:**************************************************  GENERAL: NAD    PSYCH: no agitation, baseline mentation  HEENT:     NERVOUS SYSTEM:  Alert & Oriented X3,   PULMONARY: MARGRET, CTA    CARDIOVASCULAR: S1S2 RRR    GI: Soft, NT, ND; BS present.    EXTREMITIES:  2+ Peripheral Pulses, No clubbing, cyanosis, or edema    LYMPH: No lymphadenopathy noted    SKIN: No rashes or lesions      **************************** LABS *******************************************************                          8.6    6.44  )-----------( 415      ( 30 Sep 2022 07:36 )             25.2     09-30    139  |  104  |  3<L>  ----------------------------<  125<H>  4.3   |  24  |  0.6<L>    Ca    7.9<L>      30 Sep 2022 07:36  Mg     2.0     09-30    TPro  4.7<L>  /  Alb  2.3<L>  /  TBili  0.2  /  DBili  x   /  AST  14  /  ALT  6   /  AlkPhos  73  09-30          Lactate Trend        CAPILLARY BLOOD GLUCOSE              **************************Active Medications *******************************************  No Known Allergies      aluminum hydroxide/magnesium hydroxide/simethicone Suspension 30 milliLiter(s) Oral every 4 hours PRN  atorvastatin 20 milliGRAM(s) Oral at bedtime  chlorhexidine 2% Cloths 1 Application(s) Topical daily  folic acid 1 milliGRAM(s) Oral daily  LORazepam     Tablet 2 milliGRAM(s) Oral every 4 hours PRN  melatonin 3 milliGRAM(s) Oral at bedtime PRN  multivitamin 1 Tablet(s) Oral daily  ondansetron Injectable 4 milliGRAM(s) IV Push every 8 hours PRN  pantoprazole  Injectable 40 milliGRAM(s) IV Push every 12 hours  piperacillin/tazobactam IVPB.. 3.375 Gram(s) IV Intermittent every 6 hours  senna 2 Tablet(s) Oral at bedtime  thiamine IVPB 500 milliGRAM(s) IV Intermittent every 8 hours      ***************************************************  RADIOLOGY & ADDITIONAL TESTS:    Imaging Personally Reviewed:  [ ] YES  [ ] NO    HEALTH ISSUES - PROBLEM Dx:  Peripancreatic fluid collection
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
## 2022-09-30 NOTE — PROGRESS NOTE ADULT - ASSESSMENT
ASSESSMENT:   71y/o male with necrotising pancreatitis, upper GI bleed, found to have large duodenal ulcer with visible vessel. After stabilization from GI bleeding patient had EUS drainage of collection by utilizing a trans-gastric luminal opposing stent.    - Downgraded from CCU   - pt has no complaints    - GI planning for EGD with necrosectomy later this admission   - no surgical intervention @ this time.   - Trend hgb, transfuse as needed   - Continue care per primary team    - Surgery signing off, reconsult as needed    If you have any remaining concerns or questions, please contact us.   [id number]

## 2022-09-30 NOTE — PROGRESS NOTE ADULT - SUBJECTIVE AND OBJECTIVE BOX
CHRIS KHAN 72y Male  MRN#: [id number]   CODE STATUS:________full    Hospital Day: 6d    Pt is currently admitted with the primary diagnosis of hematemesis    SUBJECTIVE    No adverse overnight events     Subjective complaints     Patient was examined and seen by the bedside. No complaints. Denies fever, chills, SOB, nausea, vomiting.                                           OBJECTIVE  PAST MEDICAL & SURGICAL HISTORY  HTN (hypertension)                                                ALLERGIES:  No Known Allergies                           HOME MEDICATIONS  Home Medications:  AMLODIPINE BESYLATE 5MG TAB:  (24 Sep 2022 18:16)  ATORVASTATIN CALCIUM 20MG TAB:  (24 Sep 2022 18:16)  LISINOPRIL 20 MG TABLET: TAKE 1 TABLET BY MOUTH EVERY DAY (24 Sep 2022 18:16)                           MEDICATIONS:  STANDING MEDICATIONS  atorvastatin 20 milliGRAM(s) Oral at bedtime  chlorhexidine 2% Cloths 1 Application(s) Topical daily  folic acid 1 milliGRAM(s) Oral daily  multivitamin 1 Tablet(s) Oral daily  pantoprazole  Injectable 40 milliGRAM(s) IV Push every 12 hours  piperacillin/tazobactam IVPB.. 3.375 Gram(s) IV Intermittent every 6 hours  senna 2 Tablet(s) Oral at bedtime  thiamine IVPB 500 milliGRAM(s) IV Intermittent every 8 hours    PRN MEDICATIONS  aluminum hydroxide/magnesium hydroxide/simethicone Suspension 30 milliLiter(s) Oral every 4 hours PRN  LORazepam     Tablet 2 milliGRAM(s) Oral every 4 hours PRN  melatonin 3 milliGRAM(s) Oral at bedtime PRN  ondansetron Injectable 4 milliGRAM(s) IV Push every 8 hours PRN                                            ------------------------------------------------------------  VITAL SIGNS: Last 24 Hours  T(C): 36.6 (30 Sep 2022 05:00), Max: 36.6 (30 Sep 2022 05:00)  T(F): 97.8 (30 Sep 2022 05:00), Max: 97.8 (30 Sep 2022 05:00)  HR: 70 (30 Sep 2022 07:39) (65 - 80)  BP: 108/67 (30 Sep 2022 07:39) (94/50 - 147/70)  BP(mean): 88 (29 Sep 2022 15:00) (82 - 103)  RR: 18 (30 Sep 2022 07:39) (18 - 24)  SpO2: 96% (30 Sep 2022 07:39) (96% - 98%)      09-29-22 @ 07:01  -  09-30-22 @ 07:00  --------------------------------------------------------  IN: 700 mL / OUT: 350 mL / NET: 350 mL                                               LABS:                        8.6    6.44  )-----------( 415      ( 30 Sep 2022 07:36 )             25.2     09-29    137  |  104  |  6<L>  ----------------------------<  155<H>  3.9   |  24  |  0.6<L>    Ca    7.4<L>      29 Sep 2022 04:54  Mg     1.9     09-29    TPro  4.5<L>  /  Alb  2.3<L>  /  TBili  0.3  /  DBili  x   /  AST  10  /  ALT  6   /  AlkPhos  74  09-29                Culture - Body Fluid with Gram Stain (collected 28 Sep 2022 17:50)  Source: Paracentesis None  Gram Stain (30 Sep 2022 02:04):    Rare polymorphonuclear leukocytes seen per low power field    No organisms seen per oil power field    Limited volume of specimen received, Unable to centrifuge/concentrate    specimen. Culture results may be compromised.    Culture - Body Fluid with Gram Stain (collected 28 Sep 2022 17:50)  Source: Paracentesis None  Gram Stain (30 Sep 2022 02:04):    No polymorphonuclear cells seen per low power field    No organisms seen per oil power field    Culture - Blood (collected 27 Sep 2022 12:16)  Source: .Blood None  Preliminary Report (28 Sep 2022 23:01):    No growth to date.                                                    RADIOLOGY:        PHYSICAL EXAM:  GENERAL: NAD, lying in bed comfortably  HEAD:  Atraumatic, Normocephalic  EYES: conjunctiva and sclera clear  ENT: Moist mucous membranes  NECK: Supple,   CHEST/LUNG: Clear to auscultation bilaterally; Unlabored respirations  HEART: Regular rate and rhythm;  ABDOMEN: Soft, tenderness of upper quadrants, nondistended  EXTREMITIES:  No clubbing, cyanosis, or edema  NERVOUS SYSTEM:  A&Ox3, no focal deficits   SKIN: No rashes or lesions                                         ASSESSMENT & PLAN                                   CHRIS KHAN 72y Male  MRN#: [id number]   CODE STATUS:________full    Hospital Day: 6d    Pt is currently admitted with the primary diagnosis of hematemesis    SUBJECTIVE    No adverse overnight events     Subjective complaints     Patient was examined and seen by the bedside. No complaints. Denies fever, chills, SOB, nausea, vomiting.                                           OBJECTIVE  PAST MEDICAL & SURGICAL HISTORY  HTN (hypertension)                                                ALLERGIES:  No Known Allergies                           HOME MEDICATIONS  Home Medications:  AMLODIPINE BESYLATE 5MG TAB:  (24 Sep 2022 18:16)  ATORVASTATIN CALCIUM 20MG TAB:  (24 Sep 2022 18:16)  LISINOPRIL 20 MG TABLET: TAKE 1 TABLET BY MOUTH EVERY DAY (24 Sep 2022 18:16)                           MEDICATIONS:  STANDING MEDICATIONS  atorvastatin 20 milliGRAM(s) Oral at bedtime  chlorhexidine 2% Cloths 1 Application(s) Topical daily  folic acid 1 milliGRAM(s) Oral daily  multivitamin 1 Tablet(s) Oral daily  pantoprazole  Injectable 40 milliGRAM(s) IV Push every 12 hours  piperacillin/tazobactam IVPB.. 3.375 Gram(s) IV Intermittent every 6 hours  senna 2 Tablet(s) Oral at bedtime  thiamine IVPB 500 milliGRAM(s) IV Intermittent every 8 hours    PRN MEDICATIONS  aluminum hydroxide/magnesium hydroxide/simethicone Suspension 30 milliLiter(s) Oral every 4 hours PRN  LORazepam     Tablet 2 milliGRAM(s) Oral every 4 hours PRN  melatonin 3 milliGRAM(s) Oral at bedtime PRN  ondansetron Injectable 4 milliGRAM(s) IV Push every 8 hours PRN                                            ------------------------------------------------------------  VITAL SIGNS: Last 24 Hours  T(C): 36.6 (30 Sep 2022 05:00), Max: 36.6 (30 Sep 2022 05:00)  T(F): 97.8 (30 Sep 2022 05:00), Max: 97.8 (30 Sep 2022 05:00)  HR: 70 (30 Sep 2022 07:39) (65 - 80)  BP: 108/67 (30 Sep 2022 07:39) (94/50 - 147/70)  BP(mean): 88 (29 Sep 2022 15:00) (82 - 103)  RR: 18 (30 Sep 2022 07:39) (18 - 24)  SpO2: 96% (30 Sep 2022 07:39) (96% - 98%)      09-29-22 @ 07:01  -  09-30-22 @ 07:00  --------------------------------------------------------  IN: 700 mL / OUT: 350 mL / NET: 350 mL                                               LABS:                        8.6    6.44  )-----------( 415      ( 30 Sep 2022 07:36 )             25.2     09-29    137  |  104  |  6<L>  ----------------------------<  155<H>  3.9   |  24  |  0.6<L>    Ca    7.4<L>      29 Sep 2022 04:54  Mg     1.9     09-29    TPro  4.5<L>  /  Alb  2.3<L>  /  TBili  0.3  /  DBili  x   /  AST  10  /  ALT  6   /  AlkPhos  74  09-29                Culture - Body Fluid with Gram Stain (collected 28 Sep 2022 17:50)  Source: Paracentesis None  Gram Stain (30 Sep 2022 02:04):    Rare polymorphonuclear leukocytes seen per low power field    No organisms seen per oil power field    Limited volume of specimen received, Unable to centrifuge/concentrate    specimen. Culture results may be compromised.    Culture - Body Fluid with Gram Stain (collected 28 Sep 2022 17:50)  Source: Paracentesis None  Gram Stain (30 Sep 2022 02:04):    No polymorphonuclear cells seen per low power field    No organisms seen per oil power field    Culture - Blood (collected 27 Sep 2022 12:16)  Source: .Blood None  Preliminary Report (28 Sep 2022 23:01):    No growth to date.                                                    RADIOLOGY:        PHYSICAL EXAM:  GENERAL: NAD, lying in bed comfortably  HEAD:  Atraumatic, Normocephalic  EYES: conjunctiva and sclera clear  ENT: Moist mucous membranes  NECK: Supple,   CHEST/LUNG: Clear to auscultation bilaterally; Unlabored respirations  HEART: Regular rate and rhythm;  ABDOMEN: Soft, tenderness of upper quadrants, nondistended  EXTREMITIES:  No clubbing, cyanosis, or edema  NERVOUS SYSTEM:  A&Ox3, no focal deficits   SKIN: No rashes or lesions                                         ASSESSMENT & PLAN    73yo Mw/ a PMH of HTN, HLD, recent hx of pancreatitis, and extensive daily alcohol use presents for episodes of hematemesis and hematochezia.   On the day of admission, pt had a large bloody BM along with an episode of bloody vomiting. Patient became dizzy and almost passed out. Abd imaging showed pancreatic necrosis, for which surgery was consulted for.     Of note, pt was recently admitted to [facility] for 1 week in June for uncomplicated pancreatitis. He had no hx of pancreatitis but has had intermittent epigastric/LUQ pain for years. He drinks about 5oz of vodka a day.     Hgb 4.8 in the ED. Transfused 2u PRBC. F/u hgb 6.7. Transfused 3rd unit -> 8.3    EGD/Colonoscopy 9/27:   -EGD: GE junction ulcer, ulcer in antrum, erosive gastritis, large ulcer in duodenal bulb with visible vessel (injection, thermal therapy), clean based smaller ulcer in duodenal bulb.   -Colonoscopy: mild diverticulosis of sigmoid colon, internal and external hemorrhoids  >>c/w IV PPI q12h x 8weeks on discharge, keep NPO for today, monitor hgb q12 (CBC q12), repeat EGD in 8 weeks. rec alcohol cessation, avoid NSAIDs    >EUS 9/28:  -EUS showed 7 cm x 6 cm fluid collection with debris consistent with walled off necrosis  Successful placement of lumen apposing metal stent (Axios) stent 15mm x 10mm   -rec clear liquid diet and now advanced to full liquid diet, start zosyn, c/w PPI BID    #GI bleed  #ulcers at GE junction, antrum, duodenal bulb with visible vessel s/p injection and thermal therapy  #erosive gastritis  #necrotising pancreatitis  - presented with hematemasis and hematochezia. has not had episodes of either since  - s/p 3 units PRBC for hgb 4.8  - EGD showed GE junction ulcer, ulcer in antrum, erosive gastritis, large ulcer in duodenal bulb with visible vessel (injection, thermal therapy), clean based smaller ulcer in duodenal bulb.   - colonoscopy showed mild diverticulosis of sigmoid colon, internal and external hemorrhoids  - Advanced GI did EUS showing 7 cm x 6 cm fluid collection with debris consistent with walled off necrosis  - May plan for advanced GI to do necrosectomy outpatient   - full liquid diet now  - c/w zosyn  - protonix iv bid, continue ppi for 8 weeks after discharge, monitor hgb q12, repeat EGD in 8 weeks   - Plan EGD with necrosectomy this Monday or Tuesday, would keep inpatient given GI bleed, Necrosis, and significant ETOH use      #H/O EtOH abuse  - [facility] protocol  - Addiction medicine/CATCH team following  - Recommend offering naltrexone and gabapentin for anti-craving  - thiamine, folate    #HLD  - atorvastatin    #HTN  - controlled off medications.            Plan EGD with necrosectomy this Monday or Tuesday, would keep inpatient given GI bleed, Necrosis, and significant ETOH use                     CHRIS KHAN 72y Male  MRN#: [id number]   CODE STATUS:________full    Hospital Day: 6d    Pt is currently admitted with the primary diagnosis of hematemesis    SUBJECTIVE    No adverse overnight events     Subjective complaints     Patient was examined and seen by the bedside. No complaints. Denies fever, chills, SOB, nausea, vomiting.                                           OBJECTIVE  PAST MEDICAL & SURGICAL HISTORY  HTN (hypertension)                                                ALLERGIES:  No Known Allergies                           HOME MEDICATIONS  Home Medications:  AMLODIPINE BESYLATE 5MG TAB:  (24 Sep 2022 18:16)  ATORVASTATIN CALCIUM 20MG TAB:  (24 Sep 2022 18:16)  LISINOPRIL 20 MG TABLET: TAKE 1 TABLET BY MOUTH EVERY DAY (24 Sep 2022 18:16)                           MEDICATIONS:  STANDING MEDICATIONS  atorvastatin 20 milliGRAM(s) Oral at bedtime  chlorhexidine 2% Cloths 1 Application(s) Topical daily  folic acid 1 milliGRAM(s) Oral daily  multivitamin 1 Tablet(s) Oral daily  pantoprazole  Injectable 40 milliGRAM(s) IV Push every 12 hours  piperacillin/tazobactam IVPB.. 3.375 Gram(s) IV Intermittent every 6 hours  senna 2 Tablet(s) Oral at bedtime  thiamine IVPB 500 milliGRAM(s) IV Intermittent every 8 hours    PRN MEDICATIONS  aluminum hydroxide/magnesium hydroxide/simethicone Suspension 30 milliLiter(s) Oral every 4 hours PRN  LORazepam     Tablet 2 milliGRAM(s) Oral every 4 hours PRN  melatonin 3 milliGRAM(s) Oral at bedtime PRN  ondansetron Injectable 4 milliGRAM(s) IV Push every 8 hours PRN                                            ------------------------------------------------------------  VITAL SIGNS: Last 24 Hours  T(C): 36.6 (30 Sep 2022 05:00), Max: 36.6 (30 Sep 2022 05:00)  T(F): 97.8 (30 Sep 2022 05:00), Max: 97.8 (30 Sep 2022 05:00)  HR: 70 (30 Sep 2022 07:39) (65 - 80)  BP: 108/67 (30 Sep 2022 07:39) (94/50 - 147/70)  BP(mean): 88 (29 Sep 2022 15:00) (82 - 103)  RR: 18 (30 Sep 2022 07:39) (18 - 24)  SpO2: 96% (30 Sep 2022 07:39) (96% - 98%)      09-29-22 @ 07:01  -  09-30-22 @ 07:00  --------------------------------------------------------  IN: 700 mL / OUT: 350 mL / NET: 350 mL                                               LABS:                        8.6    6.44  )-----------( 415      ( 30 Sep 2022 07:36 )             25.2     09-29    137  |  104  |  6<L>  ----------------------------<  155<H>  3.9   |  24  |  0.6<L>    Ca    7.4<L>      29 Sep 2022 04:54  Mg     1.9     09-29    TPro  4.5<L>  /  Alb  2.3<L>  /  TBili  0.3  /  DBili  x   /  AST  10  /  ALT  6   /  AlkPhos  74  09-29                Culture - Body Fluid with Gram Stain (collected 28 Sep 2022 17:50)  Source: Paracentesis None  Gram Stain (30 Sep 2022 02:04):    Rare polymorphonuclear leukocytes seen per low power field    No organisms seen per oil power field    Limited volume of specimen received, Unable to centrifuge/concentrate    specimen. Culture results may be compromised.    Culture - Body Fluid with Gram Stain (collected 28 Sep 2022 17:50)  Source: Paracentesis None  Gram Stain (30 Sep 2022 02:04):    No polymorphonuclear cells seen per low power field    No organisms seen per oil power field    Culture - Blood (collected 27 Sep 2022 12:16)  Source: .Blood None  Preliminary Report (28 Sep 2022 23:01):    No growth to date.                                                    RADIOLOGY:    EGD/Colonoscopy 9/27:   -EGD: GE junction ulcer, ulcer in antrum, erosive gastritis, large ulcer in duodenal bulb with visible vessel (injection, thermal therapy), clean based smaller ulcer in duodenal bulb.   -Colonoscopy: mild diverticulosis of sigmoid colon, internal and external hemorrhoids  >>c/w IV PPI q12h x 8weeks on discharge, keep NPO for today, monitor hgb q12 (CBC q12), repeat EGD in 8 weeks. rec alcohol cessation, avoid NSAIDs    >EUS 9/28:  -EUS showed 7 cm x 6 cm fluid collection with debris consistent with walled off necrosis  Successful placement of lumen apposing metal stent (Axios) stent 15mm x 10mm   -rec clear liquid diet and now advanced to full liquid diet, start zosyn, c/w PPI BID        PHYSICAL EXAM:  GENERAL: NAD, lying in bed comfortably  HEAD:  Atraumatic, Normocephalic  EYES: conjunctiva and sclera clear  ENT: Moist mucous membranes  NECK: Supple,   CHEST/LUNG: Clear to auscultation bilaterally; Unlabored respirations  HEART: Regular rate and rhythm;  ABDOMEN: Soft, tenderness of upper quadrants, nondistended  EXTREMITIES:  No clubbing, cyanosis, or edema  NERVOUS SYSTEM:  A&Ox3, no focal deficits   SKIN: No rashes or lesions                                         ASSESSMENT & PLAN    71yo Mw/ a PMH of HTN, HLD, recent hx of pancreatitis, and extensive daily alcohol use presents for episodes of hematemesis and hematochezia.   On the day of admission, pt had a large bloody BM along with an episode of bloody vomiting. Patient became dizzy and almost passed out. Abd imaging showed pancreatic necrosis, for which surgery was consulted for.       #GI bleed  #ulcers at GE junction, antrum, duodenal bulb with visible vessel s/p injection and thermal therapy  #erosive gastritis  #necrotising pancreatitis  - presented with hematemasis and hematochezia. has not had episodes of either since  - s/p 3 units PRBC for hgb 4.8  - EGD showed GE junction ulcer, ulcer in antrum, erosive gastritis, large ulcer in duodenal bulb with visible vessel (injection, thermal therapy), clean based smaller ulcer in duodenal bulb.   - colonoscopy showed mild diverticulosis of sigmoid colon, internal and external hemorrhoids  - Advanced [facility] did EUS showing 7 cm x 6 cm fluid collection with debris consistent with walled off necrosis  - May plan for advanced GI to do necrosectomy outpatient   - full liquid diet now  - c/w zosyn  - protonix iv bid, continue ppi for 8 weeks after discharge, monitor hgb q12, repeat EGD in 8 weeks   - Plan EGD with necrosectomy this Monday or Tuesday, would keep inpatient given GI bleed, Necrosis, and significant ETOH use      #H/O EtOH abuse  - [facility] protocol  - Addiction medicine/CATCH team following  - Recommend offering naltrexone and gabapentin for anti-craving  - thiamine, folate    #HLD  - atorvastatin    #HTN  - controlled off medications.    DVT: Lovenox  GI: PPI 40 BID  code full  Dispo acute

## 2022-09-30 NOTE — PROGRESS NOTE ADULT - SUBJECTIVE AND OBJECTIVE BOX
Patient is a 72-year-old gentleman with past medical history of hypertension, hyperlipidemia, extensive alcohol use in which he is currently actively using, and as of recent history of pancreatitis requiring hospitalization who presents to [facility] for rectal bleeding.  Of note patient was admitted June 8 to [place] in [place] in total for 1 week in which she was treated for an uncomplicated pancreatitis.  Patient admitted and had general GI work-up revealing large Duodenal ulcer with visible vessel. After stabilization from GI bleeding patient had EUS drainage of Collection by utilizing a trans-gastric luminal opposing stent. Patient downgraded from CCU and overall doing well.       PAST MEDICAL & SURGICAL HISTORY:  HTN  HLD  Pancreatitis ( First Episode June 8th 2022- [facility])  ETOH use and abuse      MEDICATIONS  (STANDING):  cefTRIAXone   IVPB 1000 milliGRAM(s) IV Intermittent once  lactated ringers Bolus 1000 milliLiter(s) IV Bolus once  metroNIDAZOLE  IVPB 500 milliGRAM(s) IV Intermittent Once  pantoprazole  Injectable 80 milliGRAM(s) IV Push Once    Home Meds:  Lisinopril  Amlodipine  Atorvastatin     Allergies  No Known Allergies    Review of Systems  General:  Denies Fatigue, Denies Fever, Denies Weakness ,Denies Weight Loss   HEENT: Denies Trouble Swallowing ,Denies  Sore Throat , Denies Change in hearing/vision/speech ,Denies Dizziness    Cardio: Denies  Chest Pain , Palpitations    Respiratory: Denies worsening of SOB, Denies Cough  Abdomen: See detailed HPI  Neuro: Denies Headache Denies Dizziness, Denies Paresthesias  MSK: Denies pain in Bones/Joints/Muscles   Psych: Patient denies depression, denies suicidal or homicidal ideations  Integ: Patient Denies rash, or new skin lesions       Vital Signs Last 24 Hrs  T(C): 36.6 (30 Sep 2022 05:00), Max: 36.6 (30 Sep 2022 05:00)  T(F): 97.8 (30 Sep 2022 05:00), Max: 97.8 (30 Sep 2022 05:00)  HR: 70 (30 Sep 2022 07:39) (65 - 80)  BP: 108/67 (30 Sep 2022 07:39) (94/50 - 147/70)  BP(mean): 88 (29 Sep 2022 15:00) (82 - 103)  RR: 18 (30 Sep 2022 07:39) (18 - 24)  SpO2: 96% (30 Sep 2022 07:39) (96% - 98%)    Parameters below as of 30 Sep 2022 07:39  Patient On (Oxygen Delivery Method): room air        Physical Exam  Gen: NAD  HEENT: NC/AT, Mucosal Membranes Moist  Cardio: S1/S2 No S3/S4, Regular  Resp: CTA B/L  Abdomen: Soft, ND/NT,   Neuro: AAOx3  Extremities: FROM x 4  Skin: No jaundice       Labs:                        8.6    6.44  )-----------( 415      ( 30 Sep 2022 07:36 )             25.2     09-29    137  |  104  |  6<L>  ----------------------------<  155<H>  3.9   |  24  |  0.6<L>    Ca    7.4<L>      29 Sep 2022 04:54  Mg     1.9     09-29    TPro  4.5<L>  /  Alb  2.3<L>  /  TBili  0.3  /  DBili  x   /  AST  10  /  ALT  6   /  AlkPhos  74  09-29           RADIOLOGY & ADDITIONAL STUDIES:  CT Abdomen and Pelvis w/ IV Cont 09.24.22   IMPRESSION:    Findings of necrotizing pancreatitis. Multiple peripancreatic fluid   collections as described. Gas within some of these collections may   represent superinfection.    Additional areas of hypoenhancement in the pancreatic head, may also   represent sequela of necrotizing pancreatitis, however follow-up is   recommended.

## 2022-10-01 LAB
ALBUMIN SERPL ELPH-MCNC: 2.4 G/DL — LOW (ref 3.5–5.2)
ALP SERPL-CCNC: 76 U/L — SIGNIFICANT CHANGE UP (ref 30–115)
ALT FLD-CCNC: 6 U/L — SIGNIFICANT CHANGE UP (ref 0–41)
ANION GAP SERPL CALC-SCNC: 7 MMOL/L — SIGNIFICANT CHANGE UP (ref 7–14)
AST SERPL-CCNC: 13 U/L — SIGNIFICANT CHANGE UP (ref 0–41)
BASOPHILS # BLD AUTO: 0.06 K/UL — SIGNIFICANT CHANGE UP (ref 0–0.2)
BASOPHILS NFR BLD AUTO: 0.8 % — SIGNIFICANT CHANGE UP (ref 0–1)
BILIRUB SERPL-MCNC: 0.3 MG/DL — SIGNIFICANT CHANGE UP (ref 0.2–1.2)
BUN SERPL-MCNC: 5 MG/DL — LOW (ref 10–20)
CALCIUM SERPL-MCNC: 7.7 MG/DL — LOW (ref 8.4–10.5)
CHLORIDE SERPL-SCNC: 106 MMOL/L — SIGNIFICANT CHANGE UP (ref 98–110)
CO2 SERPL-SCNC: 24 MMOL/L — SIGNIFICANT CHANGE UP (ref 17–32)
CREAT SERPL-MCNC: 0.7 MG/DL — SIGNIFICANT CHANGE UP (ref 0.7–1.5)
CULTURE RESULTS: SIGNIFICANT CHANGE UP
CULTURE RESULTS: SIGNIFICANT CHANGE UP
EGFR: 97 ML/MIN/1.73M2 — SIGNIFICANT CHANGE UP
EOSINOPHIL # BLD AUTO: 0.23 K/UL — SIGNIFICANT CHANGE UP (ref 0–0.7)
EOSINOPHIL NFR BLD AUTO: 3 % — SIGNIFICANT CHANGE UP (ref 0–8)
GLUCOSE SERPL-MCNC: 139 MG/DL — HIGH (ref 70–99)
HCT VFR BLD CALC: 27 % — LOW (ref 42–52)
HGB BLD-MCNC: 9.4 G/DL — LOW (ref 14–18)
IMM GRANULOCYTES NFR BLD AUTO: 1.2 % — HIGH (ref 0.1–0.3)
LYMPHOCYTES # BLD AUTO: 1.51 K/UL — SIGNIFICANT CHANGE UP (ref 1.2–3.4)
LYMPHOCYTES # BLD AUTO: 20 % — LOW (ref 20.5–51.1)
MAGNESIUM SERPL-MCNC: 2.3 MG/DL — SIGNIFICANT CHANGE UP (ref 1.8–2.4)
MCHC RBC-ENTMCNC: 30.9 PG — SIGNIFICANT CHANGE UP (ref 27–31)
MCHC RBC-ENTMCNC: 34.8 G/DL — SIGNIFICANT CHANGE UP (ref 32–37)
MCV RBC AUTO: 88.8 FL — SIGNIFICANT CHANGE UP (ref 80–94)
MONOCYTES # BLD AUTO: 0.66 K/UL — HIGH (ref 0.1–0.6)
MONOCYTES NFR BLD AUTO: 8.7 % — SIGNIFICANT CHANGE UP (ref 1.7–9.3)
NEUTROPHILS # BLD AUTO: 5 K/UL — SIGNIFICANT CHANGE UP (ref 1.4–6.5)
NEUTROPHILS NFR BLD AUTO: 66.3 % — SIGNIFICANT CHANGE UP (ref 42.2–75.2)
NRBC # BLD: 0 /100 WBCS — SIGNIFICANT CHANGE UP (ref 0–0)
PLATELET # BLD AUTO: 390 K/UL — SIGNIFICANT CHANGE UP (ref 130–400)
POTASSIUM SERPL-MCNC: 3.9 MMOL/L — SIGNIFICANT CHANGE UP (ref 3.5–5)
POTASSIUM SERPL-SCNC: 3.9 MMOL/L — SIGNIFICANT CHANGE UP (ref 3.5–5)
PROT SERPL-MCNC: 4.5 G/DL — LOW (ref 6–8)
RBC # BLD: 3.04 M/UL — LOW (ref 4.7–6.1)
RBC # FLD: 16.2 % — HIGH (ref 11.5–14.5)
SODIUM SERPL-SCNC: 137 MMOL/L — SIGNIFICANT CHANGE UP (ref 135–146)
SPECIMEN SOURCE: SIGNIFICANT CHANGE UP
SPECIMEN SOURCE: SIGNIFICANT CHANGE UP
WBC # BLD: 7.55 K/UL — SIGNIFICANT CHANGE UP (ref 4.8–10.8)
WBC # FLD AUTO: 7.55 K/UL — SIGNIFICANT CHANGE UP (ref 4.8–10.8)

## 2022-10-01 PROCEDURE — 99232 SBSQ HOSP IP/OBS MODERATE 35: CPT

## 2022-10-01 RX ADMIN — PANTOPRAZOLE SODIUM 40 MILLIGRAM(S): 20 TABLET, DELAYED RELEASE ORAL at 05:24

## 2022-10-01 RX ADMIN — Medication 105 MILLIGRAM(S): at 21:52

## 2022-10-01 RX ADMIN — Medication 105 MILLIGRAM(S): at 05:24

## 2022-10-01 RX ADMIN — Medication 1 TABLET(S): at 11:23

## 2022-10-01 RX ADMIN — PIPERACILLIN AND TAZOBACTAM 25 GRAM(S): 4; .5 INJECTION, POWDER, LYOPHILIZED, FOR SOLUTION INTRAVENOUS at 11:23

## 2022-10-01 RX ADMIN — ATORVASTATIN CALCIUM 20 MILLIGRAM(S): 80 TABLET, FILM COATED ORAL at 21:52

## 2022-10-01 RX ADMIN — PANTOPRAZOLE SODIUM 40 MILLIGRAM(S): 20 TABLET, DELAYED RELEASE ORAL at 17:23

## 2022-10-01 RX ADMIN — PIPERACILLIN AND TAZOBACTAM 25 GRAM(S): 4; .5 INJECTION, POWDER, LYOPHILIZED, FOR SOLUTION INTRAVENOUS at 05:23

## 2022-10-01 RX ADMIN — SENNA PLUS 2 TABLET(S): 8.6 TABLET ORAL at 21:52

## 2022-10-01 RX ADMIN — PIPERACILLIN AND TAZOBACTAM 25 GRAM(S): 4; .5 INJECTION, POWDER, LYOPHILIZED, FOR SOLUTION INTRAVENOUS at 01:09

## 2022-10-01 RX ADMIN — Medication 105 MILLIGRAM(S): at 13:05

## 2022-10-01 RX ADMIN — CHLORHEXIDINE GLUCONATE 1 APPLICATION(S): 213 SOLUTION TOPICAL at 11:23

## 2022-10-01 RX ADMIN — PIPERACILLIN AND TAZOBACTAM 25 GRAM(S): 4; .5 INJECTION, POWDER, LYOPHILIZED, FOR SOLUTION INTRAVENOUS at 17:23

## 2022-10-01 RX ADMIN — Medication 1 MILLIGRAM(S): at 11:23

## 2022-10-01 NOTE — PROGRESS NOTE ADULT - ASSESSMENT
71yo Mw/ a PMH of HTN, HLD, recent hx of pancreatitis, and extensive daily alcohol use presents for episodes of hematemesis and hematochezia.   On the day of admission, pt had a large bloody BM along with an episode of bloody vomiting. Patient became dizzy and almost passed out. Abd imaging showed pancreatic necrosis, for which surgery was consulted for.     # Acute blood loss anemia due to GI bleed  #ulcers at GE junction, antrum, duodenal bulb with visible vessel s/p injection and thermal therapy  #erosive gastritis  #necrotising pancreatitis  - presented with hematemasis and hematochezia. has not had episodes of either since  - s/p 3 units PRBC for hgb 4.8  - EGD showed GE junction ulcer, ulcer in antrum, erosive gastritis, large ulcer in duodenal bulb with visible vessel (injection, thermal therapy), clean based smaller ulcer in duodenal bulb.   - colonoscopy showed mild diverticulosis of sigmoid colon, internal and external hemorrhoids  - Advanced GI did EUS showing 7 cm x 6 cm fluid collection with debris consistent with walled off necrosis  - Possible EGD with Necrosectomy on Monday or Tuesday .   - advance diet as tolerated.   - c/w zosyn  - protonix iv bid, continue ppi for 8 weeks after discharge, monitor hgb q12, repeat EGD in 8 weeks       #H/O EtOH abuse  - [facility] protocol  - Addiction medicine/CATCH team following  - Recommend offering naltrexone and gabapentin for anti-craving  - thiamine, folate    #HLD  - atorvastatin    #HTN  - controlled off medications.    DVT: Lovenox  GI: PPI 40 BID  code full  Dispo acute      #Progress Note Handoff  Pending (specify):  EGD w/ Necrosectomy Monday or Tuesday   Family discussion: with the wife at bedside.   Disposition: Home

## 2022-10-01 NOTE — PROGRESS NOTE ADULT - SUBJECTIVE AND OBJECTIVE BOX
SUBJECTIVE:    Patient is a 72y old Male who presents with a chief complaint of UGIB (30 Sep 2022 15:32)    Currently admitted to medicine with the primary diagnosis of Anemia       Today is hospital day 7d. This morning he is resting comfortably in bed and reports no new issues or overnight events.     PAST MEDICAL & SURGICAL HISTORY  HTN (hypertension)      SOCIAL HISTORY:  Negative for smoking/alcohol/drug use.     ALLERGIES:  No Known Allergies    MEDICATIONS:  STANDING MEDICATIONS  atorvastatin 20 milliGRAM(s) Oral at bedtime  chlorhexidine 2% Cloths 1 Application(s) Topical daily  folic acid 1 milliGRAM(s) Oral daily  multivitamin 1 Tablet(s) Oral daily  pantoprazole  Injectable 40 milliGRAM(s) IV Push every 12 hours  piperacillin/tazobactam IVPB.. 3.375 Gram(s) IV Intermittent every 6 hours  senna 2 Tablet(s) Oral at bedtime  thiamine IVPB 500 milliGRAM(s) IV Intermittent every 8 hours    PRN MEDICATIONS  aluminum hydroxide/magnesium hydroxide/simethicone Suspension 30 milliLiter(s) Oral every 4 hours PRN  LORazepam     Tablet 2 milliGRAM(s) Oral every 4 hours PRN  melatonin 3 milliGRAM(s) Oral at bedtime PRN  ondansetron Injectable 4 milliGRAM(s) IV Push every 8 hours PRN    VITALS:   T(F): 97  HR: 79  BP: 121/75  RR: 18  SpO2: 95%    PHYSICAL EXAM:  GEN: No acute distress  LUNGS: Clear to auscultation bilaterally   HEART: S1/S2 present.  ABD: Soft, non-tender, non-distended. Bowel sounds present  EXT: NC/NC/NE/2+PP/BERGERON  NEURO: AAOX3      LABS:                        9.4    7.55  )-----------( 390      ( 01 Oct 2022 06:51 )             27.0     10-01    137  |  106  |  5<L>  ----------------------------<  139<H>  3.9   |  24  |  0.7    Ca    7.7<L>      01 Oct 2022 06:51  Mg     2.3     10-01    TPro  4.5<L>  /  Alb  2.4<L>  /  TBili  0.3  /  DBili  x   /  AST  13  /  ALT  6   /  AlkPhos  76  10-01                Culture - Body Fluid with Gram Stain (collected 28 Sep 2022 17:50)  Source: Paracentesis None  Gram Stain (30 Sep 2022 02:04):    Rare polymorphonuclear leukocytes seen per low power field    No organisms seen per oil power field    Limited volume of specimen received, Unable to centrifuge/concentrate    specimen. Culture results may be compromised.  Preliminary Report (30 Sep 2022 22:37):    Few Escherichia coli    Few Proteus mirabilis    Culture - Body Fluid with Gram Stain (collected 28 Sep 2022 17:50)  Source: Paracentesis None  Gram Stain (30 Sep 2022 02:04):    No polymorphonuclear cells seen per low power field    No organisms seen per oil power field  Preliminary Report (30 Sep 2022 18:32):    No growth            RADIOLOGY:

## 2022-10-02 LAB
ALBUMIN SERPL ELPH-MCNC: 2.9 G/DL — LOW (ref 3.5–5.2)
ALP SERPL-CCNC: 83 U/L — SIGNIFICANT CHANGE UP (ref 30–115)
ALT FLD-CCNC: 7 U/L — SIGNIFICANT CHANGE UP (ref 0–41)
ANION GAP SERPL CALC-SCNC: 10 MMOL/L — SIGNIFICANT CHANGE UP (ref 7–14)
AST SERPL-CCNC: 14 U/L — SIGNIFICANT CHANGE UP (ref 0–41)
BASOPHILS # BLD AUTO: 0.06 K/UL — SIGNIFICANT CHANGE UP (ref 0–0.2)
BASOPHILS NFR BLD AUTO: 0.7 % — SIGNIFICANT CHANGE UP (ref 0–1)
BILIRUB SERPL-MCNC: 0.3 MG/DL — SIGNIFICANT CHANGE UP (ref 0.2–1.2)
BUN SERPL-MCNC: 8 MG/DL — LOW (ref 10–20)
CALCIUM SERPL-MCNC: 8.1 MG/DL — LOW (ref 8.4–10.5)
CHLORIDE SERPL-SCNC: 107 MMOL/L — SIGNIFICANT CHANGE UP (ref 98–110)
CO2 SERPL-SCNC: 21 MMOL/L — SIGNIFICANT CHANGE UP (ref 17–32)
CREAT SERPL-MCNC: 0.6 MG/DL — LOW (ref 0.7–1.5)
CULTURE RESULTS: SIGNIFICANT CHANGE UP
EGFR: 102 ML/MIN/1.73M2 — SIGNIFICANT CHANGE UP
EOSINOPHIL # BLD AUTO: 0.24 K/UL — SIGNIFICANT CHANGE UP (ref 0–0.7)
EOSINOPHIL NFR BLD AUTO: 2.6 % — SIGNIFICANT CHANGE UP (ref 0–8)
GLUCOSE SERPL-MCNC: 124 MG/DL — HIGH (ref 70–99)
HCT VFR BLD CALC: 30 % — LOW (ref 42–52)
HGB BLD-MCNC: 10.1 G/DL — LOW (ref 14–18)
IMM GRANULOCYTES NFR BLD AUTO: 1 % — HIGH (ref 0.1–0.3)
LYMPHOCYTES # BLD AUTO: 1.74 K/UL — SIGNIFICANT CHANGE UP (ref 1.2–3.4)
LYMPHOCYTES # BLD AUTO: 19.2 % — LOW (ref 20.5–51.1)
MAGNESIUM SERPL-MCNC: 1.8 MG/DL — SIGNIFICANT CHANGE UP (ref 1.8–2.4)
MCHC RBC-ENTMCNC: 30.4 PG — SIGNIFICANT CHANGE UP (ref 27–31)
MCHC RBC-ENTMCNC: 33.7 G/DL — SIGNIFICANT CHANGE UP (ref 32–37)
MCV RBC AUTO: 90.4 FL — SIGNIFICANT CHANGE UP (ref 80–94)
MONOCYTES # BLD AUTO: 0.55 K/UL — SIGNIFICANT CHANGE UP (ref 0.1–0.6)
MONOCYTES NFR BLD AUTO: 6.1 % — SIGNIFICANT CHANGE UP (ref 1.7–9.3)
NEUTROPHILS # BLD AUTO: 6.38 K/UL — SIGNIFICANT CHANGE UP (ref 1.4–6.5)
NEUTROPHILS NFR BLD AUTO: 70.4 % — SIGNIFICANT CHANGE UP (ref 42.2–75.2)
NRBC # BLD: 0 /100 WBCS — SIGNIFICANT CHANGE UP (ref 0–0)
PLATELET # BLD AUTO: 443 K/UL — HIGH (ref 130–400)
POTASSIUM SERPL-MCNC: 4.2 MMOL/L — SIGNIFICANT CHANGE UP (ref 3.5–5)
POTASSIUM SERPL-SCNC: 4.2 MMOL/L — SIGNIFICANT CHANGE UP (ref 3.5–5)
PROT SERPL-MCNC: 5.1 G/DL — LOW (ref 6–8)
RBC # BLD: 3.32 M/UL — LOW (ref 4.7–6.1)
RBC # FLD: 15.7 % — HIGH (ref 11.5–14.5)
SODIUM SERPL-SCNC: 138 MMOL/L — SIGNIFICANT CHANGE UP (ref 135–146)
SPECIMEN SOURCE: SIGNIFICANT CHANGE UP
WBC # BLD: 9.06 K/UL — SIGNIFICANT CHANGE UP (ref 4.8–10.8)
WBC # FLD AUTO: 9.06 K/UL — SIGNIFICANT CHANGE UP (ref 4.8–10.8)

## 2022-10-02 PROCEDURE — 99232 SBSQ HOSP IP/OBS MODERATE 35: CPT

## 2022-10-02 RX ADMIN — PIPERACILLIN AND TAZOBACTAM 25 GRAM(S): 4; .5 INJECTION, POWDER, LYOPHILIZED, FOR SOLUTION INTRAVENOUS at 12:03

## 2022-10-02 RX ADMIN — PIPERACILLIN AND TAZOBACTAM 25 GRAM(S): 4; .5 INJECTION, POWDER, LYOPHILIZED, FOR SOLUTION INTRAVENOUS at 00:35

## 2022-10-02 RX ADMIN — PANTOPRAZOLE SODIUM 40 MILLIGRAM(S): 20 TABLET, DELAYED RELEASE ORAL at 17:08

## 2022-10-02 RX ADMIN — PIPERACILLIN AND TAZOBACTAM 25 GRAM(S): 4; .5 INJECTION, POWDER, LYOPHILIZED, FOR SOLUTION INTRAVENOUS at 05:57

## 2022-10-02 RX ADMIN — SENNA PLUS 2 TABLET(S): 8.6 TABLET ORAL at 22:02

## 2022-10-02 RX ADMIN — Medication 1 TABLET(S): at 12:03

## 2022-10-02 RX ADMIN — Medication 1 MILLIGRAM(S): at 12:03

## 2022-10-02 RX ADMIN — Medication 105 MILLIGRAM(S): at 05:58

## 2022-10-02 RX ADMIN — CHLORHEXIDINE GLUCONATE 1 APPLICATION(S): 213 SOLUTION TOPICAL at 12:04

## 2022-10-02 RX ADMIN — PIPERACILLIN AND TAZOBACTAM 25 GRAM(S): 4; .5 INJECTION, POWDER, LYOPHILIZED, FOR SOLUTION INTRAVENOUS at 17:08

## 2022-10-02 RX ADMIN — PIPERACILLIN AND TAZOBACTAM 25 GRAM(S): 4; .5 INJECTION, POWDER, LYOPHILIZED, FOR SOLUTION INTRAVENOUS at 23:43

## 2022-10-02 RX ADMIN — PANTOPRAZOLE SODIUM 40 MILLIGRAM(S): 20 TABLET, DELAYED RELEASE ORAL at 05:59

## 2022-10-02 RX ADMIN — Medication 105 MILLIGRAM(S): at 22:02

## 2022-10-02 RX ADMIN — Medication 105 MILLIGRAM(S): at 14:24

## 2022-10-02 RX ADMIN — ATORVASTATIN CALCIUM 20 MILLIGRAM(S): 80 TABLET, FILM COATED ORAL at 22:02

## 2022-10-02 NOTE — PROGRESS NOTE ADULT - ATTENDING COMMENTS
71yo Mw/ a PMH of HTN, HLD, recent hx of pancreatitis, and extensive daily alcohol use presents for episodes of hematemesis and hematochezia.   On the day of admission, pt had a large bloody BM along with an episode of bloody vomiting. Patient became dizzy and almost passed out. Abd imaging showed pancreatic necrosis, for which surgery was consulted for.       #GI bleed  #ulcers at GE junction, antrum, duodenal bulb with visible vessel s/p injection and thermal therapy  #erosive gastritis  #necrotising pancreatitis  - presented with hematemasis and hematochezia. has not had episodes of either since  - s/p 3 units PRBC for hgb 4.8  - EGD showed GE junction ulcer, ulcer in antrum, erosive gastritis, large ulcer in duodenal bulb with visible vessel (injection, thermal therapy), clean based smaller ulcer in duodenal bulb.   - colonoscopy showed mild diverticulosis of sigmoid colon, internal and external hemorrhoids  - [facility] did EUS showing 7 cm x 6 cm fluid collection with debris consistent with walled off necrosis  - May plan for advanced GI to do necrosectomy outpatient   - full liquid diet now  - c/w zosyn  - protonix iv bid, continue ppi for 8 weeks after discharge, monitor hgb q12, repeat EGD in 8 weeks   - Plan EGD with necrosectomy this Monday or Tuesday, would keep inpatient given GI bleed, Necrosis, and significant ETOH use      #H/O EtOH abuse  - [facility] protocol  - Addiction medicine/CATCH team following  - Recommend offering naltrexone and gabapentin for anti-craving  - thiamine, folate    #HLD  - atorvastatin    #HTN  - controlled off medications.    DVT: Lovenox  GI: PPI 40 BID  code full  Dispo acute 71yo Mw/ a PMH of HTN, HLD, recent hx of pancreatitis, and extensive daily alcohol use presents for episodes of hematemesis and hematochezia.   On the day of admission, pt had a large bloody BM along with an episode of bloody vomiting. Patient became dizzy and almost passed out. Abd imaging showed pancreatic necrosis, for which surgery was consulted for.       #GI bleed  #ulcers at GE junction, antrum, duodenal bulb with visible vessel s/p injection and thermal therapy  #erosive gastritis  #necrotising pancreatitis  - presented with hematemasis and hematochezia. has not had episodes of either since  - s/p 3 units PRBC for hgb 4.8  - EGD showed GE junction ulcer, ulcer in antrum, erosive gastritis, large ulcer in duodenal bulb with visible vessel (injection, thermal therapy), clean based smaller ulcer in duodenal bulb.   - colonoscopy showed mild diverticulosis of sigmoid colon, internal and external hemorrhoids  - [facility] did EUS showing 7 cm x 6 cm fluid collection with debris consistent with walled off necrosis  - May plan for advanced GI to do necrosectomy outpatient   - full liquid diet now  - c/w zosyn  - protonix iv bid, continue ppi for 8 weeks after discharge, monitor hgb q12, repeat EGD in 8 weeks   - Plan EGD with necrosectomy this Monday or Tuesday, would keep inpatient given GI bleed, Necrosis, and significant ETOH use      #H/O EtOH abuse  - [facility] protocol  - Addiction medicine/CATCH team following  - Recommend offering naltrexone and gabapentin for anti-craving  - thiamine, folate    #HLD  - atorvastatin    #HTN  - controlled off medications.    DVT: Lovenox  GI: PPI 40 BID  code full  Dispo acute    Shraddha Mayfield MD  Attending Hospitalist

## 2022-10-02 NOTE — PROGRESS NOTE ADULT - SUBJECTIVE AND OBJECTIVE BOX
CHRIS KHAN 72y Male  MRN#: [id number]   CODE STATUS:________full    Hospital Day: 8d    Pt is currently admitted with the primary diagnosis of UGIB    SUBJECTIVE    No adverse overnight events     Subjective complaints     Patient was examined and seen by the bedside. No complaints. Denies fever, chills, SOB.                                           OBJECTIVE  PAST MEDICAL & SURGICAL HISTORY  HTN (hypertension)                                                ALLERGIES:  No Known Allergies                           HOME MEDICATIONS  Home Medications:  AMLODIPINE BESYLATE 5MG TAB:  (24 Sep 2022 18:16)  ATORVASTATIN CALCIUM 20MG TAB:  (24 Sep 2022 18:16)  LISINOPRIL 20 MG TABLET: TAKE 1 TABLET BY MOUTH EVERY DAY (24 Sep 2022 18:16)                           MEDICATIONS:  STANDING MEDICATIONS  atorvastatin 20 milliGRAM(s) Oral at bedtime  chlorhexidine 2% Cloths 1 Application(s) Topical daily  folic acid 1 milliGRAM(s) Oral daily  multivitamin 1 Tablet(s) Oral daily  pantoprazole  Injectable 40 milliGRAM(s) IV Push every 12 hours  piperacillin/tazobactam IVPB.. 3.375 Gram(s) IV Intermittent every 6 hours  senna 2 Tablet(s) Oral at bedtime  thiamine IVPB 500 milliGRAM(s) IV Intermittent every 8 hours    PRN MEDICATIONS  aluminum hydroxide/magnesium hydroxide/simethicone Suspension 30 milliLiter(s) Oral every 4 hours PRN  LORazepam     Tablet 2 milliGRAM(s) Oral every 4 hours PRN  melatonin 3 milliGRAM(s) Oral at bedtime PRN  ondansetron Injectable 4 milliGRAM(s) IV Push every 8 hours PRN                                            ------------------------------------------------------------  VITAL SIGNS: Last 24 Hours  T(C): 36.7 (02 Oct 2022 04:30), Max: 37.1 (01 Oct 2022 12:46)  T(F): 98.1 (02 Oct 2022 04:30), Max: 98.8 (01 Oct 2022 20:00)  HR: 68 (02 Oct 2022 04:30) (68 - 76)  BP: 100/55 (02 Oct 2022 04:30) (100/55 - 125/68)  BP(mean): --  RR: 18 (02 Oct 2022 04:30) (18 - 18)  SpO2: 95% (02 Oct 2022 04:30) (95% - 97%)                                               LABS:                        9.4    7.55  )-----------( 390      ( 01 Oct 2022 06:51 )             27.0     10-01    137  |  106  |  5<L>  ----------------------------<  139<H>  3.9   |  24  |  0.7    Ca    7.7<L>      01 Oct 2022 06:51  Mg     2.3     10-01    TPro  4.5<L>  /  Alb  2.4<L>  /  TBili  0.3  /  DBili  x   /  AST  13  /  ALT  6   /  AlkPhos  76  10-01                                                              RADIOLOGY:        PHYSICAL EXAM:    GENERAL: NAD, lying in bed comfortably  HEAD:  Atraumatic, Normocephalic  EYES: conjunctiva and sclera clear  ENT: Moist mucous membranes  NECK: Supple,   CHEST/LUNG: Clear to auscultation bilaterally; Unlabored respirations  HEART: Regular rate and rhythm;  ABDOMEN: Soft, tenderness of upper quadrants, nondistended  EXTREMITIES:  No clubbing, cyanosis, or edema  NERVOUS SYSTEM:  A&Ox3, no focal deficits   SKIN: No rashes or lesions                                         ASSESSMENT & PLAN    71yo Mw/ a PMH of HTN, HLD, recent hx of pancreatitis, and extensive daily alcohol use presents for episodes of hematemesis and hematochezia.   On the day of admission, pt had a large bloody BM along with an episode of bloody vomiting. Patient became dizzy and almost passed out. Abd imaging showed pancreatic necrosis, for which surgery was consulted for.       #GI bleed  #ulcers at GE junction, antrum, duodenal bulb with visible vessel s/p injection and thermal therapy  #erosive gastritis  #necrotising pancreatitis  - presented with hematemasis and hematochezia. has not had episodes of either since  - s/p 3 units PRBC for hgb 4.8  - EGD showed GE junction ulcer, ulcer in antrum, erosive gastritis, large ulcer in duodenal bulb with visible vessel (injection, thermal therapy), clean based smaller ulcer in duodenal bulb.   - colonoscopy showed mild diverticulosis of sigmoid colon, internal and external hemorrhoids  - [facility] did EUS showing 7 cm x 6 cm fluid collection with debris consistent with walled off necrosis  - May plan for advanced GI to do necrosectomy outpatient   - full liquid diet now  - c/w zosyn  - protonix iv bid, continue ppi for 8 weeks after discharge, monitor hgb q12, repeat EGD in 8 weeks   - Plan EGD with necrosectomy this Monday or Tuesday, would keep inpatient given GI bleed, Necrosis, and significant ETOH use      #H/O EtOH abuse  - [facility] protocol  - Addiction medicine/CATCH team following  - Recommend offering naltrexone and gabapentin for anti-craving  - thiamine, folate    #HLD  - atorvastatin    #HTN  - controlled off medications.    DVT: Lovenox  GI: PPI 40 BID  code full  Dispo acute

## 2022-10-03 ENCOUNTER — TRANSCRIPTION ENCOUNTER (OUTPATIENT)
Age: 72
End: 2022-10-03

## 2022-10-03 LAB
ALBUMIN SERPL ELPH-MCNC: 2.7 G/DL — LOW (ref 3.5–5.2)
ALBUMIN SERPL ELPH-MCNC: 2.8 G/DL — LOW (ref 3.5–5.2)
ALP SERPL-CCNC: 80 U/L — SIGNIFICANT CHANGE UP (ref 30–115)
ALP SERPL-CCNC: 85 U/L — SIGNIFICANT CHANGE UP (ref 30–115)
ALT FLD-CCNC: 8 U/L — SIGNIFICANT CHANGE UP (ref 0–41)
ALT FLD-CCNC: 8 U/L — SIGNIFICANT CHANGE UP (ref 0–41)
ANION GAP SERPL CALC-SCNC: 6 MMOL/L — LOW (ref 7–14)
ANION GAP SERPL CALC-SCNC: 8 MMOL/L — SIGNIFICANT CHANGE UP (ref 7–14)
APTT BLD: 30 SEC — SIGNIFICANT CHANGE UP (ref 27–39.2)
AST SERPL-CCNC: 18 U/L — SIGNIFICANT CHANGE UP (ref 0–41)
AST SERPL-CCNC: 19 U/L — SIGNIFICANT CHANGE UP (ref 0–41)
BASOPHILS # BLD AUTO: 0.06 K/UL — SIGNIFICANT CHANGE UP (ref 0–0.2)
BASOPHILS # BLD AUTO: 0.07 K/UL — SIGNIFICANT CHANGE UP (ref 0–0.2)
BASOPHILS NFR BLD AUTO: 0.6 % — SIGNIFICANT CHANGE UP (ref 0–1)
BASOPHILS NFR BLD AUTO: 0.9 % — SIGNIFICANT CHANGE UP (ref 0–1)
BILIRUB SERPL-MCNC: 0.2 MG/DL — SIGNIFICANT CHANGE UP (ref 0.2–1.2)
BILIRUB SERPL-MCNC: 0.3 MG/DL — SIGNIFICANT CHANGE UP (ref 0.2–1.2)
BLD GP AB SCN SERPL QL: SIGNIFICANT CHANGE UP
BLD GP AB SCN SERPL QL: SIGNIFICANT CHANGE UP
BUN SERPL-MCNC: 10 MG/DL — SIGNIFICANT CHANGE UP (ref 10–20)
BUN SERPL-MCNC: 15 MG/DL — SIGNIFICANT CHANGE UP (ref 10–20)
CALCIUM SERPL-MCNC: 8.5 MG/DL — SIGNIFICANT CHANGE UP (ref 8.4–10.5)
CALCIUM SERPL-MCNC: 8.6 MG/DL — SIGNIFICANT CHANGE UP (ref 8.4–10.5)
CHLORIDE SERPL-SCNC: 108 MMOL/L — SIGNIFICANT CHANGE UP (ref 98–110)
CHLORIDE SERPL-SCNC: 109 MMOL/L — SIGNIFICANT CHANGE UP (ref 98–110)
CO2 SERPL-SCNC: 20 MMOL/L — SIGNIFICANT CHANGE UP (ref 17–32)
CO2 SERPL-SCNC: 26 MMOL/L — SIGNIFICANT CHANGE UP (ref 17–32)
CREAT SERPL-MCNC: 0.6 MG/DL — LOW (ref 0.7–1.5)
CREAT SERPL-MCNC: 0.7 MG/DL — SIGNIFICANT CHANGE UP (ref 0.7–1.5)
EGFR: 102 ML/MIN/1.73M2 — SIGNIFICANT CHANGE UP
EGFR: 97 ML/MIN/1.73M2 — SIGNIFICANT CHANGE UP
EOSINOPHIL # BLD AUTO: 0.17 K/UL — SIGNIFICANT CHANGE UP (ref 0–0.7)
EOSINOPHIL # BLD AUTO: 0.18 K/UL — SIGNIFICANT CHANGE UP (ref 0–0.7)
EOSINOPHIL NFR BLD AUTO: 1.6 % — SIGNIFICANT CHANGE UP (ref 0–8)
EOSINOPHIL NFR BLD AUTO: 2.2 % — SIGNIFICANT CHANGE UP (ref 0–8)
GLUCOSE SERPL-MCNC: 100 MG/DL — HIGH (ref 70–99)
GLUCOSE SERPL-MCNC: 136 MG/DL — HIGH (ref 70–99)
HCT VFR BLD CALC: 26.8 % — LOW (ref 42–52)
HCT VFR BLD CALC: 28.1 % — LOW (ref 42–52)
HGB BLD-MCNC: 9 G/DL — LOW (ref 14–18)
HGB BLD-MCNC: 9.4 G/DL — LOW (ref 14–18)
IMM GRANULOCYTES NFR BLD AUTO: 1 % — HIGH (ref 0.1–0.3)
IMM GRANULOCYTES NFR BLD AUTO: 1 % — HIGH (ref 0.1–0.3)
INR BLD: 1.12 RATIO — SIGNIFICANT CHANGE UP (ref 0.65–1.3)
LYMPHOCYTES # BLD AUTO: 1.53 K/UL — SIGNIFICANT CHANGE UP (ref 1.2–3.4)
LYMPHOCYTES # BLD AUTO: 14.7 % — LOW (ref 20.5–51.1)
LYMPHOCYTES # BLD AUTO: 2.02 K/UL — SIGNIFICANT CHANGE UP (ref 1.2–3.4)
LYMPHOCYTES # BLD AUTO: 24.9 % — SIGNIFICANT CHANGE UP (ref 20.5–51.1)
MAGNESIUM SERPL-MCNC: 1.9 MG/DL — SIGNIFICANT CHANGE UP (ref 1.8–2.4)
MAGNESIUM SERPL-MCNC: 2 MG/DL — SIGNIFICANT CHANGE UP (ref 1.8–2.4)
MCHC RBC-ENTMCNC: 30.9 PG — SIGNIFICANT CHANGE UP (ref 27–31)
MCHC RBC-ENTMCNC: 31 PG — SIGNIFICANT CHANGE UP (ref 27–31)
MCHC RBC-ENTMCNC: 33.5 G/DL — SIGNIFICANT CHANGE UP (ref 32–37)
MCHC RBC-ENTMCNC: 33.6 G/DL — SIGNIFICANT CHANGE UP (ref 32–37)
MCV RBC AUTO: 92.4 FL — SIGNIFICANT CHANGE UP (ref 80–94)
MCV RBC AUTO: 92.4 FL — SIGNIFICANT CHANGE UP (ref 80–94)
MONOCYTES # BLD AUTO: 0.59 K/UL — SIGNIFICANT CHANGE UP (ref 0.1–0.6)
MONOCYTES # BLD AUTO: 0.69 K/UL — HIGH (ref 0.1–0.6)
MONOCYTES NFR BLD AUTO: 6.6 % — SIGNIFICANT CHANGE UP (ref 1.7–9.3)
MONOCYTES NFR BLD AUTO: 7.3 % — SIGNIFICANT CHANGE UP (ref 1.7–9.3)
NEUTROPHILS # BLD AUTO: 5.18 K/UL — SIGNIFICANT CHANGE UP (ref 1.4–6.5)
NEUTROPHILS # BLD AUTO: 7.83 K/UL — HIGH (ref 1.4–6.5)
NEUTROPHILS NFR BLD AUTO: 63.7 % — SIGNIFICANT CHANGE UP (ref 42.2–75.2)
NEUTROPHILS NFR BLD AUTO: 75.5 % — HIGH (ref 42.2–75.2)
NRBC # BLD: 0 /100 WBCS — SIGNIFICANT CHANGE UP (ref 0–0)
NRBC # BLD: 0 /100 WBCS — SIGNIFICANT CHANGE UP (ref 0–0)
PHOSPHATE SERPL-MCNC: 3.3 MG/DL — SIGNIFICANT CHANGE UP (ref 2.1–4.9)
PLATELET # BLD AUTO: 401 K/UL — HIGH (ref 130–400)
PLATELET # BLD AUTO: 476 K/UL — HIGH (ref 130–400)
POTASSIUM SERPL-MCNC: 4.5 MMOL/L — SIGNIFICANT CHANGE UP (ref 3.5–5)
POTASSIUM SERPL-MCNC: 5.2 MMOL/L — HIGH (ref 3.5–5)
POTASSIUM SERPL-SCNC: 4.5 MMOL/L — SIGNIFICANT CHANGE UP (ref 3.5–5)
POTASSIUM SERPL-SCNC: 5.2 MMOL/L — HIGH (ref 3.5–5)
PROT SERPL-MCNC: 5.1 G/DL — LOW (ref 6–8)
PROT SERPL-MCNC: 5.3 G/DL — LOW (ref 6–8)
PROTHROM AB SERPL-ACNC: 12.8 SEC — SIGNIFICANT CHANGE UP (ref 9.95–12.87)
RBC # BLD: 2.9 M/UL — LOW (ref 4.7–6.1)
RBC # BLD: 3.04 M/UL — LOW (ref 4.7–6.1)
RBC # FLD: 15.6 % — HIGH (ref 11.5–14.5)
RBC # FLD: 15.8 % — HIGH (ref 11.5–14.5)
SODIUM SERPL-SCNC: 137 MMOL/L — SIGNIFICANT CHANGE UP (ref 135–146)
SODIUM SERPL-SCNC: 140 MMOL/L — SIGNIFICANT CHANGE UP (ref 135–146)
WBC # BLD: 10.38 K/UL — SIGNIFICANT CHANGE UP (ref 4.8–10.8)
WBC # BLD: 8.12 K/UL — SIGNIFICANT CHANGE UP (ref 4.8–10.8)
WBC # FLD AUTO: 10.38 K/UL — SIGNIFICANT CHANGE UP (ref 4.8–10.8)
WBC # FLD AUTO: 8.12 K/UL — SIGNIFICANT CHANGE UP (ref 4.8–10.8)

## 2022-10-03 PROCEDURE — 74018 RADEX ABDOMEN 1 VIEW: CPT | Mod: 26

## 2022-10-03 PROCEDURE — 99232 SBSQ HOSP IP/OBS MODERATE 35: CPT

## 2022-10-03 PROCEDURE — 43255 EGD CONTROL BLEEDING ANY: CPT | Mod: 59

## 2022-10-03 PROCEDURE — 71045 X-RAY EXAM CHEST 1 VIEW: CPT | Mod: 26

## 2022-10-03 PROCEDURE — 43247 EGD REMOVE FOREIGN BODY: CPT

## 2022-10-03 PROCEDURE — 93010 ELECTROCARDIOGRAM REPORT: CPT

## 2022-10-03 RX ORDER — PANTOPRAZOLE SODIUM 20 MG/1
8 TABLET, DELAYED RELEASE ORAL
Qty: 80 | Refills: 0 | Status: DISCONTINUED | OUTPATIENT
Start: 2022-10-03 | End: 2022-10-06

## 2022-10-03 RX ORDER — SODIUM CHLORIDE 9 MG/ML
1000 INJECTION, SOLUTION INTRAVENOUS
Refills: 0 | Status: DISCONTINUED | OUTPATIENT
Start: 2022-10-03 | End: 2022-10-04

## 2022-10-03 RX ORDER — MEROPENEM 1 G/30ML
1000 INJECTION INTRAVENOUS EVERY 8 HOURS
Refills: 0 | Status: DISCONTINUED | OUTPATIENT
Start: 2022-10-03 | End: 2022-10-07

## 2022-10-03 RX ADMIN — Medication 105 MILLIGRAM(S): at 06:35

## 2022-10-03 RX ADMIN — PANTOPRAZOLE SODIUM 40 MILLIGRAM(S): 20 TABLET, DELAYED RELEASE ORAL at 06:35

## 2022-10-03 RX ADMIN — SODIUM CHLORIDE 75 MILLILITER(S): 9 INJECTION, SOLUTION INTRAVENOUS at 10:03

## 2022-10-03 RX ADMIN — CHLORHEXIDINE GLUCONATE 1 APPLICATION(S): 213 SOLUTION TOPICAL at 12:06

## 2022-10-03 RX ADMIN — SODIUM CHLORIDE 75 MILLILITER(S): 9 INJECTION, SOLUTION INTRAVENOUS at 22:17

## 2022-10-03 RX ADMIN — PIPERACILLIN AND TAZOBACTAM 25 GRAM(S): 4; .5 INJECTION, POWDER, LYOPHILIZED, FOR SOLUTION INTRAVENOUS at 06:35

## 2022-10-03 RX ADMIN — PIPERACILLIN AND TAZOBACTAM 25 GRAM(S): 4; .5 INJECTION, POWDER, LYOPHILIZED, FOR SOLUTION INTRAVENOUS at 13:10

## 2022-10-03 RX ADMIN — MEROPENEM 100 MILLIGRAM(S): 1 INJECTION INTRAVENOUS at 22:16

## 2022-10-03 RX ADMIN — PANTOPRAZOLE SODIUM 10 MG/HR: 20 TABLET, DELAYED RELEASE ORAL at 22:05

## 2022-10-03 NOTE — PROGRESS NOTE ADULT - SUBJECTIVE AND OBJECTIVE BOX
CHRIS KHAN 72y Male  MRN#: [id number]   CODE STATUS:________full    Hospital Day: 9d    Pt is currently admitted with the primary diagnosis of pancreatitis    SUBJECTIVE    No adverse overnight events     Subjective complaints     Patient was examined and seen by the bedside. No complaints. Denies fever, chills, SOB, nausea, vomiting.                                           OBJECTIVE  PAST MEDICAL & SURGICAL HISTORY  HTN (hypertension)                                                ALLERGIES:  No Known Allergies                           HOME MEDICATIONS  Home Medications:  AMLODIPINE BESYLATE 5MG TAB:  (24 Sep 2022 18:16)  ATORVASTATIN CALCIUM 20MG TAB:  (24 Sep 2022 18:16)  LISINOPRIL 20 MG TABLET: TAKE 1 TABLET BY MOUTH EVERY DAY (24 Sep 2022 18:16)                           MEDICATIONS:  STANDING MEDICATIONS  atorvastatin 20 milliGRAM(s) Oral at bedtime  chlorhexidine 2% Cloths 1 Application(s) Topical daily  folic acid 1 milliGRAM(s) Oral daily  multivitamin 1 Tablet(s) Oral daily  pantoprazole  Injectable 40 milliGRAM(s) IV Push every 12 hours  piperacillin/tazobactam IVPB.. 3.375 Gram(s) IV Intermittent every 6 hours  senna 2 Tablet(s) Oral at bedtime    PRN MEDICATIONS  aluminum hydroxide/magnesium hydroxide/simethicone Suspension 30 milliLiter(s) Oral every 4 hours PRN  LORazepam     Tablet 2 milliGRAM(s) Oral every 4 hours PRN  melatonin 3 milliGRAM(s) Oral at bedtime PRN  ondansetron Injectable 4 milliGRAM(s) IV Push every 8 hours PRN                                            ------------------------------------------------------------  VITAL SIGNS: Last 24 Hours  T(C): 36.4 (03 Oct 2022 05:00), Max: 37 (02 Oct 2022 20:00)  T(F): 97.5 (03 Oct 2022 05:00), Max: 98.6 (02 Oct 2022 20:00)  HR: 80 (03 Oct 2022 05:00) (71 - 80)  BP: 92/54 (03 Oct 2022 05:00) (92/54 - 117/69)  BP(mean): --  RR: 18 (03 Oct 2022 05:00) (18 - 18)  SpO2: 96% (03 Oct 2022 05:00) (96% - 97%)                                               LABS:                        9.4    10.38 )-----------( 476      ( 03 Oct 2022 06:53 )             28.1     10-02    138  |  107  |  8<L>  ----------------------------<  124<H>  4.2   |  21  |  0.6<L>    Ca    8.1<L>      02 Oct 2022 08:01  Mg     1.8     10-02    TPro  5.1<L>  /  Alb  2.9<L>  /  TBili  0.3  /  DBili  x   /  AST  14  /  ALT  7   /  AlkPhos  83  10-02    PT/INR - ( 03 Oct 2022 06:53 )   PT: 12.80 sec;   INR: 1.12 ratio         PTT - ( 03 Oct 2022 06:53 )  PTT:30.0 sec                                                          RADIOLOGY:        PHYSICAL EXAM:  73yo Mw/ a PMH of HTN, HLD, recent hx of pancreatitis, and extensive daily alcohol use presents for episodes of hematemesis and hematochezia.   On the day of admission, pt had a large bloody BM along with an episode of bloody vomiting. Patient became dizzy and almost passed out. Abd imaging showed pancreatic necrosis, for which surgery was consulted for.       #GI bleed  #ulcers at GE junction, antrum, duodenal bulb with visible vessel s/p injection and thermal therapy  #erosive gastritis  #necrotising pancreatitis  - presented with hematemasis and hematochezia. has not had episodes of either since  - s/p 3 units PRBC for hgb 4.8  - EGD showed GE junction ulcer, ulcer in antrum, erosive gastritis, large ulcer in duodenal bulb with visible vessel (injection, thermal therapy), clean based smaller ulcer in duodenal bulb.   - colonoscopy showed mild diverticulosis of sigmoid colon, internal and external hemorrhoids  - [facility] did EUS showing 7 cm x 6 cm fluid collection with debris consistent with walled off necrosis  - May plan for advanced GI to do necrosectomy outpatient   - full liquid diet now  - c/w zosyn  - protonix iv bid, continue ppi for 8 weeks after discharge, monitor hgb q12, repeat EGD in 8 weeks   - Plan EGD with necrosectomy this Monday or Tuesday, would keep inpatient given GI bleed, Necrosis, and significant ETOH use      #H/O EtOH abuse  - [facility] protocol  - Addiction medicine/CATCH team following  - Recommend offering naltrexone and gabapentin for anti-craving  - thiamine, folate    #HLD  - atorvastatin    #HTN  - controlled off medications.    DVT: Lovenox  GI: PPI 40 BID  code full  Dispo acute

## 2022-10-03 NOTE — PRE-ANESTHESIA EVALUATION ADULT - NSRADCARDRESULTSFT_GEN_ALL_CORE
EKG:    Ventricular Rate 100 BPM    Atrial Rate 94 BPM    QRS Duration 142 ms    Q-T Interval 394 ms    QTC Calculation(Bazett) 508 ms    R Axis -49 degrees    T Axis 26 degrees    Diagnosis Line Sinus rhythm with occasional Premature atrial complexes  Right bundle branch block  Left anterior fascicular block  *** Bifascicular block ***  Abnormal ECG    CXR: (-)    ECHO:  Summary:   1. Left ventricular ejection fraction, by visual estimation, is 65 to   70%.   2. Normal global left ventricular systolic function.   3. Mildly dilated left atrium.   4. Mild anterior mitral leaflet prolapse.   5. Moderate mitral regurgitation.

## 2022-10-03 NOTE — CHART NOTE - NSCHARTNOTEFT_GEN_A_CORE
s/p EGD for melena    EGD impression:    Esophagus	Mucosa	Normal mucosa was noted in the whole esophagus.  Stomach   	Axios stent was noted with necrotic issue in it. No signs of bleeding at the site of axios stent.  Duodenum	Excavated lesions	A single 3 cm ulcer with visible vessel (Ra classification IIa) was found in the duodenal bulb. Epinephrine 1/73470 injection was successfully applied for hemostasis. Then coagulation with hot biopsy forceps was applied at the site for the purpose of hemostasis.      REC:  PPI drip     NPO    Monitor CBC    ICU monitoring  ( approved by DR. Carvajal)  Will repeat EGD tomorrow

## 2022-10-03 NOTE — PROGRESS NOTE ADULT - ASSESSMENT
71 y/o man with PMH of HTN, hyperlipidemia, recent hx of pancreatitis in June 2022 (reportedly uncomplicated), and extensive daily alcohol use (vodka) presented for episodes of hematemesis and hematochezia.   On the day of admission, he had a large bloody BM along with an episode of bloody vomiting. Patient became dizzy and almost passed out. Abd imaging showed pancreatic necrosis.      1. Acute blood loss anemia due to UGI bleed  - EGD: ulcers at GE junction, antrum, duodenal bulb with visible vessel s/p injection and thermal therapy; erosive gastritis, clean based smaller ulcer in duodenal bulb.   - s/p total of 5 units PRBC this admission (Initial Hgb 4.8)  - colonoscopy showed mild diverticulosis of sigmoid colon, internal and external hemorrhoids  - tolerating diet  - PPI q12hr x 8 weeks after discharge and then daily and needs repeat EGD in 8 weeks  - H/H now stable    2. Necrotizing pancreatitis with multiple peripancreatic fluid collections with gas   - s/p EUS 9/28: EUS showed 7 cm x 6 cm fluid collection with debris consistent with walled off necrosis   Successful placement of lumen apposing metal stent (Axios) stent 15mm x 10mm with drainage of pus  - culture with ESBL E. coli and Proteus - sensitivities reviewed - change to meropenem 1g IV q8hr (discussed with ID today)  - EGD with Necrosectomy planned for today - NPO for procedure  - seen by surgery this admission    3. Alcohol abuse  - CIWA protocol  - evaluated by addiction medicine and CATCH team  - s/p thiamine  - pt declines services at this time  - importance of alcohol cessation discussed with the pt  - addiction medicine: Recommend offering naltrexone and gabapentin for anti-craving    4. Hyperlipidemia on statin    5. HTN - stable off meds    6. DVT prophylaxis - SCD      full code status  guarded prognosis      Progress Note Handoff  Pending (specify):  EGD w/ Necrosectomy Monday, f/u all cultures  pt informed of the plan of care  Disposition: Home

## 2022-10-03 NOTE — PROGRESS NOTE ADULT - SUBJECTIVE AND OBJECTIVE BOX
CHRIS KHAN  72y Male    INTERVAL HPI/OVERNIGHT EVENTS:    pt awaiting procedure today  denies abdominal pain, N/V, SOB, chest pain, fever  ROS negative  was tolerating diet yesterday    T(F): 97.9 (10-03-22 @ 12:10), Max: 98.6 (10-02-22 @ 20:00)  HR: 75 (10-03-22 @ 12:10) (75 - 80)  BP: 121/72 (10-03-22 @ 12:10) (92/54 - 121/72)  RR: 18 (10-03-22 @ 12:10) (18 - 18)  SpO2: 96% (10-03-22 @ 05:00) (96% - 97%) on RA    PHYSICAL EXAM:  GENERAL: NAD  HEAD:  Normocephalic  EYES:  conjunctiva and sclera clear  ENMT: Moist mucous membranes  NERVOUS SYSTEM:  Alert, awake, Good concentration  CHEST/LUNG: CTA b/l  HEART: Regular rate and rhythm  ABDOMEN: Soft, Nontender, Nondistended; Bowel sounds present  EXTREMITIES:   No edema  SKIN: warm, dry      Consultant(s) Notes Reviewed:  [x ] YES  [ ] NO  Care Discussed with Consultants/Other Providers [ x] YES  [ ] NO    MEDICATIONS  (STANDING):  atorvastatin 20 milliGRAM(s) Oral at bedtime  chlorhexidine 2% Cloths 1 Application(s) Topical daily  folic acid 1 milliGRAM(s) Oral daily  lactated ringers. 1000 milliLiter(s) (75 mL/Hr) IV Continuous <Continuous>  multivitamin 1 Tablet(s) Oral daily  pantoprazole  Injectable 40 milliGRAM(s) IV Push every 12 hours  piperacillin/tazobactam IVPB.. 3.375 Gram(s) IV Intermittent every 6 hours  senna 2 Tablet(s) Oral at bedtime    MEDICATIONS  (PRN):  aluminum hydroxide/magnesium hydroxide/simethicone Suspension 30 milliLiter(s) Oral every 4 hours PRN Dyspepsia  LORazepam     Tablet 2 milliGRAM(s) Oral every 4 hours PRN for CIWA score 8 or above  melatonin 3 milliGRAM(s) Oral at bedtime PRN Insomnia  ondansetron Injectable 4 milliGRAM(s) IV Push every 8 hours PRN Nausea and/or Vomiting      LABS:                        9.4    10.38 )-----------( 476      ( 03 Oct 2022 06:53 )             28.1     10-03    140  |  108  |  10  ----------------------------<  136<H>  5.2<H>   |  26  |  0.7    Ca    8.6      03 Oct 2022 06:53  Mg     2.0     10-03    TPro  5.3<L>  /  Alb  2.7<L>  /  TBili  0.3  /  DBili  x   /  AST  18  /  ALT  8   /  AlkPhos  85  10-03    PT/INR - ( 03 Oct 2022 06:53 )   PT: 12.80 sec;   INR: 1.12 ratio         PTT - ( 03 Oct 2022 06:53 )  PTT:30.0 sec      RADIOLOGY & ADDITIONAL TESTS:    Imaging or report Personally Reviewed:  [x ] YES  [ ] NO    < from: CT Angio Abdomen and Pelvis w/ IV Cont (09.25.22 @ 11:12) >  IMPRESSION:  Since 9/24/2022 at 10:19 AM:  1.  No evidence of active gastrointestinal hemorrhage.  2.  Findings of necrotizing pancreatitis with numerous peripancreatic   collections not significantly changed on this short interval follow-up.   See prior report for further details.  3.  New small bilateral pleural effusions.    < end of copied text >      < from: CT Abdomen and Pelvis w/ IV Cont (09.24.22 @ 10:32) >  IMPRESSION:    Findings of necrotizing pancreatitis. Multiple peripancreatic fluid   collections as described. Gas within some of these collections may   represent superinfection.    Additional areas of hypoenhancement in the pancreatic head, may also   represent sequela of necrotizing pancreatitis, however follow-up is   recommended.      < end of copied text >      Case discussed with residents and RN on rounds today    Care discussed with pt

## 2022-10-04 LAB
ALBUMIN SERPL ELPH-MCNC: 2.8 G/DL — LOW (ref 3.5–5.2)
ALP SERPL-CCNC: 77 U/L — SIGNIFICANT CHANGE UP (ref 30–115)
ALT FLD-CCNC: 7 U/L — SIGNIFICANT CHANGE UP (ref 0–41)
ANION GAP SERPL CALC-SCNC: 8 MMOL/L — SIGNIFICANT CHANGE UP (ref 7–14)
APTT BLD: 29.7 SEC — SIGNIFICANT CHANGE UP (ref 27–39.2)
AST SERPL-CCNC: 16 U/L — SIGNIFICANT CHANGE UP (ref 0–41)
BASOPHILS # BLD AUTO: 0.05 K/UL — SIGNIFICANT CHANGE UP (ref 0–0.2)
BASOPHILS NFR BLD AUTO: 0.6 % — SIGNIFICANT CHANGE UP (ref 0–1)
BILIRUB SERPL-MCNC: 0.3 MG/DL — SIGNIFICANT CHANGE UP (ref 0.2–1.2)
BUN SERPL-MCNC: 15 MG/DL — SIGNIFICANT CHANGE UP (ref 10–20)
CALCIUM SERPL-MCNC: 8.2 MG/DL — LOW (ref 8.4–10.5)
CHLORIDE SERPL-SCNC: 108 MMOL/L — SIGNIFICANT CHANGE UP (ref 98–110)
CO2 SERPL-SCNC: 24 MMOL/L — SIGNIFICANT CHANGE UP (ref 17–32)
CREAT SERPL-MCNC: 0.6 MG/DL — LOW (ref 0.7–1.5)
EGFR: 102 ML/MIN/1.73M2 — SIGNIFICANT CHANGE UP
EOSINOPHIL # BLD AUTO: 0.17 K/UL — SIGNIFICANT CHANGE UP (ref 0–0.7)
EOSINOPHIL NFR BLD AUTO: 2.1 % — SIGNIFICANT CHANGE UP (ref 0–8)
GLUCOSE BLDC GLUCOMTR-MCNC: 105 MG/DL — HIGH (ref 70–99)
GLUCOSE BLDC GLUCOMTR-MCNC: 120 MG/DL — HIGH (ref 70–99)
GLUCOSE BLDC GLUCOMTR-MCNC: 127 MG/DL — HIGH (ref 70–99)
GLUCOSE BLDC GLUCOMTR-MCNC: 140 MG/DL — HIGH (ref 70–99)
GLUCOSE BLDC GLUCOMTR-MCNC: 99 MG/DL — SIGNIFICANT CHANGE UP (ref 70–99)
GLUCOSE SERPL-MCNC: 102 MG/DL — HIGH (ref 70–99)
HCT VFR BLD CALC: 26.4 % — LOW (ref 42–52)
HGB BLD-MCNC: 8.6 G/DL — LOW (ref 14–18)
IMM GRANULOCYTES NFR BLD AUTO: 1 % — HIGH (ref 0.1–0.3)
INR BLD: 1.1 RATIO — SIGNIFICANT CHANGE UP (ref 0.65–1.3)
LYMPHOCYTES # BLD AUTO: 1.46 K/UL — SIGNIFICANT CHANGE UP (ref 1.2–3.4)
LYMPHOCYTES # BLD AUTO: 17.8 % — LOW (ref 20.5–51.1)
MAGNESIUM SERPL-MCNC: 1.9 MG/DL — SIGNIFICANT CHANGE UP (ref 1.8–2.4)
MCHC RBC-ENTMCNC: 29.7 PG — SIGNIFICANT CHANGE UP (ref 27–31)
MCHC RBC-ENTMCNC: 32.6 G/DL — SIGNIFICANT CHANGE UP (ref 32–37)
MCV RBC AUTO: 91 FL — SIGNIFICANT CHANGE UP (ref 80–94)
MONOCYTES # BLD AUTO: 0.62 K/UL — HIGH (ref 0.1–0.6)
MONOCYTES NFR BLD AUTO: 7.6 % — SIGNIFICANT CHANGE UP (ref 1.7–9.3)
NEUTROPHILS # BLD AUTO: 5.81 K/UL — SIGNIFICANT CHANGE UP (ref 1.4–6.5)
NEUTROPHILS NFR BLD AUTO: 70.9 % — SIGNIFICANT CHANGE UP (ref 42.2–75.2)
NRBC # BLD: 0 /100 WBCS — SIGNIFICANT CHANGE UP (ref 0–0)
PHOSPHATE SERPL-MCNC: 3.2 MG/DL — SIGNIFICANT CHANGE UP (ref 2.1–4.9)
PLATELET # BLD AUTO: 432 K/UL — HIGH (ref 130–400)
POTASSIUM SERPL-MCNC: 4.3 MMOL/L — SIGNIFICANT CHANGE UP (ref 3.5–5)
POTASSIUM SERPL-SCNC: 4.3 MMOL/L — SIGNIFICANT CHANGE UP (ref 3.5–5)
PROT SERPL-MCNC: 4.9 G/DL — LOW (ref 6–8)
PROTHROM AB SERPL-ACNC: 12.6 SEC — SIGNIFICANT CHANGE UP (ref 9.95–12.87)
RBC # BLD: 2.9 M/UL — LOW (ref 4.7–6.1)
RBC # FLD: 15.4 % — HIGH (ref 11.5–14.5)
SODIUM SERPL-SCNC: 140 MMOL/L — SIGNIFICANT CHANGE UP (ref 135–146)
WBC # BLD: 8.19 K/UL — SIGNIFICANT CHANGE UP (ref 4.8–10.8)
WBC # FLD AUTO: 8.19 K/UL — SIGNIFICANT CHANGE UP (ref 4.8–10.8)

## 2022-10-04 PROCEDURE — 99291 CRITICAL CARE FIRST HOUR: CPT | Mod: GC

## 2022-10-04 RX ORDER — CALCIUM CARBONATE 500(1250)
1 TABLET ORAL
Refills: 0 | Status: COMPLETED | OUTPATIENT
Start: 2022-10-04 | End: 2022-10-05

## 2022-10-04 RX ADMIN — MEROPENEM 100 MILLIGRAM(S): 1 INJECTION INTRAVENOUS at 06:12

## 2022-10-04 RX ADMIN — ATORVASTATIN CALCIUM 20 MILLIGRAM(S): 80 TABLET, FILM COATED ORAL at 21:14

## 2022-10-04 RX ADMIN — Medication 1 MILLIGRAM(S): at 12:52

## 2022-10-04 RX ADMIN — MEROPENEM 100 MILLIGRAM(S): 1 INJECTION INTRAVENOUS at 14:19

## 2022-10-04 RX ADMIN — Medication 1 TABLET(S): at 17:36

## 2022-10-04 RX ADMIN — MEROPENEM 100 MILLIGRAM(S): 1 INJECTION INTRAVENOUS at 21:17

## 2022-10-04 RX ADMIN — SENNA PLUS 2 TABLET(S): 8.6 TABLET ORAL at 21:14

## 2022-10-04 RX ADMIN — Medication 1 TABLET(S): at 12:52

## 2022-10-04 RX ADMIN — CHLORHEXIDINE GLUCONATE 1 APPLICATION(S): 213 SOLUTION TOPICAL at 12:52

## 2022-10-04 NOTE — DIETITIAN INITIAL EVALUATION ADULT - OTHER INFO
Pertinent Medical Information: p/w UGIB; now noted with acute lower GI bleed (improving). Acute Necrotizing Pancreatitis 2/2 EtOH abuse. GI reports may advance diet to GI soft. Multiple miracle- pancreatic fluid collections noted. s/p EGD 10/3. Visible Vessel on EGD, High risk of rebleed per progress notes. Gastric ulcer & duodenal ulcer noted. Per GI progress notes, "stable without Melena. May start full liquids and advance if tolerates to GI soft. No further plans for additional Endoscopic interventions at this time."

## 2022-10-04 NOTE — PROGRESS NOTE ADULT - SUBJECTIVE AND OBJECTIVE BOX
Patient is a 72y old  Male who presents with a chief complaint of UGIB (04 Oct 2022 09:11)        Over Night Events:    yesterday endoscopy showed high risk of rebleeding, stigmata of recent bleed. admitted to ICU    ROS:     All ROS are negative except HPI           PHYSICAL EXAM    ICU Vital Signs Last 24 Hrs  T(C): 36.8 (04 Oct 2022 02:53), Max: 36.8 (04 Oct 2022 02:53)  T(F): 98.3 (04 Oct 2022 02:53), Max: 98.3 (04 Oct 2022 02:53)  HR: 71 (04 Oct 2022 13:00) (67 - 84)  BP: 142/69 (04 Oct 2022 12:00) (88/56 - 142/69)  BP(mean): 97 (04 Oct 2022 12:00) (84 - 97)  ABP: --  ABP(mean): --  RR: 19 (04 Oct 2022 13:00) (16 - 39)  SpO2: 97% (04 Oct 2022 13:00) (95% - 100%)    O2 Parameters below as of 04 Oct 2022 13:00  Patient On (Oxygen Delivery Method): room air            Constitutional: no acute distress, well nourished well developed  Neuro: moving all 4 limbs spontaneously, no facial droop or dysarthria  HEENT: NCAT, anicteric  Neck: no visible lymphadenopathy or goiter  Pulm: no respiratory distress. clear to auscultation bilaterally  Cardiac: extremities appear pink and well-perfused.  regular rhythm and rate, no murmur detected  Abdomen: non-distended  Extremities: no peripheral edema      10-03-22 @ 07:01  -  10-04-22 @ 07:00  --------------------------------------------------------  IN:    Lactated Ringers: 225 mL    Pantoprazole: 30 mL  Total IN: 255 mL    OUT:  Total OUT: 0 mL    Total NET: 255 mL      10-04-22 @ 07:01  -  10-04-22 @ 14:06  --------------------------------------------------------  IN:    Lactated Ringers: 600 mL    Pantoprazole: 80 mL  Total IN: 680 mL    OUT:    Voided (mL): 1300 mL  Total OUT: 1300 mL    Total NET: -620 mL          LABS:                            8.6    8.19  )-----------( 432      ( 04 Oct 2022 04:41 )             26.4                                               10-04    140  |  108  |  15  ----------------------------<  102<H>  4.3   |  24  |  0.6<L>    Ca    8.2<L>      04 Oct 2022 04:41  Phos  3.2     10-04  Mg     1.9     10-04    TPro  4.9<L>  /  Alb  2.8<L>  /  TBili  0.3  /  DBili  x   /  AST  16  /  ALT  7   /  AlkPhos  77  10-04      PT/INR - ( 04 Oct 2022 04:41 )   PT: 12.60 sec;   INR: 1.10 ratio         PTT - ( 04 Oct 2022 04:41 )  PTT:29.7 sec                                                                                     LIVER FUNCTIONS - ( 04 Oct 2022 04:41 )  Alb: 2.8 g/dL / Pro: 4.9 g/dL / ALK PHOS: 77 U/L / ALT: 7 U/L / AST: 16 U/L / GGT: x                                                                                                                                       MEDICATIONS  (STANDING):  atorvastatin 20 milliGRAM(s) Oral at bedtime  calcium carbonate    500 mG (Tums) Chewable 1 Tablet(s) Chew two times a day  chlorhexidine 2% Cloths 1 Application(s) Topical daily  folic acid 1 milliGRAM(s) Oral daily  lactated ringers. 1000 milliLiter(s) (75 mL/Hr) IV Continuous <Continuous>  meropenem  IVPB 1000 milliGRAM(s) IV Intermittent every 8 hours  multivitamin 1 Tablet(s) Oral daily  pantoprazole Infusion 8 mG/Hr (10 mL/Hr) IV Continuous <Continuous>  senna 2 Tablet(s) Oral at bedtime    MEDICATIONS  (PRN):  aluminum hydroxide/magnesium hydroxide/simethicone Suspension 30 milliLiter(s) Oral every 4 hours PRN Dyspepsia  LORazepam     Tablet 2 milliGRAM(s) Oral every 4 hours PRN for CIWA score 8 or above  melatonin 3 milliGRAM(s) Oral at bedtime PRN Insomnia  ondansetron Injectable 4 milliGRAM(s) IV Push every 8 hours PRN Nausea and/or Vomiting      New X-rays reviewed:       ECHO reviewed    CXR interpreted by me:

## 2022-10-04 NOTE — DIETITIAN INITIAL EVALUATION ADULT - NSPROEDAREADYLEARN_GEN_A_NUR
Discussed full liquid diet and allowed foods. Reviewed RD recommendations and fiber/residue restricted + low fat diets.

## 2022-10-04 NOTE — DIETITIAN INITIAL EVALUATION ADULT - NSFNSGIASSESSMENTFT_GEN_A_CORE
Last BM 10/4. Reports no nausea/vomiting/diarrhea/constipation at this time. Abd discomfort; reportedly improved.

## 2022-10-04 NOTE — DIETITIAN INITIAL EVALUATION ADULT - NUTRITIONGOAL OUTCOME1
Pt to demonstrate tolerance to diet advance, with at least 75% po intake achieved & maintained over next 6 days.  Monitor: Skin, labs, BM, wt, nutrition focused physical findings, body composition, GI.

## 2022-10-04 NOTE — PROGRESS NOTE ADULT - SUBJECTIVE AND OBJECTIVE BOX
Patient is a 72-year-old gentleman with past medical history of hypertension, hyperlipidemia, extensive alcohol use in which he is currently actively using, and as of recent history of pancreatitis requiring hospitalization who presents to [facility] for rectal bleeding.  Of note patient was admitted June 8 to [facility] in [place] in total for 1 week in which she was treated for an uncomplicated pancreatitis.  Patient admitted and had general GI work-up revealing large Duodenal ulcer with visible vessel. After stabilization from GI bleeding patient had EUS drainage of Collection by utilizing a trans-gastric luminal opposing stent. Patient was doing well but on EGD 10/3 found to have a Duodenal ulcer with a visible vessel so Necrosis could not be done. Patient stable without Melena.       PAST MEDICAL & SURGICAL HISTORY:  HTN  HLD  Pancreatitis ( First Episode June 8th 2022- [facility])  ETOH use and abuse      MEDICATIONS  (STANDING):  cefTRIAXone   IVPB 1000 milliGRAM(s) IV Intermittent once  lactated ringers Bolus 1000 milliLiter(s) IV Bolus once  metroNIDAZOLE  IVPB 500 milliGRAM(s) IV Intermittent Once  pantoprazole  Injectable 80 milliGRAM(s) IV Push Once    Home Meds:  Lisinopril  Amlodipine  Atorvastatin     Allergies  No Known Allergies    Review of Systems  General:  Denies Fatigue, Denies Fever, Denies Weakness ,Denies Weight Loss   HEENT: Denies Trouble Swallowing ,Denies  Sore Throat , Denies Change in hearing/vision/speech ,Denies Dizziness    Cardio: Denies  Chest Pain , Palpitations    Respiratory: Denies worsening of SOB, Denies Cough  Abdomen: See detailed HPI  Neuro: Denies Headache Denies Dizziness, Denies Paresthesias  MSK: Denies pain in Bones/Joints/Muscles   Psych: Patient denies depression, denies suicidal or homicidal ideations  Integ: Patient Denies rash, or new skin lesions       Vital Signs Last 24 Hrs  T(C): 36.8 (04 Oct 2022 02:53), Max: 36.8 (04 Oct 2022 02:53)  T(F): 98.3 (04 Oct 2022 02:53), Max: 98.3 (04 Oct 2022 02:53)  HR: 74 (04 Oct 2022 14:30) (67 - 84)  BP: 122/66 (04 Oct 2022 14:30) (88/56 - 142/69)  BP(mean): 89 (04 Oct 2022 14:30) (84 - 97)  RR: 44 (04 Oct 2022 14:30) (16 - 44)  SpO2: 97% (04 Oct 2022 14:30) (95% - 100%)    Parameters below as of 04 Oct 2022 14:30  Patient On (Oxygen Delivery Method): room air        Physical Exam  Gen: NAD  HEENT: NC/AT, Mucosal Membranes Moist  Cardio: S1/S2 No S3/S4, Regular  Resp: CTA B/L  Abdomen: Soft, ND/NT,   Neuro: AAOx3  Extremities: FROM x 4  Skin: No jaundice       Labs:                                   8.6    8.19  )-----------( 432      ( 04 Oct 2022 04:41 )             26.4     10-04    140  |  108  |  15  ----------------------------<  102<H>  4.3   |  24  |  0.6<L>    Ca    8.2<L>      04 Oct 2022 04:41  Phos  3.2     10-04  Mg     1.9     10-04    TPro  4.9<L>  /  Alb  2.8<L>  /  TBili  0.3  /  DBili  x   /  AST  16  /  ALT  7   /  AlkPhos  77  10-04         RADIOLOGY & ADDITIONAL STUDIES:  CT Abdomen and Pelvis w/ IV Cont 09.24.22   IMPRESSION:    Findings of necrotizing pancreatitis. Multiple peripancreatic fluid   collections as described. Gas within some of these collections may   represent superinfection.    Additional areas of hypoenhancement in the pancreatic head, may also   represent sequela of necrotizing pancreatitis, however follow-up is   recommended.

## 2022-10-04 NOTE — DIETITIAN INITIAL EVALUATION ADULT - NS FNS DIET ORDER
Pt was receiving Soft & Bite Sized diet from 9/30 to 10/3. At this time, pt was consuming 50% of meals; reduced intake d/t abd discomfort this admit. Pt was then made NPO for EGD. Advanced to clear liquids today, followed by further diet advance to full liquid. Plan to advance to GI soft diet upon observed tolerance.  Diet, Full Liquid:   DASH/TLC {Sodium & Cholesterol Restricted} (10-04-22 @ 17:25)  Reports fair tolerance to full liquid diet at this time; appetite remains suboptimal but consuming 50% of meals.

## 2022-10-04 NOTE — DIETITIAN INITIAL EVALUATION ADULT - ORAL INTAKE PTA/DIET HISTORY
Pt reports good appetite & po intake PTP. Reportedly follows a regular diet, with no dietary restrictions at home. No supplements reported. NKFA. No dietary restrictions reported. No food preferences reported. Consumes 2-3 meals/day. Per H&P, pt was noted to drink 5 ounces of vodka a day. Spoke with pt regarding alcohol consumption habits, however pt declined to go into hx at this time. Reports appetite declined day prior to admission d/t onset of abd discomfort, which followed a large bloody BM & an episode of bloody vomiting. UBW 65.9 kg with no known h/o unintentional wt loss. No signs of muscle wasting/subcutaneous fat loss observed at this time.

## 2022-10-04 NOTE — DIETITIAN INITIAL EVALUATION ADULT - ADD RECOMMEND
Recommendation: Advance diet to solids as medically feasible; provide low fat + fiber/residue restricted diet modifier. Order Vital 1.0 once daily (240 kcal, 10 g protein) - this supplement is more easily tolerating in setting of GI issues. Continue providing MVI daily.

## 2022-10-04 NOTE — DIETITIAN INITIAL EVALUATION ADULT - PERTINENT LABORATORY DATA
10-04    140  |  108  |  15  ----------------------------<  102<H>  4.3   |  24  |  0.6<L>    Ca    8.2<L>      04 Oct 2022 04:41  Phos  3.2     10-04  Mg     1.9     10-04    TPro  4.9<L>  /  Alb  2.8<L>  /  TBili  0.3  /  DBili  x   /  AST  16  /  ALT  7   /  AlkPhos  77  10-04  POCT Blood Glucose.: 127 mg/dL (10-04-22 @ 21:25)  A1C with Estimated Average Glucose Result: 5.7 % (09-25-22 @ 06:55)

## 2022-10-04 NOTE — PROGRESS NOTE ADULT - ASSESSMENT
IMPRESSION:      Acute Necrotizing Pancreatitis 2/2 EtOH abuse  Multiple miracle- pancreatic fluid collections  Visible Vessel on EGD, High risk of rebleed  Gastric Ulcer  Duodenal Ulcer  Acute Lower GIB (improving)      PLAN:    CNS:   -Sedation:   Daily SAT  - CIWA score of 0    HEENT: Oral care    PULMONARY:  HOB @ 45 degrees. keep pox>92%. Aspiration precautions     CARDIOVASCULAR: keep map > 65.   - Echo 9/25: EF 65-70%. Mildly dilated LA, mild ant mitral leaflet prolapse, mod MVR    GI: NPO  - Protonix IV BID.   - Maintain 2x large bore IV access, maintain active type & screen  - GI prophylaxis:   Feeds: NPO  BM: regimen PRN  --Awaiting advanced endoscopy ( EGD with necrosectomy) today 10/4    RENAL:  Follow up lytes.  Correct as needed  - Monitor I&Os  -good UOP  -no Cr elevation    INFECTIOUS DISEASE:   - d/c ceftriaxone 2g QD, flagyl 500mg TID  - d/c zosyn  - patient on meropenem due to intrabdominal infection/peritonitis as per ID    HEMATOLOGICAL: s/p 3 units PRBC  - trend H/H. Keep active type and screen. Keep Hgb > 8   -- hold AC for high bleeding risk. SCDs for now.     ENDOCRINE:  Follow up FS.  Insulin protocol if needed.      MSK/DERM  PT/OT when cooperative      CODE STATUS:  DISPO: remain in ICU

## 2022-10-04 NOTE — PROGRESS NOTE ADULT - ASSESSMENT
CHRIS KHAN is a 72y man with a medical history significant for _____ who presented initially with _______, and is now in the critical care unit for ______.     Impression    Acute Necrotizing Pancreatitis 2/2 EtOH abuse  Multiple miracle- pancreatic fluid collections  Visible Vessel on EGD, High risk of rebleed  Gastric Ulcer  Duodenal Ulcer  Acute Lower GIB (improving)      Plan:      CNS:  Sedation:   Daily SAT  CIWA reactive withdrawal protocl    HEENT:  Oral care  ETT day:     CARDIOVASCULAR  Vasopressors:     PULMONARY  HOB @ 45 degrees, aspiration precautions  tolerating room air    GASTROINTESTINAL  Advanced endoscopy later today  PPI gtt  Maintain 2x large bore IV access, maintain active type & screen  GI prophylaxis:   Feeds: NPO  BM: regimen PRN    GENITOURINARY/RENAL  [place]:   Monitor I&O:   good UOP, no Cr elevation    INFECTIOUS  On meropenem for peritonitis    HEMATOLOGIC  DVT ppx: hold for high bleed risk  SCD    ENDOCRINE  Follow up FS.  Insulin protocol as needed.  BG goal 140-180    MSK/DERM  PT/OT when cooperative      CODE STATUS:  DISPO: remain in ICU CHRIS KHAN is a 72y man with a medical history significant for EtOH abuse who presented initially with acute pancreatitis, and is now in the critical care unit for elevated risk of decompensation due to high risk endoscopic lesion.     Impression    Acute Necrotizing Pancreatitis 2/2 EtOH abuse  Multiple miracle- pancreatic fluid collections  Visible Vessel on EGD, High risk of rebleed  Gastric Ulcer  Duodenal Ulcer  Acute Lower GIB (improving)      Plan:      CNS:  CIWA reactive withdrawal protocl    HEENT:  Oral care    CARDIOVASCULAR  No acute concerns, holding antihypertensives for now    PULMONARY  HOB @ 45 degrees, aspiration precautions  tolerating room air    GASTROINTESTINAL  Advanced endoscopy later today for necrosectomy  PPI gtt  Maintain 2x large bore IV access, maintain active type & screen  Feeds: NPO for procddure  BM: regimen PRN    GENITOURINARY/RENAL  good UOP, no Cr elevation, monitor renal function daily for high risk pancreatitis    INFECTIOUS  On meropenem for necrotic pancreatic fluid collection culture positive for ESBL and Proteus    HEMATOLOGIC  DVT ppx: hold for high bleed risk  SCDs    ENDOCRINE  Follow up FS.  Insulin protocol as needed.  BG goal 140-180    MSK/DERM  PT/OT when cooperative      CODE STATUS:  DISPO: remain in ICU

## 2022-10-04 NOTE — DIETITIAN INITIAL EVALUATION ADULT - PERTINENT MEDS FT
MEDICATIONS  (STANDING):  atorvastatin 20 milliGRAM(s) Oral at bedtime  calcium carbonate    500 mG (Tums) Chewable 1 Tablet(s) Chew two times a day  chlorhexidine 2% Cloths 1 Application(s) Topical daily  folic acid 1 milliGRAM(s) Oral daily  meropenem  IVPB 1000 milliGRAM(s) IV Intermittent every 8 hours  multivitamin 1 Tablet(s) Oral daily  pantoprazole Infusion 8 mG/Hr (10 mL/Hr) IV Continuous <Continuous>  senna 2 Tablet(s) Oral at bedtime    MEDICATIONS  (PRN):  aluminum hydroxide/magnesium hydroxide/simethicone Suspension 30 milliLiter(s) Oral every 4 hours PRN Dyspepsia  LORazepam     Tablet 2 milliGRAM(s) Oral every 4 hours PRN for CIWA score 8 or above  melatonin 3 milliGRAM(s) Oral at bedtime PRN Insomnia  ondansetron Injectable 4 milliGRAM(s) IV Push every 8 hours PRN Nausea and/or Vomiting
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
## 2022-10-04 NOTE — PROGRESS NOTE ADULT - SUBJECTIVE AND OBJECTIVE BOX
HPI:  72-year-old gentleman with PMHX  of HTN, HLD , recent hx of pancreatitis, extensive alcohol use daily, presents for episodes of Hemetemesis and hematochezia. g.  Patient notes that today he developed a large volume bloody bowel movement along with an episode of blood vomitting.  Patient became dizzy after this episode and almost passed out.  Wife was present to supplement the history.  Patient was brought to the ER and still notes dizziness.  Patient had abdominal imaging in which she was found to have necrosis of the pancreas.  Surgery consulted for pancreatic necrosis.    Of note patient was admitted  to [facility] in [place] in total for 1 week in which she was treated for an uncomplicated pancreatitis.  Patient has never had prior documented pancreatitis.  Patient's wife notes that he has been having on and off intermittent epigastric pain more so towards the left upper quadrant and this has been going on for years.  Patient has been treating this pain with over-the-counter NSAIDs at times.   He drinking 5 ounces of vodka a day. Patient and wife deny him having prior endoscopic evaluation.    In the ED   T(F): 100.1 (24 Sep 2022 16:50), Max: 100.1 (24 Sep 2022 16:50)  HR: 95 (24 Sep 2022 16:50) (73 - 98)  BP: 112/58 (24 Sep 2022 16:50) (112/58 - 133/81)      LABS/IMG in ED:  cret                        6.7    9.97  )-----------( 295      ( 24 Sep 2022 17:30 )             20.1     -    134<L>  |  91<L>  |  21<H>  ----------------------------<  245<H>  4.0   |  34<H>  |  1.0    Ca    8.0<L>      24 Sep 2022 09:17    TPro  4.5<L>  /  Alb  2.5<L>  /  TBili  <0.2  /  DBili  <0.2  /  AST  31  /  ALT  28  /  AlkPhos  90  09-24    PT/INR - ( 24 Sep 2022 09:17 )   PT: 12.70 sec;   INR: 1.11 ratio         PTT - ( 24 Sep 2022 09:17 )  PTT:26.4 sec      < from: CT Abdomen and Pelvis w/ IV Cont (22 @ 10:32) >  Findings of necrotizing pancreatitis. Multiple peripancreatic fluid   collections as described. Gas within some of these collections may   represent superinfection.    < end of copied text >   (24 Sep 2022 18:11)      INTERVAL HISTORY:    PAST MEDICAL & SURGICAL HISTORY  HTN (hypertension)  HLD      ALLERGIES:  No Known Allergies      MEDICATIONS:  MEDICATIONS  (STANDING):  atorvastatin 20 milliGRAM(s) Oral at bedtime  calcium carbonate    500 mG (Tums) Chewable 1 Tablet(s) Chew two times a day  chlorhexidine 2% Cloths 1 Application(s) Topical daily  folic acid 1 milliGRAM(s) Oral daily  lactated ringers. 1000 milliLiter(s) (75 mL/Hr) IV Continuous <Continuous>  meropenem  IVPB 1000 milliGRAM(s) IV Intermittent every 8 hours  multivitamin 1 Tablet(s) Oral daily  pantoprazole Infusion 8 mG/Hr (10 mL/Hr) IV Continuous <Continuous>  senna 2 Tablet(s) Oral at bedtime    MEDICATIONS  (PRN):  aluminum hydroxide/magnesium hydroxide/simethicone Suspension 30 milliLiter(s) Oral every 4 hours PRN Dyspepsia  LORazepam     Tablet 2 milliGRAM(s) Oral every 4 hours PRN for CIWA score 8 or above  melatonin 3 milliGRAM(s) Oral at bedtime PRN Insomnia  ondansetron Injectable 4 milliGRAM(s) IV Push every 8 hours PRN Nausea and/or Vomiting      HOME MEDICATIONS:  Home Medications:  AMLODIPINE BESYLATE 5MG TAB:  (24 Sep 2022 18:16)  ATORVASTATIN CALCIUM 20MG TAB:  (24 Sep 2022 18:16)  LISINOPRIL 20 MG TABLET: TAKE 1 TABLET BY MOUTH EVERY DAY (24 Sep 2022 18:16)        OBJECTIVE:  ICU Vital Signs Last 24 Hrs  T(C): 36.8 (04 Oct 2022 02:53), Max: 36.8 (04 Oct 2022 02:53)  T(F): 98.3 (04 Oct 2022 02:53), Max: 98.3 (04 Oct 2022 02:53)  HR: 74 (04 Oct 2022 07:30) (67 - 84)  BP: 116/70 (04 Oct 2022 07:30) (88/56 - 131/71)  BP(mean): 88 (04 Oct 2022 07:30) (84 - 95)  ABP: --  ABP(mean): --  RR: 16 (04 Oct 2022 07:30) (16 - 22)  SpO2: 98% (04 Oct 2022 07:30) (95% - 100%)    O2 Parameters below as of 04 Oct 2022 07:30  Patient On (Oxygen Delivery Method): room air            Adult Advanced Hemodynamics Last 24 Hrs  CVP(mm Hg): --  CVP(cm H2O): --  CO: --  CI: --  PA: --  PA(mean): --  PCWP: --  SVR: --  SVRI: --  PVR: --  PVRI: --  I&O's Summary    03 Oct 2022 07:01  -  04 Oct 2022 07:00  --------------------------------------------------------  IN: 255 mL / OUT: 0 mL / NET: 255 mL      Daily Height in cm: 167.64 (03 Oct 2022 16:34)    Daily Weight in k.2 (04 Oct 2022 02:53)    PHYSICAL EXAM:  NEURO: patient is awake , alert and oriented  GEN: Not in acute distress  NECK: no thyroid enlargement, no JVD  LUNGS: Clear to auscultation bilaterally   CARDIOVASCULAR: S1/S2 present, RRR , no murmurs or rubs, no carotid bruits,  + PP bilaterally  ABD: Soft, non-tender, non-distended, +BS  EXT: No KOFI  SKIN: Intact  ACCESS Site:    LABS:                        8.6    8.19  )-----------( 432      ( 04 Oct 2022 04:41 )             26.4     10-04    140  |  108  |  15  ----------------------------<  102<H>  4.3   |  24  |  0.6<L>    Ca    8.2<L>      04 Oct 2022 04:41  Phos  3.2     10-04  Mg     1.9     10-04    TPro  4.9<L>  /  Alb  2.8<L>  /  TBili  0.3  /  DBili  x   /  AST  16  /  ALT  7   /  AlkPhos  77  10-04    PT/INR - ( 04 Oct 2022 04:41 )   PT: 12.60 sec;   INR: 1.10 ratio         PTT - ( 04 Oct 2022 04:41 )  PTT:29.7 sec          Troponin trend:       Chol 96 LDL -- HDL 29<L> Trig 101      RADIOLOGY:  -CXR:  -TTE:  -STRESS TEST:  -CATHETERIZATION:    ECG:    TELEMETRY EVENTS:       HPI:  72-year-old gentleman with PMHX  of HTN, HLD , recent hx of pancreatitis, extensive alcohol use daily, presents for episodes of Hemetemesis and hematochezia.   Patient notes that today he developed a large volume bloody bowel movement along with an episode of blood vomitting.  Patient became dizzy after this episode and almost passed out.  Wife was present to supplement the history.  Patient was brought to the ER and still notes dizziness.  Patient had abdominal imaging in which she was found to have necrosis of the pancreas.  Surgery consulted for pancreatic necrosis.    Of note patient was admitted  to [facility] in [place] in total for 1 week in which she was treated for an uncomplicated pancreatitis.  Patient has never had prior documented pancreatitis.  Patient's wife notes that he has been having on and off intermittent epigastric pain more so towards the left upper quadrant and this has been going on for years.  Patient has been treating this pain with over-the-counter NSAIDs at times.   He drinking 5 ounces of vodka a day. Patient and wife deny him having prior endoscopic evaluation.    In the ED   T(F): 100.1 (24 Sep 2022 16:50), Max: 100.1 (24 Sep 2022 16:50)  HR: 95 (24 Sep 2022 16:50) (73 - 98)  BP: 112/58 (24 Sep 2022 16:50) (112/58 - 133/81)  Hgb 4.8 in the ED. Transfused 2u PRBC. F/u hgb 6.7. Transfused 3rd unit -> 8.3      Patient was transferred to CCU due to large bleeding ulcer found on EGD. Patient was started on PPI drip and morning labs. All antiplatelets and AC will be stopped.       INTERVAL HISTORY:   No acute events overnight. Pt awaiting procedure EGD with necrosectomy today. Denies abdominal pain, N/V, SOB, and chest pain.     PAST MEDICAL & SURGICAL HISTORY  HTN (hypertension)  HLD      ALLERGIES:  No Known Allergies      MEDICATIONS:  MEDICATIONS  (STANDING):  atorvastatin 20 milliGRAM(s) Oral at bedtime  calcium carbonate    500 mG (Tums) Chewable 1 Tablet(s) Chew two times a day  chlorhexidine 2% Cloths 1 Application(s) Topical daily  folic acid 1 milliGRAM(s) Oral daily  lactated ringers. 1000 milliLiter(s) (75 mL/Hr) IV Continuous <Continuous>  meropenem  IVPB 1000 milliGRAM(s) IV Intermittent every 8 hours  multivitamin 1 Tablet(s) Oral daily  pantoprazole Infusion 8 mG/Hr (10 mL/Hr) IV Continuous <Continuous>  senna 2 Tablet(s) Oral at bedtime    MEDICATIONS  (PRN):  aluminum hydroxide/magnesium hydroxide/simethicone Suspension 30 milliLiter(s) Oral every 4 hours PRN Dyspepsia  LORazepam     Tablet 2 milliGRAM(s) Oral every 4 hours PRN for CIWA score 8 or above  melatonin 3 milliGRAM(s) Oral at bedtime PRN Insomnia  ondansetron Injectable 4 milliGRAM(s) IV Push every 8 hours PRN Nausea and/or Vomiting      HOME MEDICATIONS:  Home Medications:  AMLODIPINE BESYLATE 5MG TAB:  (24 Sep 2022 18:16)  ATORVASTATIN CALCIUM 20MG TAB:  (24 Sep 2022 18:16)  LISINOPRIL 20 MG TABLET: TAKE 1 TABLET BY MOUTH EVERY DAY (24 Sep 2022 18:16)        OBJECTIVE:  ICU Vital Signs Last 24 Hrs  T(C): 36.8 (04 Oct 2022 02:53), Max: 36.8 (04 Oct 2022 02:53)  T(F): 98.3 (04 Oct 2022 02:53), Max: 98.3 (04 Oct 2022 02:53)  HR: 74 (04 Oct 2022 07:30) (67 - 84)  BP: 116/70 (04 Oct 2022 07:30) (88/56 - 131/71)  BP(mean): 88 (04 Oct 2022 07:30) (84 - 95)  ABP: --  ABP(mean): --  RR: 16 (04 Oct 2022 07:30) (16 - 22)  SpO2: 98% (04 Oct 2022 07:30) (95% - 100%)    O2 Parameters below as of 04 Oct 2022 07:30  Patient On (Oxygen Delivery Method): room air            03 Oct 2022 07:01  -  04 Oct 2022 07:00  --------------------------------------------------------  IN: 255 mL / OUT: 0 mL / NET: 255 mL      Daily Height in cm: 167.64 (03 Oct 2022 16:34)    Daily Weight in k.2 (04 Oct 2022 02:53)    PHYSICAL EXAM:  NEURO: patient is awake , alert and oriented  GEN: Not in acute distress  NECK: no thyroid enlargement, no JVD  LUNGS: Clear to auscultation bilaterally   CARDIOVASCULAR: S1/S2 present, RRR , no murmurs or rubs, no carotid bruits,  + PP bilaterally  ABD: Soft, non-tender, non-distended, +BS  EXT: No KOFI  SKIN: Intact  ACCESS Site:    LABS:                        8.6    8.19  )-----------( 432      ( 04 Oct 2022 04:41 )             26.4     10-04    140  |  108  |  15  ----------------------------<  102<H>  4.3   |  24  |  0.6<L>    Ca    8.2<L>      04 Oct 2022 04:41  Phos  3.2     10-04  Mg     1.9     10-04    TPro  4.9<L>  /  Alb  2.8<L>  /  TBili  0.3  /  DBili  x   /  AST  16  /  ALT  7   /  AlkPhos  77  10-04    PT/INR - ( 04 Oct 2022 04:41 )   PT: 12.60 sec;   INR: 1.10 ratio         PTT - ( 04 Oct 2022 04:41 )  PTT:29.7 sec          Troponin trend:       Chol 96 LDL -- HDL 29<L> Trig 101      RADIOLOGY/IMAGING:  Imaging or report Personally Reviewed:  [x ] YES  [ ] NO    < from: CT Angio Abdomen and Pelvis w/ IV Cont (22 @ 11:12) >  IMPRESSION:  Since 2022 at 10:19 AM:  1.  No evidence of active gastrointestinal hemorrhage.  2.  Findings of necrotizing pancreatitis with numerous peripancreatic   collections not significantly changed on this short interval follow-up.   See prior report for further details.  3.  New small bilateral pleural effusions.    < end of copied text >      < from: CT Abdomen and Pelvis w/ IV Cont (22 @ 10:32) >  IMPRESSION:    Findings of necrotizing pancreatitis. Multiple peripancreatic fluid   collections as described. Gas within some of these collections may   represent superinfection.    Additional areas of hypoenhancement in the pancreatic head, may also   represent sequela of necrotizing pancreatitis, however follow-up is   recommended.      < end of copied text >    - EGD showed GE junction ulcer, ulcer in antrum, erosive gastritis, large ulcer in duodenal bulb with visible vessel (injection, thermal therapy), clean based smaller ulcer in duodenal bulb.   - colonoscopy showed mild diverticulosis of sigmoid colon, internal and external hemorrhoids  - Advanced GI did EUS showing 7 cm x 6 cm fluid collection with debris consistent with walled off necrosis    ECG:    TELEMETRY EVENTS:  -No telemetry events     HPI:  72-year-old gentleman with PMHX  of HTN, HLD , recent hx of pancreatitis, extensive alcohol use daily, presents for episodes of Hemetemesis and hematochezia.   Patient notes that today he developed a large volume bloody bowel movement along with an episode of blood vomitting.  Patient became dizzy after this episode and almost passed out.  Wife was present to supplement the history.  Patient was brought to the ER and still notes dizziness.  Patient had abdominal imaging in which she was found to have necrosis of the pancreas.  Surgery consulted for pancreatic necrosis.    Of note patient was admitted  to [facility] in [place] in total for 1 week in which she was treated for an uncomplicated pancreatitis.  Patient has never had prior documented pancreatitis.  Patient's wife notes that he has been having on and off intermittent epigastric pain more so towards the left upper quadrant and this has been going on for years.  Patient has been treating this pain with over-the-counter NSAIDs at times.   He drinking 5 ounces of vodka a day. Patient and wife deny him having prior endoscopic evaluation.    In the ED   T(F): 100.1 (24 Sep 2022 16:50), Max: 100.1 (24 Sep 2022 16:50)  HR: 95 (24 Sep 2022 16:50) (73 - 98)  BP: 112/58 (24 Sep 2022 16:50) (112/58 - 133/81)  Hgb 4.8 in the ED. Transfused 2u PRBC. F/u hgb 6.7. Transfused 3rd unit -> 8.3      Patient was transferred to CCU due to large bleeding ulcer found on EGD. Patient was started on PPI drip and morning labs. All antiplatelets and AC will be stopped.       INTERVAL HISTORY:   No acute events overnight. Pt awaiting procedure EGD with necrosectomy. Denies abdominal pain, N/V, SOB, and chest pain.   - EGD on hold    PAST MEDICAL & SURGICAL HISTORY  HTN (hypertension)  HLD      ALLERGIES:  No Known Allergies      MEDICATIONS:  MEDICATIONS  (STANDING):  atorvastatin 20 milliGRAM(s) Oral at bedtime  calcium carbonate    500 mG (Tums) Chewable 1 Tablet(s) Chew two times a day  chlorhexidine 2% Cloths 1 Application(s) Topical daily  folic acid 1 milliGRAM(s) Oral daily  lactated ringers. 1000 milliLiter(s) (75 mL/Hr) IV Continuous <Continuous>  meropenem  IVPB 1000 milliGRAM(s) IV Intermittent every 8 hours  multivitamin 1 Tablet(s) Oral daily  pantoprazole Infusion 8 mG/Hr (10 mL/Hr) IV Continuous <Continuous>  senna 2 Tablet(s) Oral at bedtime    MEDICATIONS  (PRN):  aluminum hydroxide/magnesium hydroxide/simethicone Suspension 30 milliLiter(s) Oral every 4 hours PRN Dyspepsia  LORazepam     Tablet 2 milliGRAM(s) Oral every 4 hours PRN for CIWA score 8 or above  melatonin 3 milliGRAM(s) Oral at bedtime PRN Insomnia  ondansetron Injectable 4 milliGRAM(s) IV Push every 8 hours PRN Nausea and/or Vomiting      HOME MEDICATIONS:  Home Medications:  AMLODIPINE BESYLATE 5MG TAB:  (24 Sep 2022 18:16)  ATORVASTATIN CALCIUM 20MG TAB:  (24 Sep 2022 18:16)  LISINOPRIL 20 MG TABLET: TAKE 1 TABLET BY MOUTH EVERY DAY (24 Sep 2022 18:16)        OBJECTIVE:  ICU Vital Signs Last 24 Hrs  T(C): 36.8 (04 Oct 2022 02:53), Max: 36.8 (04 Oct 2022 02:53)  T(F): 98.3 (04 Oct 2022 02:53), Max: 98.3 (04 Oct 2022 02:53)  HR: 74 (04 Oct 2022 07:30) (67 - 84)  BP: 116/70 (04 Oct 2022 07:30) (88/56 - 131/71)  BP(mean): 88 (04 Oct 2022 07:30) (84 - 95)  ABP: --  ABP(mean): --  RR: 16 (04 Oct 2022 07:30) (16 - 22)  SpO2: 98% (04 Oct 2022 07:30) (95% - 100%)    O2 Parameters below as of 04 Oct 2022 07:30  Patient On (Oxygen Delivery Method): room air            03 Oct 2022 07:01  -  04 Oct 2022 07:00  --------------------------------------------------------  IN: 255 mL / OUT: 0 mL / NET: 255 mL      Daily Height in cm: 167.64 (03 Oct 2022 16:34)    Daily Weight in k.2 (04 Oct 2022 02:53)    PHYSICAL EXAM:  NEURO: patient is awake , alert and oriented  GEN: Not in acute distress  NECK: no thyroid enlargement, no JVD  LUNGS: Clear to auscultation bilaterally   CARDIOVASCULAR: S1/S2 present, RRR , no murmurs or rubs, no carotid bruits,  + PP bilaterally  ABD: Soft, non-tender, non-distended, +BS  EXT: No KOFI  SKIN: Intact  ACCESS Site:    LABS:                        8.6    8.19  )-----------( 432      ( 04 Oct 2022 04:41 )             26.4     10-04    140  |  108  |  15  ----------------------------<  102<H>  4.3   |  24  |  0.6<L>    Ca    8.2<L>      04 Oct 2022 04:41  Phos  3.2     10-04  Mg     1.9     10-04    TPro  4.9<L>  /  Alb  2.8<L>  /  TBili  0.3  /  DBili  x   /  AST  16  /  ALT  7   /  AlkPhos  77  10-04    PT/INR - ( 04 Oct 2022 04:41 )   PT: 12.60 sec;   INR: 1.10 ratio         PTT - ( 04 Oct 2022 04:41 )  PTT:29.7 sec          Troponin trend:       Chol 96 LDL -- HDL 29<L> Trig 101      RADIOLOGY/IMAGING:  Imaging or report Personally Reviewed:  [x ] YES  [ ] NO    < from: CT Angio Abdomen and Pelvis w/ IV Cont (22 @ 11:12) >  IMPRESSION:  Since 2022 at 10:19 AM:  1.  No evidence of active gastrointestinal hemorrhage.  2.  Findings of necrotizing pancreatitis with numerous peripancreatic   collections not significantly changed on this short interval follow-up.   See prior report for further details.  3.  New small bilateral pleural effusions.    < end of copied text >      < from: CT Abdomen and Pelvis w/ IV Cont (22 @ 10:32) >  IMPRESSION:    Findings of necrotizing pancreatitis. Multiple peripancreatic fluid   collections as described. Gas within some of these collections may   represent superinfection.    Additional areas of hypoenhancement in the pancreatic head, may also   represent sequela of necrotizing pancreatitis, however follow-up is   recommended.      < end of copied text >    - EGD showed GE junction ulcer, ulcer in antrum, erosive gastritis, large ulcer in duodenal bulb with visible vessel (injection, thermal therapy), clean based smaller ulcer in duodenal bulb.   - colonoscopy showed mild diverticulosis of sigmoid colon, internal and external hemorrhoids  - Advanced GI did EUS showing 7 cm x 6 cm fluid collection with debris consistent with walled off necrosis    ECG:    TELEMETRY EVENTS:  -No telemetry events

## 2022-10-05 LAB
ALBUMIN SERPL ELPH-MCNC: 3 G/DL — LOW (ref 3.5–5.2)
ALP SERPL-CCNC: 85 U/L — SIGNIFICANT CHANGE UP (ref 30–115)
ALT FLD-CCNC: 8 U/L — SIGNIFICANT CHANGE UP (ref 0–41)
ANION GAP SERPL CALC-SCNC: 7 MMOL/L — SIGNIFICANT CHANGE UP (ref 7–14)
AST SERPL-CCNC: 18 U/L — SIGNIFICANT CHANGE UP (ref 0–41)
BASOPHILS # BLD AUTO: 0.07 K/UL — SIGNIFICANT CHANGE UP (ref 0–0.2)
BASOPHILS NFR BLD AUTO: 1.2 % — HIGH (ref 0–1)
BILIRUB SERPL-MCNC: 0.3 MG/DL — SIGNIFICANT CHANGE UP (ref 0.2–1.2)
BUN SERPL-MCNC: 10 MG/DL — SIGNIFICANT CHANGE UP (ref 10–20)
CALCIUM SERPL-MCNC: 8.4 MG/DL — SIGNIFICANT CHANGE UP (ref 8.4–10.5)
CHLORIDE SERPL-SCNC: 109 MMOL/L — SIGNIFICANT CHANGE UP (ref 98–110)
CO2 SERPL-SCNC: 24 MMOL/L — SIGNIFICANT CHANGE UP (ref 17–32)
CREAT SERPL-MCNC: 0.6 MG/DL — LOW (ref 0.7–1.5)
EGFR: 102 ML/MIN/1.73M2 — SIGNIFICANT CHANGE UP
EOSINOPHIL # BLD AUTO: 0.19 K/UL — SIGNIFICANT CHANGE UP (ref 0–0.7)
EOSINOPHIL NFR BLD AUTO: 3.3 % — SIGNIFICANT CHANGE UP (ref 0–8)
GLUCOSE BLDC GLUCOMTR-MCNC: 111 MG/DL — HIGH (ref 70–99)
GLUCOSE BLDC GLUCOMTR-MCNC: 129 MG/DL — HIGH (ref 70–99)
GLUCOSE BLDC GLUCOMTR-MCNC: 133 MG/DL — HIGH (ref 70–99)
GLUCOSE SERPL-MCNC: 114 MG/DL — HIGH (ref 70–99)
HCT VFR BLD CALC: 27 % — LOW (ref 42–52)
HGB BLD-MCNC: 9.1 G/DL — LOW (ref 14–18)
IMM GRANULOCYTES NFR BLD AUTO: 0.5 % — HIGH (ref 0.1–0.3)
LYMPHOCYTES # BLD AUTO: 1.5 K/UL — SIGNIFICANT CHANGE UP (ref 1.2–3.4)
LYMPHOCYTES # BLD AUTO: 26.3 % — SIGNIFICANT CHANGE UP (ref 20.5–51.1)
MAGNESIUM SERPL-MCNC: 1.9 MG/DL — SIGNIFICANT CHANGE UP (ref 1.8–2.4)
MCHC RBC-ENTMCNC: 30.8 PG — SIGNIFICANT CHANGE UP (ref 27–31)
MCHC RBC-ENTMCNC: 33.7 G/DL — SIGNIFICANT CHANGE UP (ref 32–37)
MCV RBC AUTO: 91.5 FL — SIGNIFICANT CHANGE UP (ref 80–94)
MONOCYTES # BLD AUTO: 0.56 K/UL — SIGNIFICANT CHANGE UP (ref 0.1–0.6)
MONOCYTES NFR BLD AUTO: 9.8 % — HIGH (ref 1.7–9.3)
NEUTROPHILS # BLD AUTO: 3.35 K/UL — SIGNIFICANT CHANGE UP (ref 1.4–6.5)
NEUTROPHILS NFR BLD AUTO: 58.9 % — SIGNIFICANT CHANGE UP (ref 42.2–75.2)
NRBC # BLD: 0 /100 WBCS — SIGNIFICANT CHANGE UP (ref 0–0)
PLATELET # BLD AUTO: 408 K/UL — HIGH (ref 130–400)
POTASSIUM SERPL-MCNC: 4.5 MMOL/L — SIGNIFICANT CHANGE UP (ref 3.5–5)
POTASSIUM SERPL-SCNC: 4.5 MMOL/L — SIGNIFICANT CHANGE UP (ref 3.5–5)
PROT SERPL-MCNC: 5.1 G/DL — LOW (ref 6–8)
RBC # BLD: 2.95 M/UL — LOW (ref 4.7–6.1)
RBC # FLD: 15 % — HIGH (ref 11.5–14.5)
SODIUM SERPL-SCNC: 140 MMOL/L — SIGNIFICANT CHANGE UP (ref 135–146)
WBC # BLD: 5.7 K/UL — SIGNIFICANT CHANGE UP (ref 4.8–10.8)
WBC # FLD AUTO: 5.7 K/UL — SIGNIFICANT CHANGE UP (ref 4.8–10.8)

## 2022-10-05 PROCEDURE — 99291 CRITICAL CARE FIRST HOUR: CPT | Mod: GC

## 2022-10-05 RX ADMIN — Medication 1 TABLET(S): at 05:03

## 2022-10-05 RX ADMIN — MEROPENEM 100 MILLIGRAM(S): 1 INJECTION INTRAVENOUS at 21:00

## 2022-10-05 RX ADMIN — PANTOPRAZOLE SODIUM 10 MG/HR: 20 TABLET, DELAYED RELEASE ORAL at 05:04

## 2022-10-05 RX ADMIN — Medication 1 TABLET(S): at 12:15

## 2022-10-05 RX ADMIN — Medication 1 MILLIGRAM(S): at 12:15

## 2022-10-05 RX ADMIN — MEROPENEM 100 MILLIGRAM(S): 1 INJECTION INTRAVENOUS at 05:04

## 2022-10-05 RX ADMIN — ATORVASTATIN CALCIUM 20 MILLIGRAM(S): 80 TABLET, FILM COATED ORAL at 21:00

## 2022-10-05 RX ADMIN — MEROPENEM 100 MILLIGRAM(S): 1 INJECTION INTRAVENOUS at 14:48

## 2022-10-05 NOTE — PROGRESS NOTE ADULT - ASSESSMENT
CHRIS KHAN is a 72y man with a medical history significant for EtOH abuse who presented initially with acute pancreatitis, and is now in the critical care unit for elevated risk of decompensation due to high risk endoscopic lesion.     Impression    Acute Necrotizing Pancreatitis 2/2 EtOH abuse  Multiple miracle- pancreatic fluid collections  Visible Vessel on EGD, High risk of rebleed  Gastric Ulcer  Duodenal Ulcer  Acute Lower GIB (improving)      Plan:    CNS:  -CIWA reactive withdrawal protocol    HEENT:  -Oral care    CARDIOVASCULAR  -No acute concerns, holding antihypertensives for now    PULMONARY  -HOB @ 45 degrees, aspiration precautions  tolerating room air    GASTROINTESTINAL  -Advanced GI endoscope procedure with necrosectomy cancelled yesterday   -no additional advanced endoscopy for necrosectomy planned for this admission as per GI  -PPI gtt  -Maintain 2x large bore IV access, maintain active type & screen  -Feeds: advance to clears, continue advancing per GI  -BM: regimen PRN  -Monitor high risk bleed for atleast another 24-48 hours     GENITOURINARY/RENAL  -good UOP, no Cr elevation, monitor renal function daily for high risk pancreatitis    INFECTIOUS  -On meropenem for necrotic pancreatic fluid collection culture positive for ESBL and Proteus    HEMATOLOGIC  -DVT ppx: hold for high bleed risk  -SCDs    ENDOCRINE  -Follow up FS.  Insulin protocol as needed.  BG goal 140-180    MSK/DERM  -PT/OT, OOB as tolerated    CODE STATUS: FULL  DISPO: Remain in ICU

## 2022-10-05 NOTE — CHART NOTE - NSCHARTNOTEFT_GEN_A_CORE
MICU Transfer Note    Transfer from: CCU  Transfer to: Med/Tele    Admitting reason: Brandon Redd is a 72y man with a medical history significant for EtOH abuse who presented initially with acute pancreatitis, and is now in the critical care unit for elevated risk of decompensation due to high risk endoscopic lesion.     ICU Course:    Pt placed on PPI gtt, Meropenem, and CIWA protocol. Anti-hypertensives were withheld and 2x large bore IV access were maintained. Pt awaiting necrosectomy by advanced GI. Pt did not have an episode of GI bleed while in the unit.    Follow-up:    GI for repeat endoscopy  PT for discharge planning  ID for abx (currently on Meropenem, no end date)  Advance diet as tolerated

## 2022-10-05 NOTE — PROGRESS NOTE ADULT - SUBJECTIVE AND OBJECTIVE BOX
Patient is a 72y old  Male who presents with a chief complaint of ANEMIA; PANCREATITIS, NECROTIZING...     (04 Oct 2022 19:37)        Over Night Events:    no necrosectomy performed yesterday, recent ulcer intervention prevented procedure (canceled)  no further clinical bleeding overnight    ROS:     All ROS are negative except HPI           PHYSICAL EXAM    ICU Vital Signs Last 24 Hrs  T(C): 36.7 (05 Oct 2022 04:00), Max: 36.8 (04 Oct 2022 20:00)  T(F): 98 (05 Oct 2022 04:00), Max: 98.2 (04 Oct 2022 20:00)  HR: 64 (05 Oct 2022 06:00) (61 - 78)  BP: 123/63 (05 Oct 2022 06:00) (1/- - 142/69)  BP(mean): 88 (05 Oct 2022 06:00) (74 - 99)  ABP: --  ABP(mean): --  RR: 17 (05 Oct 2022 06:00) (16 - 44)  SpO2: 97% (05 Oct 2022 06:00) (96% - 99%)    O2 Parameters below as of 05 Oct 2022 06:00  Patient On (Oxygen Delivery Method): room air            Constitutional: no acute distress, well nourished well developed  Neuro: moving all 4 limbs spontaneously, no facial droop or dysarthria  HEENT: NCAT, anicteric  Neck: no visible lymphadenopathy or goiter  Pulm: no respiratory distress. clear to auscultation bilaterally  Cardiac: extremities appear pink and well-perfused.  regular rhythm and rate, no murmur detected  Abdomen: non-distended  Extremities: no peripheral edema      10-04-22 @ 07:01  -  10-05-22 @ 07:00  --------------------------------------------------------  IN:    IV PiggyBack: 100 mL    Lactated Ringers: 900 mL    Oral Fluid: 840 mL    Pantoprazole: 220 mL  Total IN: 2060 mL    OUT:    Voided (mL): 2450 mL  Total OUT: 2450 mL    Total NET: -390 mL          LABS:                            9.1    5.70  )-----------( 408      ( 05 Oct 2022 05:11 )             27.0                                               10-05    140  |  109  |  10  ----------------------------<  114<H>  4.5   |  24  |  0.6<L>    Ca    8.4      05 Oct 2022 05:11  Phos  3.2     10-04  Mg     1.9     10-05    TPro  5.1<L>  /  Alb  3.0<L>  /  TBili  0.3  /  DBili  x   /  AST  18  /  ALT  8   /  AlkPhos  85  10-05      PT/INR - ( 04 Oct 2022 04:41 )   PT: 12.60 sec;   INR: 1.10 ratio         PTT - ( 04 Oct 2022 04:41 )  PTT:29.7 sec                                                                                     LIVER FUNCTIONS - ( 05 Oct 2022 05:11 )  Alb: 3.0 g/dL / Pro: 5.1 g/dL / ALK PHOS: 85 U/L / ALT: 8 U/L / AST: 18 U/L / GGT: x                                                                                                                                       MEDICATIONS  (STANDING):  atorvastatin 20 milliGRAM(s) Oral at bedtime  chlorhexidine 2% Cloths 1 Application(s) Topical daily  folic acid 1 milliGRAM(s) Oral daily  meropenem  IVPB 1000 milliGRAM(s) IV Intermittent every 8 hours  multivitamin 1 Tablet(s) Oral daily  pantoprazole Infusion 8 mG/Hr (10 mL/Hr) IV Continuous <Continuous>  senna 2 Tablet(s) Oral at bedtime    MEDICATIONS  (PRN):  aluminum hydroxide/magnesium hydroxide/simethicone Suspension 30 milliLiter(s) Oral every 4 hours PRN Dyspepsia  LORazepam     Tablet 2 milliGRAM(s) Oral every 4 hours PRN for CIWA score 8 or above  melatonin 3 milliGRAM(s) Oral at bedtime PRN Insomnia  ondansetron Injectable 4 milliGRAM(s) IV Push every 8 hours PRN Nausea and/or Vomiting      New X-rays reviewed:   no new cxr today    ECHO reviewed    CXR interpreted by me:

## 2022-10-05 NOTE — PROGRESS NOTE ADULT - CRITICAL CARE ATTENDING COMMENT
This patient is critically ill due to the following:  * Multiple organ failure requiring complex decision-making, and there is a high probability of imminent or life-threatening deterioration in the patient’s condition  * The patient required frequent reassessments and monitoring to ensure response to interventions and therapies.    Critical care time includes time spent evaluating and treating the patient's acute illness as well as time spent reviewing labs, radiology,  and discussing the case with a multidisciplinary team in an effort to prevent further life threatening deterioration or end organ damage. This time is independent of any procedures performed.
This patient is critically ill due to the following:  * Multiple organ failure requiring complex decision-making, and there is a high probability of imminent or life-threatening deterioration in the patient’s condition  * The patient required frequent reassessments and monitoring to ensure response to interventions and therapies.    Critical care time includes time spent evaluating and treating the patient's acute illness as well as time spent reviewing labs, radiology,  and discussing the case with a multidisciplinary team in an effort to prevent further life threatening deterioration or end organ damage. This time is independent of any procedures performed.

## 2022-10-05 NOTE — PHYSICAL THERAPY INITIAL EVALUATION ADULT - LIVES WITH, PROFILE
in an apartment with 2 flights to enter/spouse in an apartment with 0 steps to enter and  2 flights to apartment/spouse

## 2022-10-05 NOTE — PROGRESS NOTE ADULT - ASSESSMENT
CHRIS KHAN is a 72y man with a medical history significant for EtOH abuse who presented initially with acute pancreatitis, and is now in the critical care unit for elevated risk of decompensation due to high risk endoscopic lesion.     Impression    Acute Necrotizing Pancreatitis 2/2 EtOH abuse  Multiple miracle- pancreatic fluid collections  Visible Vessel on EGD, High risk of rebleed  Gastric Ulcer  Duodenal Ulcer  Acute Lower GIB (improving)      Plan:      CNS:  CIWA reactive withdrawal protocol    HEENT:  Oral care    CARDIOVASCULAR  No acute concerns, holding antihypertensives for now    PULMONARY  HOB @ 45 degrees, aspiration precautions  tolerating room air    GASTROINTESTINAL  Additional advanced endoscopy for necrosectomy needed this admission  PPI gtt  Maintain 2x large bore IV access, maintain active type & screen  Feeds: advance to clears, continue advancing per GI  BM: regimen PRN    GENITOURINARY/RENAL  good UOP, no Cr elevation, monitor renal function daily for high risk pancreatitis    INFECTIOUS  On meropenem for necrotic pancreatic fluid collection culture positive for ESBL and Proteus    HEMATOLOGIC  DVT ppx: hold for high bleed risk  SCDs    ENDOCRINE  Follow up FS.  Insulin protocol as needed.  BG goal 140-180    MSK/DERM  PT/OT, OOB as tolerated    CODE STATUS: FULL  DISPO: Remain in ICU

## 2022-10-05 NOTE — PHYSICAL THERAPY INITIAL EVALUATION ADULT - GENERAL OBSERVATIONS, REHAB EVAL
pt. encountered sitting at EOB about to eat dinner. pt willing to participate in functional evaluation. [phone]

## 2022-10-05 NOTE — PHYSICAL THERAPY INITIAL EVALUATION ADULT - PERTINENT HX OF CURRENT PROBLEM, REHAB EVAL
pt is a 73y/o man with a medical history significant for EtOH abuse who presented initially with acute pancreatitis, and is now in the critical care unit for elevated risk of decompensation due to high risk endoscopic lesion.

## 2022-10-05 NOTE — PROGRESS NOTE ADULT - SUBJECTIVE AND OBJECTIVE BOX
HPI:  72-year-old gentleman with PMHX  of HTN, HLD , recent hx of pancreatitis, extensive alcohol use daily, presents for episodes of Hemetemesis and hematochezia. g.  Patient notes that today he developed a large volume bloody bowel movement along with an episode of blood vomiting.  Patient became dizzy after this episode and almost passed out.  Wife was present to supplement the history.  Patient was brought to the ER and still notes dizziness.  Patient had abdominal imaging in which she was found to have necrosis of the pancreas.  Surgery consulted for pancreatic necrosis.    Of note patient was admitted  to [facility] in [place] in total for 1 week in which she was treated for an uncomplicated pancreatitis.  Patient has never had prior documented pancreatitis.  Patient's wife notes that he has been having on and off intermittent epigastric pain more so towards the left upper quadrant and this has been going on for years.  Patient has been treating this pain with over-the-counter NSAIDs at times.   He drinking 5 ounces of vodka a day. Patient and wife deny him having prior endoscopic evaluation.    In the ED   T(F): 100.1 (24 Sep 2022 16:50), Max: 100.1 (24 Sep 2022 16:50)  HR: 95 (24 Sep 2022 16:50) (73 - 98)  BP: 112/58 (24 Sep 2022 16:50) (112/58 - 133/81)    Patient was transferred to CCU due to large bleeding ulcer found on EGD. Patient was started on PPI drip and morning labs. All antiplatelets and AC were stopped.       INTERVAL HISTORY:  No acute events overnight. Patient denies hematochezia, hemetemesis, abdominal pain, nausea or vomiting.   no necrosectomy performed yesterday, recent ulcer intervention prevented procedure (canceled)  no further clinical bleeding overnight      PAST MEDICAL & SURGICAL HISTORY  HTN (hypertension)  HLD      ALLERGIES:  No Known Allergies      MEDICATIONS:  MEDICATIONS  (STANDING):  atorvastatin 20 milliGRAM(s) Oral at bedtime  chlorhexidine 2% Cloths 1 Application(s) Topical daily  folic acid 1 milliGRAM(s) Oral daily  meropenem  IVPB 1000 milliGRAM(s) IV Intermittent every 8 hours  multivitamin 1 Tablet(s) Oral daily  pantoprazole Infusion 8 mG/Hr (10 mL/Hr) IV Continuous <Continuous>  senna 2 Tablet(s) Oral at bedtime    MEDICATIONS  (PRN):  aluminum hydroxide/magnesium hydroxide/simethicone Suspension 30 milliLiter(s) Oral every 4 hours PRN Dyspepsia  LORazepam     Tablet 2 milliGRAM(s) Oral every 4 hours PRN for CIWA score 8 or above  melatonin 3 milliGRAM(s) Oral at bedtime PRN Insomnia  ondansetron Injectable 4 milliGRAM(s) IV Push every 8 hours PRN Nausea and/or Vomiting      HOME MEDICATIONS:  Home Medications:  AMLODIPINE BESYLATE 5MG TAB:  (24 Sep 2022 18:16)  ATORVASTATIN CALCIUM 20MG TAB:  (24 Sep 2022 18:16)  LISINOPRIL 20 MG TABLET: TAKE 1 TABLET BY MOUTH EVERY DAY (24 Sep 2022 18:16)        OBJECTIVE:  ICU Vital Signs Last 24 Hrs  T(C): 36.8 (05 Oct 2022 08:00), Max: 36.8 (04 Oct 2022 20:00)  T(F): 98.2 (05 Oct 2022 08:00), Max: 98.2 (04 Oct 2022 20:00)  HR: 64 (05 Oct 2022 08:00) (61 - 78)  BP: 124/70 (05 Oct 2022 08:00) (1/- - 142/69)  BP(mean): 91 (05 Oct 2022 08:00) (74 - 99)  ABP: --  ABP(mean): --  RR: 20 (05 Oct 2022 08:00) (16 - 44)  SpO2: 97% (05 Oct 2022 08:00) (96% - 99%)    O2 Parameters below as of 05 Oct 2022 08:00  Patient On (Oxygen Delivery Method): room air          04 Oct 2022 07:01  -  05 Oct 2022 07:00  --------------------------------------------------------  IN: 2060 mL / OUT: 2450 mL / NET: -390 mL      Daily Height in cm: 167.6 (04 Oct 2022 19:37)    Daily Weight in k.5 (05 Oct 2022 04:00)    PHYSICAL EXAM:  NEURO: patient is awake , alert and oriented  GEN: Not in acute distress  NECK: no thyroid enlargement, no JVD  LUNGS: Clear to auscultation bilaterally   CARDIOVASCULAR: S1/S2 present, RRR , no murmurs or rubs, no carotid bruits,  + PP bilaterally  ABD: Soft, non-tender, non-distended, +BS  EXT: No KOFI  SKIN: Intact  ACCESS Site:    LABS:                        9.1    5.70  )-----------( 408      ( 05 Oct 2022 05:11 )             27.0     10-05    140  |  109  |  10  ----------------------------<  114<H>  4.5   |  24  |  0.6<L>    Ca    8.4      05 Oct 2022 05:11  Phos  3.2     10-04  Mg     1.9     10-05    TPro  5.1<L>  /  Alb  3.0<L>  /  TBili  0.3  /  DBili  x   /  AST  18  /  ALT  8   /  AlkPhos  85  10-05    PT/INR - ( 04 Oct 2022 04:41 )   PT: 12.60 sec;   INR: 1.10 ratio         PTT - ( 04 Oct 2022 04:41 )  PTT:29.7 sec      Troponin trend:       Chol 96 LDL -- HDL 29<L> Trig 101      RADIOLOGY:  < from: CT Angio Abdomen and Pelvis w/ IV Cont (22 @ 11:12) >  IMPRESSION:  Since 2022 at 10:19 AM:  1.  No evidence of active gastrointestinal hemorrhage.  2.  Findings of necrotizing pancreatitis with numerous peripancreatic   collections not significantly changed on this short interval follow-up.   See prior report for further details.  3.  New small bilateral pleural effusions.    < end of copied text >      < from: CT Abdomen and Pelvis w/ IV Cont (22 @ 10:32) >  IMPRESSION:    Findings of necrotizing pancreatitis. Multiple peripancreatic fluid   collections as described. Gas within some of these collections may   represent superinfection.    Additional areas of hypoenhancement in the pancreatic head, may also   represent sequela of necrotizing pancreatitis, however follow-up is   recommended.      < end of copied text >    - EGD showed GE junction ulcer, ulcer in antrum, erosive gastritis, large ulcer in duodenal bulb with visible vessel (injection, thermal therapy), clean based smaller ulcer in duodenal bulb.   - colonoscopy showed mild diverticulosis of sigmoid colon, internal and external hemorrhoids  - Advanced GI did EUS showing 7 cm x 6 cm fluid collection with debris consistent with walled off necrosis      ECG:    TELEMETRY EVENTS:

## 2022-10-06 LAB
ALBUMIN SERPL ELPH-MCNC: 2.7 G/DL — LOW (ref 3.5–5.2)
ALP SERPL-CCNC: 95 U/L — SIGNIFICANT CHANGE UP (ref 30–115)
ALT FLD-CCNC: 9 U/L — SIGNIFICANT CHANGE UP (ref 0–41)
ANION GAP SERPL CALC-SCNC: 8 MMOL/L — SIGNIFICANT CHANGE UP (ref 7–14)
AST SERPL-CCNC: 20 U/L — SIGNIFICANT CHANGE UP (ref 0–41)
BASOPHILS # BLD AUTO: 0.06 K/UL — SIGNIFICANT CHANGE UP (ref 0–0.2)
BASOPHILS NFR BLD AUTO: 1 % — SIGNIFICANT CHANGE UP (ref 0–1)
BILIRUB SERPL-MCNC: 0.2 MG/DL — SIGNIFICANT CHANGE UP (ref 0.2–1.2)
BUN SERPL-MCNC: 9 MG/DL — LOW (ref 10–20)
CALCIUM SERPL-MCNC: 8.2 MG/DL — LOW (ref 8.4–10.4)
CHLORIDE SERPL-SCNC: 106 MMOL/L — SIGNIFICANT CHANGE UP (ref 98–110)
CO2 SERPL-SCNC: 24 MMOL/L — SIGNIFICANT CHANGE UP (ref 17–32)
CREAT SERPL-MCNC: 0.8 MG/DL — SIGNIFICANT CHANGE UP (ref 0.7–1.5)
EGFR: 93 ML/MIN/1.73M2 — SIGNIFICANT CHANGE UP
EOSINOPHIL # BLD AUTO: 0.13 K/UL — SIGNIFICANT CHANGE UP (ref 0–0.7)
EOSINOPHIL NFR BLD AUTO: 2.1 % — SIGNIFICANT CHANGE UP (ref 0–8)
GLUCOSE SERPL-MCNC: 120 MG/DL — HIGH (ref 70–99)
HCT VFR BLD CALC: 26 % — LOW (ref 42–52)
HGB BLD-MCNC: 8.6 G/DL — LOW (ref 14–18)
IMM GRANULOCYTES NFR BLD AUTO: 0.5 % — HIGH (ref 0.1–0.3)
LYMPHOCYTES # BLD AUTO: 1.42 K/UL — SIGNIFICANT CHANGE UP (ref 1.2–3.4)
LYMPHOCYTES # BLD AUTO: 22.7 % — SIGNIFICANT CHANGE UP (ref 20.5–51.1)
MAGNESIUM SERPL-MCNC: 1.9 MG/DL — SIGNIFICANT CHANGE UP (ref 1.8–2.4)
MCHC RBC-ENTMCNC: 30.1 PG — SIGNIFICANT CHANGE UP (ref 27–31)
MCHC RBC-ENTMCNC: 33.1 G/DL — SIGNIFICANT CHANGE UP (ref 32–37)
MCV RBC AUTO: 90.9 FL — SIGNIFICANT CHANGE UP (ref 80–94)
MONOCYTES # BLD AUTO: 0.54 K/UL — SIGNIFICANT CHANGE UP (ref 0.1–0.6)
MONOCYTES NFR BLD AUTO: 8.6 % — SIGNIFICANT CHANGE UP (ref 1.7–9.3)
NEUTROPHILS # BLD AUTO: 4.07 K/UL — SIGNIFICANT CHANGE UP (ref 1.4–6.5)
NEUTROPHILS NFR BLD AUTO: 65.1 % — SIGNIFICANT CHANGE UP (ref 42.2–75.2)
NRBC # BLD: 0 /100 WBCS — SIGNIFICANT CHANGE UP (ref 0–0)
PLATELET # BLD AUTO: 366 K/UL — SIGNIFICANT CHANGE UP (ref 130–400)
POTASSIUM SERPL-MCNC: 4.4 MMOL/L — SIGNIFICANT CHANGE UP (ref 3.5–5)
POTASSIUM SERPL-SCNC: 4.4 MMOL/L — SIGNIFICANT CHANGE UP (ref 3.5–5)
PROT SERPL-MCNC: 5.3 G/DL — LOW (ref 6–8)
RBC # BLD: 2.86 M/UL — LOW (ref 4.7–6.1)
RBC # FLD: 14.5 % — SIGNIFICANT CHANGE UP (ref 11.5–14.5)
SODIUM SERPL-SCNC: 138 MMOL/L — SIGNIFICANT CHANGE UP (ref 135–146)
WBC # BLD: 6.25 K/UL — SIGNIFICANT CHANGE UP (ref 4.8–10.8)
WBC # FLD AUTO: 6.25 K/UL — SIGNIFICANT CHANGE UP (ref 4.8–10.8)

## 2022-10-06 PROCEDURE — 99233 SBSQ HOSP IP/OBS HIGH 50: CPT | Mod: GC

## 2022-10-06 RX ORDER — PANTOPRAZOLE SODIUM 20 MG/1
40 TABLET, DELAYED RELEASE ORAL
Refills: 0 | Status: DISCONTINUED | OUTPATIENT
Start: 2022-10-06 | End: 2022-10-07

## 2022-10-06 RX ADMIN — PANTOPRAZOLE SODIUM 10 MG/HR: 20 TABLET, DELAYED RELEASE ORAL at 05:21

## 2022-10-06 RX ADMIN — ATORVASTATIN CALCIUM 20 MILLIGRAM(S): 80 TABLET, FILM COATED ORAL at 21:59

## 2022-10-06 RX ADMIN — Medication 1 MILLIGRAM(S): at 12:09

## 2022-10-06 RX ADMIN — SENNA PLUS 2 TABLET(S): 8.6 TABLET ORAL at 21:58

## 2022-10-06 RX ADMIN — PANTOPRAZOLE SODIUM 40 MILLIGRAM(S): 20 TABLET, DELAYED RELEASE ORAL at 17:31

## 2022-10-06 RX ADMIN — MEROPENEM 100 MILLIGRAM(S): 1 INJECTION INTRAVENOUS at 05:45

## 2022-10-06 RX ADMIN — MEROPENEM 100 MILLIGRAM(S): 1 INJECTION INTRAVENOUS at 14:19

## 2022-10-06 RX ADMIN — Medication 1 TABLET(S): at 12:09

## 2022-10-06 RX ADMIN — MEROPENEM 100 MILLIGRAM(S): 1 INJECTION INTRAVENOUS at 21:58

## 2022-10-06 RX ADMIN — CHLORHEXIDINE GLUCONATE 1 APPLICATION(S): 213 SOLUTION TOPICAL at 12:14

## 2022-10-06 NOTE — PROGRESS NOTE ADULT - SUBJECTIVE AND OBJECTIVE BOX
HPI:  72-year-old gentleman with PMHX  of HTN, HLD , recent hx of pancreatitis, extensive alcohol use daily, presents for episodes of Hemetemesis and hematochezia. g.  Patient notes that today he developed a large volume bloody bowel movement along with an episode of blood vomitting.  Patient became dizzy after this episode and almost passed out.  Wife was present to supplement the history.  Patient was brought to the ER and still notes dizziness.  Patient had abdominal imaging in which she was found to have necrosis of the pancreas.  Surgery consulted for pancreatic necrosis.    Of note patient was admitted June 8 to [facility] in [place] in total for 1 week in which she was treated for an uncomplicated pancreatitis.  Patient has never had prior documented pancreatitis.  Patient's wife notes that he has been having on and off intermittent epigastric pain more so towards the left upper quadrant and this has been going on for years.  Patient has been treating this pain with over-the-counter NSAIDs at times.   He drinking 5 ounces of vodka a day. Patient and wife deny him having prior endoscopic evaluation.    In the ED   T(F): 100.1 (24 Sep 2022 16:50), Max: 100.1 (24 Sep 2022 16:50)  HR: 95 (24 Sep 2022 16:50) (73 - 98)  BP: 112/58 (24 Sep 2022 16:50) (112/58 - 133/81)      LABS:  cret                        6.7    9.97  )-----------( 295      ( 24 Sep 2022 17:30 )             20.1     09-24    134<L>  |  91<L>  |  21<H>  ----------------------------<  245<H>  4.0   |  34<H>  |  1.0    Ca    8.0<L>      24 Sep 2022 09:17    TPro  4.5<L>  /  Alb  2.5<L>  /  TBili  <0.2  /  DBili  <0.2  /  AST  31  /  ALT  28  /  AlkPhos  90  09-24    PT/INR - ( 24 Sep 2022 09:17 )   PT: 12.70 sec;   INR: 1.11 ratio         PTT - ( 24 Sep 2022 09:17 )  PTT:26.4 sec      < from: CT Abdomen and Pelvis w/ IV Cont (09.24.22 @ 10:32) >  Findings of necrotizing pancreatitis. Multiple peripancreatic fluid   collections as described. Gas within some of these collections may   represent superinfection.    < end of copied text >   (24 Sep 2022 18:11)      INTERVAL HISTORY:    PAST MEDICAL & SURGICAL HISTORY  HTN (hypertension)        ALLERGIES:  No Known Allergies      MEDICATIONS:  MEDICATIONS  (STANDING):  atorvastatin 20 milliGRAM(s) Oral at bedtime  chlorhexidine 2% Cloths 1 Application(s) Topical daily  folic acid 1 milliGRAM(s) Oral daily  meropenem  IVPB 1000 milliGRAM(s) IV Intermittent every 8 hours  multivitamin 1 Tablet(s) Oral daily  pantoprazole Infusion 8 mG/Hr (10 mL/Hr) IV Continuous <Continuous>  senna 2 Tablet(s) Oral at bedtime    MEDICATIONS  (PRN):  aluminum hydroxide/magnesium hydroxide/simethicone Suspension 30 milliLiter(s) Oral every 4 hours PRN Dyspepsia  LORazepam     Tablet 2 milliGRAM(s) Oral every 4 hours PRN for CIWA score 8 or above  melatonin 3 milliGRAM(s) Oral at bedtime PRN Insomnia  ondansetron Injectable 4 milliGRAM(s) IV Push every 8 hours PRN Nausea and/or Vomiting      HOME MEDICATIONS:  Home Medications:  AMLODIPINE BESYLATE 5MG TAB:  (24 Sep 2022 18:16)  ATORVASTATIN CALCIUM 20MG TAB:  (24 Sep 2022 18:16)  LISINOPRIL 20 MG TABLET: TAKE 1 TABLET BY MOUTH EVERY DAY (24 Sep 2022 18:16)        OBJECTIVE:  ICU Vital Signs Last 24 Hrs  T(C): 36.8 (06 Oct 2022 04:03), Max: 36.9 (05 Oct 2022 20:08)  T(F): 98.3 (06 Oct 2022 04:03), Max: 98.4 (05 Oct 2022 20:08)  HR: 70 (06 Oct 2022 04:03) (64 - 80)  BP: 111/66 (06 Oct 2022 04:03) (109/64 - 130/72)  BP(mean): 83 (06 Oct 2022 04:03) (81 - 94)  ABP: --  ABP(mean): --  RR: 20 (06 Oct 2022 04:03) (18 - 31)  SpO2: 97% (06 Oct 2022 04:03) (95% - 100%)    O2 Parameters below as of 06 Oct 2022 04:03  Patient On (Oxygen Delivery Method): room air            Adult Advanced Hemodynamics Last 24 Hrs  CVP(mm Hg): --  CVP(cm H2O): --  CO: --  CI: --  PA: --  PA(mean): --  PCWP: --  SVR: --  SVRI: --  PVR: --  PVRI: --  I&O's Summary    04 Oct 2022 07:01  -  05 Oct 2022 07:00  --------------------------------------------------------  IN: 2060 mL / OUT: 2450 mL / NET: -390 mL    05 Oct 2022 07:01  -  06 Oct 2022 06:40  --------------------------------------------------------  IN: 1140 mL / OUT: 650 mL / NET: 490 mL      Daily     Daily     PHYSICAL EXAM:  NEURO: patient is awake , alert and oriented  GEN: Not in acute distress  NECK: no thyroid enlargement, no JVD  LUNGS: Clear to auscultation bilaterally   CARDIOVASCULAR: S1/S2 present, RRR , no murmurs or rubs, no carotid bruits,  + PP bilaterally  ABD: Soft, non-tender, non-distended, +BS  EXT: No KOFI  SKIN: Intact  ACCESS Site:    LABS:                        8.6    6.25  )-----------( 366      ( 06 Oct 2022 05:22 )             26.0     10-06    138  |  106  |  9<L>  ----------------------------<  120<H>  4.4   |  24  |  0.8    Ca    8.2<L>      06 Oct 2022 05:22  Mg     1.9     10-06    TPro  5.3<L>  /  Alb  2.7<L>  /  TBili  0.2  /  DBili  x   /  AST  20  /  ALT  9   /  AlkPhos  95  10-06              Troponin trend:      09-25 Chol 96 LDL -- HDL 29<L> Trig 101      RADIOLOGY:  -CXR:  -TTE:  -STRESS TEST:  -CATHETERIZATION:    ECG:    TELEMETRY EVENTS:       HPI:  72-year-old gentleman with PMHX  of HTN, HLD , recent hx of pancreatitis, extensive alcohol use daily, presents for episodes of Hemetemesis and hematochezia. g.  Patient notes that today he developed a large volume bloody bowel movement along with an episode of blood vomitting.  Patient became dizzy after this episode and almost passed out.  Wife was present to supplement the history.  Patient was brought to the ER and still notes dizziness.  Patient had abdominal imaging in which she was found to have necrosis of the pancreas.  Surgery consulted for pancreatic necrosis.    Of note patient was admitted June 8 to [facility] in [place] in total for 1 week in which she was treated for an uncomplicated pancreatitis.  Patient has never had prior documented pancreatitis.  Patient's wife notes that he has been having on and off intermittent epigastric pain more so towards the left upper quadrant and this has been going on for years.  Patient has been treating this pain with over-the-counter NSAIDs at times.   He drinking 5 ounces of vodka a day. Patient and wife deny him having prior endoscopic evaluation.    In the ED   T(F): 100.1 (24 Sep 2022 16:50), Max: 100.1 (24 Sep 2022 16:50)  HR: 95 (24 Sep 2022 16:50) (73 - 98)  BP: 112/58 (24 Sep 2022 16:50) (112/58 - 133/81)      LABS:  cret                        6.7    9.97  )-----------( 295      ( 24 Sep 2022 17:30 )             20.1     09-24    134<L>  |  91<L>  |  21<H>  ----------------------------<  245<H>  4.0   |  34<H>  |  1.0    Ca    8.0<L>      24 Sep 2022 09:17    TPro  4.5<L>  /  Alb  2.5<L>  /  TBili  <0.2  /  DBili  <0.2  /  AST  31  /  ALT  28  /  AlkPhos  90  09-24    PT/INR - ( 24 Sep 2022 09:17 )   PT: 12.70 sec;   INR: 1.11 ratio         PTT - ( 24 Sep 2022 09:17 )  PTT:26.4 sec      < from: CT Abdomen and Pelvis w/ IV Cont (09.24.22 @ 10:32) >  Findings of necrotizing pancreatitis. Multiple peripancreatic fluid   collections as described. Gas within some of these collections may   represent superinfection.    < end of copied text >   (24 Sep 2022 18:11)      INTERVAL HISTORY:    no acute events  plan for endoscopy on Friday    PAST MEDICAL & SURGICAL HISTORY  HTN (hypertension)        ALLERGIES:  No Known Allergies      MEDICATIONS:  MEDICATIONS  (STANDING):  atorvastatin 20 milliGRAM(s) Oral at bedtime  chlorhexidine 2% Cloths 1 Application(s) Topical daily  folic acid 1 milliGRAM(s) Oral daily  meropenem  IVPB 1000 milliGRAM(s) IV Intermittent every 8 hours  multivitamin 1 Tablet(s) Oral daily  pantoprazole Infusion 8 mG/Hr (10 mL/Hr) IV Continuous <Continuous>  senna 2 Tablet(s) Oral at bedtime    MEDICATIONS  (PRN):  aluminum hydroxide/magnesium hydroxide/simethicone Suspension 30 milliLiter(s) Oral every 4 hours PRN Dyspepsia  LORazepam     Tablet 2 milliGRAM(s) Oral every 4 hours PRN for CIWA score 8 or above  melatonin 3 milliGRAM(s) Oral at bedtime PRN Insomnia  ondansetron Injectable 4 milliGRAM(s) IV Push every 8 hours PRN Nausea and/or Vomiting      HOME MEDICATIONS:  Home Medications:  AMLODIPINE BESYLATE 5MG TAB:  (24 Sep 2022 18:16)  ATORVASTATIN CALCIUM 20MG TAB:  (24 Sep 2022 18:16)  LISINOPRIL 20 MG TABLET: TAKE 1 TABLET BY MOUTH EVERY DAY (24 Sep 2022 18:16)        OBJECTIVE:  ICU Vital Signs Last 24 Hrs  T(C): 36.8 (06 Oct 2022 04:03), Max: 36.9 (05 Oct 2022 20:08)  T(F): 98.3 (06 Oct 2022 04:03), Max: 98.4 (05 Oct 2022 20:08)  HR: 70 (06 Oct 2022 04:03) (64 - 80)  BP: 111/66 (06 Oct 2022 04:03) (109/64 - 130/72)  BP(mean): 83 (06 Oct 2022 04:03) (81 - 94)  ABP: --  ABP(mean): --  RR: 20 (06 Oct 2022 04:03) (18 - 31)  SpO2: 97% (06 Oct 2022 04:03) (95% - 100%)    O2 Parameters below as of 06 Oct 2022 04:03  Patient On (Oxygen Delivery Method): room air            Adult Advanced Hemodynamics Last 24 Hrs  CVP(mm Hg): --  CVP(cm H2O): --  CO: --  CI: --  PA: --  PA(mean): --  PCWP: --  SVR: --  SVRI: --  PVR: --  PVRI: --  I&O's Summary    04 Oct 2022 07:01  -  05 Oct 2022 07:00  --------------------------------------------------------  IN: 2060 mL / OUT: 2450 mL / NET: -390 mL    05 Oct 2022 07:01  -  06 Oct 2022 06:40  --------------------------------------------------------  IN: 1140 mL / OUT: 650 mL / NET: 490 mL      Daily     Daily     PHYSICAL EXAM:  NEURO: patient is awake , alert and oriented  GEN: Not in acute distress  NECK: no thyroid enlargement, no JVD  LUNGS: Clear to auscultation bilaterally   CARDIOVASCULAR: S1/S2 present, RRR , no murmurs or rubs, no carotid bruits,  + PP bilaterally  ABD: Soft, non-tender, non-distended, +BS  EXT: No KOFI  SKIN: Intact  ACCESS Site:    LABS:                        8.6    6.25  )-----------( 366      ( 06 Oct 2022 05:22 )             26.0     10-06    138  |  106  |  9<L>  ----------------------------<  120<H>  4.4   |  24  |  0.8    Ca    8.2<L>      06 Oct 2022 05:22  Mg     1.9     10-06    TPro  5.3<L>  /  Alb  2.7<L>  /  TBili  0.2  /  DBili  x   /  AST  20  /  ALT  9   /  AlkPhos  95  10-06              Troponin trend:      09-25 Chol 96 LDL -- HDL 29<L> Trig 101      RADIOLOGY:  -CXR:  -TTE:  -STRESS TEST:  -CATHETERIZATION:    ECG:    TELEMETRY EVENTS:

## 2022-10-06 NOTE — PROGRESS NOTE ADULT - ASSESSMENT
· Assessment	  CHRIS KHAN is a 72y man with a medical history significant for EtOH abuse who presented initially with acute pancreatitis, and is now in the critical care unit for elevated risk of decompensation due to high risk endoscopic lesion.     Impression    Acute Necrotizing Pancreatitis 2/2 EtOH abuse  Multiple miracle- pancreatic fluid collections  Visible Vessel on EGD, High risk of rebleed  Gastric Ulcer  Duodenal Ulcer  Acute Lower GIB (improving)      Plan:    CNS:  -CIWA reactive withdrawal protocol    HEENT:  -Oral care    CARDIOVASCULAR  -No acute concerns, holding antihypertensives for now    PULMONARY  -HOB @ 45 degrees, aspiration precautions  tolerating room air    GASTROINTESTINAL  -Advanced GI endoscope procedure with necrosectomy cancelled yesterday   -no additional advanced endoscopy for necrosectomy planned for this admission as per GI  -PPI gtt  -Maintain 2x large bore IV access, maintain active type & screen  -Feeds: advance to clears, continue advancing per GI  -BM: regimen PRN  -Monitor high risk bleed for atleast another 24-48 hours     GENITOURINARY/RENAL  -good UOP, no Cr elevation, monitor renal function daily for high risk pancreatitis    INFECTIOUS  -On meropenem for necrotic pancreatic fluid collection culture positive for ESBL and Proteus    HEMATOLOGIC  -DVT ppx: hold for high bleed risk  -SCDs    ENDOCRINE  -Follow up FS.  Insulin protocol as needed.  BG goal 140-180    MSK/DERM  -PT/OT, OOB as tolerated    CODE STATUS: FULL  DISPO: Remain in ICU   · Assessment	  CHRIS KHAN is a 72y man with a medical history significant for EtOH abuse who presented initially with acute pancreatitis, and is now in the critical care unit for elevated risk of decompensation due to high risk endoscopic lesion.     Impression    Acute Necrotizing Pancreatitis 2/2 EtOH abuse  Multiple miracle- pancreatic fluid collections  Visible Vessel on EGD, High risk of rebleed  Gastric Ulcer  Duodenal Ulcer  Acute Lower GIB (improving)      Plan:    CNS:  -Complete CIWA-targetted taper    HEENT:  -Oral care    CARDIOVASCULAR  -No acute concerns, holding antihypertensives for now    PULMONARY  -HOB @ 45 degrees, aspiration precautions  tolerating room air    GASTROINTESTINAL  -no additional advanced endoscopy for necrosectomy planned for this Friday as per GI  -PPI BID  -Maintain 2x large bore IV access, maintain active type & screen  -Feeds: advance to clears, continue advancing per GI  -BM: regimen PRN  -Monitor high risk bleed for atleast another 24-48 hours     GENITOURINARY/RENAL  -good UOP, no Cr elevation, monitor renal function daily for high risk pancreatitis    INFECTIOUS  -On meropenem for necrotic pancreatic fluid collection culture positive for ESBL and Proteus  -ID following, appreciate recommendations    HEMATOLOGIC  -DVT ppx: hold for high bleed risk  -SCDs    ENDOCRINE  -Follow up FS.  Insulin protocol as needed.  BG goal 140-180    MSK/DERM  -PT/OT, OOB as tolerated    CODE STATUS: FULL  DISPO: med-tele

## 2022-10-06 NOTE — PROGRESS NOTE ADULT - ATTENDING COMMENTS
Mr Redd is doing well s/p endoscopic control of bleeding, decreasing risk of rebleeding.  He is now awaiting necrosectomy with GI for tomorrow.  I agree with the resident note, with the exceptions listed in my attestation above.  The remainder of impression and plan per resident note.

## 2022-10-06 NOTE — CHART NOTE - NSCHARTNOTEFT_GEN_A_CORE
RN called to report that patient procedure is postponed for tomorrow. DIET from prior is resumed and now npo at midnight.

## 2022-10-07 ENCOUNTER — TRANSCRIPTION ENCOUNTER (OUTPATIENT)
Age: 72
End: 2022-10-07

## 2022-10-07 VITALS — WEIGHT: 145.51 LBS | HEIGHT: 68 IN

## 2022-10-07 LAB
ALBUMIN SERPL ELPH-MCNC: 3.3 G/DL — LOW (ref 3.5–5.2)
ALP SERPL-CCNC: 103 U/L — SIGNIFICANT CHANGE UP (ref 30–115)
ALT FLD-CCNC: 9 U/L — SIGNIFICANT CHANGE UP (ref 0–41)
ANION GAP SERPL CALC-SCNC: 9 MMOL/L — SIGNIFICANT CHANGE UP (ref 7–14)
AST SERPL-CCNC: 19 U/L — SIGNIFICANT CHANGE UP (ref 0–41)
BASOPHILS # BLD AUTO: 0.08 K/UL — SIGNIFICANT CHANGE UP (ref 0–0.2)
BASOPHILS NFR BLD AUTO: 1.4 % — HIGH (ref 0–1)
BILIRUB SERPL-MCNC: 0.3 MG/DL — SIGNIFICANT CHANGE UP (ref 0.2–1.2)
BLD GP AB SCN SERPL QL: SIGNIFICANT CHANGE UP
BLD GP AB SCN SERPL QL: SIGNIFICANT CHANGE UP
BUN SERPL-MCNC: 10 MG/DL — SIGNIFICANT CHANGE UP (ref 10–20)
CALCIUM SERPL-MCNC: 8.6 MG/DL — SIGNIFICANT CHANGE UP (ref 8.4–10.5)
CHLORIDE SERPL-SCNC: 105 MMOL/L — SIGNIFICANT CHANGE UP (ref 98–110)
CO2 SERPL-SCNC: 24 MMOL/L — SIGNIFICANT CHANGE UP (ref 17–32)
CREAT SERPL-MCNC: 0.6 MG/DL — LOW (ref 0.7–1.5)
EGFR: 102 ML/MIN/1.73M2 — SIGNIFICANT CHANGE UP
EOSINOPHIL # BLD AUTO: 0.08 K/UL — SIGNIFICANT CHANGE UP (ref 0–0.7)
EOSINOPHIL NFR BLD AUTO: 1.4 % — SIGNIFICANT CHANGE UP (ref 0–8)
GLUCOSE SERPL-MCNC: 120 MG/DL — HIGH (ref 70–99)
HCT VFR BLD CALC: 28.3 % — LOW (ref 42–52)
HGB BLD-MCNC: 9.4 G/DL — LOW (ref 14–18)
IMM GRANULOCYTES NFR BLD AUTO: 0.5 % — HIGH (ref 0.1–0.3)
LYMPHOCYTES # BLD AUTO: 1.39 K/UL — SIGNIFICANT CHANGE UP (ref 1.2–3.4)
LYMPHOCYTES # BLD AUTO: 24 % — SIGNIFICANT CHANGE UP (ref 20.5–51.1)
MAGNESIUM SERPL-MCNC: 2 MG/DL — SIGNIFICANT CHANGE UP (ref 1.8–2.4)
MCHC RBC-ENTMCNC: 30.6 PG — SIGNIFICANT CHANGE UP (ref 27–31)
MCHC RBC-ENTMCNC: 33.2 G/DL — SIGNIFICANT CHANGE UP (ref 32–37)
MCV RBC AUTO: 92.2 FL — SIGNIFICANT CHANGE UP (ref 80–94)
MONOCYTES # BLD AUTO: 0.44 K/UL — SIGNIFICANT CHANGE UP (ref 0.1–0.6)
MONOCYTES NFR BLD AUTO: 7.6 % — SIGNIFICANT CHANGE UP (ref 1.7–9.3)
NEUTROPHILS # BLD AUTO: 3.77 K/UL — SIGNIFICANT CHANGE UP (ref 1.4–6.5)
NEUTROPHILS NFR BLD AUTO: 65.1 % — SIGNIFICANT CHANGE UP (ref 42.2–75.2)
NRBC # BLD: 0 /100 WBCS — SIGNIFICANT CHANGE UP (ref 0–0)
PLATELET # BLD AUTO: 392 K/UL — SIGNIFICANT CHANGE UP (ref 130–400)
POTASSIUM SERPL-MCNC: 4.6 MMOL/L — SIGNIFICANT CHANGE UP (ref 3.5–5)
POTASSIUM SERPL-SCNC: 4.6 MMOL/L — SIGNIFICANT CHANGE UP (ref 3.5–5)
PROT SERPL-MCNC: 5.8 G/DL — LOW (ref 6–8)
RBC # BLD: 3.07 M/UL — LOW (ref 4.7–6.1)
RBC # FLD: 14.8 % — HIGH (ref 11.5–14.5)
SODIUM SERPL-SCNC: 138 MMOL/L — SIGNIFICANT CHANGE UP (ref 135–146)
WBC # BLD: 5.79 K/UL — SIGNIFICANT CHANGE UP (ref 4.8–10.8)
WBC # FLD AUTO: 5.79 K/UL — SIGNIFICANT CHANGE UP (ref 4.8–10.8)

## 2022-10-07 PROCEDURE — 99232 SBSQ HOSP IP/OBS MODERATE 35: CPT | Mod: GC

## 2022-10-07 RX ORDER — ONDANSETRON 8 MG/1
4 TABLET, FILM COATED ORAL ONCE
Refills: 0 | Status: DISCONTINUED | OUTPATIENT
Start: 2022-10-07 | End: 2022-10-07

## 2022-10-07 RX ORDER — ATORVASTATIN CALCIUM 80 MG/1
0 TABLET, FILM COATED ORAL
Qty: 0 | Refills: 0 | DISCHARGE

## 2022-10-07 RX ORDER — PANTOPRAZOLE SODIUM 20 MG/1
1 TABLET, DELAYED RELEASE ORAL
Qty: 30 | Refills: 0
Start: 2022-10-07 | End: 2022-11-05

## 2022-10-07 RX ORDER — INFLUENZA VIRUS VACCINE 15; 15; 15; 15 UG/.5ML; UG/.5ML; UG/.5ML; UG/.5ML
0.7 SUSPENSION INTRAMUSCULAR ONCE
Refills: 0 | Status: DISCONTINUED | OUTPATIENT
Start: 2022-10-07 | End: 2022-10-07

## 2022-10-07 RX ORDER — AMLODIPINE BESYLATE 2.5 MG/1
0 TABLET ORAL
Qty: 0 | Refills: 0 | DISCHARGE

## 2022-10-07 RX ORDER — PANTOPRAZOLE SODIUM 20 MG/1
1 TABLET, DELAYED RELEASE ORAL
Qty: 60 | Refills: 0
Start: 2022-10-07 | End: 2022-11-05

## 2022-10-07 RX ADMIN — MEROPENEM 100 MILLIGRAM(S): 1 INJECTION INTRAVENOUS at 06:18

## 2022-10-07 RX ADMIN — PANTOPRAZOLE SODIUM 40 MILLIGRAM(S): 20 TABLET, DELAYED RELEASE ORAL at 06:07

## 2022-10-07 NOTE — DISCHARGE NOTE NURSING/CASE MANAGEMENT/SOCIAL WORK - NSDCPEFALRISK_GEN_ALL_CORE
For information on Fall & Injury Prevention, visit: [email or web address] OR  [email or web address] OR  https://www.cdc.gov/steadi/patient.html

## 2022-10-07 NOTE — PRE-ANESTHESIA EVALUATION ADULT - NSANTHAPLANRD_GEN_ALL_CORE
monitored anesthesia care (MAC)

## 2022-10-07 NOTE — CHART NOTE - NSCHARTNOTESELECT_GEN_ALL_CORE
EGD-colonoscopy/Event Note
EGD/colonoscopy tomorrow/Event Note
Gastroenterology/Event Note
Transfer Note
Event Note
Event Note
NPO/Event Note
Transfer Note
Transfer Note
Transfer To ICU/Transfer Note
gastroenterology/Event Note

## 2022-10-07 NOTE — PRE-ANESTHESIA EVALUATION ADULT - NSANTHPMHFT_GEN_ALL_CORE
Chart reviewed, Med/GI/Pulmonary evaluation seen, pt interviewed and examined. Labs, EKG, Echo report seen.
PMHx: bifascicular heart block, HTN, HLD  +ESBL  Denies n/v, f/c, SOB/CP, or recent URI.

## 2022-10-07 NOTE — DISCHARGE NOTE PROVIDER - HOSPITAL COURSE
72-year-old gentleman with PMHX  of HTN, HLD , recent hx of pancreatitis, extensive alcohol use daily, presents for episodes of Hemetemesis and hematochezia. g.  Patient notes that today he developed a large volume bloody bowel movement along with an episode of blood vomitting.  Patient became dizzy after this episode and almost passed out.  Wife was present to supplement the history.  Patient was brought to the ER and still notes dizziness.  Patient had abdominal imaging in which she was found to have necrosis of the pancreas.  Surgery consulted for pancreatic necrosis.    Of note patient was admitted June 8 to [facility] in [place] in total for 1 week in which she was treated for an uncomplicated pancreatitis.  Patient has never had prior documented pancreatitis.  Patient's wife notes that he has been having on and off intermittent epigastric pain more so towards the left upper quadrant and this has been going on for years.  Patient has been treating this pain with over-the-counter NSAIDs at times.   He drinking 5 ounces of vodka a day. Patient and wife deny him having prior endoscopic evaluation.    In the ED   T(F): 100.1 (24 Sep 2022 16:50), Max: 100.1 (24 Sep 2022 16:50)  HR: 95 (24 Sep 2022 16:50) (73 - 98)  BP: 112/58 (24 Sep 2022 16:50) (112/58 - 133/81)     CT Abdomen and Pelvis w/ IV Cont (09.24.22 @ 10:32) >  Findings of necrotizing pancreatitis. Multiple peripancreatic fluid   collections as described. Gas within some of these collections may   represent superinfection.    Of note, pt was recently admitted to [facility] for 1 week in June for uncomplicated pancreatitis. He had no hx of pancreatitis but has had intermittent epigastric/LUQ pain for years. He drinks about 5oz of vodka a day.   Hgb 4.8 in the ED. Transfused 2u PRBC. F/u hgb 6.7. Transfused 3rd unit -> 8.3    EGD/Colonoscopy 9/27:   -EGD: GE junction ulcer, ulcer in antrum, erosive gastritis, large ulcer in duodenal bulb with visible vessel (injection, thermal therapy), clean based smaller ulcer in duodenal bulb.   -Colonoscopy: mild diverticulosis of sigmoid colon, internal and external hemorrhoids  >>c/w IV PPI q12h x 8weeks on discharge, keep NPO for today, monitor hgb q12 (CBC q12), repeat EGD in 8 weeks. rec alcohol cessation, avoid NSAIDs    >EUS 9/28:  -EUS showed 7 cm x 6 cm fluid collection with debris consistent with walled off necrosis  Successful placement of lumen apposing metal stent (Axios) stent 15mm x 10mm   -rec clear liquid diet and now advanced to full liquid diet, start zosyn, c/w PPI BID    >10/3 Patient went for endoscopy and a large bleeding ulcer was found. Intervention was done and Dr. Gildardo Encinas was informed and approved transfer to ICU. Patient will be started on PPI drip and all antiplatelets and AC will be stopped.   patient was started on meropenem for necrotic pancreatic fluid collection culture positive for ESBL and Proteus  - Repeat EGD was done 10/4  - patient had no melena or events of bleeding hgb stable.   - Patient was downgraded to telemetry, tolerating PPI, vitals stable, on oral diet. Patient was planned to go for pancreatic necrosectomy but patient refused because he did not want to wait anymore. Patient Signed out AMA.

## 2022-10-07 NOTE — DISCHARGE NOTE PROVIDER - CARE PROVIDER_API CALL
NELLY COVARRUBIAS  Internal Medicine  [address] [place] [address]  Phone: [phone]  Fax: [phone]  Follow Up Time: 1 week    Ted Novoa)  Gastroenterology; Internal Medicine  [address] [place] [address]  Phone: [phone]  Fax: [phone]  Follow Up Time: 2 weeks

## 2022-10-07 NOTE — DISCHARGE NOTE PROVIDER - NSDCCPCAREPLAN_GEN_ALL_CORE_FT
PRINCIPAL DISCHARGE DIAGNOSIS  Diagnosis: Anemia  Assessment and Plan of Treatment: you were admitted for bleeding. We found an ulcer in the duodenum that was causing you to bleed. We treated the ulcer with edoscopic procedure. Please continue to take the pantoprazole 2x a day it will reduce the occurence of bleeding. It is improtant to follow up with the gastroenterologist to ensure the ulcer is healing. If you have any more bleeding or have palpitations, SO, chest pain, dizziness or confusion please come back to the ED.      SECONDARY DISCHARGE DIAGNOSES  Diagnosis: Pancreatitis, necrotizing  Assessment and Plan of Treatment: You were found to have dead tissue of the pancreas this is caused from multiple episodes of pancreatitis. During your stay they found you to have an infection within that dead tisue. We treated you with IV antibiotics. We wanted to do another procedure to remove some of the dead tissue. Please follow up with the GI team to make sure you are healing properly and that the pancreatitis does not get worse. If you have worsening abdominal pain, nausea, vomiting, diarrhea or signs of infection like fever, confusion, palpitations, or shortness of breath please come to the ED.

## 2022-10-07 NOTE — DISCHARGE NOTE NURSING/CASE MANAGEMENT/SOCIAL WORK - PATIENT PORTAL LINK FT
You can access the FollowMyHealth Patient Portal offered by [facility] by registering at the following website: [email or web address]. By joining [facility]’s FollowMyHealth portal, you will also be able to view your health information using other applications (apps) compatible with our system.

## 2022-10-07 NOTE — DISCHARGE NOTE PROVIDER - NSDCMRMEDTOKEN_GEN_ALL_CORE_FT
AMLODIPINE BESYLATE 5MG TAB: 1 tab(s) orally once a day  ATORVASTATIN CALCIUM 20MG TAB: 1 tab(s) orally once a day  LISINOPRIL 20 MG TABLET: TAKE 1 TABLET BY MOUTH EVERY DAY  Multiple Vitamins oral tablet: 1 tab(s) orally once a day  pantoprazole 40 mg oral delayed release tablet: 1 tab(s) orally 2 times a day

## 2022-10-07 NOTE — PROGRESS NOTE ADULT - REASON FOR ADMISSION
UGIB
UGIB, necrotizing pancreatitis
UGIB

## 2022-10-07 NOTE — DISCHARGE NOTE PROVIDER - CARE PROVIDERS DIRECT ADDRESSES
,DirectAddress_Unknown,[email or web address]

## 2022-10-07 NOTE — CHART NOTE - NSCHARTNOTEFT_GEN_A_CORE
Pt seen and examined at bedside with wife and resident. Pt frustrated that procedure cancelled so many times and can no longer trust that he will go for procedure today. Gi team aware and assured that patient would go for procedure today. Risks of possible death explained. Pt opted to leave.

## 2022-10-07 NOTE — DISCHARGE NOTE PROVIDER - PROVIDER TOKENS
PROVIDER:[TOKEN:[[id number]],FOLLOWUP:[1 week]],PROVIDER:[TOKEN:[[id number]],FOLLOWUP:[2 weeks]]
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
## 2022-10-07 NOTE — PROGRESS NOTE ADULT - SUBJECTIVE AND OBJECTIVE BOX
Gastroenterology progress note:     Patient is a 72y old  Male who presents with a chief complaint of UGIB (06 Oct 2022 06:39)       Admitted on: 09-24-22    We are following the patient for: necrotising pancraetitis     Interval History:  No abdominal pain, can tolerate PO intake      PAST MEDICAL & SURGICAL HISTORY:  HTN (hypertension)          MEDICATIONS  (STANDING):  atorvastatin 20 milliGRAM(s) Oral at bedtime  chlorhexidine 2% Cloths 1 Application(s) Topical daily  folic acid 1 milliGRAM(s) Oral daily  meropenem  IVPB 1000 milliGRAM(s) IV Intermittent every 8 hours  multivitamin 1 Tablet(s) Oral daily  pantoprazole  Injectable 40 milliGRAM(s) IV Push two times a day  senna 2 Tablet(s) Oral at bedtime    MEDICATIONS  (PRN):  aluminum hydroxide/magnesium hydroxide/simethicone Suspension 30 milliLiter(s) Oral every 4 hours PRN Dyspepsia  melatonin 3 milliGRAM(s) Oral at bedtime PRN Insomnia  ondansetron Injectable 4 milliGRAM(s) IV Push every 8 hours PRN Nausea and/or Vomiting  ondansetron Injectable 4 milliGRAM(s) IV Push once PRN Nausea and/or Vomiting      Allergies  No Known Allergies      Review of Systems:   Constitutional: Ne Fever, No chills, No weakness  ENT: No visual changes, No throat pain  Cardiovascular:  No Chest Pain, No Palpitations  Respiratory:  No Cough, No Dyspnea  Gastrointestinal:  As described in HPI  Neurological: No numbness or weakness  Skin: No rash, no itching    Physical Examination:  T(C): 36.4 (10-07-22 @ 04:58), Max: 37 (10-06-22 @ 19:57)  HR: 68 (10-07-22 @ 04:58) (65 - 77)  BP: 106/65 (10-07-22 @ 04:58) (106/65 - 123/70)  RR: 18 (10-07-22 @ 04:58) (18 - 23)  SpO2: 98% (10-06-22 @ 16:00) (96% - 98%)  Weight (kg): 66 (10-07-22 @ 09:31)    10-06-22 @ 07:01  -  10-07-22 @ 07:00  --------------------------------------------------------  IN: 120 mL / OUT: 750 mL / NET: -630 mL      Constitutional: No acute distress.  Respiratory:  No signs of respiratory distress. Lung sounds are clear bilaterally.  Cardiovascular:  S1 S2, Regular rate and rhythm.  Abdominal: Abdomen is soft, symmetric, and non-tender without distention. There are no visible lesions. Bowel sounds are present and normoactive in all four quadrants. No masses, hepatomegaly, or splenomegaly are noted.   Skin: No rashes, No Jaundice.  Neurology: AAOX3, Non-focal  Skin: No rash, No excoriation  Vascular: No varicose vein, No cyanosis, No edema        Data: (reviewed by attending)                        9.4    5.79  )-----------( 392      ( 07 Oct 2022 07:29 )             28.3     Hgb trend:  9.4  10-07-22 @ 07:29  8.6  10-06-22 @ 05:22  9.1  10-05-22 @ 05:11        10-07    138  |  105  |  10  ----------------------------<  120<H>  4.6   |  24  |  0.6<L>    Ca    8.6      07 Oct 2022 07:29  Mg     2.0     10-07    TPro  5.8<L>  /  Alb  3.3<L>  /  TBili  0.3  /  DBili  x   /  AST  19  /  ALT  9   /  AlkPhos  103  10-07    Liver panel trend:  TBili 0.3   /   AST 19   /   ALT 9   /   AlkP 103   /   Tptn 5.8   /   Alb 3.3    /   DBili --      10-07  TBili 0.2   /   AST 20   /   ALT 9   /   AlkP 95   /   Tptn 5.3   /   Alb 2.7    /   DBili --      10-06  TBili 0.3   /   AST 18   /   ALT 8   /   AlkP 85   /   Tptn 5.1   /   Alb 3.0    /   DBili --      10-05  TBili 0.3   /   AST 16   /   ALT 7   /   AlkP 77   /   Tptn 4.9   /   Alb 2.8    /   DBili --      10-04  TBili 0.2   /   AST 19   /   ALT 8   /   AlkP 80   /   Tptn 5.1   /   Alb 2.8    /   DBili --      10-03  TBili 0.3   /   AST 18   /   ALT 8   /   AlkP 85   /   Tptn 5.3   /   Alb 2.7    /   DBili --      10-03  TBili 0.3   /   AST 14   /   ALT 7   /   AlkP 83   /   Tptn 5.1   /   Alb 2.9    /   DBili --      10-02  TBili 0.3   /   AST 13   /   ALT 6   /   AlkP 76   /   Tptn 4.5   /   Alb 2.4    /   DBili --      10-01  TBili 0.2   /   AST 14   /   ALT 6   /   AlkP 73   /   Tptn 4.7   /   Alb 2.3    /   DBili -- 09-30  TBili 0.3   /   AST 10   /   ALT 6   /   AlkP 74   /   Tptn 4.5   /   Alb 2.3    /   DBili -- 09-29  TBili 0.3   /   AST 13   /   ALT 9   /   AlkP 90   /   Tptn 5.3   /   Alb 2.6    /   DBili -- 09-28  TBili 0.3   /   AST 18   /   ALT 10   /   AlkP 91   /   Tptn 5.2   /   Alb 2.5    /   DBili --      09-27             Radiology: (reviewed by attending)

## 2022-10-07 NOTE — CHART NOTE - NSCHARTNOTEFT_GEN_A_CORE
Patient refused COVID swab this morning. He was informed that without a COVID test his EGD may be cancelled today. Patient stated that he no longer wishes to proceed with the EGD.

## 2022-10-07 NOTE — PROGRESS NOTE ADULT - ATTENDING COMMENTS
Patient seen and examined during the GI advanced endoscopy rounds S/P Transgastric placement of luminal opposing stent with drainage of collection- Additional Necrotic tissue noted so will need additional session; However unable to complete necrosectomy due to active GI bleeding and stomach full of blood, patient needed control of bleeding twice, noncompliant and not cooperative explained to patient that he needs repeated necrosectomy to be able to clear the necrotic tissue, does not want to stay today, scheduled for procedure today, will follow

## 2022-10-12 DIAGNOSIS — K26.0 ACUTE DUODENAL ULCER WITH HEMORRHAGE: ICD-10-CM

## 2022-10-12 DIAGNOSIS — K85.92 ACUTE PANCREATITIS WITH INFECTED NECROSIS, UNSPECIFIED: ICD-10-CM

## 2022-10-12 DIAGNOSIS — D62 ACUTE POSTHEMORRHAGIC ANEMIA: ICD-10-CM

## 2022-10-12 DIAGNOSIS — K64.4 RESIDUAL HEMORRHOIDAL SKIN TAGS: ICD-10-CM

## 2022-10-12 DIAGNOSIS — B96.4 PROTEUS (MIRABILIS) (MORGANII) AS THE CAUSE OF DISEASES CLASSIFIED ELSEWHERE: ICD-10-CM

## 2022-10-12 DIAGNOSIS — E83.42 HYPOMAGNESEMIA: ICD-10-CM

## 2022-10-12 DIAGNOSIS — K64.8 OTHER HEMORRHOIDS: ICD-10-CM

## 2022-10-12 DIAGNOSIS — Z16.12 EXTENDED SPECTRUM BETA LACTAMASE (ESBL) RESISTANCE: ICD-10-CM

## 2022-10-12 DIAGNOSIS — E78.5 HYPERLIPIDEMIA, UNSPECIFIED: ICD-10-CM

## 2022-10-12 DIAGNOSIS — F10.10 ALCOHOL ABUSE, UNCOMPLICATED: ICD-10-CM

## 2022-10-12 DIAGNOSIS — K57.33 DIVERTICULITIS OF LARGE INTESTINE WITHOUT PERFORATION OR ABSCESS WITH BLEEDING: ICD-10-CM

## 2022-10-12 DIAGNOSIS — I95.9 HYPOTENSION, UNSPECIFIED: ICD-10-CM

## 2022-10-12 DIAGNOSIS — I10 ESSENTIAL (PRIMARY) HYPERTENSION: ICD-10-CM

## 2022-10-12 DIAGNOSIS — K29.01 ACUTE GASTRITIS WITH BLEEDING: ICD-10-CM

## 2022-10-18 ENCOUNTER — APPOINTMENT (OUTPATIENT)
Dept: GASTROENTEROLOGY | Facility: CLINIC | Age: 72
End: 2022-10-18

## 2022-10-18 VITALS — HEIGHT: 68 IN | WEIGHT: 150 LBS | BODY MASS INDEX: 22.73 KG/M2

## 2022-10-18 DIAGNOSIS — Z78.9 OTHER SPECIFIED HEALTH STATUS: ICD-10-CM

## 2022-10-18 DIAGNOSIS — Z12.11 ENCOUNTER FOR SCREENING FOR MALIGNANT NEOPLASM OF COLON: ICD-10-CM

## 2022-10-18 DIAGNOSIS — K85.90 ACUTE PANCREATITIS WITHOUT NECROSIS OR INFECTION, UNSPECIFIED: ICD-10-CM

## 2022-10-18 DIAGNOSIS — Z86.79 PERSONAL HISTORY OF OTHER DISEASES OF THE CIRCULATORY SYSTEM: ICD-10-CM

## 2022-10-18 DIAGNOSIS — Z86.39 PERSONAL HISTORY OF OTHER ENDOCRINE, NUTRITIONAL AND METABOLIC DISEASE: ICD-10-CM

## 2022-10-18 DIAGNOSIS — K25.3 ACUTE GASTRIC ULCER W/OUT HEMORRHAGE OR PERFORATION: ICD-10-CM

## 2022-10-18 PROBLEM — Z00.00 ENCOUNTER FOR PREVENTIVE HEALTH EXAMINATION: Status: ACTIVE | Noted: 2022-10-18

## 2022-10-18 PROBLEM — I10 ESSENTIAL (PRIMARY) HYPERTENSION: Chronic | Status: ACTIVE | Noted: 2022-09-24

## 2022-10-18 PROCEDURE — 99214 OFFICE O/P EST MOD 30 MIN: CPT

## 2022-10-18 PROCEDURE — ZZZZZ: CPT

## 2022-10-18 RX ORDER — PANTOPRAZOLE 40 MG/1
40 TABLET, DELAYED RELEASE ORAL DAILY
Qty: 90 | Refills: 3 | Status: ACTIVE | COMMUNITY
Start: 2022-10-18 | End: 1900-01-01

## 2022-10-18 RX ORDER — PANTOPRAZOLE 40 MG/1
40 TABLET, DELAYED RELEASE ORAL
Refills: 0 | Status: ACTIVE | COMMUNITY

## 2022-10-18 NOTE — ASSESSMENT
[FreeTextEntry1] :  Reviewed inpatient CT\par Reviewed inpatietn egd x3\par Reviewed inpatiient cbc,cmp\par patient will need repeat EGD for followup of gastric ulcers and management of axios stent

## 2022-10-18 NOTE — REVIEW OF SYSTEMS
[Feeling Tired] : feeling tired [Recent Weight Loss (___ Lbs)] : recent [unfilled] ~Ulb weight loss [Loss Of Hearing] : hearing loss [Negative] : Respiratory

## 2022-10-18 NOTE — HISTORY OF PRESENT ILLNESS
[FreeTextEntry1] : 72-year-old male here for follow-up after [facility] admission.  The patient was seen by GI September 24, 2022 with rectal bleeding and abdominal pain.  The patient had a history of alcohol use and hypertriglyceridemia and pancreatitis, was previously seen in June for pancreatitis at [facility].  The patient had a colonoscopy regarding rectal bleeding which was normal.  Upper endoscopy revealed multiple ulcers.  The patient had an ulcer at the GE junction, multiple gastric ulcers, and a duodenal ulcer which was actively bleeding with a visible vessel which was cauterized and injected.  The patient was found to have necrotizing pancreatitis, had an EUS with axial stent placement, subsequent EGD revealed a duodenal ulcer and clean the axial stent.  Patient currently denies nausea or vomiting or black or bloody stools or dysphagia or odynophagia.  He has had a 6 pound weight loss over the past month.

## 2022-10-18 NOTE — PHYSICAL EXAM
[None] : no edema [Normal] : normal bowel sounds, non-tender, no masses, soft, no no hepato-splenomegaly

## 2022-10-18 NOTE — REVIEW OF SYSTEMS
[Fever] : no fever [Chills] : no chills [Feeling Poorly] : not feeling poorly [Eye Pain] : no eye pain [Red Eyes] : eyes not red [Sore Throat] : no sore throat [Hoarseness] : no hoarseness [Abdominal Pain] : no abdominal pain [Vomiting] : no vomiting [Constipation] : no constipation [Diarrhea] : no diarrhea [Heartburn] : no heartburn [Melena (black stool)] : no melena [Fecal Incontinence (soiling)] : no fecal incontinence [Arthralgias (joint pain)] : no arthralgias [Joint Swelling] : no joint swelling [Joint Stiffness] : no joint stiffness [Limb Swelling] : no limb swelling [Skin Lesions] : no skin lesions [Skin Wound] : no skin wound [Confused] : no confusion [Convulsions] : no convulsions [Dizziness] : no dizziness [Fainting] : no fainting [Suicidal] : not suicidal [Sleep Disturbances] : no sleep disturbances [Anxiety] : no anxiety [Depression] : no depression [Emotional Problems] : no emotional problems [Proptosis] : no proptosis [Hot Flashes] : no hot flashes [Muscle Weakness] : no muscle weakness [Deepening of voice] : no deepening of voice [Easy Bleeding] : no tendency for easy bleeding [Easy Bruising] : no tendency for easy bruising [Swollen Glands] : no swollen glands [FreeTextEntry2] :  sari

## 2022-10-19 LAB
ALBUMIN SERPL ELPH-MCNC: 3.6 G/DL
ALP BLD-CCNC: 145 U/L
ALT SERPL-CCNC: 8 U/L
AMYLASE/CREAT SERPL: 56 U/L
ANION GAP SERPL CALC-SCNC: 12 MMOL/L
AST SERPL-CCNC: 18 U/L
BASOPHILS # BLD AUTO: 0.05 K/UL
BASOPHILS NFR BLD AUTO: 0.7 %
BILIRUB SERPL-MCNC: 0.4 MG/DL
BUN SERPL-MCNC: 10 MG/DL
CALCIUM SERPL-MCNC: 9.1 MG/DL
CHLORIDE SERPL-SCNC: 102 MMOL/L
CO2 SERPL-SCNC: 24 MMOL/L
CREAT SERPL-MCNC: 0.8 MG/DL
EGFR: 93 ML/MIN/1.73M2
EOSINOPHIL # BLD AUTO: 0.08 K/UL
EOSINOPHIL NFR BLD AUTO: 1.1 %
GLUCOSE SERPL-MCNC: 127 MG/DL
HCT VFR BLD CALC: 31.8 %
HGB BLD-MCNC: 9.8 G/DL
IMM GRANULOCYTES NFR BLD AUTO: 0.3 %
LPL SERPL-CCNC: 40 U/L
LYMPHOCYTES # BLD AUTO: 1.45 K/UL
LYMPHOCYTES NFR BLD AUTO: 20.6 %
MAN DIFF?: NORMAL
MCHC RBC-ENTMCNC: 28.6 PG
MCHC RBC-ENTMCNC: 30.8 G/DL
MCV RBC AUTO: 92.7 FL
MONOCYTES # BLD AUTO: 0.72 K/UL
MONOCYTES NFR BLD AUTO: 10.2 %
NEUTROPHILS # BLD AUTO: 4.72 K/UL
NEUTROPHILS NFR BLD AUTO: 67.1 %
PLATELET # BLD AUTO: 277 K/UL
POTASSIUM SERPL-SCNC: 4.3 MMOL/L
PROT SERPL-MCNC: 6.4 G/DL
RBC # BLD: 3.43 M/UL
RBC # FLD: 13.6 %
SODIUM SERPL-SCNC: 138 MMOL/L
WBC # FLD AUTO: 7.04 K/UL

## 2022-11-02 ENCOUNTER — APPOINTMENT (OUTPATIENT)
Dept: GASTROENTEROLOGY | Facility: CLINIC | Age: 72
End: 2022-11-02

## 2022-11-02 DIAGNOSIS — K26.3 ACUTE DUODENAL ULCER W/OUT HEMORRHAGE OR PERFORATION: ICD-10-CM

## 2022-11-02 DIAGNOSIS — K86.89 OTHER SPECIFIED DISEASES OF PANCREAS: ICD-10-CM

## 2022-11-02 PROCEDURE — 99443: CPT

## 2022-11-02 RX ORDER — AMLODIPINE BESYLATE 5 MG/1
5 TABLET ORAL
Qty: 90 | Refills: 0 | Status: ACTIVE | COMMUNITY
Start: 2022-09-22

## 2022-11-02 RX ORDER — ATORVASTATIN CALCIUM 20 MG/1
20 TABLET, FILM COATED ORAL
Qty: 90 | Refills: 0 | Status: ACTIVE | COMMUNITY
Start: 2022-08-13

## 2022-11-08 ENCOUNTER — LABORATORY RESULT (OUTPATIENT)
Age: 72
End: 2022-11-08

## 2022-11-11 ENCOUNTER — TRANSCRIPTION ENCOUNTER (OUTPATIENT)
Age: 72
End: 2022-11-11

## 2022-11-11 ENCOUNTER — OUTPATIENT (OUTPATIENT)
Dept: OUTPATIENT SERVICES | Facility: HOSPITAL | Age: 72
LOS: 1 days | Discharge: HOME | End: 2022-11-11

## 2022-11-11 ENCOUNTER — RESULT REVIEW (OUTPATIENT)
Age: 72
End: 2022-11-11

## 2022-11-11 VITALS
TEMPERATURE: 97 F | HEART RATE: 63 BPM | RESPIRATION RATE: 20 BRPM | OXYGEN SATURATION: 100 % | DIASTOLIC BLOOD PRESSURE: 62 MMHG | SYSTOLIC BLOOD PRESSURE: 128 MMHG

## 2022-11-11 VITALS — WEIGHT: 154.32 LBS | HEIGHT: 68 IN

## 2022-11-11 PROCEDURE — 88305 TISSUE EXAM BY PATHOLOGIST: CPT | Mod: 26

## 2022-11-11 PROCEDURE — 88312 SPECIAL STAINS GROUP 1: CPT | Mod: 26

## 2022-11-11 PROCEDURE — 43247 EGD REMOVE FOREIGN BODY: CPT

## 2022-11-11 PROCEDURE — 43239 EGD BIOPSY SINGLE/MULTIPLE: CPT

## 2022-11-11 PROCEDURE — 88313 SPECIAL STAINS GROUP 2: CPT | Mod: 26

## 2022-11-11 PROCEDURE — 88342 IMHCHEM/IMCYTCHM 1ST ANTB: CPT | Mod: 26

## 2022-11-11 PROCEDURE — 88341 IMHCHEM/IMCYTCHM EA ADD ANTB: CPT | Mod: 26

## 2022-11-11 RX ORDER — AMLODIPINE BESYLATE 2.5 MG/1
1 TABLET ORAL
Qty: 0 | Refills: 0 | DISCHARGE

## 2022-11-11 RX ORDER — ATORVASTATIN CALCIUM 80 MG/1
1 TABLET, FILM COATED ORAL
Qty: 0 | Refills: 0 | DISCHARGE

## 2022-11-11 RX ORDER — LISINOPRIL 2.5 MG/1
0 TABLET ORAL
Qty: 0 | Refills: 1 | DISCHARGE

## 2022-11-11 NOTE — CHART NOTE - NSCHARTNOTEFT_GEN_A_CORE
PACU ANESTHESIA ADMISSION NOTE      Procedure:   Post op diagnosis:      ____  Intubated  TV:______       Rate: ______      FiO2: ______    __x__  Patent Airway    ___x_  Full return of protective reflexes    __x__  Full recovery from anesthesia / back to baseline     Vitals:   T:  37         R:  20                BP:  142/73                Sat: 100                  P: 63      Mental Status:  _x___ Awake   __x___ Alert   _____ Drowsy   _____ Sedated    Nausea/Vomiting:  _x__ NO  ______Yes,   See Post - Op Orders          Pain Scale (0-10):  _0___    Treatment: ____ None    ____ See Post - Op/PCA Orders    Post - Operative Fluids:   __x__ Oral   ____ See Post - Op Orders    Plan: Discharge:   _x__Home       _____Floor     _____Critical Care    _____  Other:_________________    Comments:

## 2022-11-11 NOTE — PRE-ANESTHESIA EVALUATION ADULT - WEIGHT IN KG
Upon discharge, patient is AAOx4, no cardiac or respiratory complications. Follow up care and  Medications have been reviewed with patient and has been instructed to return to the ER if needed. Patient verbalized understanding and ambulated to the lobby without difficulty. SASKIA DASH.       70

## 2022-11-11 NOTE — ASU PATIENT PROFILE, ADULT - TEACHING/LEARNING FACTORS INFLUENCE READINESS TO LEARN
Ivermectin Pregnancy And Lactation Text: This medication is Pregnancy Category C and it isn't known if it is safe during pregnancy. It is also excreted in breast milk. none

## 2022-11-11 NOTE — ASU DISCHARGE PLAN (ADULT/PEDIATRIC) - CARE PROVIDER_API CALL
Ted Novoa)  Gastroenterology; Internal Medicine  [address] [place] [address]  Phone: [phone]  Fax: [phone]  Follow Up Time:

## 2022-11-11 NOTE — H&P PST ADULT - HISTORY OF PRESENT ILLNESS
72 y.o male patient who had necrotizing pancreatitis with WON had Endoscopic cystogastrostomy drainage using AXIOS stent. Here today for EGD with necrosectomy

## 2022-11-11 NOTE — ASU DISCHARGE PLAN (ADULT/PEDIATRIC) - NS MD DC FALL RISK RISK
no lesions,  no deformities,  no traumatic injuries,  no significant scars are present,  chest wall non-tender,  no masses present, breathing is unlabored without accessory muscle use,normal breath sounds For information on Fall & Injury Prevention, visit: [email or web address] OR  [email or web address] OR  https://www.cdc.gov/steadi/patient.html

## 2022-11-11 NOTE — ASU PATIENT PROFILE, ADULT - FALL HARM RISK - HARM RISK INTERVENTIONS

## 2022-11-15 LAB — SURGICAL PATHOLOGY STUDY: SIGNIFICANT CHANGE UP

## 2022-11-16 DIAGNOSIS — K85.91 ACUTE PANCREATITIS WITH UNINFECTED NECROSIS, UNSPECIFIED: ICD-10-CM

## 2022-11-16 DIAGNOSIS — I10 ESSENTIAL (PRIMARY) HYPERTENSION: ICD-10-CM

## 2022-11-16 DIAGNOSIS — K29.80 DUODENITIS WITHOUT BLEEDING: ICD-10-CM

## 2022-11-16 DIAGNOSIS — K29.50 UNSPECIFIED CHRONIC GASTRITIS WITHOUT BLEEDING: ICD-10-CM

## 2022-11-16 DIAGNOSIS — E78.00 PURE HYPERCHOLESTEROLEMIA, UNSPECIFIED: ICD-10-CM

## 2022-11-23 ENCOUNTER — APPOINTMENT (OUTPATIENT)
Dept: GASTROENTEROLOGY | Facility: CLINIC | Age: 72
End: 2022-11-23

## 2022-12-23 NOTE — ASSESSMENT
[FreeTextEntry1] : 72 yr old male with H/O Pancreatitis with Pancreatic Necrosis, S/P placement of an Axios stent on 9/28/22, and necrotic tissue was removed on 10/3/22. He also has an ulcer of the duodenal bulb. He needs a repeat EGD with necrosectomy next week.\par \par \par Will schedule EGD with necrosectomy and Endo rotor for next week on 11/11/22; all risks and benefits were discussed with the patient through his wife today.\par F/U in the office after the procedure is done.\par Continue PPI for duodenal ulcer until it is fully healed.

## 2022-12-23 NOTE — HISTORY OF PRESENT ILLNESS
[Home] : at home, [unfilled] , at the time of the visit. [Medical Office: ([facility])___] : at the medical office located in  [Family Member] : family member [FreeTextEntry3] : Regina - Svitlana [FreeTextEntry4] : Bárbara [FreeTextEntry1] : 72 yr old male with H/O Pancreatitis with Pancreatic Necrosis, S/P placement of an Axios stent on 9/28/22, and necrotic tissue was removed on 10/3/22. He also has an ulcer of the duodenal bulb. He needs a repeat EGD with necrotectomy next week. According to his wife who translated for the patient, he has not had any further abdominal pain, fevers, vomiting, or diarrhea. He is eating well  without any bloating or dysphagia. He has not been drinking any more alcohol since he was discharged from the hospital.  [de-identified] : 10/3/22 [de-identified] : 9/28/22

## 2022-12-23 NOTE — REVIEW OF SYSTEMS
[As Noted in HPI] : as noted in HPI [Negative] : Heme/Lymph [Abdominal Pain] : no abdominal pain [Vomiting] : no vomiting [Constipation] : no constipation [Diarrhea] : no diarrhea [Heartburn] : no heartburn [Melena (black stool)] : no melena [Bleeding] : no bleeding [Fecal Incontinence (soiling)] : no fecal incontinence [Bloating (gassiness)] : no bloating

## 2022-12-23 NOTE — REASON FOR VISIT
[Follow-up] : a follow-up of an existing diagnosis [Spouse] : spouse [FreeTextEntry1] : Dr. Chen Ref for Pancreatic Necrosis

## 2023-06-21 NOTE — PRE-ANESTHESIA EVALUATION ADULT - ANESTHESIA, PREVIOUS REACTION, PROFILE
none
No Residual Tumor Seen Histology Text: There were no malignant cells seen in the sections examined.

## 2025-04-16 ENCOUNTER — INPATIENT (INPATIENT)
Facility: HOSPITAL | Age: 75
LOS: 12 days | Discharge: HOME | DRG: 641 | End: 2025-04-29
Attending: INTERNAL MEDICINE | Admitting: INTERNAL MEDICINE
Payer: MEDICARE

## 2025-04-16 VITALS
WEIGHT: 160.06 LBS | HEIGHT: 68 IN | OXYGEN SATURATION: 97 % | RESPIRATION RATE: 16 BRPM | TEMPERATURE: 98 F | HEART RATE: 89 BPM | DIASTOLIC BLOOD PRESSURE: 79 MMHG | SYSTOLIC BLOOD PRESSURE: 144 MMHG

## 2025-04-16 DIAGNOSIS — E87.29 OTHER ACIDOSIS: ICD-10-CM

## 2025-04-16 PROBLEM — K25.9 GASTRIC ULCER, UNSPECIFIED AS ACUTE OR CHRONIC, WITHOUT HEMORRHAGE OR PERFORATION: Chronic | Status: ACTIVE | Noted: 2022-11-11

## 2025-04-16 PROBLEM — E78.00 PURE HYPERCHOLESTEROLEMIA, UNSPECIFIED: Chronic | Status: ACTIVE | Noted: 2022-11-11

## 2025-04-16 LAB
ALBUMIN SERPL ELPH-MCNC: 3.5 G/DL — SIGNIFICANT CHANGE UP (ref 3.5–5.2)
ALP SERPL-CCNC: 224 U/L — HIGH (ref 30–115)
ALT FLD-CCNC: 75 U/L — HIGH (ref 0–41)
ANION GAP SERPL CALC-SCNC: 24 MMOL/L — HIGH (ref 7–14)
AST SERPL-CCNC: 258 U/L — HIGH (ref 0–41)
B-OH-BUTYR SERPL-SCNC: 3.4 MMOL/L — HIGH
BASOPHILS # BLD AUTO: 0.03 K/UL — SIGNIFICANT CHANGE UP (ref 0–0.2)
BASOPHILS NFR BLD AUTO: 0.5 % — SIGNIFICANT CHANGE UP (ref 0–1)
BILIRUB SERPL-MCNC: 1.6 MG/DL — HIGH (ref 0.2–1.2)
BUN SERPL-MCNC: 22 MG/DL — HIGH (ref 10–20)
CALCIUM SERPL-MCNC: 8.7 MG/DL — SIGNIFICANT CHANGE UP (ref 8.4–10.5)
CHLORIDE SERPL-SCNC: 90 MMOL/L — LOW (ref 98–110)
CO2 SERPL-SCNC: 19 MMOL/L — SIGNIFICANT CHANGE UP (ref 17–32)
CREAT SERPL-MCNC: 0.8 MG/DL — SIGNIFICANT CHANGE UP (ref 0.7–1.5)
EGFR: 92 ML/MIN/1.73M2 — SIGNIFICANT CHANGE UP
EGFR: 92 ML/MIN/1.73M2 — SIGNIFICANT CHANGE UP
EOSINOPHIL # BLD AUTO: 0 K/UL — SIGNIFICANT CHANGE UP (ref 0–0.7)
EOSINOPHIL NFR BLD AUTO: 0 % — SIGNIFICANT CHANGE UP (ref 0–8)
GAS PNL BLDV: SIGNIFICANT CHANGE UP
GAS PNL BLDV: SIGNIFICANT CHANGE UP
GLUCOSE SERPL-MCNC: 139 MG/DL — HIGH (ref 70–99)
HCT VFR BLD CALC: 37.3 % — LOW (ref 42–52)
HGB BLD-MCNC: 13.2 G/DL — LOW (ref 14–18)
IMM GRANULOCYTES NFR BLD AUTO: 0.6 % — HIGH (ref 0.1–0.3)
LIDOCAIN IGE QN: 65 U/L — HIGH (ref 7–60)
LYMPHOCYTES # BLD AUTO: 1.52 K/UL — SIGNIFICANT CHANGE UP (ref 1.2–3.4)
LYMPHOCYTES # BLD AUTO: 24.4 % — SIGNIFICANT CHANGE UP (ref 20.5–51.1)
MAGNESIUM SERPL-MCNC: 1.9 MG/DL — SIGNIFICANT CHANGE UP (ref 1.8–2.4)
MCHC RBC-ENTMCNC: 33.9 PG — HIGH (ref 27–31)
MCHC RBC-ENTMCNC: 35.4 G/DL — SIGNIFICANT CHANGE UP (ref 32–37)
MCV RBC AUTO: 95.9 FL — HIGH (ref 80–94)
MONOCYTES # BLD AUTO: 0.68 K/UL — HIGH (ref 0.1–0.6)
MONOCYTES NFR BLD AUTO: 10.9 % — HIGH (ref 1.7–9.3)
NEUTROPHILS # BLD AUTO: 3.97 K/UL — SIGNIFICANT CHANGE UP (ref 1.4–6.5)
NEUTROPHILS NFR BLD AUTO: 63.6 % — SIGNIFICANT CHANGE UP (ref 42.2–75.2)
NRBC BLD AUTO-RTO: 0 /100 WBCS — SIGNIFICANT CHANGE UP (ref 0–0)
PHOSPHATE SERPL-MCNC: 4.2 MG/DL — SIGNIFICANT CHANGE UP (ref 2.1–4.9)
PLATELET # BLD AUTO: 204 K/UL — SIGNIFICANT CHANGE UP (ref 130–400)
PMV BLD: 10.2 FL — SIGNIFICANT CHANGE UP (ref 7.4–10.4)
POTASSIUM SERPL-MCNC: 3.9 MMOL/L — SIGNIFICANT CHANGE UP (ref 3.5–5)
POTASSIUM SERPL-SCNC: 3.9 MMOL/L — SIGNIFICANT CHANGE UP (ref 3.5–5)
PROT SERPL-MCNC: 6.1 G/DL — SIGNIFICANT CHANGE UP (ref 6–8)
RBC # BLD: 3.89 M/UL — LOW (ref 4.7–6.1)
RBC # FLD: 12.7 % — SIGNIFICANT CHANGE UP (ref 11.5–14.5)
SODIUM SERPL-SCNC: 133 MMOL/L — LOW (ref 135–146)
WBC # BLD: 6.24 K/UL — SIGNIFICANT CHANGE UP (ref 4.8–10.8)
WBC # FLD AUTO: 6.24 K/UL — SIGNIFICANT CHANGE UP (ref 4.8–10.8)

## 2025-04-16 PROCEDURE — 81001 URINALYSIS AUTO W/SCOPE: CPT

## 2025-04-16 PROCEDURE — 80048 BASIC METABOLIC PNL TOTAL CA: CPT

## 2025-04-16 PROCEDURE — 82248 BILIRUBIN DIRECT: CPT

## 2025-04-16 PROCEDURE — 85014 HEMATOCRIT: CPT

## 2025-04-16 PROCEDURE — 82962 GLUCOSE BLOOD TEST: CPT

## 2025-04-16 PROCEDURE — 86923 COMPATIBILITY TEST ELECTRIC: CPT

## 2025-04-16 PROCEDURE — 84425 ASSAY OF VITAMIN B-1: CPT

## 2025-04-16 PROCEDURE — 80053 COMPREHEN METABOLIC PANEL: CPT

## 2025-04-16 PROCEDURE — 72125 CT NECK SPINE W/O DYE: CPT | Mod: 26

## 2025-04-16 PROCEDURE — 84436 ASSAY OF TOTAL THYROXINE: CPT

## 2025-04-16 PROCEDURE — 97110 THERAPEUTIC EXERCISES: CPT | Mod: GP

## 2025-04-16 PROCEDURE — 36415 COLL VENOUS BLD VENIPUNCTURE: CPT

## 2025-04-16 PROCEDURE — 97162 PT EVAL MOD COMPLEX 30 MIN: CPT | Mod: GP

## 2025-04-16 PROCEDURE — 87640 STAPH A DNA AMP PROBE: CPT

## 2025-04-16 PROCEDURE — 97116 GAIT TRAINING THERAPY: CPT | Mod: GP

## 2025-04-16 PROCEDURE — 76705 ECHO EXAM OF ABDOMEN: CPT

## 2025-04-16 PROCEDURE — 93307 TTE W/O DOPPLER COMPLETE: CPT

## 2025-04-16 PROCEDURE — 99291 CRITICAL CARE FIRST HOUR: CPT

## 2025-04-16 PROCEDURE — 84100 ASSAY OF PHOSPHORUS: CPT

## 2025-04-16 PROCEDURE — 82803 BLOOD GASES ANY COMBINATION: CPT

## 2025-04-16 PROCEDURE — 85730 THROMBOPLASTIN TIME PARTIAL: CPT

## 2025-04-16 PROCEDURE — 83735 ASSAY OF MAGNESIUM: CPT

## 2025-04-16 PROCEDURE — 88305 TISSUE EXAM BY PATHOLOGIST: CPT

## 2025-04-16 PROCEDURE — 97530 THERAPEUTIC ACTIVITIES: CPT | Mod: GP

## 2025-04-16 PROCEDURE — 83036 HEMOGLOBIN GLYCOSYLATED A1C: CPT

## 2025-04-16 PROCEDURE — 97167 OT EVAL HIGH COMPLEX 60 MIN: CPT | Mod: GO

## 2025-04-16 PROCEDURE — 85025 COMPLETE CBC W/AUTO DIFF WBC: CPT

## 2025-04-16 PROCEDURE — 95714 VEEG EA 12-26 HR UNMNTR: CPT

## 2025-04-16 PROCEDURE — 95700 EEG CONT REC W/VID EEG TECH: CPT

## 2025-04-16 PROCEDURE — 85027 COMPLETE CBC AUTOMATED: CPT

## 2025-04-16 PROCEDURE — 84484 ASSAY OF TROPONIN QUANT: CPT

## 2025-04-16 PROCEDURE — 70450 CT HEAD/BRAIN W/O DYE: CPT | Mod: MC

## 2025-04-16 PROCEDURE — 74177 CT ABD & PELVIS W/CONTRAST: CPT | Mod: 26

## 2025-04-16 PROCEDURE — 82247 BILIRUBIN TOTAL: CPT

## 2025-04-16 PROCEDURE — 85018 HEMOGLOBIN: CPT

## 2025-04-16 PROCEDURE — 82607 VITAMIN B-12: CPT

## 2025-04-16 PROCEDURE — 84443 ASSAY THYROID STIM HORMONE: CPT

## 2025-04-16 PROCEDURE — P9016: CPT

## 2025-04-16 PROCEDURE — 86850 RBC ANTIBODY SCREEN: CPT

## 2025-04-16 PROCEDURE — 88312 SPECIAL STAINS GROUP 1: CPT

## 2025-04-16 PROCEDURE — 85610 PROTHROMBIN TIME: CPT

## 2025-04-16 PROCEDURE — 36430 TRANSFUSION BLD/BLD COMPNT: CPT

## 2025-04-16 PROCEDURE — 70450 CT HEAD/BRAIN W/O DYE: CPT | Mod: 26

## 2025-04-16 PROCEDURE — 93005 ELECTROCARDIOGRAM TRACING: CPT

## 2025-04-16 PROCEDURE — 97535 SELF CARE MNGMENT TRAINING: CPT | Mod: GO

## 2025-04-16 PROCEDURE — 84295 ASSAY OF SERUM SODIUM: CPT

## 2025-04-16 PROCEDURE — 99223 1ST HOSP IP/OBS HIGH 75: CPT

## 2025-04-16 PROCEDURE — 71045 X-RAY EXAM CHEST 1 VIEW: CPT | Mod: 26

## 2025-04-16 PROCEDURE — 86901 BLOOD TYPING SEROLOGIC RH(D): CPT

## 2025-04-16 PROCEDURE — 83605 ASSAY OF LACTIC ACID: CPT

## 2025-04-16 PROCEDURE — 87641 MR-STAPH DNA AMP PROBE: CPT

## 2025-04-16 PROCEDURE — 82746 ASSAY OF FOLIC ACID SERUM: CPT

## 2025-04-16 PROCEDURE — 71045 X-RAY EXAM CHEST 1 VIEW: CPT

## 2025-04-16 PROCEDURE — 84132 ASSAY OF SERUM POTASSIUM: CPT

## 2025-04-16 PROCEDURE — 82330 ASSAY OF CALCIUM: CPT

## 2025-04-16 PROCEDURE — 86900 BLOOD TYPING SEROLOGIC ABO: CPT

## 2025-04-16 PROCEDURE — 82010 KETONE BODYS QUAN: CPT

## 2025-04-16 RX ORDER — MELATONIN 5 MG
3 TABLET ORAL AT BEDTIME
Refills: 0 | Status: DISCONTINUED | OUTPATIENT
Start: 2025-04-16 | End: 2025-04-29

## 2025-04-16 RX ORDER — LORAZEPAM 4 MG/ML
2 VIAL (ML) INJECTION
Refills: 0 | Status: DISCONTINUED | OUTPATIENT
Start: 2025-04-16 | End: 2025-04-16

## 2025-04-16 RX ORDER — MAGNESIUM, ALUMINUM HYDROXIDE 200-200 MG
30 TABLET,CHEWABLE ORAL EVERY 4 HOURS
Refills: 0 | Status: DISCONTINUED | OUTPATIENT
Start: 2025-04-16 | End: 2025-04-29

## 2025-04-16 RX ORDER — ONDANSETRON HCL/PF 4 MG/2 ML
4 VIAL (ML) INJECTION EVERY 8 HOURS
Refills: 0 | Status: DISCONTINUED | OUTPATIENT
Start: 2025-04-16 | End: 2025-04-29

## 2025-04-16 RX ORDER — SODIUM CHLORIDE 9 G/1000ML
1000 INJECTION, SOLUTION INTRAVENOUS
Refills: 0 | Status: DISCONTINUED | OUTPATIENT
Start: 2025-04-16 | End: 2025-04-17

## 2025-04-16 RX ORDER — FOLIC ACID 1 MG/1
1 TABLET ORAL ONCE
Refills: 0 | Status: COMPLETED | OUTPATIENT
Start: 2025-04-16 | End: 2025-04-16

## 2025-04-16 RX ORDER — DIAZEPAM 2 MG/1
5 TABLET ORAL
Refills: 0 | Status: DISCONTINUED | OUTPATIENT
Start: 2025-04-16 | End: 2025-04-16

## 2025-04-16 RX ORDER — HEPARIN SODIUM 1000 [USP'U]/ML
5000 INJECTION INTRAVENOUS; SUBCUTANEOUS EVERY 8 HOURS
Refills: 0 | Status: DISCONTINUED | OUTPATIENT
Start: 2025-04-16 | End: 2025-04-17

## 2025-04-16 RX ORDER — SODIUM CHLORIDE 9 G/1000ML
1000 INJECTION, SOLUTION INTRAVENOUS
Refills: 0 | Status: DISCONTINUED | OUTPATIENT
Start: 2025-04-16 | End: 2025-04-16

## 2025-04-16 RX ORDER — ACETAMINOPHEN 500 MG/5ML
650 LIQUID (ML) ORAL EVERY 6 HOURS
Refills: 0 | Status: DISCONTINUED | OUTPATIENT
Start: 2025-04-16 | End: 2025-04-29

## 2025-04-16 RX ADMIN — Medication 100 MILLIGRAM(S): at 22:00

## 2025-04-16 RX ADMIN — Medication 1000 MILLILITER(S): at 16:38

## 2025-04-16 RX ADMIN — SODIUM CHLORIDE 100 MILLILITER(S): 9 INJECTION, SOLUTION INTRAVENOUS at 23:09

## 2025-04-16 RX ADMIN — SODIUM CHLORIDE 73 MILLILITER(S): 9 INJECTION, SOLUTION INTRAVENOUS at 19:45

## 2025-04-16 RX ADMIN — FOLIC ACID 1 MILLIGRAM(S): 1 TABLET ORAL at 22:00

## 2025-04-16 NOTE — ED PROVIDER NOTE - PHYSICAL EXAMINATION
CONSTITUTIONAL: well-appearing, in NAD  SKIN: Warm dry, normal skin turgor  HEAD: NCAT, no hematomas, ecchymosis, edema, erythema   EYES: EOMI, PERRLA, no scleral icterus, conjunctiva pink  ENT: normal pharynx with no erythema or exudates.   NECK: Supple; non tender. Full ROM.  CARD: RRR  RESP: clear to ausculation b/l. No crackles or wheezing.  ABD: soft, non-tender, non-distended, no rebound or guarding.  EXT: Full ROM,   BACK: No midline TTP cervical region   NEURO: normal motor. normal sensory.

## 2025-04-16 NOTE — H&P ADULT - ATTENDING COMMENTS
Assessment    Mechanical fall amid binge drinking episode  Alcohol use disorder  Alcoholic ketoacidosis  Mixed LFTSs  HTN  HLD  Hx of necrotizing pancreatitis    Plan    - CIWA prn, patient's last drink was this morning does not appear to be in withdrawal currently, not tachycardic, thiamine IV, folate, MVI // D5+NS for ketoacidosis, trend electrolytes and phos, wife stating he does not eat any food and mainly drinks alcohol  - patient is comfortable lying in bed, elevated lactate likely ketoacidosis+presumed thiamine deficiency (does not eat much)+decreased clearance, no sign of tissue ischemia and no pain anywhere, continue to trend  - f/u RUQ US, no tenderness    Pending: improvement in lactate / AG / trend electrolytes to avoid refeeding / RUQ US    # DVT PPX: heparin sc    GENERAL: NAD, lying in bed comfortably  HEAD:  Atraumatic, normocephalic  NERVOUS SYSTEM:  A&Ox3, moving all extremities, no focal deficits   EYES: EOMI, PERRL  NECK: Supple, trachea midline, no JVD  HEART: Regular rate and rhythm  LUNGS: Clear to auscultation bilaterally, no crackles, wheezing, or rhonchi  ABDOMEN: Soft, nontender, nondistended, +BS  EXTREMITIES: 2+ peripheral pulses bilaterally. No clubbing, cyanosis, or edema    75 minutes spent on review of labs, imaging studies, old records, obtaining history, personally examining patient, counselling and communicating with patient/ family, entering orders for medications/tests/etc, discussions with other health care providers, documentation in electronic health records, independent interpretation of labs, imaging/procedure results and care coordination.

## 2025-04-16 NOTE — ED PROVIDER NOTE - OBJECTIVE STATEMENT
74-year-old male with past medical history of alcohol use disorder, history of necrotizing pancreatitis, hypertension, hyperlipidemia presenting to the ED accompanied by his wife for intoxication.  Wife states that the patient drinks approximately every day.  Patient has had a binge drinking episode for the past 4 days.  Today patient fell and was unable to ambulate after the fall.  Wife states this happens often.  Wife states she is concerned and wanted to get him checked out.  Patient is not contributing to history at this time.  Wife states patient is acting at baseline when intoxicated.

## 2025-04-16 NOTE — ED ADULT TRIAGE NOTE - CHIEF COMPLAINT QUOTE
pt BIBA from home for intox, as per wife pt has been on a drinking binge, drinking frida. last drink this morning. pt also fell today - HT - LOC. denies ac use.  in triage

## 2025-04-16 NOTE — ED ADULT NURSE NOTE - NSFALLHARMRISKINTERV_ED_ALL_ED

## 2025-04-16 NOTE — H&P ADULT - HISTORY OF PRESENT ILLNESS
74-year-old male with past medical history of alcohol use disorder, history of necrotizing pancreatitis, hypertension, hyperlipidemia presenting to the ED accompanied by his wife for intoxication.  Wife states that the patient drinks approximately every day.  Patient has had a binge drinking episode for the past 4 days.  Today patient fell and was unable to ambulate after the fall.  Wife states this happens often.  Wife states she is concerned and wanted to get him checked out.  Patient is not contributing to history at this time.  Wife states patient is acting at baseline when intoxicated.     Vital Signs:  · BP Systolic	144 mm Hg  · BP Diastolic	79 mm Hg  · Heart Rate	89 /min  · Respiration Rate (breaths/min)	16 /min  · Temp (F)	98 Degrees F  · Temp (C)	36.7 Degrees C  · Temp site	oral  · SpO2 (%)	97 %  · O2 Delivery/Oxygen Delivery Method	room air  · Temp at ED Arrival (C)	36.7 Degrees C    Labs: Hb 13.2, Na 133, AG 24, Bili 1.6, ALP: 224, AST/ALT: 258/75, beta-hyroxy: 3.4, lipase 65, Vbg: lactate 6.1    CTAP: 1.  Severe hepatic steatosis.  2.  Nonspecific distended gallbladder.  3.  Left adrenal gland 6 cm myelolipoma    CT HEAD:  No evidence for acute intracranial pathology.    CT CERVICAL SPINE:  No evidence for acute fracture or subluxation.    s/p 1L LR, folic acid, thiamine

## 2025-04-16 NOTE — H&P ADULT - ASSESSMENT
74-year-old male with past medical history of alcohol use disorder, history of necrotizing pancreatitis, hypertension, hyperlipidemia presenting to the ED accompanied by his wife for intoxication.  Wife states that the patient drinks approximately every day.  Patient has had a binge drinking episode for the past 4 days.  Today patient fell and was unable to ambulate after the fall.  Wife states this happens often.  Wife states she is concerned and wanted to get him checked out.  Patient is not contributing to history at this time.  Wife states patient is acting at baseline when intoxicated.      Assessment and plan:     # Alcoholic ketoacidosis   #Alcohol use disorder  # H/o necrotizing pancreatitis   # witnessed mechanical fall sec to intoxication       ED vitals: /79, HR 89/min, RR 16  Labs: Hb 13.2, Na 133, AG 24, Bili 1.6, ALP: 224, AST/ALT: 258/75, beta-hyroxy: 3.4, lipase 65, Vbg: lactate 6.1  CTAP: 1.  Severe hepatic steatosis,   Nonspecific distended gallbladder. Left adrenal gland 6 cm myelolipoma  Trauma work up negative   s/p 1L LR, folic acid, thiamine   Addiction team/catch team  c/w ciwa protocol  c/w thiamine 500 mg iv for 3 days, then 100mg tid   c/w folate 1mg od  c/w IVF LR @ 100CC/HR   f/u folate and vitalmin B12   BS control 140-180     # HTN  # HLD   - c/w home meds        DVT: heparin  GI: PPI   diet: dash   activity as tolerated  dispo: home  74-year-old male with past medical history of alcohol use disorder, history of necrotizing pancreatitis, hypertension, hyperlipidemia presenting to the ED accompanied by his wife for intoxication.  Wife states that the patient drinks approximately every day.  Patient has had a binge drinking episode for the past 4 days.  Today patient fell and was unable to ambulate after the fall.  Wife states this happens often.  Wife states she is concerned and wanted to get him checked out.  Patient is not contributing to history at this time.  Wife states patient is acting at baseline when intoxicated.      Assessment and plan:     # Alcoholic ketoacidosis   #Alcohol use disorder  # H/o necrotizing pancreatitis   # witnessed mechanical fall sec to intoxication   # Transaminitis   # Severe hepatic steatosis  # Distended GB       ED vitals: /79, HR 89/min, RR 16  Labs: Hb 13.2, Na 133, AG 24, Bili 1.6, ALP: 224, AST/ALT: 258/75, beta-hyroxy: 3.4, lipase 65, Vbg: lactate 6.1  CTAP: 1.  Severe hepatic steatosis,   Nonspecific distended gallbladder. Left adrenal gland 6 cm myelolipoma  Trauma work up negative   s/p 1L LR, folic acid, thiamine   Addiction team/catch team  c/w ciwa protocol  c/w thiamine 500 mg iv for 3 days, then 100mg tid   c/w folate 1mg od  c/w IVF LR @ 100CC/HR   PT/INR f/u  calculate Maddrey score in am to guide steroid need  f/u folate and vitalmin B12   BS control 140-180       # HTN  # HLD   - c/w home meds        DVT: heparin  GI: PPI   diet: dash   activity as tolerated  dispo: home  74-year-old male with past medical history of alcohol use disorder, history of necrotizing pancreatitis, hypertension, hyperlipidemia presenting to the ED accompanied by his wife for intoxication.  Wife states that the patient drinks approximately every day.  Patient has had a binge drinking episode for the past 4 days.  Today patient fell and was unable to ambulate after the fall.  Wife states this happens often.  Wife states she is concerned and wanted to get him checked out.  Patient is not contributing to history at this time.  Wife states patient is acting at baseline when intoxicated.      Assessment and plan:     # Alcoholic ketoacidosis   #Alcohol use disorder  # H/o necrotizing pancreatitis   # witnessed mechanical fall sec to intoxication   # Transaminitis   # Severe hepatic steatosis  # Distended GB       ED vitals: /79, HR 89/min, RR 16  Labs: Hb 13.2, Na 133, AG 24, Bili 1.6, ALP: 224, AST/ALT: 258/75, beta-hyroxy: 3.4, lipase 65, Vbg: lactate 6.1  CTAP: 1.  Severe hepatic steatosis,   Nonspecific distended gallbladder. Left adrenal gland 6 cm myelolipoma  Trauma work up negative   s/p 1L LR, folic acid, thiamine   Addiction team/catch team  c/w ciwa protocol  c/w thiamine 500 mg iv for 3 days, then 100mg tid   c/w folate 1mg od  c/w IVF LR @ 100CC/HR   PT/INR f/u  calculate Maddrey score in am to guide steroid need  US abdomen   f/u folate and vitalmin B12   BS control 140-180       # HTN  # HLD   - non-compliant with medication     DVT: heparin  GI: PPI   diet: dash   activity as tolerated  dispo: home

## 2025-04-16 NOTE — ED PROVIDER NOTE - CLINICAL SUMMARY MEDICAL DECISION MAKING FREE TEXT BOX
Pt with drinking, multiple falls. acidosis and ketosis, 2/2 alcohol and malnutrition, will give D5,     Independently interpretted by Stuart Gabriel DO:  XR interpretation: No cardiopulmonary disease,   EKG interpretation: no CONSTANZA, NSR    Appropriate medications for patient's presenting complaints were ordered and effects were reassessed.  Patient's external records were reviewed.     Escalation to admission/observation was considered.  At this time, patient requires inpatient hospitalization .

## 2025-04-16 NOTE — ED PROVIDER NOTE - PROGRESS NOTE DETAILS
CB: s/o received - pending imaging, reassess, admit CB: s/w icu fellow Dr Quinonez - does not require icu admission, admit to medicine - s/o to MAR

## 2025-04-16 NOTE — H&P ADULT - NSHPLABSRESULTS_GEN_ALL_CORE
LABS:                          13.2   6.24  )-----------( 204      ( 16 Apr 2025 17:00 )             37.3     04-16    133[L]  |  90[L]  |  22[H]  ----------------------------<  139[H]  3.9   |  19  |  0.8    Ca    8.7      16 Apr 2025 17:00  Phos  4.2     04-16  Mg     1.9     04-16    TPro  6.1  /  Alb  3.5  /  TBili  1.6[H]  /  DBili  x   /  AST  258[H]  /  ALT  75[H]  /  AlkPhos  224[H]  04-16    LIVER FUNCTIONS - ( 16 Apr 2025 17:00 )  Alb: 3.5 g/dL / Pro: 6.1 g/dL / ALK PHOS: 224 U/L / ALT: 75 U/L / AST: 258 U/L / GGT: x             Urinalysis Basic - ( 16 Apr 2025 17:00 )    Color: x / Appearance: x / SG: x / pH: x  Gluc: 139 mg/dL / Ketone: x  / Bili: x / Urobili: x   Blood: x / Protein: x / Nitrite: x   Leuk Esterase: x / RBC: x / WBC x   Sq Epi: x / Non Sq Epi: x / Bacteria: x

## 2025-04-16 NOTE — H&P ADULT - NSHPPHYSICALEXAM_GEN_ALL_CORE
GENERAL: NAD, lying in bed comfortably  HEAD:  Atraumatic, normocephalic  EYES: EOMI, PERRL  NECK: Supple, trachea midline, no JVD  HEART: Regular rate and rhythm  LUNGS: Unlabored respirations.  Clear to auscultation bilaterally, no crackles, wheezing, or rhonchi  ABDOMEN: Soft, nontender, nondistended, +BS  EXTREMITIES: 2+ peripheral pulses bilaterally. No clubbing, cyanosis, or edema  NERVOUS SYSTEM:  A&Ox3, moving all extremities, no focal deficits GENERAL: NAD, lying in bed comfortably  HEAD:  Atraumatic, normocephalic  EYES: EOMI, PERRL  NECK: Supple, trachea midline, no JVD  HEART: Regular rate and rhythm  LUNGS: Unlabored respirations.  Clear to auscultation bilaterally, no crackles, wheezing, or rhonchi  ABDOMEN: Soft, nontender, nondistended, +BS  EXTREMITIES: 2+ peripheral pulses bilaterally. No clubbing, cyanosis, or edema, multiple bruises on bilateral arms/legs (recent)   NERVOUS SYSTEM:  A&Ox3, moving all extremities, no focal deficits

## 2025-04-17 LAB
ALBUMIN SERPL ELPH-MCNC: 3.1 G/DL — LOW (ref 3.5–5.2)
ALP SERPL-CCNC: 192 U/L — HIGH (ref 30–115)
ALT FLD-CCNC: 58 U/L — HIGH (ref 0–41)
ANION GAP SERPL CALC-SCNC: 18 MMOL/L — HIGH (ref 7–14)
APTT BLD: 30.8 SEC — SIGNIFICANT CHANGE UP (ref 27–39.2)
AST SERPL-CCNC: 184 U/L — HIGH (ref 0–41)
B-OH-BUTYR SERPL-SCNC: 3.6 MMOL/L — HIGH
BASOPHILS # BLD AUTO: 0.02 K/UL — SIGNIFICANT CHANGE UP (ref 0–0.2)
BASOPHILS NFR BLD AUTO: 0.4 % — SIGNIFICANT CHANGE UP (ref 0–1)
BILIRUB SERPL-MCNC: 1.5 MG/DL — HIGH (ref 0.2–1.2)
BUN SERPL-MCNC: 19 MG/DL — SIGNIFICANT CHANGE UP (ref 10–20)
CALCIUM SERPL-MCNC: 8 MG/DL — LOW (ref 8.4–10.5)
CHLORIDE SERPL-SCNC: 96 MMOL/L — LOW (ref 98–110)
CO2 SERPL-SCNC: 22 MMOL/L — SIGNIFICANT CHANGE UP (ref 17–32)
CREAT SERPL-MCNC: 0.6 MG/DL — LOW (ref 0.7–1.5)
EGFR: 101 ML/MIN/1.73M2 — SIGNIFICANT CHANGE UP
EGFR: 101 ML/MIN/1.73M2 — SIGNIFICANT CHANGE UP
EOSINOPHIL # BLD AUTO: 0 K/UL — SIGNIFICANT CHANGE UP (ref 0–0.7)
EOSINOPHIL NFR BLD AUTO: 0 % — SIGNIFICANT CHANGE UP (ref 0–8)
GLUCOSE BLDC GLUCOMTR-MCNC: 156 MG/DL — HIGH (ref 70–99)
GLUCOSE BLDC GLUCOMTR-MCNC: 156 MG/DL — HIGH (ref 70–99)
GLUCOSE BLDC GLUCOMTR-MCNC: 160 MG/DL — HIGH (ref 70–99)
GLUCOSE BLDC GLUCOMTR-MCNC: 221 MG/DL — HIGH (ref 70–99)
GLUCOSE SERPL-MCNC: 159 MG/DL — HIGH (ref 70–99)
HCT VFR BLD CALC: 32 % — LOW (ref 42–52)
HGB BLD-MCNC: 11.5 G/DL — LOW (ref 14–18)
IMM GRANULOCYTES NFR BLD AUTO: 0.6 % — HIGH (ref 0.1–0.3)
INR BLD: 1.09 RATIO — SIGNIFICANT CHANGE UP (ref 0.65–1.3)
INR BLD: 1.1 RATIO — SIGNIFICANT CHANGE UP (ref 0.65–1.3)
LACTATE SERPL-SCNC: 3.9 MMOL/L — HIGH (ref 0.7–2)
LYMPHOCYTES # BLD AUTO: 1.48 K/UL — SIGNIFICANT CHANGE UP (ref 1.2–3.4)
LYMPHOCYTES # BLD AUTO: 27.5 % — SIGNIFICANT CHANGE UP (ref 20.5–51.1)
MAGNESIUM SERPL-MCNC: 1.7 MG/DL — LOW (ref 1.8–2.4)
MCHC RBC-ENTMCNC: 34.1 PG — HIGH (ref 27–31)
MCHC RBC-ENTMCNC: 35.9 G/DL — SIGNIFICANT CHANGE UP (ref 32–37)
MCV RBC AUTO: 95 FL — HIGH (ref 80–94)
MONOCYTES # BLD AUTO: 0.67 K/UL — HIGH (ref 0.1–0.6)
MONOCYTES NFR BLD AUTO: 12.5 % — HIGH (ref 1.7–9.3)
NEUTROPHILS # BLD AUTO: 3.18 K/UL — SIGNIFICANT CHANGE UP (ref 1.4–6.5)
NEUTROPHILS NFR BLD AUTO: 59 % — SIGNIFICANT CHANGE UP (ref 42.2–75.2)
NRBC BLD AUTO-RTO: 0 /100 WBCS — SIGNIFICANT CHANGE UP (ref 0–0)
PHOSPHATE SERPL-MCNC: 2.2 MG/DL — SIGNIFICANT CHANGE UP (ref 2.1–4.9)
PLATELET # BLD AUTO: 151 K/UL — SIGNIFICANT CHANGE UP (ref 130–400)
PMV BLD: 10.5 FL — HIGH (ref 7.4–10.4)
POTASSIUM SERPL-MCNC: 3.6 MMOL/L — SIGNIFICANT CHANGE UP (ref 3.5–5)
POTASSIUM SERPL-SCNC: 3.6 MMOL/L — SIGNIFICANT CHANGE UP (ref 3.5–5)
PROT SERPL-MCNC: 5.6 G/DL — LOW (ref 6–8)
PROTHROM AB SERPL-ACNC: 12.9 SEC — HIGH (ref 9.95–12.87)
PROTHROM AB SERPL-ACNC: 13 SEC — HIGH (ref 9.95–12.87)
RBC # BLD: 3.37 M/UL — LOW (ref 4.7–6.1)
RBC # FLD: 12.6 % — SIGNIFICANT CHANGE UP (ref 11.5–14.5)
SODIUM SERPL-SCNC: 136 MMOL/L — SIGNIFICANT CHANGE UP (ref 135–146)
WBC # BLD: 5.38 K/UL — SIGNIFICANT CHANGE UP (ref 4.8–10.8)
WBC # FLD AUTO: 5.38 K/UL — SIGNIFICANT CHANGE UP (ref 4.8–10.8)

## 2025-04-17 PROCEDURE — 76705 ECHO EXAM OF ABDOMEN: CPT | Mod: 26

## 2025-04-17 PROCEDURE — 99232 SBSQ HOSP IP/OBS MODERATE 35: CPT

## 2025-04-17 RX ORDER — MAGNESIUM SULFATE 500 MG/ML
2 SYRINGE (ML) INJECTION ONCE
Refills: 0 | Status: COMPLETED | OUTPATIENT
Start: 2025-04-17 | End: 2025-04-17

## 2025-04-17 RX ORDER — ENOXAPARIN SODIUM 100 MG/ML
40 INJECTION SUBCUTANEOUS EVERY 24 HOURS
Refills: 0 | Status: DISCONTINUED | OUTPATIENT
Start: 2025-04-17 | End: 2025-04-21

## 2025-04-17 RX ORDER — FOLIC ACID 1 MG/1
1 TABLET ORAL DAILY
Refills: 0 | Status: DISCONTINUED | OUTPATIENT
Start: 2025-04-17 | End: 2025-04-29

## 2025-04-17 RX ORDER — SODIUM CHLORIDE 9 G/1000ML
1000 INJECTION, SOLUTION INTRAVENOUS
Refills: 0 | Status: DISCONTINUED | OUTPATIENT
Start: 2025-04-17 | End: 2025-04-18

## 2025-04-17 RX ADMIN — Medication 25 GRAM(S): at 12:48

## 2025-04-17 RX ADMIN — FOLIC ACID 1 MILLIGRAM(S): 1 TABLET ORAL at 10:50

## 2025-04-17 RX ADMIN — SODIUM CHLORIDE 100 MILLILITER(S): 9 INJECTION, SOLUTION INTRAVENOUS at 12:36

## 2025-04-17 RX ADMIN — Medication 105 MILLIGRAM(S): at 10:51

## 2025-04-17 RX ADMIN — SODIUM CHLORIDE 100 MILLILITER(S): 9 INJECTION, SOLUTION INTRAVENOUS at 21:45

## 2025-04-17 RX ADMIN — HEPARIN SODIUM 5000 UNIT(S): 1000 INJECTION INTRAVENOUS; SUBCUTANEOUS at 05:45

## 2025-04-17 RX ADMIN — ENOXAPARIN SODIUM 40 MILLIGRAM(S): 100 INJECTION SUBCUTANEOUS at 10:51

## 2025-04-17 RX ADMIN — Medication 40 MILLIGRAM(S): at 06:13

## 2025-04-17 NOTE — PROGRESS NOTE ADULT - ASSESSMENT
74-year-old male with past medical history of alcohol use disorder, history of necrotizing pancreatitis, hypertension, hyperlipidemia presenting to the ED accompanied by his wife for intoxication. Admitted for after a fall  (trauma w/u neg) for Alcoholic Ketoacidosis.        Assessment and plan:     #Alcoholic ketoacidosis, BHB 3 on admission   #Alcohol use disorder  # H/o necrotizing pancreatitis   # witnessed mechanical fall sec to intoxication   # Transaminitis   # Severe hepatic steatosis  # Distended GB on CT    Plan:  - CIWA Valium PRN, does not appear in acute withdrawal  - c/w D5NS until AG closes  - Addiction consult  - RUQ US  - f/u vitamin labs  - hepatology consult  - IV thiamine x 3 days, PO folate    #DVT prophylaxis: Lovenox  #GI prophylaxis: PPI  #Diet: DASH  #Activity: IAT  #Code status: Full Code  #Disposition: Med/Surg   74-year-old male with past medical history of alcohol use disorder, history of necrotizing pancreatitis, hypertension, hyperlipidemia presenting to the ED accompanied by his wife for intoxication. Admitted for after a fall  (trauma w/u neg) for Alcoholic Ketoacidosis.        Assessment and plan:     #Alcoholic ketoacidosis, BHB 3 on admission   #Alcohol use disorder  # H/o necrotizing pancreatitis   # witnessed mechanical fall sec to intoxication   # Transaminitis   # Severe hepatic steatosis  # Distended GB on CT    Plan:  - CIWA Valium PRN, does not appear in acute withdrawal  - c/w D5NS until AG closes  - Addiction consult  - RUQ US  - f/u vitamin labs  - hepatology consult  - IV thiamine x 3 days, PO folate    #DVT prophylaxis: Lovenox  #GI prophylaxis: PPI  #Diet: DASH  #Activity: IAT  #Code status: Full Code  #Disposition: Med/Surg    #Medrec: wife at bedside reports pt noncompliance for home antihypertensives and PPI for several months

## 2025-04-17 NOTE — PROGRESS NOTE ADULT - ATTENDING COMMENTS
74-year-old male with past medical history of alcohol use disorder, history of necrotizing pancreatitis, hypertension, hyperlipidemia presenting to the ED accompanied by his wife for intoxication. Admitted for after a fall  (trauma w/u neg) for Alcoholic Ketoacidosis.    Assessment:  Mechanical fall amid binge drinking episode  Alcohol use disorder  Alcoholic ketoacidosis  Suspected Folate/Thiamine deficiency 2/2 AUD  Hypomagnesemia  Mixed LFTSs  HTN  HLD  Hx of necrotizing pancreatitis    Plan  - CTH/C-spine negative  - CT A/P:  Severe hepatic steatosis. Nonspecific distended gallbladder. Left adrenal gland 6 cm myelolipoma  - CIWA protocol, Valium IV prn  - Addiction/CATCH team eval  - folate/ thiamine/magnesium supplementation  - c/w D5/NS @100cc/hr  - f/u RUQ US    Pending: improvement in lactate / AG / trend electrolytes to avoid refeeding / RUQ US    # DVT PPX: heparin sc

## 2025-04-17 NOTE — PROGRESS NOTE ADULT - SUBJECTIVE AND OBJECTIVE BOX
SUBJECTIVE/OVERNIGHT EVENTS  Today is hospital day 1d. This morning patient was seen and examined at bedside, resting comfortably in bed. No acute or major events overnight.    HOSPITAL COURSE  Day 1:   Day 2:   Day 3:     CODE STATUS:    FAMILY COMMUNICATION  Contact date:  Name of person contacted:  Relationship to patient:  Communication details:    MEDICATIONS  STANDING MEDICATIONS  dextrose 5% + sodium chloride 0.9%. 1000 milliLiter(s) IV Continuous <Continuous>  enoxaparin Injectable 40 milliGRAM(s) SubCutaneous every 24 hours  folic acid 1 milliGRAM(s) Oral daily  pantoprazole    Tablet 40 milliGRAM(s) Oral before breakfast  thiamine IVPB 500 milliGRAM(s) IV Intermittent daily    PRN MEDICATIONS  acetaminophen     Tablet .. 650 milliGRAM(s) Oral every 6 hours PRN  aluminum hydroxide/magnesium hydroxide/simethicone Suspension 30 milliLiter(s) Oral every 4 hours PRN  diazepam  Injectable 5 milliGRAM(s) IV Push every 2 hours PRN  melatonin 3 milliGRAM(s) Oral at bedtime PRN  ondansetron Injectable 4 milliGRAM(s) IV Push every 8 hours PRN    VITALS  T(F): 98.5 (04-17-25 @ 07:50), Max: 98.5 (04-17-25 @ 07:50)  HR: 91 (04-17-25 @ 07:50) (63 - 91)  BP: 117/65 (04-17-25 @ 07:50) (117/65 - 144/79)  RR: 18 (04-17-25 @ 01:09) (16 - 18)  SpO2: 100% (04-17-25 @ 07:50) (95% - 100%)  POCT Blood Glucose.: 156 mg/dL (04-17-25 @ 07:29)  POCT Blood Glucose.: 141 mg/dL (04-16-25 @ 15:59)    PHYSICAL EXAM  GENERAL: NAD, lying in bed comfortably  HEART: Regular rate and rhythm, no murmurs, rubs, or gallops  LUNGS: Unlabored respirations.  Clear to auscultation bilaterally, no crackles, wheezing, or rhonchi  ABDOMEN: Soft, nontender, nondistended, +BS  EXTREMITIES: No clubbing, cyanosis, or edema  NERVOUS SYSTEM:  Alert, mild tremors b/l  SKIN: No rashes or lesions    LABS             11.5   5.38  )-----------( 151      ( 04-17-25 @ 05:25 )             32.0     136  |  96  |  19  -------------------------<  159   04-17-25 @ 05:25  3.6  |  22  |  0.6    Ca      8.0     04-17-25 @ 05:25  Phos   2.2     04-17-25 @ 05:25  Mg     1.7     04-17-25 @ 05:25    TPro  5.6  /  Alb  3.1  /  TBili  1.5  /  DBili  x   /  AST  184  /  ALT  58  /  AlkPhos  192  /  GGT  x     04-17-25 @ 05:25    PT/INR - ( 04-17-25 @ 05:25 )   PT: 12.90 sec[H];   INR: 1.09 ratio  PTT - ( 04-17-25 @ 05:25 )  PTT:30.8 sec      Urinalysis Basic - ( 17 Apr 2025 05:25 )    Color: x / Appearance: x / SG: x / pH: x  Gluc: 159 mg/dL / Ketone: x  / Bili: x / Urobili: x   Blood: x / Protein: x / Nitrite: x   Leuk Esterase: x / RBC: x / WBC x   Sq Epi: x / Non Sq Epi: x / Bacteria: x          IMAGING

## 2025-04-18 LAB
ALBUMIN SERPL ELPH-MCNC: 2.8 G/DL — LOW (ref 3.5–5.2)
ALP SERPL-CCNC: 196 U/L — HIGH (ref 30–115)
ALT FLD-CCNC: 50 U/L — HIGH (ref 0–41)
ANION GAP SERPL CALC-SCNC: 13 MMOL/L — SIGNIFICANT CHANGE UP (ref 7–14)
AST SERPL-CCNC: 146 U/L — HIGH (ref 0–41)
BASOPHILS # BLD AUTO: 0.02 K/UL — SIGNIFICANT CHANGE UP (ref 0–0.2)
BASOPHILS NFR BLD AUTO: 0.7 % — SIGNIFICANT CHANGE UP (ref 0–1)
BILIRUB SERPL-MCNC: 2.5 MG/DL — HIGH (ref 0.2–1.2)
BUN SERPL-MCNC: 17 MG/DL — SIGNIFICANT CHANGE UP (ref 10–20)
CALCIUM SERPL-MCNC: 8.1 MG/DL — LOW (ref 8.4–10.5)
CHLORIDE SERPL-SCNC: 96 MMOL/L — LOW (ref 98–110)
CO2 SERPL-SCNC: 24 MMOL/L — SIGNIFICANT CHANGE UP (ref 17–32)
CREAT SERPL-MCNC: 0.5 MG/DL — LOW (ref 0.7–1.5)
EGFR: 106 ML/MIN/1.73M2 — SIGNIFICANT CHANGE UP
EGFR: 106 ML/MIN/1.73M2 — SIGNIFICANT CHANGE UP
EOSINOPHIL # BLD AUTO: 0 K/UL — SIGNIFICANT CHANGE UP (ref 0–0.7)
EOSINOPHIL NFR BLD AUTO: 0 % — SIGNIFICANT CHANGE UP (ref 0–8)
FOLATE SERPL-MCNC: 9.7 NG/ML — SIGNIFICANT CHANGE UP
GLUCOSE BLDC GLUCOMTR-MCNC: 122 MG/DL — HIGH (ref 70–99)
GLUCOSE BLDC GLUCOMTR-MCNC: 188 MG/DL — HIGH (ref 70–99)
GLUCOSE BLDC GLUCOMTR-MCNC: 220 MG/DL — HIGH (ref 70–99)
GLUCOSE BLDC GLUCOMTR-MCNC: 354 MG/DL — HIGH (ref 70–99)
GLUCOSE SERPL-MCNC: 230 MG/DL — HIGH (ref 70–99)
HCT VFR BLD CALC: 32 % — LOW (ref 42–52)
HGB BLD-MCNC: 11.5 G/DL — LOW (ref 14–18)
IMM GRANULOCYTES NFR BLD AUTO: 1 % — HIGH (ref 0.1–0.3)
LYMPHOCYTES # BLD AUTO: 0.74 K/UL — LOW (ref 1.2–3.4)
LYMPHOCYTES # BLD AUTO: 24.8 % — SIGNIFICANT CHANGE UP (ref 20.5–51.1)
MAGNESIUM SERPL-MCNC: 2.1 MG/DL — SIGNIFICANT CHANGE UP (ref 1.8–2.4)
MCHC RBC-ENTMCNC: 34.4 PG — HIGH (ref 27–31)
MCHC RBC-ENTMCNC: 35.9 G/DL — SIGNIFICANT CHANGE UP (ref 32–37)
MCV RBC AUTO: 95.8 FL — HIGH (ref 80–94)
MONOCYTES # BLD AUTO: 0.43 K/UL — SIGNIFICANT CHANGE UP (ref 0.1–0.6)
MONOCYTES NFR BLD AUTO: 14.4 % — HIGH (ref 1.7–9.3)
NEUTROPHILS # BLD AUTO: 1.76 K/UL — SIGNIFICANT CHANGE UP (ref 1.4–6.5)
NEUTROPHILS NFR BLD AUTO: 59.1 % — SIGNIFICANT CHANGE UP (ref 42.2–75.2)
NRBC BLD AUTO-RTO: 0 /100 WBCS — SIGNIFICANT CHANGE UP (ref 0–0)
PLATELET # BLD AUTO: 123 K/UL — LOW (ref 130–400)
PMV BLD: 10.5 FL — HIGH (ref 7.4–10.4)
POTASSIUM SERPL-MCNC: 3.2 MMOL/L — LOW (ref 3.5–5)
POTASSIUM SERPL-SCNC: 3.2 MMOL/L — LOW (ref 3.5–5)
PROT SERPL-MCNC: 5.4 G/DL — LOW (ref 6–8)
RBC # BLD: 3.34 M/UL — LOW (ref 4.7–6.1)
RBC # FLD: 12.7 % — SIGNIFICANT CHANGE UP (ref 11.5–14.5)
SODIUM SERPL-SCNC: 133 MMOL/L — LOW (ref 135–146)
VIT B12 SERPL-MCNC: 1067 PG/ML — SIGNIFICANT CHANGE UP (ref 232–1245)
WBC # BLD: 2.98 K/UL — LOW (ref 4.8–10.8)
WBC # FLD AUTO: 2.98 K/UL — LOW (ref 4.8–10.8)

## 2025-04-18 PROCEDURE — 99232 SBSQ HOSP IP/OBS MODERATE 35: CPT

## 2025-04-18 RX ORDER — DEXTROSE 50 % IN WATER 50 %
12.5 SYRINGE (ML) INTRAVENOUS ONCE
Refills: 0 | Status: DISCONTINUED | OUTPATIENT
Start: 2025-04-18 | End: 2025-04-29

## 2025-04-18 RX ORDER — SODIUM CHLORIDE 9 G/1000ML
1000 INJECTION, SOLUTION INTRAVENOUS
Refills: 0 | Status: DISCONTINUED | OUTPATIENT
Start: 2025-04-18 | End: 2025-04-23

## 2025-04-18 RX ORDER — DEXTROSE 50 % IN WATER 50 %
25 SYRINGE (ML) INTRAVENOUS ONCE
Refills: 0 | Status: DISCONTINUED | OUTPATIENT
Start: 2025-04-18 | End: 2025-04-29

## 2025-04-18 RX ORDER — INSULIN LISPRO 100 U/ML
INJECTION, SOLUTION INTRAVENOUS; SUBCUTANEOUS
Refills: 0 | Status: DISCONTINUED | OUTPATIENT
Start: 2025-04-18 | End: 2025-04-29

## 2025-04-18 RX ORDER — GLUCAGON 3 MG/1
1 POWDER NASAL ONCE
Refills: 0 | Status: DISCONTINUED | OUTPATIENT
Start: 2025-04-18 | End: 2025-04-29

## 2025-04-18 RX ORDER — DEXTROSE 50 % IN WATER 50 %
15 SYRINGE (ML) INTRAVENOUS ONCE
Refills: 0 | Status: DISCONTINUED | OUTPATIENT
Start: 2025-04-18 | End: 2025-04-29

## 2025-04-18 RX ADMIN — Medication 40 MILLIGRAM(S): at 05:29

## 2025-04-18 RX ADMIN — FOLIC ACID 1 MILLIGRAM(S): 1 TABLET ORAL at 11:35

## 2025-04-18 RX ADMIN — Medication 105 MILLIGRAM(S): at 11:35

## 2025-04-18 RX ADMIN — INSULIN LISPRO 1: 100 INJECTION, SOLUTION INTRAVENOUS; SUBCUTANEOUS at 19:05

## 2025-04-18 RX ADMIN — Medication 1 APPLICATION(S): at 11:45

## 2025-04-18 RX ADMIN — ENOXAPARIN SODIUM 40 MILLIGRAM(S): 100 INJECTION SUBCUTANEOUS at 11:35

## 2025-04-18 RX ADMIN — Medication 40 MILLIEQUIVALENT(S): at 14:45

## 2025-04-18 RX ADMIN — Medication 40 MILLIEQUIVALENT(S): at 11:35

## 2025-04-18 NOTE — PHYSICAL THERAPY INITIAL EVALUATION ADULT - PERTINENT HX OF CURRENT PROBLEM, REHAB EVAL
74-year-old male with past medical history of alcohol use disorder, history of necrotizing pancreatitis, hypertension, hyperlipidemia presenting to the ED accompanied by his wife for intoxication.  Wife states that the patient drinks approximately every day.  Patient has had a binge drinking episode for the past 4 days.  Today patient fell and was unable to ambulate after the fall.  Trauma work up negative.

## 2025-04-18 NOTE — PROGRESS NOTE ADULT - ASSESSMENT
74-year-old male with past medical history of alcohol use disorder, history of necrotizing pancreatitis, hypertension, hyperlipidemia presenting to the ED accompanied by his wife for intoxication. Admitted for after a fall  (trauma w/u neg) for Alcoholic Ketoacidosis.    #Alcoholic ketoacidosis - improving   #Lactic acidosis - improving  #Alcohol use disorder  #Transaminitis   #Severe hepatic steatosis  #Distended GB on CT  #Witnessed mechanical fall sec to intoxication   - trauma w/u negative   - BHB 3.6, AG 24 on admission  - lactate 6.2>6.1>3.9  - CIWA Valium PRN, does not appear to be in acute withdrawal  - c/w D5NS until AG closes  - addiction medicine consult  - CATCH team  - f/u RUQ US report  - IV thiamine x 3 days  - PO folate    #MISC  - DVT prophylaxis: Lovenox  - GI prophylaxis: PPI  - Diet: DASH  - Activity: IAT  - Code status: Full Code  - Disposition: Medicine  - Medrec: wife at bedside reports pt noncompliance for home antihypertensives and PPI for several months    Pendings: RAFIWA, addiction, CATCH, dispo planning 74-year-old male with past medical history of alcohol use disorder, history of necrotizing pancreatitis, hypertension, hyperlipidemia presenting to the ED accompanied by his wife for intoxication. Admitted for after a fall  (trauma w/u neg) for Alcoholic Ketoacidosis.    #Alcoholic ketoacidosis - improving   #Lactic acidosis - improving  #Alcohol use disorder  #Transaminitis   #Severe hepatic steatosis  #Distended GB on CT  #Witnessed mechanical fall sec to intoxication   - trauma w/u negative   - BHB 3.6, AG 24 on admission  - lactate 6.2>6.1>3.9  - CIWA Valium PRN, does not appear to be in acute withdrawal  - s/p D5NS, AG closed  - addiction medicine consult  - CATCH team  - f/u RUQ US report  - IV thiamine x 3 days  - PO folate    #MISC  - DVT prophylaxis: Lovenox  - GI prophylaxis: PPI  - Diet: DASH  - Activity: IAT  - Code status: Full Code  - Disposition: Medicine  - Medrec: wife at bedside reports pt noncompliance for home antihypertensives and PPI for several months    Pendings: CIWA, addiction, CATCH, dispo planning

## 2025-04-18 NOTE — PROGRESS NOTE ADULT - ATTENDING COMMENTS
74-year-old male with past medical history of alcohol use disorder, history of necrotizing pancreatitis, hypertension, hyperlipidemia presenting to the ED accompanied by his wife for intoxication. Admitted for after a fall  (trauma w/u neg) for Alcoholic Ketoacidosis and lactic acidosis.    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   patient not interested in talking about alchol use, not forthcoming with any discussion for depression stating everything is okay and he already quit drinking.    O/e  bilateral hand tremors  no ophthlamoplegia  no cerebellar signs  abd: non tender; soft, no guarding or rigidity    A&P      # metabolic acidosis - Alcoholic ketoacidosis and Lactic acidosis - improving  #Alcohol use disorder  #Transaminitis   #Severe hepatic steatosis  #Distended GB on CT  #Witnessed mechanical fall sec to intoxication   - denies any h/o seizures  - trauma w/u negative   - BHB 3.6, AG 24 on admission  - lactate 6.2>6.1>3.9  - CIWA Valium PRN, does not appear to be in acute withdrawal  - cont IV fluids  - addiction medicine consult  - CATCH team  - f/u RUQ US report  - IV thiamine x 3 days  - PO folate  - check orthostatic vital sign  - PT consult  -social work consult  fall precautions    Pending: clinical improvement  d/w patient, and also with wife 74-year-old male with past medical history of alcohol use disorder, history of necrotizing pancreatitis, hypertension, hyperlipidemia presenting to the ED accompanied by his wife for intoxication. Admitted for after a fall  (trauma w/u neg) for Alcoholic Ketoacidosis and lactic acidosis.    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   patient not interested in talking about alchol use, not forthcoming with any discussion for depression stating everything is okay and he already quit drinking.    O/e  bilateral hand tremors  no ophthlamoplegia  no cerebellar signs  abd: non tender; soft, no guarding or rigidity    A&P      # metabolic acidosis - Alcoholic ketoacidosis and Lactic acidosis - improving  #Alcohol use disorder with mild withdrawal symptoms  #Transaminitis   #Severe hepatic steatosis  #Distended GB on CT  #Witnessed mechanical fall sec to intoxication   - denies any h/o seizures  - trauma w/u negative   - BHB 3.6, AG 24 on admission  - lactate 6.2>6.1>3.9  - CIWA Valium PRN  - cont IV fluids  - addiction medicine consult, CATCH team,social work consult  - f/u RUQ US report  - IV thiamine x 3 days  - PO folate  - check orthostatic vital sign  - PT consult  fall precautions    Pending: clinical improvement  d/w patient, and also with wife

## 2025-04-18 NOTE — PROGRESS NOTE ADULT - SUBJECTIVE AND OBJECTIVE BOX
SUBJECTIVE/OVERNIGHT EVENTS  Today is hospital day 2d. This morning patient was seen and examined at bedside, resting comfortably in bed. No acute or major events overnight.  Patient is A&Ox3, follows commands, tremors noted on exam, no other signs of withdrawal, is not requiring Valium.    MEDICATIONS  STANDING MEDICATIONS  chlorhexidine 2% Cloths 1 Application(s) Topical daily  dextrose 5% + sodium chloride 0.9%. 1000 milliLiter(s) IV Continuous <Continuous>  dextrose 5%. 1000 milliLiter(s) IV Continuous <Continuous>  dextrose 5%. 1000 milliLiter(s) IV Continuous <Continuous>  dextrose 50% Injectable 25 Gram(s) IV Push once  dextrose 50% Injectable 12.5 Gram(s) IV Push once  dextrose 50% Injectable 25 Gram(s) IV Push once  enoxaparin Injectable 40 milliGRAM(s) SubCutaneous every 24 hours  folic acid 1 milliGRAM(s) Oral daily  glucagon  Injectable 1 milliGRAM(s) IntraMuscular once  insulin lispro (ADMELOG) corrective regimen sliding scale   SubCutaneous three times a day before meals  pantoprazole    Tablet 40 milliGRAM(s) Oral before breakfast  potassium chloride    Tablet ER 40 milliEquivalent(s) Oral every 4 hours  thiamine IVPB 500 milliGRAM(s) IV Intermittent daily    PRN MEDICATIONS  acetaminophen     Tablet .. 650 milliGRAM(s) Oral every 6 hours PRN  aluminum hydroxide/magnesium hydroxide/simethicone Suspension 30 milliLiter(s) Oral every 4 hours PRN  dextrose Oral Gel 15 Gram(s) Oral once PRN  diazepam  Injectable 5 milliGRAM(s) IV Push every 2 hours PRN  melatonin 3 milliGRAM(s) Oral at bedtime PRN  ondansetron Injectable 4 milliGRAM(s) IV Push every 8 hours PRN    VITALS  T(F): 97.7 (04-18-25 @ 08:02), Max: 98.7 (04-18-25 @ 00:00)  HR: 75 (04-18-25 @ 08:02) (75 - 92)  BP: 141/75 (04-18-25 @ 08:02) (139/72 - 147/76)  RR: 18 (04-18-25 @ 08:02) (18 - 19)  SpO2: 98% (04-18-25 @ 08:02) (95% - 98%)  POCT Blood Glucose.: 354 mg/dL (04-18-25 @ 11:01)  POCT Blood Glucose.: 220 mg/dL (04-18-25 @ 07:30)  POCT Blood Glucose.: 221 mg/dL (04-17-25 @ 21:27)  POCT Blood Glucose.: 156 mg/dL (04-17-25 @ 16:33)  POCT Blood Glucose.: 160 mg/dL (04-17-25 @ 14:15)    PHYSICAL EXAM  GENERAL  ( x ) NAD, lying in bed comfortably     (  ) obtunded     (  ) lethargic     (  ) somnolent    HEAD  ( x ) Atraumatic     (  ) hematoma     (  ) laceration (specify location:       )     NECK  ( x ) Supple     (  ) neck stiffness     (  ) nuchal rigidity     (  )  no JVD     (  ) JVD present ( -- cm)    HEART  Rate -->  ( x ) normal rate    (  ) bradycardic    (  ) tachycardic  Rhythm -->  ( x ) regular    (  ) regularly irregular    (  ) irregularly irregular  Murmurs -->  ( x ) normal s1/s2    (  ) systolic murmur    (  ) diastolic murmur    (  ) continuous murmur     (  ) S3 present    (  ) S4 present    LUNGS  ( x ) Unlabored respirations     (  ) tachypnea  ( x ) B/L air entry     (  ) decreased breath sounds in:  (location     )    (  ) no adventitious sound     (  ) crackles     (  ) wheezing      (  ) rhonchi      (specify location:       )  (  ) chest wall tenderness (specify location:       )    ABDOMEN  ( x ) Soft     (  ) tense   |   (  ) nondistended     (  ) distended   |   (  ) +BS     (  ) hypoactive bowel sounds     (  ) hyperactive bowel sounds  ( x ) nontender     (  ) RUQ tenderness     (  ) RLQ tenderness     (  ) LLQ tenderness     (  ) epigastric tenderness     (  ) diffuse tenderness  (  ) Splenomegaly      (  ) Hepatomegaly      (  ) Jaundice     (  ) ecchymosis     EXTREMITIES  ( x ) Normal     (  ) Rash     (  ) ecchymosis     (  ) varicose veins      (  ) pitting edema     (  ) non-pitting edema   (  ) ulceration     (  ) gangrene:     (location:     )    NERVOUS SYSTEM  ( x ) A&Ox3     (  ) confused     (  ) lethargic  CN II-XII:     (  ) Intact     (  ) focal deficits  (Specify:     )   Upper extremities:     (  ) strength X/5     (  ) focal deficit (specify:    )  Lower extremities:     (  ) strength  X/5    (  ) focal deficit (specify:    )      LABS             11.5   2.98  )-----------( 123      ( 04-18-25 @ 05:07 )             32.0     133  |  96  |  17  -------------------------<  230   04-18-25 @ 05:07  3.2  |  24  |  0.5    Ca      8.1     04-18-25 @ 05:07  Phos   2.2     04-17-25 @ 05:25  Mg     2.1     04-18-25 @ 05:07    TPro  5.4  /  Alb  2.8  /  TBili  2.5  /  DBili  x   /  AST  146  /  ALT  50  /  AlkPhos  196  /  GGT  x     04-18-25 @ 05:07    PT/INR - ( 04-17-25 @ 05:25 )   PT: 12.90 sec[H];   INR: 1.09 ratio  PTT - ( 04-17-25 @ 05:25 )  PTT:30.8 sec      Urinalysis Basic - ( 18 Apr 2025 05:07 )    Color: x / Appearance: x / SG: x / pH: x  Gluc: 230 mg/dL / Ketone: x  / Bili: x / Urobili: x   Blood: x / Protein: x / Nitrite: x   Leuk Esterase: x / RBC: x / WBC x   Sq Epi: x / Non Sq Epi: x / Bacteria: x          IMAGING

## 2025-04-19 LAB
A1C WITH ESTIMATED AVERAGE GLUCOSE RESULT: 5.9 % — HIGH (ref 4–5.6)
ALBUMIN SERPL ELPH-MCNC: 2.6 G/DL — LOW (ref 3.5–5.2)
ALP SERPL-CCNC: 175 U/L — HIGH (ref 30–115)
ALT FLD-CCNC: 41 U/L — SIGNIFICANT CHANGE UP (ref 0–41)
ANION GAP SERPL CALC-SCNC: 11 MMOL/L — SIGNIFICANT CHANGE UP (ref 7–14)
AST SERPL-CCNC: 107 U/L — HIGH (ref 0–41)
BILIRUB SERPL-MCNC: 2.2 MG/DL — HIGH (ref 0.2–1.2)
BUN SERPL-MCNC: 12 MG/DL — SIGNIFICANT CHANGE UP (ref 10–20)
CALCIUM SERPL-MCNC: 8.1 MG/DL — LOW (ref 8.4–10.5)
CHLORIDE SERPL-SCNC: 97 MMOL/L — LOW (ref 98–110)
CO2 SERPL-SCNC: 24 MMOL/L — SIGNIFICANT CHANGE UP (ref 17–32)
CREAT SERPL-MCNC: 0.5 MG/DL — LOW (ref 0.7–1.5)
EGFR: 106 ML/MIN/1.73M2 — SIGNIFICANT CHANGE UP
EGFR: 106 ML/MIN/1.73M2 — SIGNIFICANT CHANGE UP
ESTIMATED AVERAGE GLUCOSE: 123 MG/DL — HIGH (ref 68–114)
GLUCOSE BLDC GLUCOMTR-MCNC: 123 MG/DL — HIGH (ref 70–99)
GLUCOSE BLDC GLUCOMTR-MCNC: 157 MG/DL — HIGH (ref 70–99)
GLUCOSE BLDC GLUCOMTR-MCNC: 284 MG/DL — HIGH (ref 70–99)
GLUCOSE SERPL-MCNC: 268 MG/DL — HIGH (ref 70–99)
MAGNESIUM SERPL-MCNC: 1.5 MG/DL — LOW (ref 1.8–2.4)
POTASSIUM SERPL-MCNC: 3.7 MMOL/L — SIGNIFICANT CHANGE UP (ref 3.5–5)
POTASSIUM SERPL-SCNC: 3.7 MMOL/L — SIGNIFICANT CHANGE UP (ref 3.5–5)
PROT SERPL-MCNC: 4.8 G/DL — LOW (ref 6–8)
SODIUM SERPL-SCNC: 132 MMOL/L — LOW (ref 135–146)

## 2025-04-19 PROCEDURE — 99232 SBSQ HOSP IP/OBS MODERATE 35: CPT | Mod: GC

## 2025-04-19 PROCEDURE — 90792 PSYCH DIAG EVAL W/MED SRVCS: CPT

## 2025-04-19 RX ORDER — SODIUM CHLORIDE 9 G/1000ML
1000 INJECTION, SOLUTION INTRAVENOUS
Refills: 0 | Status: DISCONTINUED | OUTPATIENT
Start: 2025-04-19 | End: 2025-04-21

## 2025-04-19 RX ORDER — SODIUM CHLORIDE 9 G/1000ML
500 INJECTION, SOLUTION INTRAVENOUS ONCE
Refills: 0 | Status: COMPLETED | OUTPATIENT
Start: 2025-04-19 | End: 2025-04-19

## 2025-04-19 RX ORDER — SODIUM CHLORIDE 9 G/1000ML
500 INJECTION, SOLUTION INTRAVENOUS
Refills: 0 | Status: DISCONTINUED | OUTPATIENT
Start: 2025-04-19 | End: 2025-04-19

## 2025-04-19 RX ORDER — MAGNESIUM SULFATE 500 MG/ML
2 SYRINGE (ML) INJECTION ONCE
Refills: 0 | Status: COMPLETED | OUTPATIENT
Start: 2025-04-19 | End: 2025-04-19

## 2025-04-19 RX ORDER — MIRTAZAPINE 30 MG/1
7.5 TABLET, FILM COATED ORAL AT BEDTIME
Refills: 0 | Status: DISCONTINUED | OUTPATIENT
Start: 2025-04-19 | End: 2025-04-24

## 2025-04-19 RX ADMIN — MIRTAZAPINE 7.5 MILLIGRAM(S): 30 TABLET, FILM COATED ORAL at 21:09

## 2025-04-19 RX ADMIN — Medication 105 MILLIGRAM(S): at 15:28

## 2025-04-19 RX ADMIN — SODIUM CHLORIDE 75 MILLILITER(S): 9 INJECTION, SOLUTION INTRAVENOUS at 13:35

## 2025-04-19 RX ADMIN — Medication 25 GRAM(S): at 13:35

## 2025-04-19 RX ADMIN — SODIUM CHLORIDE 500 MILLILITER(S): 9 INJECTION, SOLUTION INTRAVENOUS at 11:43

## 2025-04-19 RX ADMIN — Medication 1 APPLICATION(S): at 11:43

## 2025-04-19 RX ADMIN — INSULIN LISPRO 1: 100 INJECTION, SOLUTION INTRAVENOUS; SUBCUTANEOUS at 08:13

## 2025-04-19 RX ADMIN — ENOXAPARIN SODIUM 40 MILLIGRAM(S): 100 INJECTION SUBCUTANEOUS at 10:19

## 2025-04-19 RX ADMIN — INSULIN LISPRO 3: 100 INJECTION, SOLUTION INTRAVENOUS; SUBCUTANEOUS at 11:44

## 2025-04-19 RX ADMIN — FOLIC ACID 1 MILLIGRAM(S): 1 TABLET ORAL at 11:43

## 2025-04-19 RX ADMIN — Medication 40 MILLIGRAM(S): at 06:42

## 2025-04-19 NOTE — PROGRESS NOTE ADULT - SUBJECTIVE AND OBJECTIVE BOX
HPI  Patient is a 74y old Male who presents with a chief complaint of Fall (19 Apr 2025 10:44)    Currently admitted to medicine with the primary diagnosis of Alcoholic ketoacidosis       Today is hospital day 3d.     INTERVAL HPI / OVERNIGHT EVENTS:  Patient was seen and examined at bedside  Patient Feels better; tremors have improved  No new complaints  Denies any complains of chest pain or shortness of breath  Denies any abdominal pain/nausea/vomiting  had 1 episode of loose stool yesterday; no further episode      PAST MEDICAL & SURGICAL HISTORY  HTN (hypertension)    High cholesterol    Gastric ulcer      ALLERGIES  No Known Allergies    MEDICATIONS  STANDING MEDICATIONS  chlorhexidine 2% Cloths 1 Application(s) Topical daily  dextrose 5%. 1000 milliLiter(s) IV Continuous <Continuous>  dextrose 5%. 1000 milliLiter(s) IV Continuous <Continuous>  dextrose 50% Injectable 25 Gram(s) IV Push once  dextrose 50% Injectable 12.5 Gram(s) IV Push once  dextrose 50% Injectable 25 Gram(s) IV Push once  enoxaparin Injectable 40 milliGRAM(s) SubCutaneous every 24 hours  folic acid 1 milliGRAM(s) Oral daily  glucagon  Injectable 1 milliGRAM(s) IntraMuscular once  insulin lispro (ADMELOG) corrective regimen sliding scale   SubCutaneous three times a day before meals  lactated ringers. 1000 milliLiter(s) IV Continuous <Continuous>  lactated ringers. 500 milliLiter(s) IV Continuous <Continuous>  pantoprazole    Tablet 40 milliGRAM(s) Oral before breakfast  thiamine IVPB 500 milliGRAM(s) IV Intermittent daily    PRN MEDICATIONS  acetaminophen     Tablet .. 650 milliGRAM(s) Oral every 6 hours PRN  aluminum hydroxide/magnesium hydroxide/simethicone Suspension 30 milliLiter(s) Oral every 4 hours PRN  dextrose Oral Gel 15 Gram(s) Oral once PRN  diazepam  Injectable 5 milliGRAM(s) IV Push every 2 hours PRN  melatonin 3 milliGRAM(s) Oral at bedtime PRN  ondansetron Injectable 4 milliGRAM(s) IV Push every 8 hours PRN    VITALS:  T(F): 98.3  HR: 77  BP: 132/80  RR: 18  SpO2: 100%    PHYSICAL EXAM  GEN:  comfortable  PULM: normal respiration  ABD: Soft, non-distended, no guarding; non-tender  EXT: No lower extremity edema  NEURO: A&Ox3, moving all extremities    LABS                        11.5   2.98  )-----------( 123      ( 18 Apr 2025 05:07 )             32.0     04-18    133[L]  |  96[L]  |  17  ----------------------------<  230[H]  3.2[L]   |  24  |  0.5[L]    Ca    8.1[L]      18 Apr 2025 05:07  Mg     2.1     04-18    TPro  5.4[L]  /  Alb  2.8[L]  /  TBili  2.5[H]  /  DBili  x   /  AST  146[H]  /  ALT  50[H]  /  AlkPhos  196[H]  04-18      Urinalysis Basic - ( 18 Apr 2025 05:07 )    Color: x / Appearance: x / SG: x / pH: x  Gluc: 230 mg/dL / Ketone: x  / Bili: x / Urobili: x   Blood: x / Protein: x / Nitrite: x   Leuk Esterase: x / RBC: x / WBC x   Sq Epi: x / Non Sq Epi: x / Bacteria: x                RADIOLOGY

## 2025-04-19 NOTE — CONSULT NOTE ADULT - ASSESSMENT
Patient is a 74-year-old male, , domiciled with wife, retired , with past medical history of necrotizing pancreatitis, hypertension, and hyperlipidemia, no reported psychiatric histroy, with substance use history of alochol (1 pint Frida a day, no history of complicated withdrawals, no history of rehab stays), admitted for Alcoholic Ketoacidosis. Addiction Medicine consulted for assistance with alcohol withdrawal.     Patient appears to have alcohol use disorder, drinking 1 pint of frida daily, currently in the precontemplation stage. His withdrawals appears to be well-controlled, with CIWA 2 for tremors. Can continue CIWA monitoring, with thiamine, folate, and MV supplementation. He is not interested in medication to reduce cravings, like naltrexone or resources/referrals to rehab. In regards to the mental health evaluation, patient appears dysthymic, constricted affect, with limited participation in interview. He is presenting with depression, unspecified, in the context of substance use (alcohol use), psychosocial stressors (recent long term), and medical illness/pain. Differential includes MDD vs substance induced mood disorder. Patient does not appear to be at acute risk of harm to self or other (no past SA, no current SI/HI/AH/VH, no firearm in home, collateral denying safety concerns); therefore, patient does not meet criteria for inpatient psychiatric hospitalization. Plan to refer patient to outpatient psychiatry. Patient is amenable to trialing mirtazapine for improvement of mood/appetite/sleep.     #Alcohol Use Disorder  - continue CIWA monitoring for clinical s/s of alcohol withdrawal especially in the first 2-3 days post last drink for moderate to severe AUD  - keep Mg>2, K>4   - continue thiamine, folate and multivitamin  - hold benzodiazepines for sedation/respiratory depression    - please continue all PRN medications for supportive management of withdrawal including:  ---- zofran q6 hr prn nausea   ---- hydroxyzine 25 mg q6h prn anxiety   ---- tylenol 650 mg q6h prn mild/moderate pain  ---- trazodone 25 mg nightly prn for insomnia  - CATCH team to assist with substance use aftercare options including inpatient vs outpatient rehab and will have ongoing discussions regarding medications for AUD such as naltrexone.    #Depression, NOS  - Start Mirtazapine 7.5mg PO QHS for mood/appetite/sleep  - Psychiatry will follow up to facilitate referrals to outpatient psychiatry  - No psychiatric contraindications to discharge    Addiction medicine will sign off. Psychiatry will follow up for referral to outpatient service.

## 2025-04-19 NOTE — PROGRESS NOTE ADULT - ASSESSMENT
74-year-old male with past medical history of alcohol use disorder, history of necrotizing pancreatitis, hypertension, hyperlipidemia presenting to the ED accompanied by his wife for intoxication. Admitted for after a fall  (trauma w/u neg) for Alcoholic Ketoacidosis and lactic acidosis.    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   patient not interested in talking about alchol use, not forthcoming with any discussion for depression stating everything is okay and he already quit drinking.    O/e  bilateral hand tremors  no ophthlamoplegia  no cerebellar signs  abd: non tender; soft, no guarding or rigidity    A&P    # metabolic acidosis - Alcoholic ketoacidosis and Lactic acidosis - improving  # Alcohol use disorder with mild withdrawal symptoms  # Transaminitis   # Severe hepatic steatosis  # Distended GB on CT  # Witnessed mechanical fall sec to intoxication and orthostatic symptoms  # orthostatic hypotension  # hypokalemia  - denies any h/o seizures  - trauma w/u negative   - BHB 3.6, AG 24 on admission  - lactate 6.2>6.1>3.9  - CIWA Valium PRN  - addiction medicine consult, CATCH team,social work consult  - f/u RUQ US report- No acute sonographic abnormality., Hepatic steatosis.  - IV thiamine x 3 days, PO folate  - PT consult- recommended Rehab; patient and family interested- care management to follow  IV fluids-  ml bolus, then 75 ml/hr for 24 hours; monitor orthostatic daily; abd binder and stocking; d/w nursing  replete potassium- monitor bmp, mg, lactic acid levels  fall precautions    DVT px- lovenox  Pending: clinical improvement, rehab arranagment  d/w patient, and also with wife.     Time spent 35 mnts

## 2025-04-19 NOTE — CONSULT NOTE ADULT - SUBJECTIVE AND OBJECTIVE BOX
Pt interviewed, examined and EMR chart reviewed.    Patient is a 74-year-old male, , domiciled with wife, retired , with past medical history of necrotizing pancreatitis, hypertension, and hyperlipidemia, no reported psychiatric histroy, with substance use history of alochol (1 pint Frida a day, no history of complicated withdrawals, no history of rehab stays), admitted for Alcoholic Ketoacidosis. Addiction Medicine consulted for assistance with alcohol withdrawal.     Upon approach, patient is laying in bed, multiple ecchymosis on extremities noted, wife at bedside who prefers to translate. Patient appears dysthymic, constricted affect, with limited participation in interview. At one point, patient goes onto his ipad and allows his wife to complete interview. Patient endorses using alcohol first in his teens, and now drinks 1 pint of frida daily, no history of complicated withdrawals, no history of rehab stays. When asked why he was admitted to the hospital he says "I have low blood pressure." He does not recognize he has alcohol use disorder and is not amenable to medication to reduce cravings or rehab.     Patient denies other substance use.     Patient endorses decreased mood with poor sleep and appetite. 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 He has also felt general fatigue as he has been aging. She denies any safety concerns. Patient denies any active SI/HI/AH/VH. Patient has no prior SA, no prior IPP admissions, no firearm in home, no family history of SA. Patient is amenable to trial Mirtazapine for mood and appetite stimulation, as well as sleep improvement. Side effects discussed. Patient also amenable for referral to outpatient psychiatric office.     [facility] ISTOP:   This report was requested by: Angela Monzon | Reference #: [id number]. There are no results for the search terms that you entered.    REVIEW OF SYSTEMS:per HPI     MEDICATIONS  (STANDING):  chlorhexidine 2% Cloths 1 Application(s) Topical daily  dextrose 5%. 1000 milliLiter(s) (50 mL/Hr) IV Continuous <Continuous>  dextrose 5%. 1000 milliLiter(s) (100 mL/Hr) IV Continuous <Continuous>  dextrose 50% Injectable 25 Gram(s) IV Push once  dextrose 50% Injectable 12.5 Gram(s) IV Push once  dextrose 50% Injectable 25 Gram(s) IV Push once  enoxaparin Injectable 40 milliGRAM(s) SubCutaneous every 24 hours  folic acid 1 milliGRAM(s) Oral daily  glucagon  Injectable 1 milliGRAM(s) IntraMuscular once  insulin lispro (ADMELOG) corrective regimen sliding scale   SubCutaneous three times a day before meals  lactated ringers Bolus 500 milliLiter(s) IV Bolus once  pantoprazole    Tablet 40 milliGRAM(s) Oral before breakfast  thiamine IVPB 500 milliGRAM(s) IV Intermittent daily    MEDICATIONS  (PRN):  acetaminophen     Tablet .. 650 milliGRAM(s) Oral every 6 hours PRN Temp greater or equal to 38C (100.4F), Mild Pain (1 - 3)  aluminum hydroxide/magnesium hydroxide/simethicone Suspension 30 milliLiter(s) Oral every 4 hours PRN Dyspepsia  dextrose Oral Gel 15 Gram(s) Oral once PRN Blood Glucose LESS THAN 70 milliGRAM(s)/deciliter  diazepam  Injectable 5 milliGRAM(s) IV Push every 2 hours PRN CIWA 8-14  melatonin 3 milliGRAM(s) Oral at bedtime PRN Insomnia  ondansetron Injectable 4 milliGRAM(s) IV Push every 8 hours PRN Nausea and/or Vomiting      Vital Signs Last 24 Hrs  T(C): 36.8 (19 Apr 2025 07:00), Max: 36.8 (19 Apr 2025 07:00)  T(F): 98.3 (19 Apr 2025 07:00), Max: 98.3 (19 Apr 2025 07:00)  HR: 77 (19 Apr 2025 07:00) (76 - 86)  BP: 132/80 (19 Apr 2025 07:00) (122/69 - 144/74)  BP(mean): 86 (18 Apr 2025 15:17) (86 - 86)  RR: 18 (19 Apr 2025 07:00) (18 - 20)  SpO2: 100% (19 Apr 2025 07:00) (98% - 100%)    Parameters below as of 19 Apr 2025 07:00  Patient On (Oxygen Delivery Method): room air        PHYSICAL EXAM:    Constitutional: NAD, well-groomed, well-developed, several ecchymosis   Respiratory: no increased work of breathing  Cardiovascular: RRR  Neurological: A/O x 3    MENTAL STATUS EXAM:  Appearance: calm, in hospital attire   Appearance in relation to age: looks stated age      Hygiene/Grooming: fair grooming   Attitude toward examiner: fully cooperative  Alertness: alert  Orientation: oriented to time, place and person  Posture: lying in bed  Gait: not evaluated  Behavior and psychomotor activity: normal  Mood: euthymic  Affect: full range and reactivity      Speech: fluent and spontaneous; normal rate, rhythm, volume and tone   Perceptions: denies hallucinations  Thought process: logical, linear and goal-directed    Thought content: no delusions or particular preoccupation, denies SI/HI  Associations: no loosening of associations   Impulse Control: aware of socially acceptable behavior  Insight: limited  Judgment: poor    LABS:                        11.5   2.98  )-----------( 123      ( 18 Apr 2025 05:07 )             32.0     04-18    133[L]  |  96[L]  |  17  ----------------------------<  230[H]  3.2[L]   |  24  |  0.5[L]    Ca    8.1[L]      18 Apr 2025 05:07  Mg     2.1     04-18    TPro  5.4[L]  /  Alb  2.8[L]  /  TBili  2.5[H]  /  DBili  x   /  AST  146[H]  /  ALT  50[H]  /  AlkPhos  196[H]  04-18      Urinalysis Basic - ( 18 Apr 2025 05:07 )    Color: x / Appearance: x / SG: x / pH: x  Gluc: 230 mg/dL / Ketone: x  / Bili: x / Urobili: x   Blood: x / Protein: x / Nitrite: x   Leuk Esterase: x / RBC: x / WBC x   Sq Epi: x / Non Sq Epi: x / Bacteria: x      Drug Screen Urine:  Alcohol Level        RADIOLOGY & ADDITIONAL STUDIES:

## 2025-04-20 LAB
ANION GAP SERPL CALC-SCNC: 10 MMOL/L — SIGNIFICANT CHANGE UP (ref 7–14)
BUN SERPL-MCNC: 7 MG/DL — LOW (ref 10–20)
CALCIUM SERPL-MCNC: 8 MG/DL — LOW (ref 8.4–10.5)
CHLORIDE SERPL-SCNC: 102 MMOL/L — SIGNIFICANT CHANGE UP (ref 98–110)
CO2 SERPL-SCNC: 25 MMOL/L — SIGNIFICANT CHANGE UP (ref 17–32)
CREAT SERPL-MCNC: 0.5 MG/DL — LOW (ref 0.7–1.5)
EGFR: 106 ML/MIN/1.73M2 — SIGNIFICANT CHANGE UP
EGFR: 106 ML/MIN/1.73M2 — SIGNIFICANT CHANGE UP
GLUCOSE BLDC GLUCOMTR-MCNC: 117 MG/DL — HIGH (ref 70–99)
GLUCOSE BLDC GLUCOMTR-MCNC: 182 MG/DL — HIGH (ref 70–99)
GLUCOSE BLDC GLUCOMTR-MCNC: 184 MG/DL — HIGH (ref 70–99)
GLUCOSE BLDC GLUCOMTR-MCNC: 200 MG/DL — HIGH (ref 70–99)
GLUCOSE SERPL-MCNC: 147 MG/DL — HIGH (ref 70–99)
LACTATE SERPL-SCNC: 1.4 MMOL/L — SIGNIFICANT CHANGE UP (ref 0.7–2)
POTASSIUM SERPL-MCNC: 3.5 MMOL/L — SIGNIFICANT CHANGE UP (ref 3.5–5)
POTASSIUM SERPL-SCNC: 3.5 MMOL/L — SIGNIFICANT CHANGE UP (ref 3.5–5)
SODIUM SERPL-SCNC: 137 MMOL/L — SIGNIFICANT CHANGE UP (ref 135–146)
VIT B1 SERPL-MCNC: 147.4 NMOL/L — SIGNIFICANT CHANGE UP (ref 66.5–200)

## 2025-04-20 PROCEDURE — 99232 SBSQ HOSP IP/OBS MODERATE 35: CPT

## 2025-04-20 RX ORDER — MAGNESIUM SULFATE 500 MG/ML
2 SYRINGE (ML) INJECTION
Refills: 0 | Status: COMPLETED | OUTPATIENT
Start: 2025-04-20 | End: 2025-04-20

## 2025-04-20 RX ADMIN — FOLIC ACID 1 MILLIGRAM(S): 1 TABLET ORAL at 11:09

## 2025-04-20 RX ADMIN — Medication 25 GRAM(S): at 15:46

## 2025-04-20 RX ADMIN — Medication 40 MILLIGRAM(S): at 05:06

## 2025-04-20 RX ADMIN — INSULIN LISPRO 1: 100 INJECTION, SOLUTION INTRAVENOUS; SUBCUTANEOUS at 11:08

## 2025-04-20 RX ADMIN — Medication 100 MILLIGRAM(S): at 11:08

## 2025-04-20 RX ADMIN — MIRTAZAPINE 7.5 MILLIGRAM(S): 30 TABLET, FILM COATED ORAL at 21:46

## 2025-04-20 RX ADMIN — ENOXAPARIN SODIUM 40 MILLIGRAM(S): 100 INJECTION SUBCUTANEOUS at 10:38

## 2025-04-20 RX ADMIN — INSULIN LISPRO 1: 100 INJECTION, SOLUTION INTRAVENOUS; SUBCUTANEOUS at 08:20

## 2025-04-20 RX ADMIN — Medication 25 GRAM(S): at 17:27

## 2025-04-20 RX ADMIN — Medication 1 APPLICATION(S): at 11:09

## 2025-04-20 NOTE — PROGRESS NOTE ADULT - SUBJECTIVE AND OBJECTIVE BOX
SUBJECTIVE:    Patient is a 74y old Male who presents with a chief complaint of Fall (19 Apr 2025 12:52)    Currently admitted to medicine with the primary diagnosis of Alcoholic ketoacidosis       Today is hospital day 4d.     PAST MEDICAL & SURGICAL HISTORY  HTN (hypertension)    High cholesterol    Gastric ulcer      ALLERGIES:  No Known Allergies    MEDICATIONS:  STANDING MEDICATIONS  chlorhexidine 2% Cloths 1 Application(s) Topical daily  dextrose 5%. 1000 milliLiter(s) IV Continuous <Continuous>  dextrose 5%. 1000 milliLiter(s) IV Continuous <Continuous>  dextrose 50% Injectable 25 Gram(s) IV Push once  dextrose 50% Injectable 12.5 Gram(s) IV Push once  dextrose 50% Injectable 25 Gram(s) IV Push once  enoxaparin Injectable 40 milliGRAM(s) SubCutaneous every 24 hours  folic acid 1 milliGRAM(s) Oral daily  glucagon  Injectable 1 milliGRAM(s) IntraMuscular once  insulin lispro (ADMELOG) corrective regimen sliding scale   SubCutaneous three times a day before meals  lactated ringers. 1000 milliLiter(s) IV Continuous <Continuous>  mirtazapine 7.5 milliGRAM(s) Oral at bedtime  pantoprazole    Tablet 40 milliGRAM(s) Oral before breakfast  thiamine 100 milliGRAM(s) Oral daily    PRN MEDICATIONS  acetaminophen     Tablet .. 650 milliGRAM(s) Oral every 6 hours PRN  aluminum hydroxide/magnesium hydroxide/simethicone Suspension 30 milliLiter(s) Oral every 4 hours PRN  dextrose Oral Gel 15 Gram(s) Oral once PRN  diazepam  Injectable 5 milliGRAM(s) IV Push every 2 hours PRN  melatonin 3 milliGRAM(s) Oral at bedtime PRN  ondansetron Injectable 4 milliGRAM(s) IV Push every 8 hours PRN    VITALS:   T(F): 98.6  HR: 61  BP: 116/75  RR: 18  SpO2: 96%    LABS:    04-20    137  |  102  |  7[L]  ----------------------------<  147[H]  3.5   |  25  |  0.5[L]    Ca    8.0[L]      20 Apr 2025 05:59  Mg     1.5     04-19    TPro  4.8[L]  /  Alb  2.6[L]  /  TBili  2.2[H]  /  DBili  x   /  AST  107[H]  /  ALT  41  /  AlkPhos  175[H]  04-19      Urinalysis Basic - ( 20 Apr 2025 05:59 )    Color: x / Appearance: x / SG: x / pH: x  Gluc: 147 mg/dL / Ketone: x  / Bili: x / Urobili: x   Blood: x / Protein: x / Nitrite: x   Leuk Esterase: x / RBC: x / WBC x   Sq Epi: x / Non Sq Epi: x / Bacteria: x        Lactate, Blood: 1.4 mmol/L (04-20-25 @ 05:59)          RADIOLOGY:    PHYSICAL EXAM:  GEN: No acute distress  LUNGS: Clear to auscultation bilaterally   HEART: S1/S2 present. RRR.   ABD/ GI: Soft, non-tender, non-distended. Bowel sounds present  EXT: NC/NC/NE/2+PP/BERGERON  NEURO: AAOX3

## 2025-04-20 NOTE — PROGRESS NOTE ADULT - ASSESSMENT
74-year-old male with past medical history of alcohol use disorder, history of necrotizing pancreatitis, hypertension, hyperlipidemia presenting to the ED accompanied by his wife for intoxication. Admitted for after a fall  (trauma w/u neg) for Alcoholic Ketoacidosis and lactic acidosis.    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   patient not interested in talking about alchol use, not forthcoming with any discussion for depression stating everything is okay and he already quit drinking.    O/e  bilateral hand tremors  no ophthlamoplegia  no cerebellar signs  abd: non tender; soft, no guarding or rigidity    A&P    # metabolic acidosis - Alcoholic ketoacidosis and Lactic acidosis - improved-- DC iv fluids  # Alcohol use disorder with mild withdrawal symptoms  # Transaminitis   # Severe hepatic steatosis  # Distended GB on CT  # Witnessed mechanical fall sec to intoxication and orthostatic symptoms  # orthostatic hypotension  # hypokalemia-- better now  # hypomagnesemia-- replete  - denies any h/o seizures  - trauma w/u negative   - lactate 6.2>6.1>3.9>1.5  - CIWA Valium PRN  - addiction medicine consult, CATCH team,social work consult  - f/u RUQ US report- No acute sonographic abnormality., Hepatic steatosis.  - IV thiamine x 3 days, PO folate  - PT consult- recommended Rehab; patient and family interested- care management to follow  IV fluids-  ml bolus, then 75 ml/hr for 24 hours;  check orthostatics  replete potassium- monitor bmp, mg, lactic acid levels  fall precautions    DVT px- lovenox  Pending: clinical improvement, rehab arrangement  d/w patient, and also with wife at bedside

## 2025-04-21 ENCOUNTER — RESULT REVIEW (OUTPATIENT)
Age: 75
End: 2025-04-21

## 2025-04-21 DIAGNOSIS — F10.239 ALCOHOL DEPENDENCE WITH WITHDRAWAL, UNSPECIFIED: ICD-10-CM

## 2025-04-21 DIAGNOSIS — E51.2 WERNICKE'S ENCEPHALOPATHY: ICD-10-CM

## 2025-04-21 DIAGNOSIS — K76.0 FATTY (CHANGE OF) LIVER, NOT ELSEWHERE CLASSIFIED: ICD-10-CM

## 2025-04-21 LAB
ALBUMIN SERPL ELPH-MCNC: 2.4 G/DL — LOW (ref 3.5–5.2)
ALBUMIN SERPL ELPH-MCNC: 2.5 G/DL — LOW (ref 3.5–5.2)
ALP SERPL-CCNC: 161 U/L — HIGH (ref 30–115)
ALP SERPL-CCNC: 165 U/L — HIGH (ref 30–115)
ALT FLD-CCNC: 29 U/L — SIGNIFICANT CHANGE UP (ref 0–41)
ALT FLD-CCNC: 29 U/L — SIGNIFICANT CHANGE UP (ref 0–41)
ANION GAP SERPL CALC-SCNC: 11 MMOL/L — SIGNIFICANT CHANGE UP (ref 7–14)
ANION GAP SERPL CALC-SCNC: 6 MMOL/L — LOW (ref 7–14)
APTT BLD: 28.1 SEC — SIGNIFICANT CHANGE UP (ref 27–39.2)
AST SERPL-CCNC: 69 U/L — HIGH (ref 0–41)
AST SERPL-CCNC: 72 U/L — HIGH (ref 0–41)
BASE EXCESS BLDA CALC-SCNC: 4.4 MMOL/L — HIGH (ref -2–3)
BASOPHILS # BLD AUTO: 0.06 K/UL — SIGNIFICANT CHANGE UP (ref 0–0.2)
BASOPHILS NFR BLD AUTO: 0.5 % — SIGNIFICANT CHANGE UP (ref 0–1)
BILIRUB SERPL-MCNC: 1.4 MG/DL — HIGH (ref 0.2–1.2)
BILIRUB SERPL-MCNC: 1.6 MG/DL — HIGH (ref 0.2–1.2)
BUN SERPL-MCNC: 11 MG/DL — SIGNIFICANT CHANGE UP (ref 10–20)
BUN SERPL-MCNC: 8 MG/DL — LOW (ref 10–20)
CALCIUM SERPL-MCNC: 7.7 MG/DL — LOW (ref 8.4–10.5)
CALCIUM SERPL-MCNC: 8 MG/DL — LOW (ref 8.4–10.5)
CHLORIDE SERPL-SCNC: 103 MMOL/L — SIGNIFICANT CHANGE UP (ref 98–110)
CHLORIDE SERPL-SCNC: 104 MMOL/L — SIGNIFICANT CHANGE UP (ref 98–110)
CO2 SERPL-SCNC: 20 MMOL/L — SIGNIFICANT CHANGE UP (ref 17–32)
CO2 SERPL-SCNC: 27 MMOL/L — SIGNIFICANT CHANGE UP (ref 17–32)
CREAT SERPL-MCNC: 0.7 MG/DL — SIGNIFICANT CHANGE UP (ref 0.7–1.5)
CREAT SERPL-MCNC: 0.7 MG/DL — SIGNIFICANT CHANGE UP (ref 0.7–1.5)
EGFR: 96 ML/MIN/1.73M2 — SIGNIFICANT CHANGE UP
EOSINOPHIL # BLD AUTO: 0.04 K/UL — SIGNIFICANT CHANGE UP (ref 0–0.7)
EOSINOPHIL NFR BLD AUTO: 0.3 % — SIGNIFICANT CHANGE UP (ref 0–8)
GLUCOSE BLDC GLUCOMTR-MCNC: 137 MG/DL — HIGH (ref 70–99)
GLUCOSE BLDC GLUCOMTR-MCNC: 165 MG/DL — HIGH (ref 70–99)
GLUCOSE BLDC GLUCOMTR-MCNC: 187 MG/DL — HIGH (ref 70–99)
GLUCOSE BLDC GLUCOMTR-MCNC: 233 MG/DL — HIGH (ref 70–99)
GLUCOSE SERPL-MCNC: 183 MG/DL — HIGH (ref 70–99)
GLUCOSE SERPL-MCNC: 227 MG/DL — HIGH (ref 70–99)
HCO3 BLDA-SCNC: 26 MMOL/L — SIGNIFICANT CHANGE UP (ref 21–28)
HCT VFR BLD CALC: 23.6 % — LOW (ref 42–52)
HCT VFR BLD CALC: 24 % — LOW (ref 42–52)
HCT VFR BLD CALC: 25 % — LOW (ref 42–52)
HGB BLD-MCNC: 8.1 G/DL — LOW (ref 14–18)
HGB BLD-MCNC: 8.3 G/DL — LOW (ref 14–18)
HGB BLD-MCNC: 8.7 G/DL — LOW (ref 14–18)
HOROWITZ INDEX BLDA+IHG-RTO: 21 — SIGNIFICANT CHANGE UP
IMM GRANULOCYTES NFR BLD AUTO: 0.3 % — SIGNIFICANT CHANGE UP (ref 0.1–0.3)
INR BLD: 1.19 RATIO — SIGNIFICANT CHANGE UP (ref 0.65–1.3)
LACTATE SERPL-SCNC: 2.1 MMOL/L — HIGH (ref 0.7–2)
LACTATE SERPL-SCNC: 5.3 MMOL/L — CRITICAL HIGH (ref 0.7–2)
LYMPHOCYTES # BLD AUTO: 35.4 % — SIGNIFICANT CHANGE UP (ref 20.5–51.1)
LYMPHOCYTES # BLD AUTO: 4.24 K/UL — HIGH (ref 1.2–3.4)
MAGNESIUM SERPL-MCNC: 1.8 MG/DL — SIGNIFICANT CHANGE UP (ref 1.8–2.4)
MAGNESIUM SERPL-MCNC: 1.9 MG/DL — SIGNIFICANT CHANGE UP (ref 1.8–2.4)
MCHC RBC-ENTMCNC: 34.3 G/DL — SIGNIFICANT CHANGE UP (ref 32–37)
MCHC RBC-ENTMCNC: 34.4 PG — HIGH (ref 27–31)
MCHC RBC-ENTMCNC: 34.6 G/DL — SIGNIFICANT CHANGE UP (ref 32–37)
MCHC RBC-ENTMCNC: 34.6 PG — HIGH (ref 27–31)
MCHC RBC-ENTMCNC: 34.8 G/DL — SIGNIFICANT CHANGE UP (ref 32–37)
MCHC RBC-ENTMCNC: 35.1 PG — HIGH (ref 27–31)
MCV RBC AUTO: 100.8 FL — HIGH (ref 80–94)
MCV RBC AUTO: 100.9 FL — HIGH (ref 80–94)
MCV RBC AUTO: 99.6 FL — HIGH (ref 80–94)
MONOCYTES # BLD AUTO: 0.73 K/UL — HIGH (ref 0.1–0.6)
MONOCYTES NFR BLD AUTO: 6.1 % — SIGNIFICANT CHANGE UP (ref 1.7–9.3)
NEUTROPHILS # BLD AUTO: 6.88 K/UL — HIGH (ref 1.4–6.5)
NEUTROPHILS NFR BLD AUTO: 57.4 % — SIGNIFICANT CHANGE UP (ref 42.2–75.2)
NRBC BLD AUTO-RTO: 0 /100 WBCS — SIGNIFICANT CHANGE UP (ref 0–0)
PCO2 BLDA: 30 MMHG — LOW (ref 35–48)
PH BLDA: 7.55 — HIGH (ref 7.35–7.45)
PHOSPHATE SERPL-MCNC: 1.4 MG/DL — LOW (ref 2.1–4.9)
PLATELET # BLD AUTO: 148 K/UL — SIGNIFICANT CHANGE UP (ref 130–400)
PLATELET # BLD AUTO: 156 K/UL — SIGNIFICANT CHANGE UP (ref 130–400)
PLATELET # BLD AUTO: 201 K/UL — SIGNIFICANT CHANGE UP (ref 130–400)
PMV BLD: 10.8 FL — HIGH (ref 7.4–10.4)
PMV BLD: 10.9 FL — HIGH (ref 7.4–10.4)
PMV BLD: 11 FL — HIGH (ref 7.4–10.4)
PO2 BLDA: 86 MMHG — SIGNIFICANT CHANGE UP (ref 83–108)
POTASSIUM SERPL-MCNC: 4.1 MMOL/L — SIGNIFICANT CHANGE UP (ref 3.5–5)
POTASSIUM SERPL-MCNC: 4.9 MMOL/L — SIGNIFICANT CHANGE UP (ref 3.5–5)
POTASSIUM SERPL-SCNC: 4.1 MMOL/L — SIGNIFICANT CHANGE UP (ref 3.5–5)
POTASSIUM SERPL-SCNC: 4.9 MMOL/L — SIGNIFICANT CHANGE UP (ref 3.5–5)
PROT SERPL-MCNC: 4.3 G/DL — LOW (ref 6–8)
PROT SERPL-MCNC: 4.4 G/DL — LOW (ref 6–8)
PROTHROM AB SERPL-ACNC: 14.1 SEC — HIGH (ref 9.95–12.87)
RBC # BLD: 2.34 M/UL — LOW (ref 4.7–6.1)
RBC # BLD: 2.41 M/UL — LOW (ref 4.7–6.1)
RBC # BLD: 2.48 M/UL — LOW (ref 4.7–6.1)
RBC # FLD: 13 % — SIGNIFICANT CHANGE UP (ref 11.5–14.5)
RBC # FLD: 13.2 % — SIGNIFICANT CHANGE UP (ref 11.5–14.5)
RBC # FLD: 13.5 % — SIGNIFICANT CHANGE UP (ref 11.5–14.5)
SAO2 % BLDA: 99.9 % — HIGH (ref 94–98)
SODIUM SERPL-SCNC: 134 MMOL/L — LOW (ref 135–146)
SODIUM SERPL-SCNC: 137 MMOL/L — SIGNIFICANT CHANGE UP (ref 135–146)
T4 AB SER-ACNC: 7.6 UG/DL — SIGNIFICANT CHANGE UP (ref 4.6–12)
TSH SERPL-MCNC: 0.66 UIU/ML — SIGNIFICANT CHANGE UP (ref 0.27–4.2)
WBC # BLD: 11.99 K/UL — HIGH (ref 4.8–10.8)
WBC # BLD: 7.11 K/UL — SIGNIFICANT CHANGE UP (ref 4.8–10.8)
WBC # BLD: 7.28 K/UL — SIGNIFICANT CHANGE UP (ref 4.8–10.8)
WBC # FLD AUTO: 11.99 K/UL — HIGH (ref 4.8–10.8)
WBC # FLD AUTO: 7.11 K/UL — SIGNIFICANT CHANGE UP (ref 4.8–10.8)
WBC # FLD AUTO: 7.28 K/UL — SIGNIFICANT CHANGE UP (ref 4.8–10.8)

## 2025-04-21 PROCEDURE — 93306 TTE W/DOPPLER COMPLETE: CPT | Mod: 26

## 2025-04-21 PROCEDURE — 99223 1ST HOSP IP/OBS HIGH 75: CPT

## 2025-04-21 PROCEDURE — 99291 CRITICAL CARE FIRST HOUR: CPT

## 2025-04-21 PROCEDURE — 99232 SBSQ HOSP IP/OBS MODERATE 35: CPT

## 2025-04-21 PROCEDURE — 99233 SBSQ HOSP IP/OBS HIGH 50: CPT

## 2025-04-21 PROCEDURE — 70450 CT HEAD/BRAIN W/O DYE: CPT | Mod: 26

## 2025-04-21 PROCEDURE — 93010 ELECTROCARDIOGRAM REPORT: CPT

## 2025-04-21 PROCEDURE — 99222 1ST HOSP IP/OBS MODERATE 55: CPT

## 2025-04-21 RX ORDER — OCTREOTIDE ACETATE 500 UG/ML
25 INJECTION, SOLUTION INTRAVENOUS; SUBCUTANEOUS
Qty: 500 | Refills: 0 | Status: DISCONTINUED | OUTPATIENT
Start: 2025-04-21 | End: 2025-04-23

## 2025-04-21 RX ORDER — BISACODYL 5 MG
20 TABLET, DELAYED RELEASE (ENTERIC COATED) ORAL AT BEDTIME
Refills: 0 | Status: COMPLETED | OUTPATIENT
Start: 2025-04-21 | End: 2025-04-21

## 2025-04-21 RX ORDER — NOREPINEPHRINE BITARTRATE 8 MG
0.05 SOLUTION INTRAVENOUS
Qty: 8 | Refills: 0 | Status: DISCONTINUED | OUTPATIENT
Start: 2025-04-21 | End: 2025-04-21

## 2025-04-21 RX ORDER — SODIUM CHLORIDE 9 G/1000ML
500 INJECTION, SOLUTION INTRAVENOUS ONCE
Refills: 0 | Status: COMPLETED | OUTPATIENT
Start: 2025-04-21 | End: 2025-04-21

## 2025-04-21 RX ORDER — POLYETHYLENE GLYCOL-3350 AND ELECTROLYTES 236; 6.74; 5.86; 2.97; 22.74 G/274.31G; G/274.31G; G/274.31G; G/274.31G; G/274.31G
4000 POWDER, FOR SOLUTION ORAL ONCE
Refills: 0 | Status: COMPLETED | OUTPATIENT
Start: 2025-04-21 | End: 2025-04-21

## 2025-04-21 RX ORDER — SODIUM CHLORIDE 9 G/1000ML
1000 INJECTION, SOLUTION INTRAVENOUS ONCE
Refills: 0 | Status: COMPLETED | OUTPATIENT
Start: 2025-04-21 | End: 2025-04-21

## 2025-04-21 RX ADMIN — SODIUM CHLORIDE 2000 MILLILITER(S): 9 INJECTION, SOLUTION INTRAVENOUS at 17:07

## 2025-04-21 RX ADMIN — Medication 100 MILLIGRAM(S): at 12:06

## 2025-04-21 RX ADMIN — FOLIC ACID 1 MILLIGRAM(S): 1 TABLET ORAL at 12:06

## 2025-04-21 RX ADMIN — Medication 40 MILLIGRAM(S): at 18:58

## 2025-04-21 RX ADMIN — Medication 80 MILLIGRAM(S): at 11:52

## 2025-04-21 RX ADMIN — INSULIN LISPRO 1: 100 INJECTION, SOLUTION INTRAVENOUS; SUBCUTANEOUS at 08:10

## 2025-04-21 RX ADMIN — OCTREOTIDE ACETATE 5 MICROGRAM(S)/HR: 500 INJECTION, SOLUTION INTRAVENOUS; SUBCUTANEOUS at 15:40

## 2025-04-21 RX ADMIN — SODIUM CHLORIDE 1000 MILLILITER(S): 9 INJECTION, SOLUTION INTRAVENOUS at 04:03

## 2025-04-21 RX ADMIN — INSULIN LISPRO 2: 100 INJECTION, SOLUTION INTRAVENOUS; SUBCUTANEOUS at 11:52

## 2025-04-21 RX ADMIN — MIRTAZAPINE 7.5 MILLIGRAM(S): 30 TABLET, FILM COATED ORAL at 22:32

## 2025-04-21 RX ADMIN — Medication 1 APPLICATION(S): at 12:11

## 2025-04-21 RX ADMIN — Medication 40 MILLIGRAM(S): at 05:30

## 2025-04-21 RX ADMIN — Medication 105 MILLIGRAM(S): at 22:31

## 2025-04-21 RX ADMIN — Medication 20 MILLIGRAM(S): at 22:32

## 2025-04-21 NOTE — RAPID RESPONSE TEAM SUMMARY - NSSITUATIONBACKGROUNDRRT_GEN_ALL_CORE
Patient went to the bathroom and a RR was called because patient had an episode of whole body shaking x 2 for 10 seconds while on the toilet. BP initially was 79/65. Repeat was 119/71. He was initially unresponsive but came back with it within 1 min.

## 2025-04-21 NOTE — PROGRESS NOTE ADULT - SUBJECTIVE AND OBJECTIVE BOX
Pt interviewed, examined and EMR chart reviewed.    Patient is a 74-year-old male, , domiciled with wife, retired , with past medical history of necrotizing pancreatitis, hypertension, and hyperlipidemia, no reported psychiatric histroy, with substance use history of alochol (1 pint Quyen a day, no history of complicated withdrawals, no history of rehab stays), admitted for Alcoholic Ketoacidosis. Addiction Medicine consulted for assistance with alcohol withdrawal.     Addiction team attempted to see patient this AM but he was undergoing Echo. Patient's wife Anastasia wanted to speak with medical team. Writer spoke with wife at length regarding patient's history. Per wife, he has had trouble with alcohol consumption for years and has not had any sober time. She admits decreased oral intake and some days consuming no food for the past 2 months. She also admits to frequent falls and injuries for the past year. Wife states that he is usually withdrawn from interviews and does not like to talk about stopping alcohol but thinks he would be amenable to medicatiojn assisted treatment. Writer told wife that we will speak with him tomorrow regarding naltrexone for AUD treatment given his severe medical complications from alcohol.     REVIEW OF SYSTEMS: per HPI     MEDICATIONS  (STANDING):  chlorhexidine 2% Cloths 1 Application(s) Topical daily  dextrose 5%. 1000 milliLiter(s) (50 mL/Hr) IV Continuous <Continuous>  dextrose 5%. 1000 milliLiter(s) (100 mL/Hr) IV Continuous <Continuous>  dextrose 50% Injectable 25 Gram(s) IV Push once  dextrose 50% Injectable 12.5 Gram(s) IV Push once  dextrose 50% Injectable 25 Gram(s) IV Push once  folic acid 1 milliGRAM(s) Oral daily  glucagon  Injectable 1 milliGRAM(s) IntraMuscular once  insulin lispro (ADMELOG) corrective regimen sliding scale   SubCutaneous three times a day before meals  lactated ringers Bolus 1000 milliLiter(s) IV Bolus once  mirtazapine 7.5 milliGRAM(s) Oral at bedtime  octreotide  Infusion 25 MICROgram(s)/Hr (5 mL/Hr) IV Continuous <Continuous>  pantoprazole  Injectable 40 milliGRAM(s) IV Push two times a day  thiamine 100 milliGRAM(s) Oral daily    MEDICATIONS  (PRN):  acetaminophen     Tablet .. 650 milliGRAM(s) Oral every 6 hours PRN Temp greater or equal to 38C (100.4F), Mild Pain (1 - 3)  aluminum hydroxide/magnesium hydroxide/simethicone Suspension 30 milliLiter(s) Oral every 4 hours PRN Dyspepsia  dextrose Oral Gel 15 Gram(s) Oral once PRN Blood Glucose LESS THAN 70 milliGRAM(s)/deciliter  diazepam  Injectable 5 milliGRAM(s) IV Push every 2 hours PRN CIWA 8-14  melatonin 3 milliGRAM(s) Oral at bedtime PRN Insomnia  ondansetron Injectable 4 milliGRAM(s) IV Push every 8 hours PRN Nausea and/or Vomiting    Vital Signs Last 24 Hrs  T(C): 36.9 (21 Apr 2025 08:13), Max: 36.9 (21 Apr 2025 08:13)  T(F): 98.5 (21 Apr 2025 08:13), Max: 98.5 (21 Apr 2025 08:13)  HR: 99 (21 Apr 2025 08:13) (80 - 99)  BP: 104/64 (21 Apr 2025 08:13) (104/64 - 124/77)  BP(mean): --  RR: 17 (21 Apr 2025 08:13) (17 - 18)  SpO2: 99% (21 Apr 2025 08:13) (98% - 100%)    Parameters below as of 21 Apr 2025 08:13  Patient On (Oxygen Delivery Method): room air      PHYSICAL EXAM:    Constitutional: NAD, well-groomed, well-developed, several ecchymosis   Respiratory: no increased work of breathing  Cardiovascular: RRR  Neurological: A/O x 3    MENTAL STATUS EXAM:  Appearance: calm, in hospital attire   Appearance in relation to age: looks stated age      Hygiene/Grooming: fair grooming   unable to complete rest of MSE this AM     LABS:                        11.5   2.98  )-----------( 123      ( 18 Apr 2025 05:07 )             32.0     04-18    133[L]  |  96[L]  |  17  ----------------------------<  230[H]  3.2[L]   |  24  |  0.5[L]    Ca    8.1[L]      18 Apr 2025 05:07  Mg     2.1     04-18    TPro  5.4[L]  /  Alb  2.8[L]  /  TBili  2.5[H]  /  DBili  x   /  AST  146[H]  /  ALT  50[H]  /  AlkPhos  196[H]  04-18      Urinalysis Basic - ( 18 Apr 2025 05:07 )    Color: x / Appearance: x / SG: x / pH: x  Gluc: 230 mg/dL / Ketone: x  / Bili: x / Urobili: x   Blood: x / Protein: x / Nitrite: x   Leuk Esterase: x / RBC: x / WBC x   Sq Epi: x / Non Sq Epi: x / Bacteria: x      Drug Screen Urine:  Alcohol Level        RADIOLOGY & ADDITIONAL STUDIES:     Pt interviewed, examined and EMR chart reviewed.    Patient is a 74-year-old male, , domiciled with wife, retired , with past medical history of necrotizing pancreatitis, hypertension, and hyperlipidemia, no reported psychiatric histroy, with substance use history of alochol (1 pint Quyen a day, no history of complicated withdrawals, no history of rehab stays), admitted for Alcoholic Ketoacidosis. Addiction Medicine consulted for assistance with alcohol withdrawal.     Addiction team attempted to see patient this AM but he was undergoing Echo. Patient's wife Anastasia wanted to speak with medical team. Writer spoke with wife at length regarding patient's history. Per wife, he has had trouble with alcohol consumption for years and has not had any sober time. She admits decreased oral intake and some days consuming no food for the past 2 months. She also admits to frequent falls and injuries for the past year. Wife states that he is usually withdrawn from interviews and does not like to talk about stopping alcohol but thinks he would be amenable to medication assisted treatment. Writer told wife that we will speak with him tomorrow regarding naltrexone for AUD treatment given his severe medical complications from alcohol.     REVIEW OF SYSTEMS: per HPI     MEDICATIONS  (STANDING):  chlorhexidine 2% Cloths 1 Application(s) Topical daily  dextrose 5%. 1000 milliLiter(s) (50 mL/Hr) IV Continuous <Continuous>  dextrose 5%. 1000 milliLiter(s) (100 mL/Hr) IV Continuous <Continuous>  dextrose 50% Injectable 25 Gram(s) IV Push once  dextrose 50% Injectable 12.5 Gram(s) IV Push once  dextrose 50% Injectable 25 Gram(s) IV Push once  folic acid 1 milliGRAM(s) Oral daily  glucagon  Injectable 1 milliGRAM(s) IntraMuscular once  insulin lispro (ADMELOG) corrective regimen sliding scale   SubCutaneous three times a day before meals  lactated ringers Bolus 1000 milliLiter(s) IV Bolus once  mirtazapine 7.5 milliGRAM(s) Oral at bedtime  octreotide  Infusion 25 MICROgram(s)/Hr (5 mL/Hr) IV Continuous <Continuous>  pantoprazole  Injectable 40 milliGRAM(s) IV Push two times a day  thiamine 100 milliGRAM(s) Oral daily    MEDICATIONS  (PRN):  acetaminophen     Tablet .. 650 milliGRAM(s) Oral every 6 hours PRN Temp greater or equal to 38C (100.4F), Mild Pain (1 - 3)  aluminum hydroxide/magnesium hydroxide/simethicone Suspension 30 milliLiter(s) Oral every 4 hours PRN Dyspepsia  dextrose Oral Gel 15 Gram(s) Oral once PRN Blood Glucose LESS THAN 70 milliGRAM(s)/deciliter  diazepam  Injectable 5 milliGRAM(s) IV Push every 2 hours PRN CIWA 8-14  melatonin 3 milliGRAM(s) Oral at bedtime PRN Insomnia  ondansetron Injectable 4 milliGRAM(s) IV Push every 8 hours PRN Nausea and/or Vomiting    Vital Signs Last 24 Hrs  T(C): 36.9 (21 Apr 2025 08:13), Max: 36.9 (21 Apr 2025 08:13)  T(F): 98.5 (21 Apr 2025 08:13), Max: 98.5 (21 Apr 2025 08:13)  HR: 99 (21 Apr 2025 08:13) (80 - 99)  BP: 104/64 (21 Apr 2025 08:13) (104/64 - 124/77)  BP(mean): --  RR: 17 (21 Apr 2025 08:13) (17 - 18)  SpO2: 99% (21 Apr 2025 08:13) (98% - 100%)    Parameters below as of 21 Apr 2025 08:13  Patient On (Oxygen Delivery Method): room air      PHYSICAL EXAM:    Constitutional: NAD, well-groomed, well-developed, several ecchymosis   Respiratory: no increased work of breathing  Cardiovascular: RRR  Neurological: A/O x 3    MENTAL STATUS EXAM:  Appearance: calm, in hospital attire   Appearance in relation to age: looks stated age      Hygiene/Grooming: fair grooming   unable to complete rest of MSE this AM     LABS:    Antibody Screen Interpretation (04.21.25 @ 11:53)   Antibody Screen: NEG  Type + Screen (04.21.25 @ 11:53)   ABO RH Interpretation: O POS  Lactate, Blood: 5.3: TYPE:(C=Critical, N=Notification, A=Abnormal) C   TESTS: _lactate   DATE/TIME CALLED: _04/21/2025 11:52:39 EDT   CALLED TO: pelon torres md   READ BACK (2 Patient Identifiers)(Y/N): _y   READ BACK VALUES (Y/N): _y   CALLED BY: DARRYL mmol/L (04.21.25 @ 11:29)   Comprehensive Metabolic Panel (04.21.25 @ 11:29)   Sodium: 134 mmol/L  Potassium: 4.1 mmol/L  Chloride: 103 mmol/L  Carbon Dioxide: 20 mmol/L  Anion Gap: 11 mmol/L  Blood Urea Nitrogen: 11 mg/dL  Creatinine: 0.7 mg/dL  Glucose: 227 mg/dL  Calcium: 7.7 mg/dL  Protein Total: 4.3 g/dL  Albumin: 2.5 g/dL  Bilirubin Total: 1.4 mg/dL  Alkaline Phosphatase: 161 U/L  Aspartate Aminotransferase (AST/SGOT): 72 U/L  Alanine Aminotransferase (ALT/SGPT): 29 U/L  eGFR: 96: The estimated glomerular filtration rate (eGFR) calculation is based on   the 2021 CKD-EPI creatinine equation, which is validated in male and   female population 18 years of age and older (N Engl J Med 2021;   [id number]:[id number]). mL/min/1.73m2  Magnesium (04.21.25 @ 11:29)   Magnesium: 1.8 mg/dL  Complete Blood Count + Automated Diff (04.21.25 @ 11:29)   Auto NRBC: 0 /100 WBCs  WBC Count: 11.99 K/uL  RBC Count: 2.48 M/uL  Hemoglobin: 8.7 g/dL  Hematocrit: 25.0 %  Mean Cell Volume: 100.8 fL  Mean Cell Hemoglobin: 35.1 pg  Mean Cell Hemoglobin Conc: 34.8 g/dL  Red Cell Distrib Width: 13.2 %  Platelet Count - Automated: 201 K/uL  MPV: 11.0 fL  Neutrophil #: 6.88 K/uL  Lymphocyte #: 4.24 K/uL  Monocyte #: 0.73 K/uL  Eosinophil #: 0.04 K/uL  Basophil #: 0.06 K/uL  Neutrophil %: 57.4: Differential percentages must be correlated with absolute numbers for   clinical significance. %  Lymphocyte %: 35.4 %  Monocyte %: 6.1 %  Eosinophil %: 0.3 %  Basophil %: 0.5 %  Auto Immature Granulocyte %: 0.3: (Includes meta, myelo and promyelocytes). Mild elevations in immature   granulocytes may be seen with many inflammatory processes and pregnancy;   clinical correlation suggested. %  Urinalysis Basic - ( 18 Apr 2025 05:07 )    Color: x / Appearance: x / SG: x / pH: x  Gluc: 230 mg/dL / Ketone: x  / Bili: x / Urobili: x   Blood: x / Protein: x / Nitrite: x   Leuk Esterase: x / RBC: x / WBC x   Sq Epi: x / Non Sq Epi: x / Bacteria: x      Drug Screen Urine:  Alcohol Level        RADIOLOGY & ADDITIONAL STUDIES:  < from: CT Abdomen and Pelvis w/ IV Cont (04.16.25 @ 17:22) >  IMPRESSION:  Limited artifact degraded examination.  1.  Severe hepatic steatosis.  2.  Nonspecific distended gallbladder.  3.  Left adrenal gland 6 cm myelolipoma    < end of copied text >

## 2025-04-21 NOTE — PROGRESS NOTE ADULT - SUBJECTIVE AND OBJECTIVE BOX
SUBJECTIVE:    Patient is a 74y old Male who presents with a chief complaint of Fall (19 Apr 2025 12:52)    Currently admitted to medicine with the primary diagnosis of Alcoholic ketoacidosis       Today is hospital day 5d.   today patient had a rapid response with episode of gi bleeding. GI was consulted.  I talked to spouse present at bedside and updated regarding patient condition.     PAST MEDICAL & SURGICAL HISTORY  HTN (hypertension)    High cholesterol    Gastric ulcer      ALLERGIES:  No Known Allergies    MEDICATIONS:  STANDING MEDICATIONS  chlorhexidine 2% Cloths 1 Application(s) Topical daily  dextrose 5%. 1000 milliLiter(s) IV Continuous <Continuous>  dextrose 5%. 1000 milliLiter(s) IV Continuous <Continuous>  dextrose 50% Injectable 25 Gram(s) IV Push once  dextrose 50% Injectable 12.5 Gram(s) IV Push once  dextrose 50% Injectable 25 Gram(s) IV Push once  enoxaparin Injectable 40 milliGRAM(s) SubCutaneous every 24 hours  folic acid 1 milliGRAM(s) Oral daily  glucagon  Injectable 1 milliGRAM(s) IntraMuscular once  insulin lispro (ADMELOG) corrective regimen sliding scale   SubCutaneous three times a day before meals  lactated ringers. 1000 milliLiter(s) IV Continuous <Continuous>  mirtazapine 7.5 milliGRAM(s) Oral at bedtime  pantoprazole    Tablet 40 milliGRAM(s) Oral before breakfast  thiamine 100 milliGRAM(s) Oral daily    PRN MEDICATIONS  acetaminophen     Tablet .. 650 milliGRAM(s) Oral every 6 hours PRN  aluminum hydroxide/magnesium hydroxide/simethicone Suspension 30 milliLiter(s) Oral every 4 hours PRN  dextrose Oral Gel 15 Gram(s) Oral once PRN  diazepam  Injectable 5 milliGRAM(s) IV Push every 2 hours PRN  melatonin 3 milliGRAM(s) Oral at bedtime PRN  ondansetron Injectable 4 milliGRAM(s) IV Push every 8 hours PRN    VITALS:   Vital Signs Last 24 Hrs  T(C): 36.9 (21 Apr 2025 08:13), Max: 36.9 (21 Apr 2025 08:13)  T(F): 98.5 (21 Apr 2025 08:13), Max: 98.5 (21 Apr 2025 08:13)  HR: 99 (21 Apr 2025 08:13) (80 - 99)  BP: 104/64 (21 Apr 2025 08:13) (104/64 - 124/77)  BP(mean): --  RR: 17 (21 Apr 2025 08:13) (17 - 18)  SpO2: 99% (21 Apr 2025 08:13) (98% - 100%)    Parameters below as of 21 Apr 2025 08:13  Patient On (Oxygen Delivery Method): room air        LABS:      LABS:                        8.7    11.99 )-----------( 201      ( 21 Apr 2025 11:29 )             25.0     04-21    134[L]  |  103  |  11  ----------------------------<  227[H]  4.1   |  20  |  0.7    Ca    7.7[L]      21 Apr 2025 11:29  Phos  1.4     04-21  Mg     1.8     04-21    TPro  4.3[L]  /  Alb  2.5[L]  /  TBili  1.4[H]  /  DBili  x   /  AST  72[H]  /  ALT  29  /  AlkPhos  161[H]  04-21    PT/INR - ( 21 Apr 2025 11:30 )   PT: 14.10 sec;   INR: 1.19 ratio         PTT - ( 21 Apr 2025 11:30 )  PTT:28.1 sec            Lactate, Blood: 1.4 mmol/L (04-20-25 @ 05:59)          RADIOLOGY:    PHYSICAL EXAM:  GEN: No acute distress  LUNGS: Clear to auscultation bilaterally   HEART: S1/S2 present. RRR.   ABD/ GI: Soft, non-tender, non-distended. Bowel sounds present  EXT: NC/NC/NE/2+PP/BERGERON  NEURO: AAOX3

## 2025-04-21 NOTE — CONSULT NOTE ADULT - ATTENDING COMMENTS
Pt is seen and evaluated with resident. Agree with the written above. Treatment plan was discussed. Denies SI/HI/AH
Patient seen and examined during the GI rounds, case discussed with the GI fellow. Reviewed all pertinent clinical information and reviewed all relevant imaging and diagnostic studies. Counseled the patient /HCP about diagnostic testing and treatment plan. All questions were answered.  Discussed recommendations with the primary team and coordinated care.
On exam this morning, patient awake, oriented to self, place, date; following all simple commands, no language deficits, no ophthalmoplegia, postural tremor in UEs.  CTH without acute findings. Seizure possible but patient out of window for EtOH-withdrawal seizures; wernickes encephalopathy also possible. Will follow up vEEG, no need for ASMs for now, c/w thiamine supplementation, Mg > 2, K > 4, CIWA protocol per primary.

## 2025-04-21 NOTE — RAPID RESPONSE TEAM SUMMARY - NSSITUATIONBACKGROUNDRRT_GEN_ALL_CORE
Rapid response called by RN at bedside. Per RN, patient was attempting to get up to go to bathroom, when he fell back to bed and was off baseline and became AOx0 (baseline AOx3), after which the patient was shaking in seizure-like motions and RR was called. Upon arrival to bedside, patient was responsive to verbal stimuli and responded in full sentence stating his need to go to bathroom. Vitals within normal limits, blood sugar 165. Pt was examined and motor function was intact. CT head, EEG and neurology consult placed to r/o any underlying cause for AMS Rapid response called by RN at bedside. Per RN, patient was attempting to get up to go to bathroom, when he fell back to bed and was off baseline and became AOx0 (baseline AOx3), after which the patient was shaking in seizure-like motions and RR was called. Upon arrival to bedside, patient was responsive to verbal stimuli and responded in full sentence stating his need to go to bathroom. Vitals within normal limits, blood sugar 165. Pt was examined and motor function was intact. CT head, EEG and neurology consult placed to r/o any underlying cause for AMS.  Per nursing patient had positive orthostatics earlier in the day, he had no gradual increase in CIWA and wasn't tachycardic before or after event.  Possibly syncope from orthostatic hypotension also considering patient was not post-ictal, patient received fluids f/u orthostatics.

## 2025-04-21 NOTE — CONSULT NOTE ADULT - ASSESSMENT
Mr. Redd is a 73 y/o M with a PMHx of alcohol use disorder, necrotizing pancreatitis, HTN, and HLD who initially presented for intoxication x 4 days and fall. Neurology is being consulted for an episode of AMS that occurred overnight. Given the patient's history and presenting complaint, can consider Wernicke's encephalopathy on the differential; however his rapid return to baseline argues against such a diagnosis. Can also consider toxic/metabolic causes, though the patient's vital signs are wnl, and lab results are unavailable due to patient refusal. Less likely, but can consider seizure on the differential, though the patient has no h/o prior seizure, and his age would argue against primary seizure disorder. Provoked seizure i/s/o subclinical infection could be considered.    Plan: Mr. Redd is a 73 y/o M with a PMHx of alcohol use disorder, necrotizing pancreatitis, HTN, and HLD who initially presented for intoxication x 4 days and fall. Neurology is being consulted for an episode of AMS that occurred overnight. CTH at the time of the RR was negative for any acute pathology. Given the patient's history and presenting complaint, can consider Wernicke's encephalopathy on the differential; however his rapid return to baseline argues against such a diagnosis. Can also consider toxic/metabolic causes, though the patient's vital signs are wnl, and lab results are unavailable due to patient refusal. Less likely, but can consider seizure on the differential, though the patient has no h/o prior seizure, and his age would argue against primary seizure disorder. Provoked seizure i/s/o subclinical infection could be considered.    Plan: Mr. Redd is a 75 y/o M with a PMHx of alcohol use disorder, necrotizing pancreatitis, HTN, and HLD who initially presented for intoxication x 4 days and fall. Neurology is being consulted for an episode of AMS that occurred overnight. CTH at the time of the RR was negative for any acute pathology. Given the patient's history and presenting complaint, can consider Wernicke's encephalopathy on the differential; however, his rapid return to baseline argues against such a diagnosis. Can also consider toxic/metabolic causes, though the patient's vital signs are wnl, and lab results are unavailable due to patient refusal. Less likely, but can consider seizure on the differential, though the patient has no h/o prior seizure, and his age would argue against primary seizure disorder. Provoked seizure i/s/o subclinical infection could be considered, though his lack of a post-ictal period makes seizure less likely.    Plan:  - Agree with vEEG.  - Rest of care per primary team.    Case discussed with Dr. Beatty.  Mr. Redd is a 73 y/o M with a PMHx of alcohol use disorder, necrotizing pancreatitis, HTN, and HLD who initially presented for intoxication x 4 days and fall. Neurology is being consulted for an episode of AMS that occurred overnight. CTH at the time of the RR was negative for any acute pathology. Given the patient's history and presenting complaint, can consider Wernicke's encephalopathy on the differential; however, his rapid return to baseline argues against such a diagnosis. Can also consider toxic/metabolic causes, though the patient's vital signs are wnl, and lab results are unavailable due to patient refusal. Less likely, but can consider seizure on the differential, though the patient has no h/o prior seizure, and his age would argue against primary seizure disorder. Provoked seizure i/s/o subclinical infection could be considered, though his lack of a post-ictal period makes seizure less likely.    Plan:  - Agree with vEEG.  - Rest of care per primary team.  - Neurology will follow.     Case discussed with Dr. Beatty.

## 2025-04-21 NOTE — PROGRESS NOTE ADULT - ASSESSMENT
Patient seen at bedside. Changes made within note as well. Discussed with medical team that includes resident(s), medical student(s), nursing staff, case management.     as per initial summary     74-year-old male with past medical history of alcohol use disorder, history of necrotizing pancreatitis, hypertension, hyperlipidemia presenting to the ED accompanied by his wife for intoxication. Admitted for after a fall  (trauma w/u neg) for Alcoholic Ketoacidosis and lactic acidosis.  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     A&P    # metabolic acidosis - Alcoholic ketoacidosis and Lactic acidosis - improved--   # Alcohol use disorder with mild withdrawal symptoms  # Transaminitis   # Severe hepatic steatosis  # Distended GB on CT  # Witnessed mechanical fall sec to intoxication and orthostatic symptoms  # orthostatic hypotension  # hypokalemia-- better now  # hypomagnesemia-- replete  - denies any h/o seizures  - trauma w/u negative   - lactate 6.2>6.1>3.9>1.5  - CIWA Valium PRN  - addiction medicine consult, CATCH team,social work consult, follow recommendations   - RUQ US report- No acute sonographic abnormality., Hepatic steatosis.  - IV thiamine x 3 days, PO folate  - PT consult- recommended Rehab; patient and family interested- care management to follow  check orthostatics  replete potassium- monitor bmp, mg, lactic acid levels  fall precautions    #seizure episode  overnight rapid response for seizure episode   neurology was consulted  veeg started   follow neurology recommendationsx     #GI bleed  rapid response in am  ordered stat labs.  GI consulyted  iv fluids bolus      follow up: acute  two rapid response one overnight for seizure, neurology consulted v eeg  another episode in am, gi bleed, gi consulted fluid bolus.   also d/w critical care during rapid response. as per critical care as patient stable for now no need of transfer as yet.   monitor lfts, daily labs  follow addiction.   hgb Q8 check     patient is high risk of deterioration, transfer to stepdown/ICU in case of worsening clinical picture. d/w residnet.        Patient seen at bedside. Changes made within note as well. Discussed with medical team that includes resident(s), medical student(s), nursing staff, case management.     as per initial summary     74-year-old male with past medical history of alcohol use disorder, history of necrotizing pancreatitis, hypertension, hyperlipidemia presenting to the ED accompanied by his wife for intoxication. Admitted for after a fall  (trauma w/u neg) for Alcoholic Ketoacidosis and lactic acidosis.  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     A&P    # metabolic acidosis - Alcoholic ketoacidosis and Lactic acidosis - improved--   # Alcohol use disorder with mild withdrawal symptoms  # Transaminitis   # Severe hepatic steatosis  # Distended GB on CT  # Witnessed mechanical fall sec to intoxication and orthostatic symptoms  # orthostatic hypotension  # hypokalemia-- better now  # hypomagnesemia-- replete  - denies any h/o seizures  - trauma w/u negative   - lactate 6.2>6.1>3.9>1.5  - CIWA Valium PRN  - addiction medicine consult, CATCH team,social work consult, follow recommendations   - RUQ US report- No acute sonographic abnormality., Hepatic steatosis.  - IV thiamine x 3 days, PO folate  - PT consult- recommended Rehab; patient and family interested- care management to follow  check orthostatics  replete potassium- monitor bmp, mg, lactic acid levels  fall precautions    #seizure episode  overnight rapid response for seizure episode   neurology was consulted  veeg started   follow neurology recommendationsx     #GI bleed  rapid response in am  ordered stat labs.  GI consulyted  iv fluids bolus      follow up: acute  two rapid response one overnight for seizure, neurology consulted v eeg  another episode in am, gi bleed, gi consulted fluid bolus.   also d/w critical care during rapid response. as per critical care as patient stable for now no need of transfer as yet.   monitor lfts, daily labs  follow addiction.   hgb Q8 check     patient is high risk of deterioration, transfer to stepdown/ICU in case of worsening clinical picture. d/w residnet.         addendum: patient accepted to stepdown by critical care team.

## 2025-04-21 NOTE — PROGRESS NOTE ADULT - ASSESSMENT
Patient is a 74-year-old male, , domiciled with wife, retired , with past medical history of necrotizing pancreatitis, hypertension, and hyperlipidemia, no reported psychiatric histroy, with substance use history of alochol (1 pint Frida a day, no history of complicated withdrawals, no history of rehab stays), admitted for Alcoholic Ketoacidosis. Addiction Medicine consulted for assistance with alcohol withdrawal.     Patient appears to have alcohol use disorder, drinking 1 pint of frida daily, currently in the precontemplation stage. His withdrawals appears to be well-controlled, with CIWA 2 for tremors. Can continue CIWA monitoring, with thiamine, folate, and MV supplementation. He is not interested in medication to reduce cravings, like naltrexone or resources/referrals to rehab. In regards to the mental health evaluation, patient appears dysthymic, constricted affect, with limited participation in interview. He is presenting with depression, unspecified, in the context of substance use (alcohol use), psychosocial stressors (recent FDC), and medical illness/pain. Differential includes MDD vs substance induced mood disorder. Patient does not appear to be at acute risk of harm to self or other (no past SA, no current SI/HI/AH/VH, no firearm in home, collateral denying safety concerns); therefore, patient does not meet criteria for inpatient psychiatric hospitalization. Plan to refer patient to outpatient psychiatry. Patient is amenable to trialing mirtazapine for improvement of mood/appetite/sleep.     #Alcohol Use Disorder  - continue CIWA monitoring for clinical s/s of alcohol withdrawal for moderate to severe AUD. Unclear if seizure-like activity was related to alcohol use however would continue CIWA monitoring for the next 24 hours.   - keep Mg>2, K>4   - continue thiamine, folate and multivitamin  - hold benzodiazepines for sedation/respiratory depression    - please offer PRN medications for supportive management of withdrawal including:  ---- zofran q6 hr prn nausea (hold for QTc>500)  ---- hydroxyzine 25 mg q6h prn anxiety   ---- tylenol 650 mg q6h prn mild/moderate pain  ---- trazodone 25 mg nightly prn for insomnia  - CATCH team to assist with substance use aftercare options, patient has already refused CATCH team resources and referrals for addiction treatment, however     #Depression, NOS  - Start Mirtazapine 7.5mg PO QHS for mood/appetite/sleep  - Psychiatry will follow up to facilitate referrals to outpatient psychiatry  - No psychiatric contraindications to discharge    Addiction medicine will sign off. Psychiatry will follow up for referral to outpatient service.     Patient is a 74-year-old male, , domiciled with wife, retired , with past medical history of necrotizing pancreatitis, hypertension, and hyperlipidemia, no reported psychiatric histroy, with substance use history of alochol (1 pint Frida a day, no history of complicated withdrawals, no history of rehab stays), admitted for Alcoholic Ketoacidosis. Addiction Medicine consulted for assistance with alcohol withdrawal.     Patient appears to have alcohol use disorder, drinking 1 pint of frida daily, currently in the precontemplation stage. His withdrawals appears to be well-controlled, with CIWA 2 for tremors. Can continue CIWA monitoring, with thiamine, folate, and MV supplementation. He is not interested in medication to reduce cravings, like naltrexone or resources/referrals to rehab. In regards to the mental health evaluation, patient appears dysthymic, constricted affect, with limited participation in interview. He is presenting with depression, unspecified, in the context of substance use (alcohol use), psychosocial stressors (recent [facility]), and medical illness/pain. Differential includes MDD vs substance induced mood disorder. Patient does not appear to be at acute risk of harm to self or other (no past SA, no current SI/HI/AH/VH, no firearm in home, collateral denying safety concerns); therefore, patient does not meet criteria for inpatient psychiatric hospitalization. Plan to refer patient to outpatient psychiatry. Patient is amenable to trialing mirtazapine for improvement of mood/appetite/sleep.     #Alcohol Use Disorder  - continue CIWA monitoring for clinical s/s of alcohol withdrawal for moderate to severe AUD. Unclear if seizure-like activity was related to alcohol use however would continue CIWA monitoring for the next 24 hours.   - keep Mg>2, K>4   - continue thiamine, folate and multivitamin  - hold benzodiazepines for sedation/respiratory depression    - please offer PRN medications for supportive management of withdrawal including:  ---- zofran q6 hr prn nausea (hold for QTc>500)  ---- hydroxyzine 25 mg q6h prn anxiety   ---- tylenol 650 mg q6h prn mild/moderate pain  ---- trazodone 25 mg nightly prn for insomnia  - CATCH team to assist with substance use aftercare options, patient has already refused CATCH team resources and referrals for addiction treatment, however may be amenable to MAT for AUD. Will discuss oral naltrexone with patient tomorrow.     #Depression, NOS  - Continue Mirtazapine 7.5mg PO QHS for mood/appetite/sleep  - Psychiatry will follow up to facilitate referrals to outpatient psychiatry  - No psychiatric contraindications to discharge    #Alcohol induced Hepatic Steatosis:  - CT A&P with severe hepatic steatosis  - counseling for alcohol cessation as above with CATCH and addiction. Will continue to engage patient in motivational discussions regarding complications of alcohol.   - would recommend testing for additional hepatology testing including Hepatitis B surface antigen (HBsAg), hepatitis B surface antibody (anti-HBs), total hepatitis B core antibody (anti-HBc), Hep A IGG and Hep C Antibody  - patient should be monitored by PCP or GI specialist annually for noninvasive liver monitoring given that up to 20% of patients with steatosis develop fibrosis.  - - if following up imaging concerning for cirrhosis, patient should be monitored with noninvasive imaging and AFP every 6 months for HCC screening     #Wernicke's Encephalopathy:   - patient is s/p 3 days of high dose thiamine treatment. Given GI concerns, would recommend to continue IV repletion while patient is hospitalized.   - given concerns for ongoing nutritional deficits, and hx of gait instability and traumatic falls, clinical concern for Wernicke's encephalopathy. Would recommend IV thiamine 500 mg q 8h for 3 days, followed by 250 mg IV daily up to additional 5 days per [employer] recommendations. Can switch to oral thiamine 100 mg daily upon discharge.  - keep Mg >2, K>4     Thank you for this consult. Rest of care per primary. Addiction medicine will continue to follow. Please reach out with any questions or concerns via TEAMS.

## 2025-04-21 NOTE — CONSULT NOTE ADULT - ASSESSMENT
IMPRESSION:    Alchocol intoxication withdrawal  Acute blood loss/ Anemia  GI bleed  Lactic acidosis resolved  HO allohol abuse disorder  HO pancreatitis      PLAN:    CNS: thiamine, folic acid, head CT reviewed    HEENT:  Oral care    PULMONARY:  HOB @ 45 degrees, on RA aspiration precaution, on RA, CXR    CARDIOVASCULAR: IVF, echo reviewed, repeat LA 1.3    GI: PPI q 12                                      Feeding NPO, GI eval    RENAL:  F/u  lytes.  Correct as needed. accurate I/O    INFECTIOUS DISEASE: procal, pancx    HEMATOLOGICAL:  DVT prophylaxis. LE doppler, trend cbc, transfuse as needed    ENDOCRINE:  Follow up FS.  Insulin protocol if needed.    SDU/ Recall changes for MICU

## 2025-04-21 NOTE — CONSULT NOTE ADULT - SUBJECTIVE AND OBJECTIVE BOX
Neurology Consult    Mr. Redd is a 73 y/o M with a PMHx of alcohol use disorder, necrotizing pancreatitis, HTN, and HLD who initially presented for intoxication x 4 days and fall. Neurology is being consulted for an episode of AMS that occurred overnight. Per the primary team, the patient was at baseline AAOx3-4, but largely nonverbal/uncooperative, when he suddenly became unresponsive and a rapid response was called. The primary noted some UE shaking at that time; however, the patient has been on CIWA protocol, and has had a similar tremor since admission. Upon this encounter, the patient was reportedly back at his baseline minimally verbal, but oriented to self, place, and time. He was uncooperative with much of the exam and interview, but was able to answer some questions and follow basic, one-step commands. He was not in any acute distress and denied headache, dizziness, changes in vision, and weakness.         PAST MEDICAL & SURGICAL HISTORY:  HTN (hypertension)      High cholesterol      Gastric ulcer          FAMILY HISTORY:      Social History: (-) x 3    Allergies    No Known Allergies    Intolerances        MEDICATIONS  (STANDING):  chlorhexidine 2% Cloths 1 Application(s) Topical daily  dextrose 5%. 1000 milliLiter(s) (50 mL/Hr) IV Continuous <Continuous>  dextrose 5%. 1000 milliLiter(s) (100 mL/Hr) IV Continuous <Continuous>  dextrose 50% Injectable 25 Gram(s) IV Push once  dextrose 50% Injectable 12.5 Gram(s) IV Push once  dextrose 50% Injectable 25 Gram(s) IV Push once  enoxaparin Injectable 40 milliGRAM(s) SubCutaneous every 24 hours  folic acid 1 milliGRAM(s) Oral daily  glucagon  Injectable 1 milliGRAM(s) IntraMuscular once  insulin lispro (ADMELOG) corrective regimen sliding scale   SubCutaneous three times a day before meals  mirtazapine 7.5 milliGRAM(s) Oral at bedtime  pantoprazole    Tablet 40 milliGRAM(s) Oral before breakfast  thiamine 100 milliGRAM(s) Oral daily    MEDICATIONS  (PRN):  acetaminophen     Tablet .. 650 milliGRAM(s) Oral every 6 hours PRN Temp greater or equal to 38C (100.4F), Mild Pain (1 - 3)  aluminum hydroxide/magnesium hydroxide/simethicone Suspension 30 milliLiter(s) Oral every 4 hours PRN Dyspepsia  dextrose Oral Gel 15 Gram(s) Oral once PRN Blood Glucose LESS THAN 70 milliGRAM(s)/deciliter  diazepam  Injectable 5 milliGRAM(s) IV Push every 2 hours PRN CIWA 8-14  melatonin 3 milliGRAM(s) Oral at bedtime PRN Insomnia  ondansetron Injectable 4 milliGRAM(s) IV Push every 8 hours PRN Nausea and/or Vomiting      Review of systems:    Negative except as noted above in HPI.    Vital Signs Last 24 Hrs  T(C): 36.4 (20 Apr 2025 23:08), Max: 37 (20 Apr 2025 07:00)  T(F): 97.6 (20 Apr 2025 23:08), Max: 98.6 (20 Apr 2025 07:00)  HR: 80 (20 Apr 2025 23:08) (61 - 81)  BP: 124/77 (20 Apr 2025 23:08) (107/72 - 128/78)  BP(mean): --  RR: 18 (20 Apr 2025 23:08) (18 - 18)  SpO2: 98% (20 Apr 2025 23:08) (96% - 100%)    Parameters below as of 20 Apr 2025 23:08  Patient On (Oxygen Delivery Method): room air        Examination: **Limited d/t patient cooperation**  General:  Appearance is consistent with chronologic age.  No abnormal facies.  Gross skin survey within normal limits.    Cognitive/Language:  The patient is oriented to person, place, time and date. Nondysarthric.    Eyes: VFF.  EOMI w/o nystagmus, skew or reported double vision.  PERRL.  No ptosis/weakness of eyelid closure.    Face:  Facial sensation normal V1 - 3, no facial asymmetry.    Ears/Nose/Throat:  Hearing grossly intact b/l.  Palate elevates midline.  Tongue and uvula midline.   Motor examination:   Normal tone, bulk and range of motion. No tenderness, twitching, or involuntary movements. Mild resting, b/l high-frequency, low-amplitude tremor noted in UE.  Formal Muscle Strength Testing: (MRC grade R/L) 5/5 UE; 5/5 LE.  No observable drift.  Reflexes: Globally reduced. 1+ b/l pectoralis, biceps, triceps, brachioradialis, patella and Achilles.  Plantar response downgoing b/l. Abbi, clonus absent.  Sensory examination:   Intact to light touch and pinprick, pain, temperature and proprioception and vibration in all extremities.  Cerebellum:  Patient did not participate in this portion of the exam.    Labs:     04-20    137  |  102  |  7[L]  ----------------------------<  147[H]  3.5   |  25  |  0.5[L]    Ca    8.0[L]      20 Apr 2025 05:59  Mg     1.5     04-19    TPro  4.8[L]  /  Alb  2.6[L]  /  TBili  2.2[H]  /  DBili  x   /  AST  107[H]  /  ALT  41  /  AlkPhos  175[H]  04-19    LIVER FUNCTIONS - ( 19 Apr 2025 11:52 )  Alb: 2.6 g/dL / Pro: 4.8 g/dL / ALK PHOS: 175 U/L / ALT: 41 U/L / AST: 107 U/L / GGT: x             Urinalysis Basic - ( 20 Apr 2025 05:59 )    Color: x / Appearance: x / SG: x / pH: x  Gluc: 147 mg/dL / Ketone: x  / Bili: x / Urobili: x   Blood: x / Protein: x / Nitrite: x   Leuk Esterase: x / RBC: x / WBC x   Sq Epi: x / Non Sq Epi: x / Bacteria: x          [facility]  [facility]     [facility]-21-25 @ 04:18           Neurology Consult    Mr. Redd is a 73 y/o M with a PMHx of alcohol use disorder, necrotizing pancreatitis, HTN, and HLD who initially presented for intoxication x 4 days and fall. Neurology is being consulted for an episode of AMS that occurred overnight. Per the primary team, the patient was at baseline AAOx3-4, but largely nonverbal/uncooperative, when he suddenly became unresponsive, AAOx0, and a rapid response was called. The primary team noted some UE shaking at that time; however, the patient has been on CIWA protocol, and has had a similar tremor since admission. Upon this encounter, the patient was reportedly back at his baseline minimally verbal, but oriented to self, place, and time. He was uncooperative with much of the exam and interview, but was able to answer some questions and follow basic, one-step commands. He was not in any acute distress and denied headache, dizziness, changes in vision, and weakness.         PAST MEDICAL & SURGICAL HISTORY:  HTN (hypertension)      High cholesterol      Gastric ulcer          FAMILY HISTORY:      Social History: (-) x 3    Allergies    No Known Allergies    Intolerances        MEDICATIONS  (STANDING):  chlorhexidine 2% Cloths 1 Application(s) Topical daily  dextrose 5%. 1000 milliLiter(s) (50 mL/Hr) IV Continuous <Continuous>  dextrose 5%. 1000 milliLiter(s) (100 mL/Hr) IV Continuous <Continuous>  dextrose 50% Injectable 25 Gram(s) IV Push once  dextrose 50% Injectable 12.5 Gram(s) IV Push once  dextrose 50% Injectable 25 Gram(s) IV Push once  enoxaparin Injectable 40 milliGRAM(s) SubCutaneous every 24 hours  folic acid 1 milliGRAM(s) Oral daily  glucagon  Injectable 1 milliGRAM(s) IntraMuscular once  insulin lispro (ADMELOG) corrective regimen sliding scale   SubCutaneous three times a day before meals  mirtazapine 7.5 milliGRAM(s) Oral at bedtime  pantoprazole    Tablet 40 milliGRAM(s) Oral before breakfast  thiamine 100 milliGRAM(s) Oral daily    MEDICATIONS  (PRN):  acetaminophen     Tablet .. 650 milliGRAM(s) Oral every 6 hours PRN Temp greater or equal to 38C (100.4F), Mild Pain (1 - 3)  aluminum hydroxide/magnesium hydroxide/simethicone Suspension 30 milliLiter(s) Oral every 4 hours PRN Dyspepsia  dextrose Oral Gel 15 Gram(s) Oral once PRN Blood Glucose LESS THAN 70 milliGRAM(s)/deciliter  diazepam  Injectable 5 milliGRAM(s) IV Push every 2 hours PRN CIWA 8-14  melatonin 3 milliGRAM(s) Oral at bedtime PRN Insomnia  ondansetron Injectable 4 milliGRAM(s) IV Push every 8 hours PRN Nausea and/or Vomiting      Review of systems:    Negative except as noted above in HPI.    Vital Signs Last 24 Hrs  T(C): 36.4 (20 Apr 2025 23:08), Max: 37 (20 Apr 2025 07:00)  T(F): 97.6 (20 Apr 2025 23:08), Max: 98.6 (20 Apr 2025 07:00)  HR: 80 (20 Apr 2025 23:08) (61 - 81)  BP: 124/77 (20 Apr 2025 23:08) (107/72 - 128/78)  BP(mean): --  RR: 18 (20 Apr 2025 23:08) (18 - 18)  SpO2: 98% (20 Apr 2025 23:08) (96% - 100%)    Parameters below as of 20 Apr 2025 23:08  Patient On (Oxygen Delivery Method): room air        Examination: **Limited d/t patient cooperation**  General:  Appearance is consistent with chronologic age.  No abnormal facies.  Gross skin survey within normal limits.    Cognitive/Language:  The patient is oriented to person, place, time and date. Nondysarthric.    Eyes: VFF.  EOMI w/o nystagmus, skew or reported double vision.  PERRL.  No ptosis/weakness of eyelid closure.    Face:  Facial sensation normal V1 - 3, no facial asymmetry.    Ears/Nose/Throat:  Hearing grossly intact b/l.  Palate elevates midline.  Tongue and uvula midline.   Motor examination:   Normal tone, bulk and range of motion. No tenderness, twitching, or involuntary movements. Mild resting, b/l high-frequency, low-amplitude tremor noted in UE.  Formal Muscle Strength Testing: (MRC grade R/L) 5/5 UE; 5/5 LE.  No observable drift.  Reflexes: Globally reduced. 1+ b/l pectoralis, biceps, triceps, brachioradialis, patella and Achilles.  Plantar response downgoing b/l. Abbi, clonus absent.  Sensory examination:   Intact to light touch and pinprick, pain, temperature and proprioception and vibration in all extremities.  Cerebellum:  Patient did not participate in this portion of the exam.    Labs:     04-20    137  |  102  |  7[L]  ----------------------------<  147[H]  3.5   |  25  |  0.5[L]    Ca    8.0[L]      20 Apr 2025 05:59  Mg     1.5     04-19    TPro  4.8[L]  /  Alb  2.6[L]  /  TBili  2.2[H]  /  DBili  x   /  AST  107[H]  /  ALT  41  /  AlkPhos  175[H]  04-19    LIVER FUNCTIONS - ( 19 Apr 2025 11:52 )  Alb: 2.6 g/dL / Pro: 4.8 g/dL / ALK PHOS: 175 U/L / ALT: 41 U/L / AST: 107 U/L / GGT: x             Urinalysis Basic - ( 20 Apr 2025 05:59 )    Color: x / Appearance: x / SG: x / pH: x  Gluc: 147 mg/dL / Ketone: x  / Bili: x / Urobili: x   Blood: x / Protein: x / Nitrite: x   Leuk Esterase: x / RBC: x / WBC x   Sq Epi: x / Non Sq Epi: x / Bacteria: x          Neuroimaging:  [facility]:     04-21-25 @ 04:18

## 2025-04-21 NOTE — CONSULT NOTE ADULT - ASSESSMENT
74-year-old male with past medical history of alcohol use disorder (Last use 4/16/25), history of necrotizing pancreatitis with WON s/p cystogastrostomy , hx of upper GI bleed sec to duodenal bulb ulcer (2022), hypertension, hyperlipidemia presenting to the ED accompanied by his wife for intoxication and a fall.  Wife states that the patient drinks approximately every day.  Patient has had a binge drinking episode for the past 4 days prior to presentation. Patient admitted to medicine on 4/16 for intoxication. This early am he had rapid response with seizure like activity. Today he had dark blood with clots with BM, hypotensive (RRT). GI consulted for further evaluation.     #Melena r/o upper GI bleed  #Acute Macrocytic anemia with low platelets  #AUD, elevated liver enzymes with alcoholic hepatitis (MDF 6.4)  #hx of duodenal ulcer bleed s/p EGD 2022  #history of necrotizing pancreatitis with WON s/p cystogastrostomy  - Hb on admission : 13.4>11.5 (4/18)>8.7 (4/21)  - s/p EGD 11/22: duodenal bulb healing ulcer  - s/p EGD 10/22: 3cm duo bulb ulcer s/p hemostasis  - s/p EGD 9/22: multiple ulcer in antrum, duo bulb, GE junc Ulcer (HP-)  - s/p colonoscopy 9/22: poor prep:   - 4/16: CTAP IC: distended GB, severe hepatic steatosis  - 4/17: RUQ US: CBD 6mm, hep steatosis  T noemi 1.4     AST 72  ALT 29  04-21-25 @ 11:29  T noemi 1.6     AST 69  ALT 29  04-21-25 @ 05:56  T noemi 2.2       ALT 41  04-19-25 @ 11:52  T noemi 2.5       ALT 50  04-18-25 @ 05:07  T noemi 1.5       ALT 58  04-17-25 @ 05:25  RRT 4.21 x2 for seizure like activity currently on vEEG, hypotensive briefly  - Not on AC, no cirrhosis on imaging    RECS  - Keep NPO  - Discussed with wife Antonella at bedside regarding possible endoscopy pending optimization   - Start IV PPI 40mg BID, octreotide drip  - Check INR today  - CBC q12h, keep above 7 , transfuse PRN  - MICU evaluation  - Mg, folate, thiamine supplementation  - If any hypotension , will recommend STAT CT angio AP to rule out active bleed  - Alcohol cessation advised       74-year-old male with past medical history of alcohol use disorder (Last use 4/16/25), history of necrotizing pancreatitis with WON s/p cystogastrostomy , hx of upper GI bleed sec to duodenal bulb ulcer (2022), hypertension, hyperlipidemia presenting to the ED accompanied by his wife for intoxication and a fall.  Wife states that the patient drinks approximately every day.  Patient has had a binge drinking episode for the past 4 days prior to presentation. Patient admitted to medicine on 4/16 for intoxication. This early am he had rapid response with seizure like activity. Today he had dark blood with clots with BM, hypotensive (RRT). GI consulted for further evaluation.     #Dark stool with clots r/o upper GI bleed vs lower   #Acute Macrocytic anemia with low platelets  #AUD, elevated liver enzymes with alcoholic hepatitis (MDF 6.4)  #hx of duodenal ulcer bleed s/p EGD 2022  #history of necrotizing pancreatitis with WON s/p cystogastrostomy  - Hb on admission : 13.4>11.5 (4/18)>8.7 (4/21)  - s/p EGD 11/22: duodenal bulb healing ulcer  - s/p EGD 10/22: 3cm duo bulb ulcer s/p hemostasis  - s/p EGD 9/22: multiple ulcer in antrum, duo bulb, GE junc Ulcer (HP-)  - s/p colonoscopy 9/22: poor prep:   - 4/16: CTAP IC: distended GB, severe hepatic steatosis  - 4/17: RUQ US: CBD 6mm, hep steatosis  T noemi 1.4     AST 72  ALT 29  04-21-25 @ 11:29  T noemi 1.6     AST 69  ALT 29  04-21-25 @ 05:56  T noemi 2.2       ALT 41  04-19-25 @ 11:52  T noemi 2.5       ALT 50  04-18-25 @ 05:07  T noemi 1.5       ALT 58  04-17-25 @ 05:25  RRT 4.21 x2 for seizure like activity currently on vEEG, hypotensive briefly  - Not on AC, no cirrhosis on imaging    RECS  - Keep NPO  - Discussed with wife Antonella at bedside regarding possible endoscopy and colonoscopy tentatively tomorrow. Start bowel prep today with 4L golytely and 20mg dulcolax  - Pt on v EEG will confirm with neuro if ok for procedure  - Start IV PPI 40mg BID, octreotide drip  - Check INR today  - CBC q12h, keep above 7 , transfuse PRN  - MICU evaluation  - Mg, folate, thiamine supplementation  - If any hypotension , will recommend STAT CT angio AP to rule out active bleed  - Alcohol cessation advised    Spoke with wife Antonella at bedside who is the decision maker     74-year-old male with past medical history of alcohol use disorder (Last use 4/16/25), history of necrotizing pancreatitis with WON s/p cystogastrostomy , hx of upper GI bleed sec to duodenal bulb ulcer (2022), hypertension, hyperlipidemia presenting to the ED accompanied by his wife for intoxication and a fall.  Wife states that the patient drinks approximately every day.  Patient has had a binge drinking episode for the past 4 days prior to presentation. Patient admitted to medicine on 4/16 for intoxication. This early am he had rapid response with seizure like activity. Today he had dark blood with clots with BM, hypotensive (RRT). GI consulted for further evaluation.     #Dark stool with clots r/o upper GI bleed vs lower   #Acute Macrocytic anemia with low platelets  #AUD, elevated liver enzymes with alcoholic hepatitis (MDF 6.4)  #hx of duodenal ulcer bleed s/p EGD 2022  #history of necrotizing pancreatitis with WON s/p cystogastrostomy  - Hb on admission : 13.4>11.5 (4/18)>8.7 (4/21)  - s/p EGD 11/22: duodenal bulb healing ulcer  - s/p EGD 10/22: 3cm duo bulb ulcer s/p hemostasis  - s/p EGD 9/22: multiple ulcer in antrum, duo bulb, GE junc Ulcer (HP-)  - s/p colonoscopy 9/22: poor prep:   - 4/16: CTAP IC: distended GB, severe hepatic steatosis  - 4/17: RUQ US: CBD 6mm, hep steatosis  T noemi 1.4     AST 72  ALT 29  04-21-25 @ 11:29  T noemi 1.6     AST 69  ALT 29  04-21-25 @ 05:56  T noemi 2.2       ALT 41  04-19-25 @ 11:52  T noemi 2.5       ALT 50  04-18-25 @ 05:07  T noemi 1.5       ALT 58  04-17-25 @ 05:25  RRT 4.21 x2 for seizure like activity currently on vEEG, hypotensive briefly  - Not on AC, no cirrhosis on imaging    RECS  - Keep NPO  - Discussed with wife Antonella at bedside regarding possible endoscopy and colonoscopy tentatively tomorrow. Start bowel prep today with 4L golytely and 20mg dulcolax  - Pt on v EEG will confirm with neuro if ok for procedure  - Start IV PPI 40mg BID, octreotide drip  - Check INR today  - CBC q8, keep above 7 , transfuse PRN  - MICU evaluation  - Mg, folate, thiamine supplementation  - If any hypotension , will recommend STAT CT angio AP to rule out active bleed  - Alcohol cessation advised    Spoke with wife Antonella at bedside who is the decision maker

## 2025-04-21 NOTE — CONSULT NOTE ADULT - SUBJECTIVE AND OBJECTIVE BOX
Gastroenterology Consultation:    Patient is a 74y old  Male who presents with a chief complaint of Fall (21 Apr 2025 04:17)        Admitted on: 04-16-25      HPI:Wife Antonella at bedside provided history  74-year-old male with past medical history of alcohol use disorder (Last use 4/16/25), history of necrotizing pancreatitis with WON s/p cystogastrostomy , hx of upper GI bleed sec to duodenal bulb ulcer (2022), hypertension, hyperlipidemia presenting to the ED accompanied by his wife for intoxication and a fall.  Wife states that the patient drinks approximately every day.  Patient has had a binge drinking episode for the past 4 days prior to presentation. Patient admitted to medicine on 4/16 for intoxication. This early am he had rapid response with seizure like activity. Today he had dark blood with clots with BM, hypotensive (RRT). GI consulted for further evaluation.       Prior EGD: see below    Prior Colonoscopy: see below      PAST MEDICAL & SURGICAL HISTORY:  HTN (hypertension)      High cholesterol      Gastric ulcer            FAMILY HISTORY:  NO GI related malignancies/disorders/IBD    Social History: per wife  Tobacco: denies  Alcohol: daily frida use  Drugs: denies    Home Medications:        MEDICATIONS  (STANDING):  chlorhexidine 2% Cloths 1 Application(s) Topical daily  dextrose 5%. 1000 milliLiter(s) (50 mL/Hr) IV Continuous <Continuous>  dextrose 5%. 1000 milliLiter(s) (100 mL/Hr) IV Continuous <Continuous>  dextrose 50% Injectable 25 Gram(s) IV Push once  dextrose 50% Injectable 12.5 Gram(s) IV Push once  dextrose 50% Injectable 25 Gram(s) IV Push once  folic acid 1 milliGRAM(s) Oral daily  glucagon  Injectable 1 milliGRAM(s) IntraMuscular once  insulin lispro (ADMELOG) corrective regimen sliding scale   SubCutaneous three times a day before meals  lactated ringers Bolus 1000 milliLiter(s) IV Bolus once  mirtazapine 7.5 milliGRAM(s) Oral at bedtime  octreotide  Infusion 25 MICROgram(s)/Hr (5 mL/Hr) IV Continuous <Continuous>  pantoprazole  Injectable 80 milliGRAM(s) IV Push once  pantoprazole  Injectable 40 milliGRAM(s) IV Push two times a day  thiamine 100 milliGRAM(s) Oral daily    MEDICATIONS  (PRN):  acetaminophen     Tablet .. 650 milliGRAM(s) Oral every 6 hours PRN Temp greater or equal to 38C (100.4F), Mild Pain (1 - 3)  aluminum hydroxide/magnesium hydroxide/simethicone Suspension 30 milliLiter(s) Oral every 4 hours PRN Dyspepsia  dextrose Oral Gel 15 Gram(s) Oral once PRN Blood Glucose LESS THAN 70 milliGRAM(s)/deciliter  diazepam  Injectable 5 milliGRAM(s) IV Push every 2 hours PRN CIWA 8-14  melatonin 3 milliGRAM(s) Oral at bedtime PRN Insomnia  ondansetron Injectable 4 milliGRAM(s) IV Push every 8 hours PRN Nausea and/or Vomiting      Allergies  No Known Allergies      Review of Systems:   Constitutional:  No Fever, No Chills  ENT/Mouth:  No Hearing Changes,  No Difficulty Swallowing  Eyes:  No Eye Pain, No Vision Changes  Cardiovascular:  No Chest Pain, No Palpitations  Respiratory:  No Cough, No Dyspnea  Gastrointestinal:  As described in HPI          Physical Examination:  T(C): 36.9 (04-21-25 @ 08:13), Max: 36.9 (04-21-25 @ 08:13)  HR: 99 (04-21-25 @ 08:13) (80 - 99)  BP: 104/64 (04-21-25 @ 08:13) (104/64 - 124/77)  RR: 17 (04-21-25 @ 08:13) (17 - 18)  SpO2: 99% (04-21-25 @ 08:13) (98% - 100%)          GENERAL: AAOx2  HEAD:  Atraumatic, Normocephalic  EYES: conjunctiva and sclera clear  CHEST/LUNG: Clear to auscultation bilaterally; No wheeze, rhonchi, or rales  HEART: Regular rate and rhythm; normal S1, S2  ABDOMEN: Soft, nontender, nondistended; Bowel sounds present  SKIN: Intact, no jaundice        Data:                        8.7    11.99 )-----------( 201      ( 21 Apr 2025 11:29 )             25.0     Hgb Trend:  8.7  04-21-25 @ 11:29      04-21    137  |  104  |  8[L]  ----------------------------<  183[H]  4.9   |  27  |  0.7    Ca    8.0[L]      21 Apr 2025 05:56  Mg     1.9     04-21    TPro  4.4[L]  /  Alb  2.4[L]  /  TBili  1.6[H]  /  DBili  x   /  AST  69[H]  /  ALT  29  /  AlkPhos  165[H]  04-21    Liver panel trend:  TBili 1.6   /   AST 69   /   ALT 29   /   AlkP 165   /   Tptn 4.4   /   Alb 2.4    /   DBili --      04-21  TBili 2.2   /      /   ALT 41   /   AlkP 175   /   Tptn 4.8   /   Alb 2.6    /   DBili --      04-19  TBili 2.5   /      /   ALT 50   /   AlkP 196   /   Tptn 5.4   /   Alb 2.8    /   DBili --      04-18  TBili 1.5   /      /   ALT 58   /   AlkP 192   /   Tptn 5.6   /   Alb 3.1    /   DBili --      04-17  TBili 1.6   /      /   ALT 75   /   AlkP 224   /   Tptn 6.1   /   Alb 3.5    /   DBili --      04-16              Radiology:      < from: US Abdomen Upper Quadrant Right (04.17.25 @ 17:10) >    No acute sonographic abnormality.    Hepatic steatosis.    < end of copied text >  < from: CT Abdomen and Pelvis w/ IV Cont (04.16.25 @ 17:22) >  Limited artifact degraded examination.  1.  Severe hepatic steatosis.  2.  Nonspecific distended gallbladder.  3.  Left adrenal gland 6 cm myelolipoma    < end of copied text >   Gastroenterology Consultation:    Patient is a 74y old  Male who presents with a chief complaint of Fall (21 Apr 2025 04:17)        Admitted on: 04-16-25      HPI:Wife Antonella at bedside provided history  74-year-old male with past medical history of alcohol use disorder (Last use 4/16/25), history of necrotizing pancreatitis with WON s/p cystogastrostomy , hx of upper GI bleed sec to duodenal bulb ulcer (2022), hypertension, hyperlipidemia presenting to the ED accompanied by his wife for intoxication and a fall.  Wife states that the patient drinks approximately every day.  Patient has had a binge drinking episode for the past 4 days prior to presentation. Patient admitted to medicine on 4/16 for intoxication. This early am he had rapid response with seizure like activity. Today he had dark blood with clots with BM, hypotensive (RRT). GI consulted for further evaluation.       Prior EGD: see below    Prior Colonoscopy: see below      PAST MEDICAL & SURGICAL HISTORY:  HTN (hypertension)      High cholesterol      Gastric ulcer            FAMILY HISTORY:  NO GI related malignancies/disorders/IBD    Social History: per wife  Tobacco: denies  Alcohol: daily frida use  Drugs: denies    Home Medications:        MEDICATIONS  (STANDING):  chlorhexidine 2% Cloths 1 Application(s) Topical daily  dextrose 5%. 1000 milliLiter(s) (50 mL/Hr) IV Continuous <Continuous>  dextrose 5%. 1000 milliLiter(s) (100 mL/Hr) IV Continuous <Continuous>  dextrose 50% Injectable 25 Gram(s) IV Push once  dextrose 50% Injectable 12.5 Gram(s) IV Push once  dextrose 50% Injectable 25 Gram(s) IV Push once  folic acid 1 milliGRAM(s) Oral daily  glucagon  Injectable 1 milliGRAM(s) IntraMuscular once  insulin lispro (ADMELOG) corrective regimen sliding scale   SubCutaneous three times a day before meals  lactated ringers Bolus 1000 milliLiter(s) IV Bolus once  mirtazapine 7.5 milliGRAM(s) Oral at bedtime  octreotide  Infusion 25 MICROgram(s)/Hr (5 mL/Hr) IV Continuous <Continuous>  pantoprazole  Injectable 80 milliGRAM(s) IV Push once  pantoprazole  Injectable 40 milliGRAM(s) IV Push two times a day  thiamine 100 milliGRAM(s) Oral daily    MEDICATIONS  (PRN):  acetaminophen     Tablet .. 650 milliGRAM(s) Oral every 6 hours PRN Temp greater or equal to 38C (100.4F), Mild Pain (1 - 3)  aluminum hydroxide/magnesium hydroxide/simethicone Suspension 30 milliLiter(s) Oral every 4 hours PRN Dyspepsia  dextrose Oral Gel 15 Gram(s) Oral once PRN Blood Glucose LESS THAN 70 milliGRAM(s)/deciliter  diazepam  Injectable 5 milliGRAM(s) IV Push every 2 hours PRN CIWA 8-14  melatonin 3 milliGRAM(s) Oral at bedtime PRN Insomnia  ondansetron Injectable 4 milliGRAM(s) IV Push every 8 hours PRN Nausea and/or Vomiting      Allergies  No Known Allergies      Review of Systems:   Limited secondary to patient condition           Physical Examination:  T(C): 36.9 (04-21-25 @ 08:13), Max: 36.9 (04-21-25 @ 08:13)  HR: 99 (04-21-25 @ 08:13) (80 - 99)  BP: 104/64 (04-21-25 @ 08:13) (104/64 - 124/77)  RR: 17 (04-21-25 @ 08:13) (17 - 18)  SpO2: 99% (04-21-25 @ 08:13) (98% - 100%)          GENERAL: AAOx2  HEAD:  Atraumatic, Normocephalic  EYES: conjunctiva and sclera clear  CHEST/LUNG: Clear to auscultation bilaterally; No wheeze, rhonchi, or rales  HEART: Regular rate and rhythm; normal S1, S2  ABDOMEN: Soft, nontender, nondistended; Bowel sounds present  SKIN: Intact, no jaundice        Data:                        8.7    11.99 )-----------( 201      ( 21 Apr 2025 11:29 )             25.0     Hgb Trend:  8.7  04-21-25 @ 11:29      04-21    137  |  104  |  8[L]  ----------------------------<  183[H]  4.9   |  27  |  0.7    Ca    8.0[L]      21 Apr 2025 05:56  Mg     1.9     04-21    TPro  4.4[L]  /  Alb  2.4[L]  /  TBili  1.6[H]  /  DBili  x   /  AST  69[H]  /  ALT  29  /  AlkPhos  165[H]  04-21    Liver panel trend:  TBili 1.6   /   AST 69   /   ALT 29   /   AlkP 165   /   Tptn 4.4   /   Alb 2.4    /   DBili --      04-21  TBili 2.2   /      /   ALT 41   /   AlkP 175   /   Tptn 4.8   /   Alb 2.6    /   DBili --      04-19  TBili 2.5   /      /   ALT 50   /   AlkP 196   /   Tptn 5.4   /   Alb 2.8    /   DBili --      04-18  TBili 1.5   /      /   ALT 58   /   AlkP 192   /   Tptn 5.6   /   Alb 3.1    /   DBili --      04-17  TBili 1.6   /      /   ALT 75   /   AlkP 224   /   Tptn 6.1   /   Alb 3.5    /   DBili --      04-16              Radiology:      < from: US Abdomen Upper Quadrant Right (04.17.25 @ 17:10) >    No acute sonographic abnormality.    Hepatic steatosis.    < end of copied text >  < from: CT Abdomen and Pelvis w/ IV Cont (04.16.25 @ 17:22) >  Limited artifact degraded examination.  1.  Severe hepatic steatosis.  2.  Nonspecific distended gallbladder.  3.  Left adrenal gland 6 cm myelolipoma    < end of copied text >

## 2025-04-21 NOTE — CONSULT NOTE ADULT - SUBJECTIVE AND OBJECTIVE BOX
Patient is a 74y old  Male who presents with a chief complaint of Fall (21 Apr 2025 13:56)    74-year-old male with past medical history of alcohol use disorder, history of necrotizing pancreatitis, hypertension, hyperlipidemia presenting to the ED accompanied by his wife for intoxication.  Wife states that the patient drinks approximately every day.  Patient has had a binge drinking episode for the past 4 days.  Today patient fell and was unable to ambulate after the fall.  Wife states this happens often.  Wife states she is concerned and wanted to get him checked out.  Patient is not contributing to history at this time.  Wife states patient is acting at baseline when intoxicated.     Vital Signs:  · BP Systolic	144 mm Hg  · BP Diastolic	79 mm Hg  · Heart Rate	89 /min  · Respiration Rate (breaths/min)	16 /min  · Temp (F)	98 Degrees F  · Temp (C)	36.7 Degrees C  · Temp site	oral  · SpO2 (%)	97 %  · O2 Delivery/Oxygen Delivery Method	room air  · Temp at ED Arrival (C)	36.7 Degrees C    Labs: Hb 13.2, Na 133, AG 24, Bili 1.6, ALP: 224, AST/ALT: 258/75, beta-hyroxy: 3.4, lipase 65, Vbg: lactate 6.1    CTAP: 1.  Severe hepatic steatosis.  2.  Nonspecific distended gallbladder.  3.  Left adrenal gland 6 cm myelolipoma    CT HEAD:  No evidence for acute intracranial pathology.    CT CERVICAL SPINE:  No evidence for acute fracture or subluxation.    s/p 1L LR, folic acid, thiamine  (16 Apr 2025 22:20)    4B Course: Patient was admitted for alcohol intoxication. He was on a CIWA protocol with Valium PRN. He was not needing any Valium. He had a RR yesterday night for: Rapid response called by RN at bedside. Per RN, patient was attempting to get up to go to bathroom, when he fell back to bed and was off baseline and became AOx0 (baseline AOx3), after which the patient was shaking in seizure-like motions and RR was called. Upon arrival to bedside, patient was responsive to verbal stimuli and responded in full sentence stating his need to go to bathroom. Vitals within normal limits, blood sugar 165. Pt was examined and motor function was intact. CT head, EEG and neurology consult placed to r/o any underlying cause for AMS.  Per nursing patient had positive orthostatics earlier in the day, he had no gradual increase in CIWA and wasn't tachycardic before or after event.     Another RR was called today at 11am: Patient went to the bathroom and a RR was called because patient had an episode of whole body shaking for 10 seconds x2 while on the toilet. BP initially was 79/65. Repeat was 119/71. He was initially unresponsive but came back with it within 3 mins. The toilet was filled with bright red blood with questionable melena. A zoll was placed on him and showed an irregular beat. GI was consulted. . He was started on an octreotide drip, protonix 40 BID.    PAST MEDICAL & SURGICAL HISTORY:  HTN (hypertension)      High cholesterol      Gastric ulcer          SOCIAL HX:  ETOH ++      REVIEW OF SYSTEMS see hpi        Allergies    No Known Allergies    Intolerances        acetaminophen     Tablet .. 650 milliGRAM(s) Oral every 6 hours PRN  aluminum hydroxide/magnesium hydroxide/simethicone Suspension 30 milliLiter(s) Oral every 4 hours PRN  bisacodyl 20 milliGRAM(s) Oral at bedtime  chlorhexidine 2% Cloths 1 Application(s) Topical daily  dextrose 5%. 1000 milliLiter(s) IV Continuous <Continuous>  dextrose 5%. 1000 milliLiter(s) IV Continuous <Continuous>  dextrose 50% Injectable 25 Gram(s) IV Push once  dextrose 50% Injectable 12.5 Gram(s) IV Push once  dextrose 50% Injectable 25 Gram(s) IV Push once  dextrose Oral Gel 15 Gram(s) Oral once PRN  diazepam  Injectable 5 milliGRAM(s) IV Push every 2 hours PRN  folic acid 1 milliGRAM(s) Oral daily  glucagon  Injectable 1 milliGRAM(s) IntraMuscular once  insulin lispro (ADMELOG) corrective regimen sliding scale   SubCutaneous three times a day before meals  lactated ringers Bolus 1000 milliLiter(s) IV Bolus once  melatonin 3 milliGRAM(s) Oral at bedtime PRN  mirtazapine 7.5 milliGRAM(s) Oral at bedtime  octreotide  Infusion 25 MICROgram(s)/Hr IV Continuous <Continuous>  ondansetron Injectable 4 milliGRAM(s) IV Push every 8 hours PRN  pantoprazole  Injectable 40 milliGRAM(s) IV Push two times a day  polyethylene glycol/electrolyte Solution. 4000 milliLiter(s) Oral once  thiamine IVPB 500 milliGRAM(s) IV Intermittent every 8 hours  : Home Meds:      PHYSICAL EXAM    ICU Vital Signs Last 24 Hrs  T(C): 36.9 (21 Apr 2025 08:13), Max: 36.9 (21 Apr 2025 08:13)  T(F): 98.5 (21 Apr 2025 08:13), Max: 98.5 (21 Apr 2025 08:13)  HR: 99 (21 Apr 2025 08:13) (80 - 99)  BP: 104/64 (21 Apr 2025 08:13) (104/64 - 124/77)  BP(mean): --  RR: 17 (21 Apr 2025 08:13) (17 - 18)  SpO2: 99% (21 Apr 2025 08:13) (98% - 100%)    O2 Parameters below as of 21 Apr 2025 08:13  Patient On (Oxygen Delivery Method): room air            General: comfortable on RA  Lungs: Dec bs both bases  Cardiovascular: RYLEE 2.6  Abdomen: Soft, Positive BS  Extremities: No clubbing  Skin: Warm  Neurological: Non focal         LABS:                          8.3    7.28  )-----------( 156      ( 21 Apr 2025 14:09 )             24.0                                               04-21    134[L]  |  103  |  11  ----------------------------<  227[H]  4.1   |  20  |  0.7    Ca    7.7[L]      21 Apr 2025 11:29  Phos  1.4     04-21  Mg     1.8     04-21    TPro  4.3[L]  /  Alb  2.5[L]  /  TBili  1.4[H]  /  DBili  x   /  AST  72[H]  /  ALT  29  /  AlkPhos  161[H]  04-21      PT/INR - ( 21 Apr 2025 11:30 )   PT: 14.10 sec;   INR: 1.19 ratio         PTT - ( 21 Apr 2025 11:30 )  PTT:28.1 sec                                       Urinalysis Basic - ( 21 Apr 2025 11:29 )    Color: x / Appearance: x / SG: x / pH: x  Gluc: 227 mg/dL / Ketone: x  / Bili: x / Urobili: x   Blood: x / Protein: x / Nitrite: x   Leuk Esterase: x / RBC: x / WBC x   Sq Epi: x / Non Sq Epi: x / Bacteria: x                                                  LIVER FUNCTIONS - ( 21 Apr 2025 11:29 )  Alb: 2.5 g/dL / Pro: 4.3 g/dL / ALK PHOS: 161 U/L / ALT: 29 U/L / AST: 72 U/L / GGT: x                                                                                                                                   ABG - ( 21 Apr 2025 11:53 )  pH, Arterial: 7.55  pH, Blood: x     /  pCO2: 30    /  pO2: 86    / HCO3: 26    / Base Excess: 4.4   /  SaO2: 99.9            MEDICATIONS  (STANDING):  bisacodyl 20 milliGRAM(s) Oral at bedtime  chlorhexidine 2% Cloths 1 Application(s) Topical daily  dextrose 5%. 1000 milliLiter(s) (50 mL/Hr) IV Continuous <Continuous>  dextrose 5%. 1000 milliLiter(s) (100 mL/Hr) IV Continuous <Continuous>  dextrose 50% Injectable 25 Gram(s) IV Push once  dextrose 50% Injectable 12.5 Gram(s) IV Push once  dextrose 50% Injectable 25 Gram(s) IV Push once  folic acid 1 milliGRAM(s) Oral daily  glucagon  Injectable 1 milliGRAM(s) IntraMuscular once  insulin lispro (ADMELOG) corrective regimen sliding scale   SubCutaneous three times a day before meals  lactated ringers Bolus 1000 milliLiter(s) IV Bolus once  mirtazapine 7.5 milliGRAM(s) Oral at bedtime  octreotide  Infusion 25 MICROgram(s)/Hr (5 mL/Hr) IV Continuous <Continuous>  pantoprazole  Injectable 40 milliGRAM(s) IV Push two times a day  polyethylene glycol/electrolyte Solution. 4000 milliLiter(s) Oral once  thiamine IVPB 500 milliGRAM(s) IV Intermittent every 8 hours    MEDICATIONS  (PRN):  acetaminophen     Tablet .. 650 milliGRAM(s) Oral every 6 hours PRN Temp greater or equal to 38C (100.4F), Mild Pain (1 - 3)  aluminum hydroxide/magnesium hydroxide/simethicone Suspension 30 milliLiter(s) Oral every 4 hours PRN Dyspepsia  dextrose Oral Gel 15 Gram(s) Oral once PRN Blood Glucose LESS THAN 70 milliGRAM(s)/deciliter  diazepam  Injectable 5 milliGRAM(s) IV Push every 2 hours PRN CIWA 8-14  melatonin 3 milliGRAM(s) Oral at bedtime PRN Insomnia  ondansetron Injectable 4 milliGRAM(s) IV Push every 8 hours PRN Nausea and/or Vomiting

## 2025-04-22 ENCOUNTER — RESULT REVIEW (OUTPATIENT)
Age: 75
End: 2025-04-22

## 2025-04-22 ENCOUNTER — TRANSCRIPTION ENCOUNTER (OUTPATIENT)
Age: 75
End: 2025-04-22

## 2025-04-22 LAB
ALBUMIN SERPL ELPH-MCNC: 2.3 G/DL — LOW (ref 3.5–5.2)
ALP SERPL-CCNC: 152 U/L — HIGH (ref 30–115)
ALT FLD-CCNC: 32 U/L — SIGNIFICANT CHANGE UP (ref 0–41)
ANION GAP SERPL CALC-SCNC: 7 MMOL/L — SIGNIFICANT CHANGE UP (ref 7–14)
APPEARANCE UR: ABNORMAL
AST SERPL-CCNC: 103 U/L — HIGH (ref 0–41)
BACTERIA # UR AUTO: NEGATIVE /HPF — SIGNIFICANT CHANGE UP
BASOPHILS # BLD AUTO: 0.05 K/UL — SIGNIFICANT CHANGE UP (ref 0–0.2)
BASOPHILS NFR BLD AUTO: 0.8 % — SIGNIFICANT CHANGE UP (ref 0–1)
BILIRUB SERPL-MCNC: 1.4 MG/DL — HIGH (ref 0.2–1.2)
BILIRUB UR-MCNC: ABNORMAL
BUN SERPL-MCNC: 13 MG/DL — SIGNIFICANT CHANGE UP (ref 10–20)
CALCIUM SERPL-MCNC: 7.9 MG/DL — LOW (ref 8.4–10.5)
CAST: 3 /LPF — SIGNIFICANT CHANGE UP (ref 0–4)
CHLORIDE SERPL-SCNC: 103 MMOL/L — SIGNIFICANT CHANGE UP (ref 98–110)
CO2 SERPL-SCNC: 24 MMOL/L — SIGNIFICANT CHANGE UP (ref 17–32)
COD CRY URNS QL: PRESENT
COLOR SPEC: ABNORMAL
CREAT SERPL-MCNC: 0.7 MG/DL — SIGNIFICANT CHANGE UP (ref 0.7–1.5)
DIFF PNL FLD: NEGATIVE — SIGNIFICANT CHANGE UP
EGFR: 96 ML/MIN/1.73M2 — SIGNIFICANT CHANGE UP
EGFR: 96 ML/MIN/1.73M2 — SIGNIFICANT CHANGE UP
EOSINOPHIL # BLD AUTO: 0.08 K/UL — SIGNIFICANT CHANGE UP (ref 0–0.7)
EOSINOPHIL NFR BLD AUTO: 1.2 % — SIGNIFICANT CHANGE UP (ref 0–8)
GLUCOSE BLDC GLUCOMTR-MCNC: 156 MG/DL — HIGH (ref 70–99)
GLUCOSE BLDC GLUCOMTR-MCNC: 158 MG/DL — HIGH (ref 70–99)
GLUCOSE BLDC GLUCOMTR-MCNC: 180 MG/DL — HIGH (ref 70–99)
GLUCOSE SERPL-MCNC: 162 MG/DL — HIGH (ref 70–99)
GLUCOSE UR QL: NEGATIVE MG/DL — SIGNIFICANT CHANGE UP
HCT VFR BLD CALC: 23.4 % — LOW (ref 42–52)
HGB BLD-MCNC: 8 G/DL — LOW (ref 14–18)
IMM GRANULOCYTES NFR BLD AUTO: 0.5 % — HIGH (ref 0.1–0.3)
KETONES UR-MCNC: ABNORMAL MG/DL
LEUKOCYTE ESTERASE UR-ACNC: ABNORMAL
LYMPHOCYTES # BLD AUTO: 1.73 K/UL — SIGNIFICANT CHANGE UP (ref 1.2–3.4)
LYMPHOCYTES # BLD AUTO: 26 % — SIGNIFICANT CHANGE UP (ref 20.5–51.1)
MAGNESIUM SERPL-MCNC: 1.8 MG/DL — SIGNIFICANT CHANGE UP (ref 1.8–2.4)
MCHC RBC-ENTMCNC: 34.2 G/DL — SIGNIFICANT CHANGE UP (ref 32–37)
MCHC RBC-ENTMCNC: 34.6 PG — HIGH (ref 27–31)
MCV RBC AUTO: 101.3 FL — HIGH (ref 80–94)
MONOCYTES # BLD AUTO: 0.39 K/UL — SIGNIFICANT CHANGE UP (ref 0.1–0.6)
MONOCYTES NFR BLD AUTO: 5.9 % — SIGNIFICANT CHANGE UP (ref 1.7–9.3)
MRSA PCR RESULT.: NEGATIVE — SIGNIFICANT CHANGE UP
NEUTROPHILS # BLD AUTO: 4.38 K/UL — SIGNIFICANT CHANGE UP (ref 1.4–6.5)
NEUTROPHILS NFR BLD AUTO: 65.6 % — SIGNIFICANT CHANGE UP (ref 42.2–75.2)
NITRITE UR-MCNC: POSITIVE
NRBC BLD AUTO-RTO: 0 /100 WBCS — SIGNIFICANT CHANGE UP (ref 0–0)
PH UR: 5.5 — SIGNIFICANT CHANGE UP (ref 5–8)
PLATELET # BLD AUTO: 155 K/UL — SIGNIFICANT CHANGE UP (ref 130–400)
PMV BLD: 10.9 FL — HIGH (ref 7.4–10.4)
POTASSIUM SERPL-MCNC: 4.4 MMOL/L — SIGNIFICANT CHANGE UP (ref 3.5–5)
POTASSIUM SERPL-SCNC: 4.4 MMOL/L — SIGNIFICANT CHANGE UP (ref 3.5–5)
PROT SERPL-MCNC: 4.3 G/DL — LOW (ref 6–8)
PROT UR-MCNC: 30 MG/DL
RBC # BLD: 2.31 M/UL — LOW (ref 4.7–6.1)
RBC # FLD: 13.3 % — SIGNIFICANT CHANGE UP (ref 11.5–14.5)
RBC CASTS # UR COMP ASSIST: 14 /HPF — HIGH (ref 0–4)
SODIUM SERPL-SCNC: 134 MMOL/L — LOW (ref 135–146)
SP GR SPEC: 1.03 — SIGNIFICANT CHANGE UP (ref 1–1.03)
SQUAMOUS # UR AUTO: 1 /HPF — SIGNIFICANT CHANGE UP (ref 0–5)
UROBILINOGEN FLD QL: 1 MG/DL — SIGNIFICANT CHANGE UP (ref 0.2–1)
WBC # BLD: 6.66 K/UL — SIGNIFICANT CHANGE UP (ref 4.8–10.8)
WBC # FLD AUTO: 6.66 K/UL — SIGNIFICANT CHANGE UP (ref 4.8–10.8)
WBC UR QL: 0 /HPF — SIGNIFICANT CHANGE UP (ref 0–5)

## 2025-04-22 PROCEDURE — 99232 SBSQ HOSP IP/OBS MODERATE 35: CPT

## 2025-04-22 PROCEDURE — 43239 EGD BIOPSY SINGLE/MULTIPLE: CPT

## 2025-04-22 PROCEDURE — 95720 EEG PHY/QHP EA INCR W/VEEG: CPT

## 2025-04-22 PROCEDURE — 88312 SPECIAL STAINS GROUP 1: CPT | Mod: 26

## 2025-04-22 PROCEDURE — 99233 SBSQ HOSP IP/OBS HIGH 50: CPT

## 2025-04-22 PROCEDURE — 88305 TISSUE EXAM BY PATHOLOGIST: CPT | Mod: 26

## 2025-04-22 RX ORDER — INFLUENZA A VIRUS A/IDAHO/07/2018 (H1N1) ANTIGEN (MDCK CELL DERIVED, PROPIOLACTONE INACTIVATED, INFLUENZA A VIRUS A/INDIANA/08/2018 (H3N2) ANTIGEN (MDCK CELL DERIVED, PROPIOLACTONE INACTIVATED), INFLUENZA B VIRUS B/SINGAPORE/INFTT-16-0610/2016 ANTIGEN (MDCK CELL DERIVED, PROPIOLACTONE INACTIVATED), INFLUENZA B VIRUS B/IOWA/06/2017 ANTIGEN (MDCK CELL DERIVED, PROPIOLACTONE INACTIVATED) 15; 15; 15; 15 UG/.5ML; UG/.5ML; UG/.5ML; UG/.5ML
0.5 INJECTION, SUSPENSION INTRAMUSCULAR ONCE
Refills: 0 | Status: DISCONTINUED | OUTPATIENT
Start: 2025-04-22 | End: 2025-04-29

## 2025-04-22 RX ORDER — POLYETHYLENE GLYCOL 3350 17 G/17G
17 POWDER, FOR SOLUTION ORAL
Refills: 0 | Status: DISCONTINUED | OUTPATIENT
Start: 2025-04-22 | End: 2025-04-29

## 2025-04-22 RX ADMIN — Medication 105 MILLIGRAM(S): at 13:58

## 2025-04-22 RX ADMIN — FOLIC ACID 1 MILLIGRAM(S): 1 TABLET ORAL at 11:43

## 2025-04-22 RX ADMIN — INSULIN LISPRO 1: 100 INJECTION, SOLUTION INTRAVENOUS; SUBCUTANEOUS at 08:21

## 2025-04-22 RX ADMIN — Medication 40 MILLIGRAM(S): at 18:29

## 2025-04-22 RX ADMIN — OCTREOTIDE ACETATE 5 MICROGRAM(S)/HR: 500 INJECTION, SOLUTION INTRAVENOUS; SUBCUTANEOUS at 05:22

## 2025-04-22 RX ADMIN — MIRTAZAPINE 7.5 MILLIGRAM(S): 30 TABLET, FILM COATED ORAL at 21:39

## 2025-04-22 RX ADMIN — OCTREOTIDE ACETATE 5 MICROGRAM(S)/HR: 500 INJECTION, SOLUTION INTRAVENOUS; SUBCUTANEOUS at 21:38

## 2025-04-22 RX ADMIN — Medication 105 MILLIGRAM(S): at 05:23

## 2025-04-22 RX ADMIN — Medication 40 MILLIGRAM(S): at 05:23

## 2025-04-22 RX ADMIN — OCTREOTIDE ACETATE 5 MICROGRAM(S)/HR: 500 INJECTION, SOLUTION INTRAVENOUS; SUBCUTANEOUS at 08:22

## 2025-04-22 RX ADMIN — Medication 102.5 MILLIGRAM(S): at 18:29

## 2025-04-22 RX ADMIN — Medication 1 APPLICATION(S): at 11:44

## 2025-04-22 NOTE — PROGRESS NOTE ADULT - ATTENDING COMMENTS
I saw and evaluated the patient today 4/22/25 with resident during key/critical portions of the visit.  Nursing/primary team/consultant records reviewed. I agree with the resident's note including past psych history, home medications, social history, allergies, surgical history, family history, and review of system. I have reviewed relevant vitals, laboratory values, imaging studies, and microbiology.  I agree with the trainee's findings and assessment and plan as documented in the trainee's note.   - Above resident's note was edited by me   - agree with rest of A/P as per detailed resident note, unless noted below.

## 2025-04-22 NOTE — PROGRESS NOTE ADULT - SUBJECTIVE AND OBJECTIVE BOX
Over Night Events: events noted, on RA, afebrile, head CT/ CBC reviewed    PHYSICAL EXAM    ICU Vital Signs Last 24 Hrs  T(C): 36.7 (22 Apr 2025 04:00), Max: 36.9 (21 Apr 2025 08:13)  T(F): 98.1 (22 Apr 2025 04:00), Max: 98.5 (21 Apr 2025 08:13)  HR: 88 (22 Apr 2025 04:00) (87 - 99)  BP: 109/68 (22 Apr 2025 04:00) (104/64 - 129/72)  BP(mean): 84 (22 Apr 2025 04:00) (84 - 99)  RR: 18 (22 Apr 2025 04:00) (17 - 18)  SpO2: 97% (22 Apr 2025 04:00) (95% - 99%)    O2 Parameters below as of 21 Apr 2025 18:30  Patient On (Oxygen Delivery Method): room air            General: comfortable  Lungs: dec bs both bases  Cardiovascular: RYLEE 2.6  Abdomen: Soft, Positive BS  Extremities: No clubbing   Neurological: Non focal         LABS:                          8.0    6.66  )-----------( 155      ( 22 Apr 2025 04:46 )             23.4                                               04-22    134[L]  |  103  |  13  ----------------------------<  162[H]  4.4   |  24  |  0.7    Ca    7.9[L]      22 Apr 2025 04:46  Phos  1.4     04-21  Mg     1.8     04-22    TPro  4.3[L]  /  Alb  2.3[L]  /  TBili  1.4[H]  /  DBili  x   /  AST  103[H]  /  ALT  32  /  AlkPhos  152[H]  04-22      PT/INR - ( 21 Apr 2025 11:30 )   PT: 14.10 sec;   INR: 1.19 ratio         PTT - ( 21 Apr 2025 11:30 )  PTT:28.1 sec                                       Urinalysis Basic - ( 22 Apr 2025 04:46 )    Color: x / Appearance: x / SG: x / pH: x  Gluc: 162 mg/dL / Ketone: x  / Bili: x / Urobili: x   Blood: x / Protein: x / Nitrite: x   Leuk Esterase: x / RBC: x / WBC x   Sq Epi: x / Non Sq Epi: x / Bacteria: x                                                  LIVER FUNCTIONS - ( 22 Apr 2025 04:46 )  Alb: 2.3 g/dL / Pro: 4.3 g/dL / ALK PHOS: 152 U/L / ALT: 32 U/L / AST: 103 U/L / GGT: x                                                                                                                                   ABG - ( 21 Apr 2025 11:53 )  pH, Arterial: 7.55  pH, Blood: x     /  pCO2: 30    /  pO2: 86    / HCO3: 26    / Base Excess: 4.4   /  SaO2: 99.9                MEDICATIONS  (STANDING):  chlorhexidine 2% Cloths 1 Application(s) Topical daily  dextrose 5%. 1000 milliLiter(s) (50 mL/Hr) IV Continuous <Continuous>  dextrose 5%. 1000 milliLiter(s) (100 mL/Hr) IV Continuous <Continuous>  dextrose 50% Injectable 25 Gram(s) IV Push once  dextrose 50% Injectable 12.5 Gram(s) IV Push once  dextrose 50% Injectable 25 Gram(s) IV Push once  folic acid 1 milliGRAM(s) Oral daily  glucagon  Injectable 1 milliGRAM(s) IntraMuscular once  influenza  Vaccine (HIGH DOSE) 0.5 milliLiter(s) IntraMuscular once  insulin lispro (ADMELOG) corrective regimen sliding scale   SubCutaneous three times a day before meals  mirtazapine 7.5 milliGRAM(s) Oral at bedtime  octreotide  Infusion 25 MICROgram(s)/Hr (5 mL/Hr) IV Continuous <Continuous>  pantoprazole  Injectable 40 milliGRAM(s) IV Push two times a day  thiamine IVPB 500 milliGRAM(s) IV Intermittent every 8 hours    MEDICATIONS  (PRN):  acetaminophen     Tablet .. 650 milliGRAM(s) Oral every 6 hours PRN Temp greater or equal to 38C (100.4F), Mild Pain (1 - 3)  aluminum hydroxide/magnesium hydroxide/simethicone Suspension 30 milliLiter(s) Oral every 4 hours PRN Dyspepsia  dextrose Oral Gel 15 Gram(s) Oral once PRN Blood Glucose LESS THAN 70 milliGRAM(s)/deciliter  diazepam  Injectable 5 milliGRAM(s) IV Push every 2 hours PRN CIWA 8-14  melatonin 3 milliGRAM(s) Oral at bedtime PRN Insomnia  ondansetron Injectable 4 milliGRAM(s) IV Push every 8 hours PRN Nausea and/or Vomiting

## 2025-04-22 NOTE — PROGRESS NOTE ADULT - SUBJECTIVE AND OBJECTIVE BOX
Neurology Progress Note    Interval History:    Patient was seen and examined, no acute events over night. VEEG: no ictal pattern.     Medications:  acetaminophen     Tablet .. 650 milliGRAM(s) Oral every 6 hours PRN  aluminum hydroxide/magnesium hydroxide/simethicone Suspension 30 milliLiter(s) Oral every 4 hours PRN  chlorhexidine 2% Cloths 1 Application(s) Topical daily  dextrose 5%. 1000 milliLiter(s) IV Continuous <Continuous>  dextrose 5%. 1000 milliLiter(s) IV Continuous <Continuous>  dextrose 50% Injectable 25 Gram(s) IV Push once  dextrose 50% Injectable 12.5 Gram(s) IV Push once  dextrose 50% Injectable 25 Gram(s) IV Push once  dextrose Oral Gel 15 Gram(s) Oral once PRN  diazepam  Injectable 5 milliGRAM(s) IV Push every 2 hours PRN  folic acid 1 milliGRAM(s) Oral daily  glucagon  Injectable 1 milliGRAM(s) IntraMuscular once  influenza  Vaccine (HIGH DOSE) 0.5 milliLiter(s) IntraMuscular once  insulin lispro (ADMELOG) corrective regimen sliding scale   SubCutaneous three times a day before meals  melatonin 3 milliGRAM(s) Oral at bedtime PRN  mirtazapine 7.5 milliGRAM(s) Oral at bedtime  octreotide  Infusion 25 MICROgram(s)/Hr IV Continuous <Continuous>  ondansetron Injectable 4 milliGRAM(s) IV Push every 8 hours PRN  pantoprazole  Injectable 40 milliGRAM(s) IV Push two times a day  polyethylene glycol 3350 17 Gram(s) Oral two times a day  thiamine IVPB 250 milliGRAM(s) IV Intermittent daily      Vital Signs Last 24 Hrs  T(C): 36.4 (2025 16:30), Max: 36.8 (2025 00:00)  T(F): 97.5 (2025 16:30), Max: 98.3 (2025 00:00)  HR: 90 (2025 16:45) (83 - 96)  BP: 108/67 (2025 16:45) (98/52 - 129/72)  BP(mean): 90 (2025 15:59) (84 - 99)  RR: 20 (2025 16:45) (18 - 20)  SpO2: 95% (2025 16:45) (94% - 98%)    Parameters below as of 2025 16:40  Patient On (Oxygen Delivery Method): room air        NEUROLOGICAL EXAMINATION:  GENERAL:  Appearance is consistent with chronologic age.   COGNITION/LANGUAGE:  Awake, alert, and oriented to person, place, time and date.  Fluent, intact comprehension, repetition, naming.  Nondysarthric.    CRANIAL NERVES:   - Eyes:  Visual acuity intact. Pupils equal round and reactive, no RAPD. EOMI w/o nystagmus, skew or reported double vision. Normal visual field on confrontation. No ptosis/weakness of eyelid closure.   - Face:  Facial sensation normal V1 - 3, no facial asymmetry.    - Ears/Nose/Throat:  Hearing grossly intact b/l to finger rub.  Palate elevates midline.  Tongue and uvula midline.   MOTOR EXAM:  (R/L) 5/5 UE; 5/5 LE.  No observable drift. Normal tone and bulk. No tenderness, fast freq tremors in outstretched b/l UE.   SENSORY EXAM:  Intact to light touch and pinprick in all extremities.  REFLEXES:   1+ b/l biceps, triceps, patella and achilles.     CEREBELLUM:  Finger to nose/Heel to knee and shin intact.  No dysmetria.    GAIT: deferred.           Labs:  CBC Full  -  ( 2025 04:46 )  WBC Count : 6.66 K/uL  RBC Count : 2.31 M/uL  Hemoglobin : 8.0 g/dL  Hematocrit : 23.4 %  Platelet Count - Automated : 155 K/uL  Mean Cell Volume : 101.3 fL  Mean Cell Hemoglobin : 34.6 pg  Mean Cell Hemoglobin Concentration : 34.2 g/dL           134[L]  |  103  |  13  ----------------------------<  162[H]  4.4   |  24  |  0.7    Ca    7.9[L]      2025 04:46  Phos  1.4     -  Mg     1.8         TPro  4.3[L]  /  Alb  2.3[L]  /  TBili  1.4[H]  /  DBili  x   /  AST  103[H]  /  ALT  32  /  AlkPhos  152[H]      LIVER FUNCTIONS - ( 2025 04:46 )  Alb: 2.3 g/dL / Pro: 4.3 g/dL / ALK PHOS: 152 U/L / ALT: 32 U/L / AST: 103 U/L / GGT: x           PT/INR - ( 2025 11:30 )   PT: 14.10 sec;   INR: 1.19 ratio         PTT - ( 2025 11:30 )  PTT:28.1 sec  Urinalysis Basic - ( 2025 10:33 )    Color: Orange / Appearance: Cloudy / S.029 / pH: x  Gluc: x / Ketone: Trace mg/dL  / Bili: Moderate / Urobili: 1.0 mg/dL   Blood: x / Protein: 30 mg/dL / Nitrite: Positive   Leuk Esterase: Small / RBC: 14 /HPF / WBC 0 /HPF   Sq Epi: x / Non Sq Epi: 1 /HPF / Bacteria: Negative /HPF        RADIOLOGY & ADDITIONAL TESTS:

## 2025-04-22 NOTE — PATIENT PROFILE ADULT - FALL HARM RISK - RISK INTERVENTIONS
Assistance OOB with selected safe patient handling equipment/Assistance with ambulation/Communicate Fall Risk and Risk Factors to all staff, patient, and family/Monitor for mental status changes/Monitor gait and stability/Reinforce activity limits and safety measures with patient and family/Sit up slowly, dangle for a short time, stand at bedside before walking/Toileting schedule using arm’s reach rule for commode and bathroom/Use of alarms - bed, chair and/or voice tab/Visual Cue: Yellow wristband/Bed in lowest position, wheels locked, appropriate side rails in place/Call bell, personal items and telephone in reach/Instruct patient to call for assistance before getting out of bed or chair/Non-slip footwear when patient is out of bed/[place] to call system/Physically safe environment - no spills, clutter or unnecessary equipment/Purposeful Proactive Rounding/Room/bathroom lighting operational, light cord in reach

## 2025-04-22 NOTE — PROGRESS NOTE ADULT - SUBJECTIVE AND OBJECTIVE BOX
Patient is a 74y old  Male who presents with a chief complaint of Fall (22 Apr 2025 07:29)      INTERVAL HPI/OVERNIGHT EVENTS: upgraded to CEU    acetaminophen     Tablet .. 650 milliGRAM(s) Oral every 6 hours PRN  aluminum hydroxide/magnesium hydroxide/simethicone Suspension 30 milliLiter(s) Oral every 4 hours PRN  chlorhexidine 2% Cloths 1 Application(s) Topical daily  dextrose 5%. 1000 milliLiter(s) IV Continuous <Continuous>  dextrose 5%. 1000 milliLiter(s) IV Continuous <Continuous>  dextrose 50% Injectable 25 Gram(s) IV Push once  dextrose 50% Injectable 12.5 Gram(s) IV Push once  dextrose 50% Injectable 25 Gram(s) IV Push once  dextrose Oral Gel 15 Gram(s) Oral once PRN  diazepam  Injectable 5 milliGRAM(s) IV Push every 2 hours PRN  folic acid 1 milliGRAM(s) Oral daily  glucagon  Injectable 1 milliGRAM(s) IntraMuscular once  influenza  Vaccine (HIGH DOSE) 0.5 milliLiter(s) IntraMuscular once  insulin lispro (ADMELOG) corrective regimen sliding scale   SubCutaneous three times a day before meals  melatonin 3 milliGRAM(s) Oral at bedtime PRN  mirtazapine 7.5 milliGRAM(s) Oral at bedtime  octreotide  Infusion 25 MICROgram(s)/Hr IV Continuous <Continuous>  ondansetron Injectable 4 milliGRAM(s) IV Push every 8 hours PRN  pantoprazole  Injectable 40 milliGRAM(s) IV Push two times a day  polyethylene glycol 3350 17 Gram(s) Oral two times a day  thiamine IVPB 500 milliGRAM(s) IV Intermittent every 8 hours      REVIEW OF SYSTEMS:  CONSTITUTIONAL: No fever, chills  EYES: No eye pain, visual disturbances, or discharge  ENMT:  No difficulty hearing, tinnitus, vertigo; No sinus or throat pain  RESPIRATORY: No cough, wheezing, chills or hemoptysis; No shortness of breath  CARDIOVASCULAR: No chest pain, palpitations  GASTROINTESTINAL: No abdominal pain. No nausea, vomiting, or diarrhea  GENITOURINARY: No dysuria  NEUROLOGICAL: No HA  SKIN: No itching, burning, rashes, or lesions   ENDOCRINE: No heat or cold intolerance; No hair loss  PSYCHIATRIC: No depression, anxiety, mood swings, or difficulty sleeping      T(C): 36.8 (04-22-25 @ 08:20), Max: 36.8 (04-22-25 @ 00:00)  HR: 85 (04-22-25 @ 08:20) (85 - 88)  BP: 118/66 (04-22-25 @ 08:20) (109/68 - 129/72)  RR: 18 (04-22-25 @ 08:20) (18 - 18)  SpO2: 95% (04-22-25 @ 08:20) (95% - 98%)  Wt(kg): --Vital Signs Last 24 Hrs  T(C): 36.8 (22 Apr 2025 08:20), Max: 36.8 (22 Apr 2025 00:00)  T(F): 98.2 (22 Apr 2025 08:20), Max: 98.3 (22 Apr 2025 00:00)  HR: 85 (22 Apr 2025 08:20) (85 - 88)  BP: 118/66 (22 Apr 2025 08:20) (109/68 - 129/72)  BP(mean): 86 (22 Apr 2025 08:20) (84 - 99)  RR: 18 (22 Apr 2025 08:20) (18 - 18)  SpO2: 95% (22 Apr 2025 08:20) (95% - 98%)    Parameters below as of 22 Apr 2025 08:20  Patient On (Oxygen Delivery Method): room air        PHYSICAL EXAM:  General: comfortable  Lungs: dec bs both bases  Cardiovascular: RYLEE 2.6  Abdomen: Soft, Positive BS  Extremities: No clubbing   Neurological: Non focal     Consultant(s) Notes Reviewed:  [x ] YES  [ ] NO  Care Discussed with Consultants/Other Providers [ x] YES  [ ] NO    LABS:                        8.0    6.66  )-----------( 155      ( 22 Apr 2025 04:46 )             23.4     04-22    134[L]  |  103  |  13  ----------------------------<  162[H]  4.4   |  24  |  0.7    Ca    7.9[L]      22 Apr 2025 04:46  Phos  1.4     04-21  Mg     1.8     04-22    TPro  4.3[L]  /  Alb  2.3[L]  /  TBili  1.4[H]  /  DBili  x   /  AST  103[H]  /  ALT  32  /  AlkPhos  152[H]  04-22    PT/INR - ( 21 Apr 2025 11:30 )   PT: 14.10 sec;   INR: 1.19 ratio         PTT - ( 21 Apr 2025 11:30 )  PTT:28.1 sec  Urinalysis Basic - ( 22 Apr 2025 04:46 )    Color: x / Appearance: x / SG: x / pH: x  Gluc: 162 mg/dL / Ketone: x  / Bili: x / Urobili: x   Blood: x / Protein: x / Nitrite: x   Leuk Esterase: x / RBC: x / WBC x   Sq Epi: x / Non Sq Epi: x / Bacteria: x      CAPILLARY BLOOD GLUCOSE      POCT Blood Glucose.: 180 mg/dL (22 Apr 2025 07:53)  POCT Blood Glucose.: 137 mg/dL (21 Apr 2025 16:41)  POCT Blood Glucose.: 233 mg/dL (21 Apr 2025 11:10)      ABG - ( 21 Apr 2025 11:53 )  pH, Arterial: 7.55  pH, Blood: x     /  pCO2: 30    /  pO2: 86    / HCO3: 26    / Base Excess: 4.4   /  SaO2: 99.9              Urinalysis Basic - ( 22 Apr 2025 04:46 )    Color: x / Appearance: x / SG: x / pH: x  Gluc: 162 mg/dL / Ketone: x  / Bili: x / Urobili: x   Blood: x / Protein: x / Nitrite: x   Leuk Esterase: x / RBC: x / WBC x   Sq Epi: x / Non Sq Epi: x / Bacteria: x          RADIOLOGY & ADDITIONAL TESTS:    Imaging Personally Reviewed:  [ ] YES  [ ] NO

## 2025-04-22 NOTE — PROGRESS NOTE ADULT - ATTENDING COMMENTS
Patient at his baseline today. vEEG reviewed: LOC episode captured without EEg correlate - suspect convulsive syncope. Recommendations as above.

## 2025-04-22 NOTE — PHYSICAL THERAPY INITIAL EVALUATION ADULT - GENERAL OBSERVATIONS, REHAB EVAL
Pt still on hold, + rapid response yesterday for seizures, + VEEG. PT is on hold, will follow up.
1:30-1:58pm Pt encountered supine in bed, A & O x 3, NAD, +IV and agreeable to PT. Pt requires Min A in bed mobility and Mod A sit/stand transfer. Pt demonstrated low standing tolerace/fatigue easily and unable to ambulate at this time. Pt left in bed as found as per pt request, spouse present at b/s. Pt will benefit from skilled PT 3-5x/wk for thera ex, functional mobility training, balance activity and gait training to achieve goals in mobility and return to previous level of function.

## 2025-04-22 NOTE — PROGRESS NOTE ADULT - ASSESSMENT
Patient is a 74-year-old male, , domiciled with wife, retired , with past medical history of necrotizing pancreatitis, hypertension, and hyperlipidemia, no reported psychiatric histroy, with substance use history of alochol (1 pint Frida a day, no history of complicated withdrawals, no history of rehab stays), admitted for Alcoholic Ketoacidosis. Addiction Medicine consulted for assistance with alcohol withdrawal.     Patient appears to have alcohol use disorder, drinking 1 pint of frida daily, currently in the precontemplation stage. His withdrawals appears to be well-controlled, with CIWA 2 for tremors. Can continue CIWA monitoring, with thiamine, folate, and MV supplementation. He is not interested in medication to reduce cravings, like naltrexone or resources/referrals to rehab. In regards to the mental health evaluation, patient appears dysthymic, constricted affect, with limited participation in interview. He is presenting with depression, unspecified, in the context of substance use (alcohol use), psychosocial stressors (recent alf), and medical illness/pain. Differential includes MDD vs substance induced mood disorder. Patient does not appear to be at acute risk of harm to self or other (no past SA, no current SI/HI/AH/VH, no firearm in home, collateral denying safety concerns); therefore, patient does not meet criteria for inpatient psychiatric hospitalization. Plan to refer patient to outpatient psychiatry. Patient is amenable to trialing mirtazapine for improvement of mood/appetite/sleep.     #Alcohol Use Disorder  - Once medically stable and no concern for decompensated liver cirrhosis, can start discharge on naltrexone 50mg PO daily with food.  - keep Mg>2, K>4   - continue thiamine, folate, and multivitamin  - hold benzodiazepines for sedation/respiratory depression    - please offer PRN medications for supportive management of withdrawal including:  ---- zofran q6 hr prn nausea (hold for QTc>500)  ---- hydroxyzine 25 mg q6h prn anxiety   ---- tylenol 650 mg q6h prn mild/moderate pain  ---- trazodone 25 mg nightly prn for insomnia  - CATCH team to assist with substance use aftercare options, patient has already refused CATCH team resources and referrals for addiction treatment, however may be amenable to MAT for AUD. Will discuss oral naltrexone with patient tomorrow.     #Depression, NOS  - Continue Mirtazapine 7.5mg PO QHS for mood/appetite/sleep  - No psychiatric contraindications to discharge  - CATCH will continue to follow up for CDOP referal if patient amenable     #Alcohol induced Hepatic Steatosis:  - CT A&P with severe hepatic steatosis  - counseling for alcohol cessation as above with CATCH and addiction. Will continue to engage patient in motivational discussions regarding complications of alcohol.   - would recommend testing for additional hepatology testing including Hepatitis B surface antigen (HBsAg), hepatitis B surface antibody (anti-HBs), total hepatitis B core antibody (anti-HBc), Hep A IGG and Hep C Antibody  - patient should be monitored by PCP or GI specialist annually for noninvasive liver monitoring given that up to 20% of patients with steatosis develop fibrosis.  - - if following up imaging concerning for cirrhosis, patient should be monitored with noninvasive imaging and AFP every 6 months for HCC screening     #Wernicke's Encephalopathy:   - patient is s/p 3 days of high dose thiamine treatment. Given GI concerns, would recommend to continue IV repletion while patient is hospitalized.   - given concerns for ongoing nutritional deficits, and hx of gait instability and traumatic falls, clinical concern for Wernicke's encephalopathy. Would recommend IV thiamine 500 mg q 8h for 3 days, followed by 250 mg IV daily up to additional 5 days per [employer] recommendations. Can switch to oral thiamine 100 mg daily upon discharge.  - keep Mg >2, K>4     Thank you for this consult. Rest of care per primary. Addiction medicine will continue to follow peripherally. Please reach out with any questions or concerns via TEAMS.      Patient is a 74-year-old male, , domiciled with wife, retired , with past medical history of necrotizing pancreatitis, hypertension, and hyperlipidemia, no reported psychiatric histroy, with substance use history of alochol (1 pint Frida a day, no history of complicated withdrawals, no history of rehab stays), admitted for Alcoholic Ketoacidosis. Addiction Medicine consulted for assistance with alcohol withdrawal.     Patient appears to have alcohol use disorder, drinking 1 pint of frida daily, currently in the precontemplation stage. His withdrawals appears to be well-controlled, with CIWA 2 for tremors. Can continue CIWA monitoring, with thiamine, folate, and MV supplementation. He is not interested in medication to reduce cravings, like naltrexone or resources/referrals to rehab. In regards to the mental health evaluation, patient appears dysthymic, constricted affect, with limited participation in interview. He is presenting with depression, unspecified, in the context of substance use (alcohol use), psychosocial stressors (recent [facility]), and medical illness/pain. Differential includes MDD vs substance induced mood disorder. Patient does not appear to be at acute risk of harm to self or other (no past SA, no current SI/HI/AH/VH, no firearm in home, collateral denying safety concerns); therefore, patient does not meet criteria for inpatient psychiatric hospitalization. Plan to refer patient to outpatient psychiatry. Patient is amenable to trialing mirtazapine for improvement of mood/appetite/sleep.     #Alcohol Use Disorder  - Once medically stable and no concern for decompensated liver cirrhosis, and patient has not had any opioid agonists in the past 7 days, can start on naltrexone 50mg PO daily with food.   - Naltrexone is an opioid antagonist that is FDA approved for alcohol use disorder by targeting alcohol cravings and decreasing binge consumption and total number of drinking days, and has been effective for patients abstaining from alcohol.  - keep Mg>2, K>4   - continue thiamine, folate, and multivitamin  - hold benzodiazepines for sedation/respiratory depression    - please offer PRN medications for supportive management of withdrawal including:  ---- zofran q6 hr prn nausea (hold for QTc>500)  ---- hydroxyzine 25 mg q6h prn anxiety   ---- tylenol 650 mg q6h prn mild/moderate pain  ---- trazodone 25 mg nightly prn for insomnia  - CATCH team to assist with substance use aftercare options, patient has already refused CATCH team resources and referrals for addiction treatment.     #Depression, NOS  - Continue Mirtazapine 7.5mg PO QHS for mood/appetite/sleep  - No psychiatric contraindications to discharge  - [facility] will continue to follow up for CDOP referal if patient amenable     #Alcohol induced Hepatic Steatosis:  - CT A&P with severe hepatic steatosis  - counseling for alcohol cessation as above with CATCH and addiction. Will continue to engage patient in motivational discussions regarding complications of alcohol.   - would recommend testing for additional hepatology testing including Hepatitis B surface antigen (HBsAg), hepatitis B surface antibody (anti-HBs), total hepatitis B core antibody (anti-HBc), Hep A IGG and Hep C Antibody  - patient should be monitored by PCP or GI specialist annually for noninvasive liver monitoring given that up to 20% of patients with steatosis develop fibrosis.  - if following up imaging concerning for cirrhosis, patient should be monitored with noninvasive imaging and AFP every 6 months for HCC screening     #Wernicke's Encephalopathy:   - patient is s/p 3 days of high dose thiamine treatment. Given GI concerns, would recommend to continue IV repletion while patient is hospitalized.   - given concerns for ongoing nutritional deficits, and hx of gait instability and traumatic falls, clinical concern for Wernicke's encephalopathy. Would recommend IV thiamine 500 mg q 8h for 3 days, followed by 250 mg IV daily up to additional 5 days per [employer] recommendations. Can switch to oral thiamine 100 mg daily upon discharge.  - keep Mg >2, K>4     Thank you for this consult. Rest of care per primary. Addiction medicine will continue to follow peripherally. Please reach out with any questions or concerns via TEAMS.

## 2025-04-22 NOTE — PROGRESS NOTE ADULT - ASSESSMENT
# metabolic acidosis - Alcoholic ketoacidosis and Lactic acidosis - improved--   # Alcohol use disorder with mild withdrawal symptoms  # Transaminitis   # Severe hepatic steatosis  # Distended GB on CT  # Witnessed mechanical fall sec to intoxication and orthostatic symptoms  # orthostatic hypotension  - denies any h/o seizures  - trauma w/u negative   - lactate 6.2>6.1>3.9>1.5  - CIWA Valium PRN  - addiction medicine consult, CATCH team,social work consult, follow recommendations   - RUQ US report- No acute sonographic abnormality., Hepatic steatosis.  - IV thiamine x 3 days, PO folate  - PT consult- recommended Rehab; patient and family interested- care management to follow    #suspected seizure episode  - f/u veeg s  - f/u neurology    #GI bleed  rapid response in am  ordered stat labs.  GI consulted  iv fluids bolus  - f/u after egd/colonscopy    # hypomagnesemia-- replete    # hypokalemia-- resolved    DVT: holding  GI: iv protonix  diet: npo  code: full    Pending: GI neurology follow up, vEEG, Colonoscopy

## 2025-04-22 NOTE — PROGRESS NOTE ADULT - ASSESSMENT
Mr. Redd is a 75 y/o M with a PMHx of alcohol use disorder, necrotizing pancreatitis, HTN, and HLD who initially presented for intoxication x 4 days and fall. Neurology is being consulted for an episode of AMS that occurred overnight. CTH at the time of the RR was negative for any acute pathology. Given the patient's history and presenting complaint, can consider Wernicke's encephalopathy on the differential; however, his rapid return to baseline argues against such a diagnosis.  VEEG was performed to r/o epileptic potential was captured event during micturition without ictal pattern, most non-epileptic likely convulsive syncope.     Plan:  - d/c VEEG  - syncope workup, c/w Thiamine supplementation.   - rest of care per primary team.       Case discussed with Dr. Rios

## 2025-04-22 NOTE — PROGRESS NOTE ADULT - ASSESSMENT
IMPRESSION:    Alchocol intoxication  Acute blood loss/ Anemia  GI bleed  Lactic acidosis resolved  HO alcohol abuse disorder  HO pancreatitis      PLAN:    CNS: thiamine, folic acid, head CT reviewed, FUP EEG, CIWA    HEENT:  Oral care    PULMONARY:  HOB @ 45 degrees, on RA aspiration precaution, on RA, CXR    CARDIOVASCULAR: IVF, echo reviewed, repeat LA 1.3    GI: PPI q 12                                      Feeding per GI    RENAL:  F/u  lytes.  Correct as needed. accurate I/O    INFECTIOUS DISEASE: procal, pancx    HEMATOLOGICAL:  DVT prophylaxis. LE doppler, trend cbc, transfuse as needed    ENDOCRINE:  Follow up FS.  Insulin protocol if needed.    SDU  GOC

## 2025-04-22 NOTE — PROGRESS NOTE ADULT - ATTENDING COMMENTS
4-year-old male with past medical history of alcohol use disorder, history of necrotizing pancreatitis, hypertension, hyperlipidemia presenting to the ED accompanied by his wife for intoxication. He was on CIWA and valium PRN, s/p RRT for fall while attempting to go to the bathroom with worsening confusion, with shaking episodes. Another RRT called for similar shaking while pt on toilet. Patient was initially altered and regained consciousness within minutes. PAtient was found to have BRBPR at that time, started on an octreotide drip, protonix 40 BID aand upgraded to SDU    Patient seen and examined independently with wife at bedside  offers no complaints, responds in 1 word answers  Physical exam unrevealing     Alcohol withdrawal wit history of alcohol use disorder  Alcoholic and lactic Ketoacidosis, improved  Transaminitis/Hepatic steatosis  suspected folate and thiamine deficiency  shaking episodes, r/o seizure   Hypokalemia/hypomagnesemia   - currently calm, mental status at baseline  - on VEEG  - c/w CIWA monitoring, valium PRN  - addiction med consulted, concern for wernickes, recs  IV thiamine 500 mg q 8h for 3 days, followed by 250 mg IV daily up to additional 5 days   - catch team following  - monitor a replete electrolytes  - neuro fu post EEG    BRBPR  - noted during RRT  - Hb downtrending since admission  - did not receive PRBC  - was planned for EGD/colono today but refused to drink prep  - now planned for EGD only  - monitor CBC, keep active T&S, transfuse if <7  - fu GI    Fall likely due to intoxication and orthostasis   - repeat orthostatics after procedure  - check echo  - PT eval once off VEEG    Distended GB on CT - No acute sonographic abnormality., Hepatic steatosis. Monitor for symptoms     All discussed with patient and wife at bedside  c/w monitoring in SDU    Patient seen at bedside, total time spent to evaluate and treat the patient's acute illness and chronic medical conditions as well as time spent reviewing prior records, labs, radiology, documenting in electronic medical records,  discussing medical plan with   medical team was 50 minutes with >50% of time spent face to face with patient, discussing with patient/family as well as coordination of care

## 2025-04-22 NOTE — EEG REPORT - NS EEG TEXT BOX
Epilepsy Attending Note:     CHRIS KHAN    74y Male  MRN [id number]    Vital Signs Last 24 Hrs  T(C): 36.8 (22 Apr 2025 08:20), Max: 36.8 (22 Apr 2025 00:00)  T(F): 98.2 (22 Apr 2025 08:20), Max: 98.3 (22 Apr 2025 00:00)  HR: 85 (22 Apr 2025 08:20) (85 - 88)  BP: 118/66 (22 Apr 2025 08:20) (109/68 - 129/72)  BP(mean): 86 (22 Apr 2025 08:20) (84 - 99)  RR: 18 (22 Apr 2025 08:20) (18 - 18)  SpO2: 95% (22 Apr 2025 08:20) (95% - 98%)    Parameters below as of 22 Apr 2025 08:20  Patient On (Oxygen Delivery Method): room air                              8.0    6.66  )-----------( 155      ( 22 Apr 2025 04:46 )             23.4       04-22    134[L]  |  103  |  13  ----------------------------<  162[H]  4.4   |  24  |  0.7    Ca    7.9[L]      22 Apr 2025 04:46  Phos  1.4     04-21  Mg     1.8     04-22    TPro  4.3[L]  /  Alb  2.3[L]  /  TBili  1.4[H]  /  DBili  x   /  AST  103[H]  /  ALT  32  /  AlkPhos  152[H]  04-22      MEDICATIONS  (STANDING):  chlorhexidine 2% Cloths 1 Application(s) Topical daily  dextrose 5%. 1000 milliLiter(s) (50 mL/Hr) IV Continuous <Continuous>  dextrose 5%. 1000 milliLiter(s) (100 mL/Hr) IV Continuous <Continuous>  dextrose 50% Injectable 25 Gram(s) IV Push once  dextrose 50% Injectable 12.5 Gram(s) IV Push once  dextrose 50% Injectable 25 Gram(s) IV Push once  folic acid 1 milliGRAM(s) Oral daily  glucagon  Injectable 1 milliGRAM(s) IntraMuscular once  influenza  Vaccine (HIGH DOSE) 0.5 milliLiter(s) IntraMuscular once  insulin lispro (ADMELOG) corrective regimen sliding scale   SubCutaneous three times a day before meals  mirtazapine 7.5 milliGRAM(s) Oral at bedtime  octreotide  Infusion 25 MICROgram(s)/Hr (5 mL/Hr) IV Continuous <Continuous>  pantoprazole  Injectable 40 milliGRAM(s) IV Push two times a day  polyethylene glycol 3350 17 Gram(s) Oral two times a day  thiamine IVPB 500 milliGRAM(s) IV Intermittent every 8 hours    MEDICATIONS  (PRN):  acetaminophen     Tablet .. 650 milliGRAM(s) Oral every 6 hours PRN Temp greater or equal to 38C (100.4F), Mild Pain (1 - 3)  aluminum hydroxide/magnesium hydroxide/simethicone Suspension 30 milliLiter(s) Oral every 4 hours PRN Dyspepsia  dextrose Oral Gel 15 Gram(s) Oral once PRN Blood Glucose LESS THAN 70 milliGRAM(s)/deciliter  diazepam  Injectable 5 milliGRAM(s) IV Push every 2 hours PRN CIWA 8-14  melatonin 3 milliGRAM(s) Oral at bedtime PRN Insomnia  ondansetron Injectable 4 milliGRAM(s) IV Push every 8 hours PRN Nausea and/or Vomiting            VEEG in the last 24 hours:    Background - continuous, symmetrical, well organized, reaching frequencies in the range of 8-9 Hz, showing normal sleep patterns.    Focal and generalized slowing - none    Interictal activity - none    Events - around 11am while in the bathroom rapid response was called.  EEG showed lower amplitude superimposed by lower range theta with no epileptiform changes (questionable hypoperfusion).    Seizures - none    Impression: VEEG as above    Plan - per neurology team

## 2025-04-22 NOTE — PROGRESS NOTE ADULT - SUBJECTIVE AND OBJECTIVE BOX
Pt interviewed, examined and EMR chart reviewed.    Patient is a 74-year-old male, , domiciled with wife, retired , with past medical history of necrotizing pancreatitis, hypertension, and hyperlipidemia, no reported psychiatric histroy, with substance use history of alochol (1 pint Quyen a day, no history of complicated withdrawals, no history of rehab stays), admitted for Alcoholic Ketoacidosis. Addiction Medicine consulted for assistance with alcohol withdrawal.     Upon today's evaluation, patient was laying in bed, on vEEG. He appears dysthymic, irritable, unwilling to engage in interview. Asked about his alcohol use, and he closes his eye. He eventually says "no" in asking if he wanted to speak. Attempted to gauge patient's understanding of his condition, and patient says "leave me alone."  Team provided education and resources about his options in reducing in alcohol consumption, like starting naltrexone. Patient did not answer.    REVIEW OF SYSTEMS: per HPI     MEDICATIONS  (STANDING):  chlorhexidine 2% Cloths 1 Application(s) Topical daily  dextrose 5%. 1000 milliLiter(s) (50 mL/Hr) IV Continuous <Continuous>  dextrose 5%. 1000 milliLiter(s) (100 mL/Hr) IV Continuous <Continuous>  dextrose 50% Injectable 25 Gram(s) IV Push once  dextrose 50% Injectable 12.5 Gram(s) IV Push once  dextrose 50% Injectable 25 Gram(s) IV Push once  folic acid 1 milliGRAM(s) Oral daily  glucagon  Injectable 1 milliGRAM(s) IntraMuscular once  insulin lispro (ADMELOG) corrective regimen sliding scale   SubCutaneous three times a day before meals  lactated ringers Bolus 1000 milliLiter(s) IV Bolus once  mirtazapine 7.5 milliGRAM(s) Oral at bedtime  octreotide  Infusion 25 MICROgram(s)/Hr (5 mL/Hr) IV Continuous <Continuous>  pantoprazole  Injectable 40 milliGRAM(s) IV Push two times a day  thiamine 100 milliGRAM(s) Oral daily    MEDICATIONS  (PRN):  acetaminophen     Tablet .. 650 milliGRAM(s) Oral every 6 hours PRN Temp greater or equal to 38C (100.4F), Mild Pain (1 - 3)  aluminum hydroxide/magnesium hydroxide/simethicone Suspension 30 milliLiter(s) Oral every 4 hours PRN Dyspepsia  dextrose Oral Gel 15 Gram(s) Oral once PRN Blood Glucose LESS THAN 70 milliGRAM(s)/deciliter  diazepam  Injectable 5 milliGRAM(s) IV Push every 2 hours PRN CIWA 8-14  melatonin 3 milliGRAM(s) Oral at bedtime PRN Insomnia  ondansetron Injectable 4 milliGRAM(s) IV Push every 8 hours PRN Nausea and/or Vomiting    Vital Signs Last 24 Hrs  T(C): 36.9 (21 Apr 2025 08:13), Max: 36.9 (21 Apr 2025 08:13)  T(F): 98.5 (21 Apr 2025 08:13), Max: 98.5 (21 Apr 2025 08:13)  HR: 99 (21 Apr 2025 08:13) (80 - 99)  BP: 104/64 (21 Apr 2025 08:13) (104/64 - 124/77)  BP(mean): --  RR: 17 (21 Apr 2025 08:13) (17 - 18)  SpO2: 99% (21 Apr 2025 08:13) (98% - 100%)    Parameters below as of 21 Apr 2025 08:13  Patient On (Oxygen Delivery Method): room air      PHYSICAL EXAM:    Constitutional: NAD, well-groomed, well-developed, on vEEG  Respiratory: no increased work of breathing  Cardiovascular: RRR  Neurological: A/O x 3    MENTAL STATUS EXAM:  Appearance: calm, in hospital attire   Appearance in relation to age: looks stated age      Hygiene/Grooming: fair grooming   Attitude toward examiner: not cooperative  Alertness: alert  Orientation: unable to determine  Posture: lying in bed  Gait: not evaluated  Behavior and psychomotor activity: normal  Mood: dysthymic  Affect: constricted range and reactivity      Speech: fluent and spontaneous; decreased volume and speech production  Perceptions: denies hallucinations  Thought process: unable to assess  Thought content: unable to assess  Impulse Control: wnl  Insight: limited  Judgment: poor        LABS:    Antibody Screen Interpretation (04.21.25 @ 11:53)   Antibody Screen: NEG  Type + Screen (04.21.25 @ 11:53)   ABO RH Interpretation: O POS  Lactate, Blood: 5.3: TYPE:(C=Critical, N=Notification, A=Abnormal) C   TESTS: _lactate   DATE/TIME CALLED: _04/21/2025 11:52:39 EDT   CALLED TO: _rachelle torres md   READ BACK (2 Patient Identifiers)(Y/N): _y   READ BACK VALUES (Y/N): _y   CALLED BY: _YD mmol/L (04.21.25 @ 11:29)   Comprehensive Metabolic Panel (04.21.25 @ 11:29)   Sodium: 134 mmol/L  Potassium: 4.1 mmol/L  Chloride: 103 mmol/L  Carbon Dioxide: 20 mmol/L  Anion Gap: 11 mmol/L  Blood Urea Nitrogen: 11 mg/dL  Creatinine: 0.7 mg/dL  Glucose: 227 mg/dL  Calcium: 7.7 mg/dL  Protein Total: 4.3 g/dL  Albumin: 2.5 g/dL  Bilirubin Total: 1.4 mg/dL  Alkaline Phosphatase: 161 U/L  Aspartate Aminotransferase (AST/SGOT): 72 U/L  Alanine Aminotransferase (ALT/SGPT): 29 U/L  eGFR: 96: The estimated glomerular filtration rate (eGFR) calculation is based on   the 2021 CKD-EPI creatinine equation, which is validated in male and   female population 18 years of age and older (N Engl J Med 2021;   [id number]). mL/min/1.73m2  Magnesium (04.21.25 @ 11:29)   Magnesium: 1.8 mg/dL  Complete Blood Count + Automated Diff (04.21.25 @ 11:29)   Auto NRBC: 0 /100 WBCs  WBC Count: 11.99 K/uL  RBC Count: 2.48 M/uL  Hemoglobin: 8.7 g/dL  Hematocrit: 25.0 %  Mean Cell Volume: 100.8 fL  Mean Cell Hemoglobin: 35.1 pg  Mean Cell Hemoglobin Conc: 34.8 g/dL  Red Cell Distrib Width: 13.2 %  Platelet Count - Automated: 201 K/uL  MPV: 11.0 fL  Neutrophil #: 6.88 K/uL  Lymphocyte #: 4.24 K/uL  Monocyte #: 0.73 K/uL  Eosinophil #: 0.04 K/uL  Basophil #: 0.06 K/uL  Neutrophil %: 57.4: Differential percentages must be correlated with absolute numbers for   clinical significance. %  Lymphocyte %: 35.4 %  Monocyte %: 6.1 %  Eosinophil %: 0.3 %  Basophil %: 0.5 %  Auto Immature Granulocyte %: 0.3: (Includes meta, myelo and promyelocytes). Mild elevations in immature   granulocytes may be seen with many inflammatory processes and pregnancy;   clinical correlation suggested. %  Urinalysis Basic - ( 18 Apr 2025 05:07 )    Color: x / Appearance: x / SG: x / pH: x  Gluc: 230 mg/dL / Ketone: x  / Bili: x / Urobili: x   Blood: x / Protein: x / Nitrite: x   Leuk Esterase: x / RBC: x / WBC x   Sq Epi: x / Non Sq Epi: x / Bacteria: x      Drug Screen Urine:  Alcohol Level        RADIOLOGY & ADDITIONAL STUDIES:  < from: CT Abdomen and Pelvis w/ IV Cont (04.16.25 @ 17:22) >  IMPRESSION:  Limited artifact degraded examination.  1.  Severe hepatic steatosis.  2.  Nonspecific distended gallbladder.  3.  Left adrenal gland 6 cm myelolipoma    < end of copied text >

## 2025-04-23 DIAGNOSIS — F32.9 MAJOR DEPRESSIVE DISORDER, SINGLE EPISODE, UNSPECIFIED: ICD-10-CM

## 2025-04-23 LAB
ALBUMIN SERPL ELPH-MCNC: 2.3 G/DL — LOW (ref 3.5–5.2)
ALP SERPL-CCNC: 165 U/L — HIGH (ref 30–115)
ALT FLD-CCNC: 30 U/L — SIGNIFICANT CHANGE UP (ref 0–41)
ANION GAP SERPL CALC-SCNC: 9 MMOL/L — SIGNIFICANT CHANGE UP (ref 7–14)
AST SERPL-CCNC: 92 U/L — HIGH (ref 0–41)
BASOPHILS # BLD AUTO: 0.07 K/UL — SIGNIFICANT CHANGE UP (ref 0–0.2)
BASOPHILS NFR BLD AUTO: 1.4 % — HIGH (ref 0–1)
BILIRUB SERPL-MCNC: 1.1 MG/DL — SIGNIFICANT CHANGE UP (ref 0.2–1.2)
BLD GP AB SCN SERPL QL: SIGNIFICANT CHANGE UP
BUN SERPL-MCNC: 12 MG/DL — SIGNIFICANT CHANGE UP (ref 10–20)
CALCIUM SERPL-MCNC: 8 MG/DL — LOW (ref 8.4–10.5)
CHLORIDE SERPL-SCNC: 104 MMOL/L — SIGNIFICANT CHANGE UP (ref 98–110)
CO2 SERPL-SCNC: 23 MMOL/L — SIGNIFICANT CHANGE UP (ref 17–32)
CREAT SERPL-MCNC: 0.7 MG/DL — SIGNIFICANT CHANGE UP (ref 0.7–1.5)
EGFR: 96 ML/MIN/1.73M2 — SIGNIFICANT CHANGE UP
EGFR: 96 ML/MIN/1.73M2 — SIGNIFICANT CHANGE UP
EOSINOPHIL # BLD AUTO: 0.08 K/UL — SIGNIFICANT CHANGE UP (ref 0–0.7)
EOSINOPHIL NFR BLD AUTO: 1.6 % — SIGNIFICANT CHANGE UP (ref 0–8)
GLUCOSE BLDC GLUCOMTR-MCNC: 155 MG/DL — HIGH (ref 70–99)
GLUCOSE BLDC GLUCOMTR-MCNC: 168 MG/DL — HIGH (ref 70–99)
GLUCOSE BLDC GLUCOMTR-MCNC: 217 MG/DL — HIGH (ref 70–99)
GLUCOSE BLDC GLUCOMTR-MCNC: 270 MG/DL — HIGH (ref 70–99)
GLUCOSE SERPL-MCNC: 181 MG/DL — HIGH (ref 70–99)
HCT VFR BLD CALC: 22.1 % — LOW (ref 42–52)
HCT VFR BLD CALC: 22.7 % — LOW (ref 42–52)
HGB BLD-MCNC: 7.6 G/DL — LOW (ref 14–18)
HGB BLD-MCNC: 7.7 G/DL — LOW (ref 14–18)
IMM GRANULOCYTES NFR BLD AUTO: 0.2 % — SIGNIFICANT CHANGE UP (ref 0.1–0.3)
LYMPHOCYTES # BLD AUTO: 1.51 K/UL — SIGNIFICANT CHANGE UP (ref 1.2–3.4)
LYMPHOCYTES # BLD AUTO: 30.1 % — SIGNIFICANT CHANGE UP (ref 20.5–51.1)
MAGNESIUM SERPL-MCNC: 1.7 MG/DL — LOW (ref 1.8–2.4)
MCHC RBC-ENTMCNC: 33.9 G/DL — SIGNIFICANT CHANGE UP (ref 32–37)
MCHC RBC-ENTMCNC: 34.1 PG — HIGH (ref 27–31)
MCHC RBC-ENTMCNC: 34.4 G/DL — SIGNIFICANT CHANGE UP (ref 32–37)
MCHC RBC-ENTMCNC: 34.5 PG — HIGH (ref 27–31)
MCV RBC AUTO: 100.4 FL — HIGH (ref 80–94)
MCV RBC AUTO: 100.5 FL — HIGH (ref 80–94)
MONOCYTES # BLD AUTO: 0.41 K/UL — SIGNIFICANT CHANGE UP (ref 0.1–0.6)
MONOCYTES NFR BLD AUTO: 8.2 % — SIGNIFICANT CHANGE UP (ref 1.7–9.3)
NEUTROPHILS # BLD AUTO: 2.93 K/UL — SIGNIFICANT CHANGE UP (ref 1.4–6.5)
NEUTROPHILS NFR BLD AUTO: 58.5 % — SIGNIFICANT CHANGE UP (ref 42.2–75.2)
NRBC BLD AUTO-RTO: 0 /100 WBCS — SIGNIFICANT CHANGE UP (ref 0–0)
NRBC BLD AUTO-RTO: 0 /100 WBCS — SIGNIFICANT CHANGE UP (ref 0–0)
PLATELET # BLD AUTO: 154 K/UL — SIGNIFICANT CHANGE UP (ref 130–400)
PLATELET # BLD AUTO: 184 K/UL — SIGNIFICANT CHANGE UP (ref 130–400)
PMV BLD: 10.6 FL — HIGH (ref 7.4–10.4)
PMV BLD: 11 FL — HIGH (ref 7.4–10.4)
POTASSIUM SERPL-MCNC: 4.1 MMOL/L — SIGNIFICANT CHANGE UP (ref 3.5–5)
POTASSIUM SERPL-SCNC: 4.1 MMOL/L — SIGNIFICANT CHANGE UP (ref 3.5–5)
PROT SERPL-MCNC: 4.4 G/DL — LOW (ref 6–8)
RBC # BLD: 2.2 M/UL — LOW (ref 4.7–6.1)
RBC # BLD: 2.26 M/UL — LOW (ref 4.7–6.1)
RBC # FLD: 13.4 % — SIGNIFICANT CHANGE UP (ref 11.5–14.5)
RBC # FLD: 13.9 % — SIGNIFICANT CHANGE UP (ref 11.5–14.5)
SODIUM SERPL-SCNC: 136 MMOL/L — SIGNIFICANT CHANGE UP (ref 135–146)
TROPONIN T, HIGH SENSITIVITY RESULT: 22 NG/L — HIGH (ref 6–21)
WBC # BLD: 5.01 K/UL — SIGNIFICANT CHANGE UP (ref 4.8–10.8)
WBC # BLD: 5.55 K/UL — SIGNIFICANT CHANGE UP (ref 4.8–10.8)
WBC # FLD AUTO: 5.01 K/UL — SIGNIFICANT CHANGE UP (ref 4.8–10.8)
WBC # FLD AUTO: 5.55 K/UL — SIGNIFICANT CHANGE UP (ref 4.8–10.8)

## 2025-04-23 PROCEDURE — 99233 SBSQ HOSP IP/OBS HIGH 50: CPT

## 2025-04-23 PROCEDURE — 90792 PSYCH DIAG EVAL W/MED SRVCS: CPT

## 2025-04-23 RX ORDER — MAGNESIUM SULFATE 500 MG/ML
2 SYRINGE (ML) INJECTION
Refills: 0 | Status: DISCONTINUED | OUTPATIENT
Start: 2025-04-23 | End: 2025-04-23

## 2025-04-23 RX ORDER — SODIUM CHLORIDE 9 G/1000ML
1000 INJECTION, SOLUTION INTRAVENOUS ONCE
Refills: 0 | Status: COMPLETED | OUTPATIENT
Start: 2025-04-23 | End: 2025-04-23

## 2025-04-23 RX ORDER — MAGNESIUM SULFATE 500 MG/ML
2 SYRINGE (ML) INJECTION
Refills: 0 | Status: COMPLETED | OUTPATIENT
Start: 2025-04-23 | End: 2025-04-23

## 2025-04-23 RX ORDER — SUCRALFATE 1 G
1 TABLET ORAL
Refills: 0 | Status: DISCONTINUED | OUTPATIENT
Start: 2025-04-23 | End: 2025-04-23

## 2025-04-23 RX ADMIN — POLYETHYLENE GLYCOL 3350 17 GRAM(S): 17 POWDER, FOR SOLUTION ORAL at 04:55

## 2025-04-23 RX ADMIN — POLYETHYLENE GLYCOL 3350 17 GRAM(S): 17 POWDER, FOR SOLUTION ORAL at 16:41

## 2025-04-23 RX ADMIN — SODIUM CHLORIDE 1000 MILLILITER(S): 9 INJECTION, SOLUTION INTRAVENOUS at 09:56

## 2025-04-23 RX ADMIN — Medication 1 GRAM(S): at 11:40

## 2025-04-23 RX ADMIN — Medication 40 MILLIGRAM(S): at 16:39

## 2025-04-23 RX ADMIN — INSULIN LISPRO 2: 100 INJECTION, SOLUTION INTRAVENOUS; SUBCUTANEOUS at 16:39

## 2025-04-23 RX ADMIN — FOLIC ACID 1 MILLIGRAM(S): 1 TABLET ORAL at 11:39

## 2025-04-23 RX ADMIN — Medication 25 GRAM(S): at 09:56

## 2025-04-23 RX ADMIN — Medication 1 APPLICATION(S): at 11:42

## 2025-04-23 RX ADMIN — Medication 25 GRAM(S): at 11:42

## 2025-04-23 RX ADMIN — Medication 40 MILLIGRAM(S): at 04:54

## 2025-04-23 RX ADMIN — Medication 102.5 MILLIGRAM(S): at 11:37

## 2025-04-23 RX ADMIN — MIRTAZAPINE 7.5 MILLIGRAM(S): 30 TABLET, FILM COATED ORAL at 21:08

## 2025-04-23 RX ADMIN — INSULIN LISPRO 3: 100 INJECTION, SOLUTION INTRAVENOUS; SUBCUTANEOUS at 11:40

## 2025-04-23 NOTE — BH CONSULTATION LIAISON ASSESSMENT NOTE - NSBHCONSULTPRIMARYDISCUSSYES_PSY_A_CORE FT
- Increase Mirtazipine to 15mg QHS - Increase Mirtazipine to 15mg QHS  - Collaborate with addiction team to provide outpatient resources for patient and wife - Increase Mirtazipine to 15mg QHS for mood and sleep   - Collaborate with addiction team to provide outpatient resources for patient and wife

## 2025-04-23 NOTE — PROGRESS NOTE ADULT - SUBJECTIVE AND OBJECTIVE BOX
Patient is a 74y old  Male who presents with a chief complaint of Fall (22 Apr 2025 17:49)        Over Night Events:    S/p EGD yesterday, showed clean based ulcer. Off oxygen. Off pressors.    ROS:     All pertinent ROS are negative except HPI         PHYSICAL EXAM    ICU Vital Signs Last 24 Hrs  T(C): 36.8 (23 Apr 2025 07:33), Max: 36.8 (22 Apr 2025 14:31)  T(F): 98.3 (23 Apr 2025 07:33), Max: 98.3 (22 Apr 2025 14:31)  HR: 72 (23 Apr 2025 07:33) (72 - 96)  BP: 124/71 (23 Apr 2025 07:33) (98/52 - 130/75)  BP(mean): 90 (23 Apr 2025 07:33) (89 - 98)  ABP: --  ABP(mean): --  RR: 18 (23 Apr 2025 07:33) (18 - 20)  SpO2: 95% (23 Apr 2025 07:33) (94% - 98%)    O2 Parameters below as of 22 Apr 2025 16:40  Patient On (Oxygen Delivery Method): room air            CONSTITUTIONAL:  NAD    ENT:   Airway patent,     EYES:   Pupils equal,   Round and reactive to light.    CARDIAC:   Normal rate,   Regular rhythm.        RESPIRATORY:   No wheezing  Bilateral BS  Normal chest expansion  Not tachypneic,  No use of accessory muscles    GASTROINTESTINAL:  Abdomen soft,   Non-tender,   No guarding,   + BS      MUSCULOSKELETAL:   No clubbing, cyanosis    NEUROLOGICAL:   Alert and oriented       SKIN:   Skin normal color for race,         04-22-25 @ 07:01  -  04-23-25 @ 07:00  --------------------------------------------------------  IN:    IV PiggyBack: 100 mL    Octreotide: 95 mL    Oral Fluid: 100 mL  Total IN: 295 mL    OUT:    Voided (mL): 350 mL  Total OUT: 350 mL    Total NET: -55 mL          LABS:                            7.7    5.01  )-----------( 154      ( 23 Apr 2025 05:50 )             22.7                                               04-23    136  |  104  |  12  ----------------------------<  181[H]  4.1   |  23  |  0.7    Ca    8.0[L]      23 Apr 2025 05:50  Phos  1.4     04-21  Mg     1.7     04-23    TPro  4.4[L]  /  Alb  2.3[L]  /  TBili  1.1  /  DBili  x   /  AST  92[H]  /  ALT  30  /  AlkPhos  165[H]  04-23      PT/INR - ( 21 Apr 2025 11:30 )   PT: 14.10 sec;   INR: 1.19 ratio         PTT - ( 21 Apr 2025 11:30 )  PTT:28.1 sec                                       Urinalysis Basic - ( 23 Apr 2025 05:50 )    Color: x / Appearance: x / SG: x / pH: x  Gluc: 181 mg/dL / Ketone: x  / Bili: x / Urobili: x   Blood: x / Protein: x / Nitrite: x   Leuk Esterase: x / RBC: x / WBC x   Sq Epi: x / Non Sq Epi: x / Bacteria: x                                                  LIVER FUNCTIONS - ( 23 Apr 2025 05:50 )  Alb: 2.3 g/dL / Pro: 4.4 g/dL / ALK PHOS: 165 U/L / ALT: 30 U/L / AST: 92 U/L / GGT: x                                                                                                                                   ABG - ( 21 Apr 2025 11:53 )  pH, Arterial: 7.55  pH, Blood: x     /  pCO2: 30    /  pO2: 86    / HCO3: 26    / Base Excess: 4.4   /  SaO2: 99.9                MEDICATIONS  (STANDING):  chlorhexidine 2% Cloths 1 Application(s) Topical daily  dextrose 5%. 1000 milliLiter(s) (50 mL/Hr) IV Continuous <Continuous>  dextrose 5%. 1000 milliLiter(s) (100 mL/Hr) IV Continuous <Continuous>  dextrose 50% Injectable 25 Gram(s) IV Push once  dextrose 50% Injectable 12.5 Gram(s) IV Push once  dextrose 50% Injectable 25 Gram(s) IV Push once  folic acid 1 milliGRAM(s) Oral daily  glucagon  Injectable 1 milliGRAM(s) IntraMuscular once  influenza  Vaccine (HIGH DOSE) 0.5 milliLiter(s) IntraMuscular once  insulin lispro (ADMELOG) corrective regimen sliding scale   SubCutaneous three times a day before meals  mirtazapine 7.5 milliGRAM(s) Oral at bedtime  pantoprazole  Injectable 40 milliGRAM(s) IV Push two times a day  polyethylene glycol 3350 17 Gram(s) Oral two times a day  thiamine IVPB 250 milliGRAM(s) IV Intermittent daily    MEDICATIONS  (PRN):  acetaminophen     Tablet .. 650 milliGRAM(s) Oral every 6 hours PRN Temp greater or equal to 38C (100.4F), Mild Pain (1 - 3)  aluminum hydroxide/magnesium hydroxide/simethicone Suspension 30 milliLiter(s) Oral every 4 hours PRN Dyspepsia  dextrose Oral Gel 15 Gram(s) Oral once PRN Blood Glucose LESS THAN 70 milliGRAM(s)/deciliter  diazepam  Injectable 5 milliGRAM(s) IV Push every 2 hours PRN CIWA 8-14  melatonin 3 milliGRAM(s) Oral at bedtime PRN Insomnia  ondansetron Injectable 4 milliGRAM(s) IV Push every 8 hours PRN Nausea and/or Vomiting      New X-rays reviewed:                                                                                  ECHO    CXR interpreted by me:       Patient is a 74y old  Male who presents with a chief complaint of Fall (22 Apr 2025 17:49)        Over Night Events:    S/p EGD yesterday, showed clean based ulcer. Off oxygen. Off pressors.          PHYSICAL EXAM    ICU Vital Signs Last 24 Hrs  T(C): 36.8 (23 Apr 2025 07:33), Max: 36.8 (22 Apr 2025 14:31)  T(F): 98.3 (23 Apr 2025 07:33), Max: 98.3 (22 Apr 2025 14:31)  HR: 72 (23 Apr 2025 07:33) (72 - 96)  BP: 124/71 (23 Apr 2025 07:33) (98/52 - 130/75)  BP(mean): 90 (23 Apr 2025 07:33) (89 - 98)    RR: 18 (23 Apr 2025 07:33) (18 - 20)  SpO2: 95% (23 Apr 2025 07:33) (94% - 98%)    O2 Parameters below as of 22 Apr 2025 16:40  Patient On (Oxygen Delivery Method): room air            CONSTITUTIONAL:  pale      CARDIAC:   Normal rate,   Regular rhythm.        RESPIRATORY:   No wheezing  Bilateral BS  Normal chest expansion  Not tachypneic,  No use of accessory muscles    GASTROINTESTINAL:  Abdomen soft,   Non-tender,   No guarding,   + BS      MUSCULOSKELETAL:   No clubbing, cyanosis    NEUROLOGICAL:   Alert and oriented               04-22-25 @ 07:01  -  04-23-25 @ 07:00  --------------------------------------------------------  IN:    IV PiggyBack: 100 mL    Octreotide: 95 mL    Oral Fluid: 100 mL  Total IN: 295 mL    OUT:    Voided (mL): 350 mL  Total OUT: 350 mL    Total NET: -55 mL          LABS:                            7.7    5.01  )-----------( 154      ( 23 Apr 2025 05:50 )             22.7                                               04-23    136  |  104  |  12  ----------------------------<  181[H]  4.1   |  23  |  0.7    Ca    8.0[L]      23 Apr 2025 05:50  Phos  1.4     04-21  Mg     1.7     04-23    TPro  4.4[L]  /  Alb  2.3[L]  /  TBili  1.1  /  DBili  x   /  AST  92[H]  /  ALT  30  /  AlkPhos  165[H]  04-23      PT/INR - ( 21 Apr 2025 11:30 )   PT: 14.10 sec;   INR: 1.19 ratio         PTT - ( 21 Apr 2025 11:30 )  PTT:28.1 sec                                       Urinalysis Basic - ( 23 Apr 2025 05:50 )    Color: x / Appearance: x / SG: x / pH: x  Gluc: 181 mg/dL / Ketone: x  / Bili: x / Urobili: x   Blood: x / Protein: x / Nitrite: x   Leuk Esterase: x / RBC: x / WBC x   Sq Epi: x / Non Sq Epi: x / Bacteria: x                                                  LIVER FUNCTIONS - ( 23 Apr 2025 05:50 )  Alb: 2.3 g/dL / Pro: 4.4 g/dL / ALK PHOS: 165 U/L / ALT: 30 U/L / AST: 92 U/L / GGT: x                                                                                                                                   ABG - ( 21 Apr 2025 11:53 )  pH, Arterial: 7.55  pH, Blood: x     /  pCO2: 30    /  pO2: 86    / HCO3: 26    / Base Excess: 4.4   /  SaO2: 99.9                MEDICATIONS  (STANDING):  chlorhexidine 2% Cloths 1 Application(s) Topical daily  dextrose 5%. 1000 milliLiter(s) (50 mL/Hr) IV Continuous <Continuous>  dextrose 5%. 1000 milliLiter(s) (100 mL/Hr) IV Continuous <Continuous>  dextrose 50% Injectable 25 Gram(s) IV Push once  dextrose 50% Injectable 12.5 Gram(s) IV Push once  dextrose 50% Injectable 25 Gram(s) IV Push once  folic acid 1 milliGRAM(s) Oral daily  glucagon  Injectable 1 milliGRAM(s) IntraMuscular once  influenza  Vaccine (HIGH DOSE) 0.5 milliLiter(s) IntraMuscular once  insulin lispro (ADMELOG) corrective regimen sliding scale   SubCutaneous three times a day before meals  mirtazapine 7.5 milliGRAM(s) Oral at bedtime  pantoprazole  Injectable 40 milliGRAM(s) IV Push two times a day  polyethylene glycol 3350 17 Gram(s) Oral two times a day  thiamine IVPB 250 milliGRAM(s) IV Intermittent daily    MEDICATIONS  (PRN):  acetaminophen     Tablet .. 650 milliGRAM(s) Oral every 6 hours PRN Temp greater or equal to 38C (100.4F), Mild Pain (1 - 3)  aluminum hydroxide/magnesium hydroxide/simethicone Suspension 30 milliLiter(s) Oral every 4 hours PRN Dyspepsia  dextrose Oral Gel 15 Gram(s) Oral once PRN Blood Glucose LESS THAN 70 milliGRAM(s)/deciliter  diazepam  Injectable 5 milliGRAM(s) IV Push every 2 hours PRN CIWA 8-14  melatonin 3 milliGRAM(s) Oral at bedtime PRN Insomnia  ondansetron Injectable 4 milliGRAM(s) IV Push every 8 hours PRN Nausea and/or Vomiting      New X-rays reviewed:                                                                                  ECHO    CXR interpreted by me:

## 2025-04-23 NOTE — DIETITIAN INITIAL EVALUATION ADULT - OTHER INFO
Patient is a 74-year-old male with past medical history of alcohol use disorder, history of necrotizing pancreatitis, hypertension, hyperlipidemia presenting to the ED accompanied by his wife for intoxication.    #Alcohol withdrawal wit history of alcohol use disorder  #Alcoholic and lactic Ketoacidosis, improved  #Transaminitis/Hepatic steatosis  #suspected folate and thiamine deficiency  #shaking episodes, r/o seizure   #Hypokalemia/hypomagnesemia   #BRBPR  - noted during RRT  - Hb downtrending since admission  -EGD: PPI BID, carafate, dc octreotide    UBW reported as 150 lbs - reporting weight loss due to alcohol use disorder  vs current dosing weight 72.6 kg (4/22/25)  no recent weight hx per EMR    RD spoke with RN in CEU patient tolerating full liquid diet well. Yesterday - last BM.

## 2025-04-23 NOTE — PROVIDER CONTACT NOTE (OTHER) - ASSESSMENT
PT assessed no reports of chest pain/discomfort or dizziness. VS /65 HR 85 temp 98.7F 02 98% RA. MD assessed pt.

## 2025-04-23 NOTE — BH CONSULTATION LIAISON ASSESSMENT NOTE - NSBHCHARTREVIEWVS_PSY_A_CORE FT
Vital Signs Last 24 Hrs  T(C): 36.8 (23 Apr 2025 07:33), Max: 36.8 (22 Apr 2025 14:31)  T(F): 98.3 (23 Apr 2025 07:33), Max: 98.3 (22 Apr 2025 14:31)  HR: 72 (23 Apr 2025 07:33) (72 - 96)  BP: 124/71 (23 Apr 2025 07:33) (98/52 - 130/75)  BP(mean): 90 (23 Apr 2025 07:33) (89 - 98)  RR: 18 (23 Apr 2025 07:33) (18 - 20)  SpO2: 95% (23 Apr 2025 07:33) (94% - 98%)    Parameters below as of 22 Apr 2025 16:40  Patient On (Oxygen Delivery Method): room air

## 2025-04-23 NOTE — DIETITIAN INITIAL EVALUATION ADULT - OTHER CALCULATIONS
Energy: 2178 - 2541 kcal/day (30 - 35 kcal/kg)  Protein:87 - 109 gm/day (1.2 - 1.5 gm/kg)  Fluid: 1 mL/kcal  estimated needs with consideration for age, BMI, acuity of illness, critical care setting, PCM

## 2025-04-23 NOTE — DIETITIAN INITIAL EVALUATION ADULT - ORAL INTAKE PTA/DIET HISTORY
Nutrition hx obtained from Patient's wife present at bedside. Patient with poor PO intake PTA for about ~2 months or so due to excessive alcohol intake. No chewing/swallowing difficulties reported. NKFA, has intolerance to acidic foods.

## 2025-04-23 NOTE — PROGRESS NOTE ADULT - SUBJECTIVE AND OBJECTIVE BOX
Patient is a 74y old  Male who presents with a chief complaint of Fall (23 Apr 2025 08:46)      INTERVAL HPI/OVERNIGHT EVENTS: received EGD yesterday    acetaminophen     Tablet .. 650 milliGRAM(s) Oral every 6 hours PRN  aluminum hydroxide/magnesium hydroxide/simethicone Suspension 30 milliLiter(s) Oral every 4 hours PRN  chlorhexidine 2% Cloths 1 Application(s) Topical daily  dextrose 50% Injectable 25 Gram(s) IV Push once  dextrose 50% Injectable 12.5 Gram(s) IV Push once  dextrose 50% Injectable 25 Gram(s) IV Push once  dextrose Oral Gel 15 Gram(s) Oral once PRN  diazepam  Injectable 5 milliGRAM(s) IV Push every 2 hours PRN  folic acid 1 milliGRAM(s) Oral daily  glucagon  Injectable 1 milliGRAM(s) IntraMuscular once  influenza  Vaccine (HIGH DOSE) 0.5 milliLiter(s) IntraMuscular once  insulin lispro (ADMELOG) corrective regimen sliding scale   SubCutaneous three times a day before meals  magnesium sulfate  IVPB 2 Gram(s) IV Intermittent every 2 hours  melatonin 3 milliGRAM(s) Oral at bedtime PRN  mirtazapine 7.5 milliGRAM(s) Oral at bedtime  ondansetron Injectable 4 milliGRAM(s) IV Push every 8 hours PRN  pantoprazole  Injectable 40 milliGRAM(s) IV Push two times a day  polyethylene glycol 3350 17 Gram(s) Oral two times a day  sucralfate suspension 1 Gram(s) Oral four times a day  thiamine IVPB 250 milliGRAM(s) IV Intermittent daily      REVIEW OF SYSTEMS:  patient non cooperative, unable to perform      T(C): 36.8 (04-23-25 @ 07:33), Max: 36.8 (04-22-25 @ 14:31)  HR: 72 (04-23-25 @ 07:33) (72 - 96)  BP: 124/71 (04-23-25 @ 07:33) (98/52 - 130/75)  RR: 18 (04-23-25 @ 07:33) (18 - 20)  SpO2: 95% (04-23-25 @ 07:33) (94% - 98%)  Wt(kg): --Vital Signs Last 24 Hrs  T(C): 36.8 (23 Apr 2025 07:33), Max: 36.8 (22 Apr 2025 14:31)  T(F): 98.3 (23 Apr 2025 07:33), Max: 98.3 (22 Apr 2025 14:31)  HR: 72 (23 Apr 2025 07:33) (72 - 96)  BP: 124/71 (23 Apr 2025 07:33) (98/52 - 130/75)  BP(mean): 90 (23 Apr 2025 07:33) (89 - 98)  RR: 18 (23 Apr 2025 07:33) (18 - 20)  SpO2: 95% (23 Apr 2025 07:33) (94% - 98%)    Parameters below as of 22 Apr 2025 16:40  Patient On (Oxygen Delivery Method): room air        PHYSICAL EXAM:  General: comfortable  Lungs: dec bs both bases  Cardiovascular: RYLEE 2.6  Abdomen: Soft, Positive BS  Extremities: No clubbing   Neurological: Non focal     Consultant(s) Notes Reviewed:  [x ] YES  [ ] NO  Care Discussed with Consultants/Other Providers [ x] YES  [ ] NO    LABS:                        7.7    5.01  )-----------( 154      ( 23 Apr 2025 05:50 )             22.7     04-23    136  |  104  |  12  ----------------------------<  181[H]  4.1   |  23  |  0.7    Ca    8.0[L]      23 Apr 2025 05:50  Phos  1.4     04-21  Mg     1.7     04-23    TPro  4.4[L]  /  Alb  2.3[L]  /  TBili  1.1  /  DBili  x   /  AST  92[H]  /  ALT  30  /  AlkPhos  165[H]  04-23    PT/INR - ( 21 Apr 2025 11:30 )   PT: 14.10 sec;   INR: 1.19 ratio         PTT - ( 21 Apr 2025 11:30 )  PTT:28.1 sec  Urinalysis Basic - ( 23 Apr 2025 05:50 )    Color: x / Appearance: x / SG: x / pH: x  Gluc: 181 mg/dL / Ketone: x  / Bili: x / Urobili: x   Blood: x / Protein: x / Nitrite: x   Leuk Esterase: x / RBC: x / WBC x   Sq Epi: x / Non Sq Epi: x / Bacteria: x      CAPILLARY BLOOD GLUCOSE      POCT Blood Glucose.: 168 mg/dL (23 Apr 2025 07:52)  POCT Blood Glucose.: 158 mg/dL (22 Apr 2025 14:33)  POCT Blood Glucose.: 156 mg/dL (22 Apr 2025 11:15)      ABG - ( 21 Apr 2025 11:53 )  pH, Arterial: 7.55  pH, Blood: x     /  pCO2: 30    /  pO2: 86    / HCO3: 26    / Base Excess: 4.4   /  SaO2: 99.9              Urinalysis Basic - ( 23 Apr 2025 05:50 )    Color: x / Appearance: x / SG: x / pH: x  Gluc: 181 mg/dL / Ketone: x  / Bili: x / Urobili: x   Blood: x / Protein: x / Nitrite: x   Leuk Esterase: x / RBC: x / WBC x   Sq Epi: x / Non Sq Epi: x / Bacteria: x          RADIOLOGY & ADDITIONAL TESTS:    Imaging Personally Reviewed:  [ ] YES  [ ] NO

## 2025-04-23 NOTE — DIETITIAN NUTRITION RISK NOTIFICATION - TREATMENT: THE FOLLOWING DIET HAS BEEN RECOMMENDED
Full Liquid diet   Add Glucerna shakes 2x/day to pending diet order    Patient with severe malnutrition in the context of chronic illness as evidenced by energy intake </=50% of estimated energy requirement for >/=1 month, severe loss of muscle (clavicles, temples, shoulders, calf) and severe loss of subcutaneous fat (buccal, triceps)

## 2025-04-23 NOTE — BH CONSULTATION LIAISON ASSESSMENT NOTE - RISK ASSESSMENT
Patient does have supportive wife but otherwise no other family or friends. Risks include daily alcohol use, falling Patient does have supportive wife but otherwise no other family or friends. Risks include chronic alcohol use, recurrent falls, limited engagement with care, social isolation. Patient does have supportive wife but otherwise no other family or friends. Risks include chronic alcohol use, recurrent falls, limited engagement with care, social isolation. Patient is at risk for self-neglect due to minimal engagement in his care. Increased supports will be necessary for patient safety. Patient does have supportive wife but otherwise no other family or friends. Risks include chronic alcohol use, recurrent falls, limited engagement with care, social isolation. He is at increased risk of self-harm due to chronic alcohol use.

## 2025-04-23 NOTE — DIETITIAN INITIAL EVALUATION ADULT - COLLABORATION WITH OTHER PROVIDERS
Interventions: meals and snacks, medical food supplements, coordination of care  Monitoring/Evaluation: energy intake, weight, labs, skin status, NFPF, BM, GI s/s

## 2025-04-23 NOTE — PROGRESS NOTE ADULT - ASSESSMENT
74-year-old male with past medical history of alcohol use disorder (Last use 4/16/25), history of necrotizing pancreatitis with WON s/p cystogastrostomy , hx of upper GI bleed sec to duodenal bulb ulcer (2022), hypertension, hyperlipidemia presenting to the ED accompanied by his wife for intoxication and a fall.  Wife states that the patient drinks approximately every day.  Patient has had a binge drinking episode for the past 4 days prior to presentation. Patient admitted to medicine on 4/16 for intoxication. This early am he had rapid response with seizure like activity. Today he had dark blood with clots with BM, hypotensive (RRT). GI consulted for further evaluation.     #Dark stool with clots likely sec to duodenal ulcer s/p EGD 4/23   #Acute Macrocytic anemia with low platelets  #AUD, elevated liver enzymes with alcoholic hepatitis (MDF 6.4)  #hx of duodenal ulcer bleed s/p EGD 2022  #history of necrotizing pancreatitis with WON s/p cystogastrostomy  - Hb on admission : 13.4>11.5 (4/18)>8.7 (4/21)  - s/p EGD 11/22: duodenal bulb healing ulcer  - s/p EGD 10/22: 3cm duo bulb ulcer s/p hemostasis  - s/p EGD 9/22: multiple ulcer in antrum, duo bulb, GE junc Ulcer (HP-)  - s/p colonoscopy 9/22: poor prep:   - 4/16: CTAP IC: distended GB, severe hepatic steatosis  - 4/17: RUQ US: CBD 6mm, hep steatosis  RRT 4.21 x2 for seizure like activity currently on vEEG, hypotensive briefly  - Not on AC, no cirrhosis on imaging  - s/p EGD 4/22: Impressions:  	Erythema in the antrum and stomach body compatible with non-erosive gastritis. (Biopsy).  	Hiatal Hernia.  	Erosions and ulceration in the duodenum compatible with duodenitis.  	A 5 mm clean based (Ra Class III) ulcer was noted in the duodenal sweep. No active bleeding seen in the examined portion of the duodenum.  	Normal mucosa in the whole esophagus.  - Discussed with wife regarding colonoscopy but she likes to hold off    RECS  - Advance diet as tolerated, await pathology results if H pylori start quadruple therapy  - Change PPI to PO 40mg BID, dc octreotide ,carafate  - CBC q8, keep above 7 , transfuse PRN  - MICU evaluation  - Mg, folate, thiamine supplementation  - If any hypotension , will recommend STAT CT angio AP to rule out active bleed  - Alcohol cessation advised  - Recall GI if any status change  - Follow up with our [facility] located at [address] [place] [address]. Phone Number: [phone]   74-year-old male with past medical history of alcohol use disorder (Last use 4/16/25), history of necrotizing pancreatitis with WON s/p cystogastrostomy , hx of upper GI bleed sec to duodenal bulb ulcer (2022), hypertension, hyperlipidemia presenting to the ED accompanied by his wife for intoxication and a fall.  Wife states that the patient drinks approximately every day.  Patient has had a binge drinking episode for the past 4 days prior to presentation. Patient admitted to medicine on 4/16 for intoxication. This early am he had rapid response with seizure like activity. Today he had dark blood with clots with BM, hypotensive (RRT). GI consulted for further evaluation.     #Dark stool with clots likely sec to duodenal ulcer s/p EGD 4/23   #Acute Macrocytic anemia with low platelets  #AUD, elevated liver enzymes with alcoholic hepatitis (MDF 6.4)  #hx of duodenal ulcer bleed s/p EGD 2022  #history of necrotizing pancreatitis with WON s/p cystogastrostomy  - Hb on admission : 13.4>11.5 (4/18)>8.7 (4/21)  - s/p EGD 11/22: duodenal bulb healing ulcer  - s/p EGD 10/22: 3cm duo bulb ulcer s/p hemostasis  - s/p EGD 9/22: multiple ulcer in antrum, duo bulb, GE junc Ulcer (HP-)  - s/p colonoscopy 9/22: poor prep:   - 4/16: CTAP IC: distended GB, severe hepatic steatosis  - 4/17: RUQ US: CBD 6mm, hep steatosis  RRT 4.21 x2 for seizure like activity currently on vEEG, hypotensive briefly  - Not on AC, no cirrhosis on imaging  - s/p EGD 4/22: Impressions:  	Erythema in the antrum and stomach body compatible with non-erosive gastritis. (Biopsy).  	Hiatal Hernia.  	Erosions and ulceration in the duodenum compatible with duodenitis.  	A 5 mm clean based (Ra Class III) ulcer was noted in the duodenal sweep. No active bleeding seen in the examined portion of the duodenum.  	Normal mucosa in the whole esophagus.  - Discussed with wife regarding colonoscopy but she likes to hold off    RECS  - Advance diet as tolerated, await pathology results if H pylori start quadruple therapy  - Change PPI to PO 40mg BID, dc octreotide ,carafate  - CBC q8, keep above 7 , transfuse PRN  - Mg, folate, thiamine supplementation  - If any hypotension , will recommend STAT CT angio AP to rule out active bleed  - Alcohol cessation advised  - Recall GI if any status change  - Follow up with our [facility] located at [address] [place] [address]. Phone Number: [phone]

## 2025-04-23 NOTE — DIETITIAN INITIAL EVALUATION ADULT - ORAL NUTRITION SUPPLEMENTS
ADD Ensure Plus HP 2x/day (350 kcal, 20 gm Protein each) to aid in optimizing kcal and protein intake  ADD Glucerna Shake 2x/day (220 kcal, 10 gm Protein each) to aid in optimizing kcal and protein intake

## 2025-04-23 NOTE — DIETITIAN INITIAL EVALUATION ADULT - LAB (SPECIFY)
4/23: H/H 7.7 / 22.7 (L), Glucose 181 (H), Ca 8.0 (L), Alk Phos 165 (H), AST/SGOT 92 (H), Mg 1.7 (L)  4/23: H/H 7.7 / 22.7 (L), Glucose 181 (H), Ca 8.0 (L), Alk Phos 165 (H), AST/SGOT 92 (H), Mg 1.7 (L)  POC Glucose 4/22 - 4/23: 156 - 270 (H)

## 2025-04-23 NOTE — PROGRESS NOTE ADULT - ATTENDING COMMENTS
Patient seen and examined during the GI rounds, case discussed with the GI fellow, plan communicated to the primary team, assessment, recommendations and plan as above.

## 2025-04-23 NOTE — BH CONSULTATION LIAISON ASSESSMENT NOTE - CURRENT MEDICATION
MEDICATIONS  (STANDING):  chlorhexidine 2% Cloths 1 Application(s) Topical daily  dextrose 50% Injectable 25 Gram(s) IV Push once  dextrose 50% Injectable 12.5 Gram(s) IV Push once  dextrose 50% Injectable 25 Gram(s) IV Push once  folic acid 1 milliGRAM(s) Oral daily  glucagon  Injectable 1 milliGRAM(s) IntraMuscular once  influenza  Vaccine (HIGH DOSE) 0.5 milliLiter(s) IntraMuscular once  insulin lispro (ADMELOG) corrective regimen sliding scale   SubCutaneous three times a day before meals  magnesium sulfate  IVPB 2 Gram(s) IV Intermittent every 2 hours  mirtazapine 7.5 milliGRAM(s) Oral at bedtime  pantoprazole  Injectable 40 milliGRAM(s) IV Push two times a day  polyethylene glycol 3350 17 Gram(s) Oral two times a day  sucralfate suspension 1 Gram(s) Oral four times a day  thiamine IVPB 250 milliGRAM(s) IV Intermittent daily    MEDICATIONS  (PRN):  acetaminophen     Tablet .. 650 milliGRAM(s) Oral every 6 hours PRN Temp greater or equal to 38C (100.4F), Mild Pain (1 - 3)  aluminum hydroxide/magnesium hydroxide/simethicone Suspension 30 milliLiter(s) Oral every 4 hours PRN Dyspepsia  dextrose Oral Gel 15 Gram(s) Oral once PRN Blood Glucose LESS THAN 70 milliGRAM(s)/deciliter  diazepam  Injectable 5 milliGRAM(s) IV Push every 2 hours PRN CIWA 8-14  melatonin 3 milliGRAM(s) Oral at bedtime PRN Insomnia  ondansetron Injectable 4 milliGRAM(s) IV Push every 8 hours PRN Nausea and/or Vomiting

## 2025-04-23 NOTE — BH CONSULTATION LIAISON ASSESSMENT NOTE - NSICDXBHSECONDARYDX_PSY_ALL_CORE
Alcohol dependence with withdrawal   F10.239  Wernicke's encephalopathy   E51.2  Major depression   F32.9   Alcohol dependence with withdrawal   F10.239  Wernicke's encephalopathy   E51.2

## 2025-04-23 NOTE — BH CONSULTATION LIAISON ASSESSMENT NOTE - NSBHCONSULTFOLLOWAFTERCARE_PSY_A_CORE FT
- patient to engage in outpatient alcohol treatment  - resources for outpatient psychiatric services to be provided

## 2025-04-23 NOTE — DIETITIAN INITIAL EVALUATION ADULT - PERTINENT LABORATORY DATA
04-23    136  |  104  |  12  ----------------------------<  181[H]  4.1   |  23  |  0.7    Ca    8.0[L]      23 Apr 2025 05:50  Mg     1.7     04-23    TPro  4.4[L]  /  Alb  2.3[L]  /  TBili  1.1  /  DBili  x   /  AST  92[H]  /  ALT  30  /  AlkPhos  165[H]  04-23  POCT Blood Glucose.: 270 mg/dL (04-23-25 @ 11:29)  A1C with Estimated Average Glucose Result: 5.9 % (04-19-25 @ 07:03)

## 2025-04-23 NOTE — PROGRESS NOTE ADULT - SUBJECTIVE AND OBJECTIVE BOX
Gastroenterology progress note:     Patient is a 74y old  Male who presents with a chief complaint of Fall (23 Apr 2025 09:56)       Admitted on: 04-16-25    We are following the patient for: bleed       Interval History:s/p EGD with duodenal ulcer bleed    No acute events overnight.     PAST MEDICAL & SURGICAL HISTORY:  HTN (hypertension)      High cholesterol      Gastric ulcer          MEDICATIONS  (STANDING):  chlorhexidine 2% Cloths 1 Application(s) Topical daily  dextrose 50% Injectable 25 Gram(s) IV Push once  dextrose 50% Injectable 12.5 Gram(s) IV Push once  dextrose 50% Injectable 25 Gram(s) IV Push once  folic acid 1 milliGRAM(s) Oral daily  glucagon  Injectable 1 milliGRAM(s) IntraMuscular once  influenza  Vaccine (HIGH DOSE) 0.5 milliLiter(s) IntraMuscular once  insulin lispro (ADMELOG) corrective regimen sliding scale   SubCutaneous three times a day before meals  mirtazapine 7.5 milliGRAM(s) Oral at bedtime  pantoprazole  Injectable 40 milliGRAM(s) IV Push two times a day  polyethylene glycol 3350 17 Gram(s) Oral two times a day  sucralfate suspension 1 Gram(s) Oral four times a day  thiamine IVPB 250 milliGRAM(s) IV Intermittent daily    MEDICATIONS  (PRN):  acetaminophen     Tablet .. 650 milliGRAM(s) Oral every 6 hours PRN Temp greater or equal to 38C (100.4F), Mild Pain (1 - 3)  aluminum hydroxide/magnesium hydroxide/simethicone Suspension 30 milliLiter(s) Oral every 4 hours PRN Dyspepsia  dextrose Oral Gel 15 Gram(s) Oral once PRN Blood Glucose LESS THAN 70 milliGRAM(s)/deciliter  diazepam  Injectable 5 milliGRAM(s) IV Push every 2 hours PRN CIWA 8-14  melatonin 3 milliGRAM(s) Oral at bedtime PRN Insomnia  ondansetron Injectable 4 milliGRAM(s) IV Push every 8 hours PRN Nausea and/or Vomiting      Allergies  No Known Allergies      Review of Systems:   Cardiovascular:  No Chest Pain, No Palpitations  Respiratory:  No Cough, No Dyspnea  Gastrointestinal:  As described in HPI  Skin:  No Skin Lesions, No Jaundice  Neuro:  No Syncope, No Dizziness    Physical Examination:  T(C): 36.8 (04-23-25 @ 11:48), Max: 36.8 (04-22-25 @ 14:31)  HR: 83 (04-23-25 @ 11:48) (72 - 96)  BP: 134/80 (04-23-25 @ 11:48) (98/52 - 134/80)  RR: 18 (04-23-25 @ 11:48) (18 - 20)  SpO2: 98% (04-23-25 @ 11:48) (94% - 98%)  Weight (kg): 72.6 (04-22-25 @ 15:59)    04-22-25 @ 07:01  -  04-23-25 @ 07:00  --------------------------------------------------------  IN: 295 mL / OUT: 350 mL / NET: -55 mL        GENERAL: AAOx2  HEAD:  Atraumatic, Normocephalic  EYES: conjunctiva and sclera clear  NECK: Supple, no JVD or thyromegaly  CHEST/LUNG: Clear to auscultation bilaterally;  HEART: Regular rate and rhythm; normal S1, S2  ABDOMEN: Soft, nontender, nondistended; Bowel sounds present  NEUROLOGY: No asterixis or tremor.   SKIN: Intact, no jaundice     Data:                        7.7    5.01  )-----------( 154      ( 23 Apr 2025 05:50 )             22.7     Hgb trend:  7.7  04-23-25 @ 05:50  8.0  04-22-25 @ 04:46  8.1  04-21-25 @ 20:38  8.3  04-21-25 @ 14:09  8.7  04-21-25 @ 11:29        04-23    136  |  104  |  12  ----------------------------<  181[H]  4.1   |  23  |  0.7    Ca    8.0[L]      23 Apr 2025 05:50  Mg     1.7     04-23    TPro  4.4[L]  /  Alb  2.3[L]  /  TBili  1.1  /  DBili  x   /  AST  92[H]  /  ALT  30  /  AlkPhos  165[H]  04-23    Liver panel trend:  TBili 1.1   /   AST 92   /   ALT 30   /   AlkP 165   /   Tptn 4.4   /   Alb 2.3    /   DBili --      04-23  TBili 1.4   /      /   ALT 32   /   AlkP 152   /   Tptn 4.3   /   Alb 2.3    /   DBili --      04-22  TBili 1.4   /   AST 72   /   ALT 29   /   AlkP 161   /   Tptn 4.3   /   Alb 2.5    /   DBili --      04-21  TBili 1.6   /   AST 69   /   ALT 29   /   AlkP 165   /   Tptn 4.4   /   Alb 2.4    /   DBili --      04-21  TBili 2.2   /      /   ALT 41   /   AlkP 175   /   Tptn 4.8   /   Alb 2.6    /   DBili --      04-19  TBili 2.5   /      /   ALT 50   /   AlkP 196   /   Tptn 5.4   /   Alb 2.8    /   DBili --      04-18  TBili 1.5   /      /   ALT 58   /   AlkP 192   /   Tptn 5.6   /   Alb 3.1    /   DBili --      04-17  TBili 1.6   /      /   ALT 75   /   AlkP 224   /   Tptn 6.1   /   Alb 3.5    /   DBili --      04-16

## 2025-04-23 NOTE — BH CONSULTATION LIAISON ASSESSMENT NOTE - SUMMARY
A 74-year-old male with a history of alcohol use disorder, necrotizing pancreatitis, hypertension, and hyperlipidemia presented to the ED with his wife for intoxication after a 4-day binge. He fell and was unable to ambulate after the fall, which his wife reports is common. The patient was uncooperative and largely unresponsive during the examination, though he demonstrated minimal responsiveness to some commands. His wife provided collateral history, noting his increased alcohol consumption since [facility] and describing his current behavior as typical when intoxicated. Although currently unwilling to engage, the nursing staff reports he is capable of feeding himself, ambulation, and following commands when he chooses.   A 74-year-old male with a history of alcohol use disorder, necrotizing pancreatitis, hypertension, and hyperlipidemia presented to the ED with his wife for intoxication after a 4-day binge. He fell and was unable to ambulate after the fall, which his wife reports is common. The patient was uncooperative and largely unresponsive during the examination, though he demonstrated minimal responsiveness to some commands. His wife provided collateral history, noting his increased alcohol consumption since detention and describing his current behavior as typical when intoxicated. Although currently unwilling to engage, the nursing staff reports he is capable of feeding himself, ambulation, and following commands when he chooses.    The patient demonstrates decreased functional engagement with concern for self neglect. Patient is a 74-year-old male with past medical history of alcohol use disorder, history of necrotizing pancreatitis, hypertension, hyperlipidemia presenting to the ED accompanied by his wife for intoxication.  Wife states that the patient drinks approximately every day.  Patient has had a binge drinking episode for the past 4 days.  Today patient fell and was unable to ambulate after the fall.  Wife states this happens often.  Wife states she is concerned and wanted to get him checked out.  Patient is not contributing to history at this time.  Wife states patient is acting at baseline when intoxicated. Psychiatry was consulted due to depression/alcohol abuse.     The patient was uncooperative and largely unresponsive during the examination, though he demonstrated minimal responsiveness to some commands. His wife provided collateral history, noting his increased alcohol consumption since halfway and describing his current behavior as typical when intoxicated. Alcohol abuse appears to be the main  for his mood symptoms. IPP admission is unlikely to provide benefit to patient; outpatient chemical dependancy treatment would best support patient at this time. Mirtazepine can provide support for depressive symptoms and can also be managed outpatient. Will work to optimize regimen while he is admitted here.

## 2025-04-23 NOTE — BH CONSULTATION LIAISON ASSESSMENT NOTE - NSBHATTESTAPPAMEND_PSY_A_CORE
I have personally seen and examined this patient. I fully participated in the care of this patient. I have made amendments to the documentation where appropriate and otherwise agree with the history, physical exam, and plan as documented by the KRYSTINA

## 2025-04-23 NOTE — DIETITIAN INITIAL EVALUATION ADULT - PERTINENT MEDS FT
MEDICATIONS  (STANDING):  chlorhexidine 2% Cloths 1 Application(s) Topical daily  dextrose 50% Injectable 25 Gram(s) IV Push once  dextrose 50% Injectable 12.5 Gram(s) IV Push once  dextrose 50% Injectable 25 Gram(s) IV Push once  folic acid 1 milliGRAM(s) Oral daily  glucagon  Injectable 1 milliGRAM(s) IntraMuscular once  influenza  Vaccine (HIGH DOSE) 0.5 milliLiter(s) IntraMuscular once  insulin lispro (ADMELOG) corrective regimen sliding scale   SubCutaneous three times a day before meals  mirtazapine 7.5 milliGRAM(s) Oral at bedtime  pantoprazole  Injectable 40 milliGRAM(s) IV Push two times a day  polyethylene glycol 3350 17 Gram(s) Oral two times a day  sucralfate suspension 1 Gram(s) Oral four times a day  thiamine IVPB 250 milliGRAM(s) IV Intermittent daily    MEDICATIONS  (PRN):  acetaminophen     Tablet .. 650 milliGRAM(s) Oral every 6 hours PRN Temp greater or equal to 38C (100.4F), Mild Pain (1 - 3)  aluminum hydroxide/magnesium hydroxide/simethicone Suspension 30 milliLiter(s) Oral every 4 hours PRN Dyspepsia  dextrose Oral Gel 15 Gram(s) Oral once PRN Blood Glucose LESS THAN 70 milliGRAM(s)/deciliter  diazepam  Injectable 5 milliGRAM(s) IV Push every 2 hours PRN CIWA 8-14  melatonin 3 milliGRAM(s) Oral at bedtime PRN Insomnia  ondansetron Injectable 4 milliGRAM(s) IV Push every 8 hours PRN Nausea and/or Vomiting

## 2025-04-23 NOTE — DIETITIAN INITIAL EVALUATION ADULT - ADD RECOMMEND
1) Continue current diet order - mon diet advancement  2) Monitor PO intake   3) Monitor electrolytes, BG, renal profile  4) Monitor BM, GI s/s  1) Continue current diet order  -ADD Consistent CHO modifier due to elevated BG   - mon diet advancement  2) Monitor PO intake   3) Monitor electrolytes, BG, renal profile  4) Monitor BM, GI s/s

## 2025-04-23 NOTE — PROGRESS NOTE ADULT - ASSESSMENT
4-year-old male with past medical history of alcohol use disorder, history of necrotizing pancreatitis, hypertension, hyperlipidemia presenting to the ED accompanied by his wife for intoxication. He was on CIWA and valium PRN, s/p RRT for fall while attempting to go to the bathroom with worsening confusion, with shaking episodes. Another RRT called for similar shaking while pt on toilet. Patient was initially altered and regained consciousness within minutes. PAtient was found to have BRBPR at that time, started on an octreotide drip, protonix 40 BID aand upgraded to SDU    #Alcohol withdrawal wit history of alcohol use disorder  #Alcoholic and lactic Ketoacidosis, improved  #Transaminitis/Hepatic steatosis  #suspected folate and thiamine deficiency  #shaking episodes, r/o seizure   #Hypokalemia/hypomagnesemia   - currently calm, mental status at baseline  -  VEEG negative  - c/w CIWA monitoring, valium PRN  - addiction med consulted, concern for wernickes, recs  IV thiamine 500 mg q 8h for 3 days, followed by 250 mg IV daily up to additional 5 days   - catch team following  - monitor a replete electrolytes  - neuro fu post EEG  - repeat orhtostats after 1L LR bolus george stocking and abdominal binder.     #BRBPR  - noted during RRT  - Hb downtrending since admission  - did not receive PRBC  - was planned for EGD/colono today but refused to drink prep  - now planned for EGD only  - monitor CBC, keep active T&S, transfuse if <7  - EGD: clean base ulcer - GI recs noted PPI BID, carafate, dc octreotide     #Fall likely due to intoxication and orthostasis   - repeat orthostatics after procedure  - check echo  - PT eval   - patient will need psychiatry and naltrexone upon discharge, down grade to floor,       DVT: holding for possible bleed  Gi ppx: protonix  diet: full liquid  Code: full    pending: gi f/u, valium prn possible alohol withdrawal, 11am trops,      4-year-old male with past medical history of alcohol use disorder, history of necrotizing pancreatitis, hypertension, hyperlipidemia presenting to the ED accompanied by his wife for intoxication. He was on CIWA and valium PRN, s/p RRT for fall while attempting to go to the bathroom with worsening confusion, with shaking episodes. Another RRT called for similar shaking while pt on toilet. Patient was initially altered and regained consciousness within minutes. PAtient was found to have BRBPR at that time, started on an octreotide drip, protonix 40 BID aand upgraded to SDU    #Alcohol withdrawal wit history of alcohol use disorder  #Alcoholic and lactic Ketoacidosis, improved  #Transaminitis/Hepatic steatosis  #suspected folate and thiamine deficiency  #shaking episodes, r/o seizure   #Hypokalemia/hypomagnesemia   - currently calm, mental status at baseline  -  VEEG negative  - c/w CIWA monitoring, valium PRN  - addiction med consulted, concern for wernickes, recs  IV thiamine 500 mg q 8h for 3 days, followed by 250 mg IV daily up to additional 5 days   - catch team following  - monitor a replete electrolytes  - neuro fu post EEG  - repeat orhtostats after 1L LR bolus george stocking and abdominal binder.     #BRBPR  - noted during RRT  - Hb downtrending since admission  - did not receive PRBC  - was planned for EGD/colono today but refused to drink prep  - now planned for EGD only  - monitor CBC, keep active T&S, transfuse if <7  - EGD: clean base ulcer - GI recs noted PPI BID, carafate, dc octreotide     #Fall likely due to intoxication and orthostasis   - repeat orthostatics after procedure  - check echo  - PT eval   - patient will need psychiatry and naltrexone upon discharge, down grade to floor,       DVT: holding for possible bleed  Gi ppx: protonix  diet: full liquid  Code: full    pending: gi f/u, valium prn possible alcohol withdrawal, 11am trops,

## 2025-04-23 NOTE — BH CONSULTATION LIAISON ASSESSMENT NOTE - HPI (INCLUDE ILLNESS QUALITY, SEVERITY, DURATION, TIMING, CONTEXT, MODIFYING FACTORS, ASSOCIATED SIGNS AND SYMPTOMS)
Patient is a 74-year-old male with past medical history of alcohol use disorder, history of necrotizing pancreatitis, hypertension, hyperlipidemia presenting to the ED accompanied by his wife for intoxication.  Wife states that the patient drinks approximately every day.  Patient has had a binge drinking episode for the past 4 days.  Today patient fell and was unable to ambulate after the fall.  Wife states this happens often.  Wife states she is concerned and wanted to get him checked out.  Patient is not contributing to history at this time.  Wife states patient is acting at baseline when intoxicated.    Patient was seen at bedside with wife present. Patient is primarily Lao Speaking, understands some English per wife. Lao  #[id number] utilized. Pt unwilling to engage in interview. When asked how his day was or how he was feeling, he remained laying in bed with his eyes closed. When asked via  services to give us a thumbs up or thumbs down he did not engage and kept his eyes shut. When asked if he felt safe to open his eyes, he did open his eyes once in that moment, but quickly closed his eyes afterwards. He also was able to grab my hands lightly on command, but did not stretch his legs out when asked to.    Wife was at bedside and was able to provide collateral information, she declined  services. Per wife, she states her  did not sleep well last night and he is "being stubborn". She notes that he does not engage with her at home when he is in a "bad mood".        Patient is a 74-year-old male with past medical history of alcohol use disorder, history of necrotizing pancreatitis, hypertension, hyperlipidemia presenting to the ED accompanied by his wife for intoxication.  Wife states that the patient drinks approximately every day.  Patient has had a binge drinking episode for the past 4 days.  Today patient fell and was unable to ambulate after the fall.  Wife states this happens often.  Wife states she is concerned and wanted to get him checked out.  Patient is not contributing to history at this time.  Wife states patient is acting at baseline when intoxicated.    Patient was seen at bedside with wife present. Patient is primarily Argentine Speaking, understands some English per wife. Argentine  #[id number] utilized. Pt unwilling to engage, non verbal in interview. When asked how his day was or how he was feeling, he remained laying in bed with his eyes closed and did not verbally answer any questions. When asked via  services to give us a thumbs up or thumbs down he did not engage and kept his eyes shut. When asked if he felt safe to open his eyes, he did open his eyes once in that moment, but quickly closed his eyes afterwards. He also was able to grab my hands lightly on command, but did not stretch his legs out when asked to.    Wife was at bedside and was able to provide collateral information, she declined  services. Per wife, she states her  did not sleep well last night and he is "being stubborn". She notes that he does not engage with her at home when he is in a "bad mood" but reports that he will ask her for things only when he needs them. She reports that he has been drinking alcohol often for over 10 years but has been drinking more heavily for the last year since he retired.    Per staff nurse, patient is able to feed himself and ambulate, and follow commands when he feels like it.               Patient is a 74-year-old male with past medical history of alcohol use disorder, history of necrotizing pancreatitis, hypertension, hyperlipidemia presenting to the ED accompanied by his wife for intoxication.  Wife states that the patient drinks approximately every day.  Patient has had a binge drinking episode for the past 4 days.  Today patient fell and was unable to ambulate after the fall.  Wife states this happens often.  Wife states she is concerned and wanted to get him checked out.  Patient is not contributing to history at this time.  Wife states patient is acting at baseline when intoxicated.    Patient was seen at bedside with wife present. Patient is primarily Chilean Speaking, understands some English per wife. Chilean  #[id number] utilized. Pt unwilling to engage, non verbal in interview. When asked how his day was or how he was feeling, he remained laying in bed with his eyes closed and did not verbally answer any questions. When asked via  services to give us a thumbs up or thumbs down he did not engage and kept his eyes shut. When asked if he felt safe to open his eyes, he did open his eyes once in that moment, but quickly closed his eyes afterwards. He also was able to grab my hands lightly on command, but did not stretch his legs out when asked to.    Wife was at bedside and was able to provide collateral information, she declined  services. Per wife, she states her  did not sleep well last night and he is "being stubborn". She notes that he does not engage with her at home when he is in a "bad mood" but reports that he will ask her for things only when he needs them. She reports that he has been drinking alcohol often for over 10 years but has been drinking more heavily for the last year since he retired. She states that he has been socially isolated and only leaves their home to go to the liquor store.    Per staff nurse, patient is able to feed himself and ambulate, and follow commands when he feels like it.               Patient is a 74-year-old male with past medical history of alcohol use disorder, history of necrotizing pancreatitis, hypertension, hyperlipidemia presenting to the ED accompanied by his wife for intoxication.  Wife states that the patient drinks approximately every day.  Patient has had a binge drinking episode for the past 4 days.  Today patient fell and was unable to ambulate after the fall.  Wife states this happens often.  Wife states she is concerned and wanted to get him checked out.  Patient is not contributing to history at this time.  Wife states patient is acting at baseline when intoxicated. Psychiatry was consulted due to depression/alcohol abuse.     Patient was seen at bedside with wife present. Patient is primarily Ghanaian Speaking, understands some English per wife. Ghanaian  #[id number] utilized. Pt unwilling to engage, non verbal in interview. When asked how his day was or how he was feeling, he remained laying in bed with his eyes closed and did not verbally answer any questions. When asked via  services to give us a thumbs up or thumbs down he did not engage and kept his eyes shut. When asked if he felt safe to open and close his eyes, he did open his eyes once in that moment, but quickly closed his eyes afterwards. He also was able to grab my hands lightly on command, but did not stretch his legs out when asked to.    Wife was at bedside and was able to provide collateral information, she declined  services. Per wife, she states her  did not sleep well last night and he is "being stubborn". She notes that he does not engage with her at home when he is in a "bad mood" but reports that he will ask her for things only when he needs them. She reports that he has been drinking alcohol often for over 10 years but has been drinking more heavily for the last year since he retired. She states that he has been socially isolated and only leaves their home to go to the liquor store.    Per staff nurse, patient is able to feed himself and ambulate, and follow commands when he feels like it.

## 2025-04-23 NOTE — PROGRESS NOTE ADULT - ATTENDING COMMENTS
74-year-old male with past medical history of alcohol use disorder, history of necrotizing pancreatitis, hypertension, hyperlipidemia presenting to the ED accompanied by his wife for intoxication. He was on CIWA and valium PRN, s/p RRT for fall while attempting to go to the bathroom with worsening confusion, with shaking episodes. Another RRT called for similar shaking while pt on toilet. Patient was initially altered and regained consciousness within minutes. PAtient was found to have BRBPR at that time, started on an octreotide drip, protonix 40 BID aand upgraded to SDU  Patient seen and examined independently with wife at bedside, pt is very weak, depressed, denies any specific complaints, not in pain, has very poor appetite and long lasting addiction to alcohol.     Physical exam unrevealing     # Alcohol withdrawal / history of alcohol use disorder/ Alcoholic and lactic Ketoacidosis/ Transaminitis/Hepatic steatosis/ suspected folate and thiamine deficiency  - shaking episodes, no seizure activity noted on VEEG , neurology is following   - c/w CIWA monitoring, valium PRN  - addiction med consulted, concern for wernickes, recs  IV thiamine 500 mg q 8h for 3 days, followed by 250 mg IV daily up to additional 5 days   - catch team following  - monitor a replete electrolytes    # depression  - psychiatry consulted, f/u recommendations     # BRBPR/ acute blood loss anemia/ PUD   - noted during RRT  - monitor H/H, keep Hb above 7.0   - GI is following, recommendations noted   - s/p EGD : was found to have PUD  - f/u biopsy report   - d/c Octreotide  - c/w IV PPIs , Carafate 1 gm Q 6 hours   - advance diet as tolerated       # Fall likely due to intoxication and orthostasis   - repeat orthostatics VS  - BRYANNA stockings or ACE wrap on LE, give fluids   - maintain fluid balance     # GI/DVT prophylaxis     #Progress Note Handoff  Pending (specify): d/c Octreotide, advance diet as tolerated, monitor Hb level, keep Hb above 7.0, increase Mirtazapine dose, give fluids, check orthostatic VS if positive put ACE wrap in LE, PT/rehab eval, monitor electrolytes, c/w Vits   Family discussion: I spoke with pt and his wife   Disposition: DGTF

## 2025-04-23 NOTE — BH CONSULTATION LIAISON ASSESSMENT NOTE - NSBHATTESTCOMMENTATTENDFT_PSY_A_CORE
Patient was seen at bedside with wife. He was largely uncooperative with interview. No concerns that patient would intentionally harm himself per his wife. Patient primarily needs outpatient follow up. Symptoms are largely driven by his alcohol use disorder and already followed by [facility] for community follow up. Agree with plan to increase mirtazapine however, psychologically--patient also needs to buy into treatment and put in effort to improve mood symptoms.

## 2025-04-23 NOTE — PROGRESS NOTE ADULT - ASSESSMENT
IMPRESSION:    Alchocol intoxication  Acute blood loss/ Anemia  GI bleed s/p EGD with clean based ulcer  Lactic acidosis resolved  HO alcohol abuse disorder  HO pancreatitis      PLAN:    CNS: thiamine, folic acid. EEG no seizures. neurology appreciated, CIWA. No extra valium given over past 24 hours    HEENT:  Oral care    PULMONARY: HOB @ 45 degrees, on RA aspiration precaution, on RA    CARDIOVASCULAR: Keep euvolemic, echo reviewed. Check troponin    GI: PPI per GI. Feeding per GI    RENAL:  F/u  lytes.  Correct as needed. accurate I/O    INFECTIOUS DISEASE: procal, pancx. Monitor off abx.    HEMATOLOGICAL:  DVT prophylaxis. LE doppler, trend cbc, transfuse as needed    ENDOCRINE:  Follow up FS.  Insulin protocol if needed.    DGTF  GO

## 2025-04-24 LAB
ALBUMIN SERPL ELPH-MCNC: 2.3 G/DL — LOW (ref 3.5–5.2)
ALP SERPL-CCNC: 148 U/L — HIGH (ref 30–115)
ALT FLD-CCNC: 25 U/L — SIGNIFICANT CHANGE UP (ref 0–41)
ANION GAP SERPL CALC-SCNC: 7 MMOL/L — SIGNIFICANT CHANGE UP (ref 7–14)
ANION GAP SERPL CALC-SCNC: 9 MMOL/L — SIGNIFICANT CHANGE UP (ref 7–14)
APTT BLD: 27.2 SEC — SIGNIFICANT CHANGE UP (ref 27–39.2)
AST SERPL-CCNC: 66 U/L — HIGH (ref 0–41)
BASOPHILS # BLD AUTO: 0.13 K/UL — SIGNIFICANT CHANGE UP (ref 0–0.2)
BASOPHILS NFR BLD AUTO: 2.6 % — HIGH (ref 0–1)
BILIRUB SERPL-MCNC: 1.8 MG/DL — HIGH (ref 0.2–1.2)
BUN SERPL-MCNC: 7 MG/DL — LOW (ref 10–20)
BUN SERPL-MCNC: 8 MG/DL — LOW (ref 10–20)
CALCIUM SERPL-MCNC: 8 MG/DL — LOW (ref 8.4–10.5)
CALCIUM SERPL-MCNC: 8.3 MG/DL — LOW (ref 8.4–10.5)
CHLORIDE SERPL-SCNC: 107 MMOL/L — SIGNIFICANT CHANGE UP (ref 98–110)
CHLORIDE SERPL-SCNC: 107 MMOL/L — SIGNIFICANT CHANGE UP (ref 98–110)
CO2 SERPL-SCNC: 24 MMOL/L — SIGNIFICANT CHANGE UP (ref 17–32)
CO2 SERPL-SCNC: 24 MMOL/L — SIGNIFICANT CHANGE UP (ref 17–32)
CREAT SERPL-MCNC: 0.6 MG/DL — LOW (ref 0.7–1.5)
CREAT SERPL-MCNC: 0.7 MG/DL — SIGNIFICANT CHANGE UP (ref 0.7–1.5)
EGFR: 101 ML/MIN/1.73M2 — SIGNIFICANT CHANGE UP
EGFR: 101 ML/MIN/1.73M2 — SIGNIFICANT CHANGE UP
EGFR: 96 ML/MIN/1.73M2 — SIGNIFICANT CHANGE UP
EGFR: 96 ML/MIN/1.73M2 — SIGNIFICANT CHANGE UP
EOSINOPHIL # BLD AUTO: 0 K/UL — SIGNIFICANT CHANGE UP (ref 0–0.7)
EOSINOPHIL NFR BLD AUTO: 0 % — SIGNIFICANT CHANGE UP (ref 0–8)
GLUCOSE BLDC GLUCOMTR-MCNC: 123 MG/DL — HIGH (ref 70–99)
GLUCOSE BLDC GLUCOMTR-MCNC: 156 MG/DL — HIGH (ref 70–99)
GLUCOSE BLDC GLUCOMTR-MCNC: 159 MG/DL — HIGH (ref 70–99)
GLUCOSE BLDC GLUCOMTR-MCNC: 169 MG/DL — HIGH (ref 70–99)
GLUCOSE SERPL-MCNC: 106 MG/DL — HIGH (ref 70–99)
GLUCOSE SERPL-MCNC: 157 MG/DL — HIGH (ref 70–99)
HCT VFR BLD CALC: 22.5 % — LOW (ref 42–52)
HCT VFR BLD CALC: 22.8 % — LOW (ref 42–52)
HCT VFR BLD CALC: 27.4 % — LOW (ref 42–52)
HGB BLD-MCNC: 7.7 G/DL — LOW (ref 14–18)
HGB BLD-MCNC: 7.8 G/DL — LOW (ref 14–18)
HGB BLD-MCNC: 9.4 G/DL — LOW (ref 14–18)
INR BLD: 1.16 RATIO — SIGNIFICANT CHANGE UP (ref 0.65–1.3)
LACTATE SERPL-SCNC: 1.6 MMOL/L — SIGNIFICANT CHANGE UP (ref 0.7–2)
LYMPHOCYTES # BLD AUTO: 0.74 K/UL — LOW (ref 1.2–3.4)
LYMPHOCYTES # BLD AUTO: 14.9 % — LOW (ref 20.5–51.1)
MAGNESIUM SERPL-MCNC: 1.5 MG/DL — LOW (ref 1.8–2.4)
MAGNESIUM SERPL-MCNC: 1.8 MG/DL — SIGNIFICANT CHANGE UP (ref 1.8–2.4)
MCHC RBC-ENTMCNC: 33.5 PG — HIGH (ref 27–31)
MCHC RBC-ENTMCNC: 34.2 G/DL — SIGNIFICANT CHANGE UP (ref 32–37)
MCHC RBC-ENTMCNC: 34.2 G/DL — SIGNIFICANT CHANGE UP (ref 32–37)
MCHC RBC-ENTMCNC: 34.3 G/DL — SIGNIFICANT CHANGE UP (ref 32–37)
MCHC RBC-ENTMCNC: 34.4 PG — HIGH (ref 27–31)
MCHC RBC-ENTMCNC: 34.7 PG — HIGH (ref 27–31)
MCV RBC AUTO: 100.4 FL — HIGH (ref 80–94)
MCV RBC AUTO: 101.4 FL — HIGH (ref 80–94)
MCV RBC AUTO: 97.5 FL — HIGH (ref 80–94)
MONOCYTES # BLD AUTO: 0.22 K/UL — SIGNIFICANT CHANGE UP (ref 0.1–0.6)
MONOCYTES NFR BLD AUTO: 4.4 % — SIGNIFICANT CHANGE UP (ref 1.7–9.3)
NEUTROPHILS # BLD AUTO: 3.72 K/UL — SIGNIFICANT CHANGE UP (ref 1.4–6.5)
NEUTROPHILS NFR BLD AUTO: 74.6 % — SIGNIFICANT CHANGE UP (ref 42.2–75.2)
NRBC BLD AUTO-RTO: 0 /100 WBCS — SIGNIFICANT CHANGE UP (ref 0–0)
NRBC BLD AUTO-RTO: 0 /100 WBCS — SIGNIFICANT CHANGE UP (ref 0–0)
NRBC BLD AUTO-RTO: SIGNIFICANT CHANGE UP /100 WBCS (ref 0–0)
PLATELET # BLD AUTO: 174 K/UL — SIGNIFICANT CHANGE UP (ref 130–400)
PLATELET # BLD AUTO: 196 K/UL — SIGNIFICANT CHANGE UP (ref 130–400)
PLATELET # BLD AUTO: 196 K/UL — SIGNIFICANT CHANGE UP (ref 130–400)
PMV BLD: 10.3 FL — SIGNIFICANT CHANGE UP (ref 7.4–10.4)
PMV BLD: 10.5 FL — HIGH (ref 7.4–10.4)
PMV BLD: 10.6 FL — HIGH (ref 7.4–10.4)
POTASSIUM SERPL-MCNC: 3.6 MMOL/L — SIGNIFICANT CHANGE UP (ref 3.5–5)
POTASSIUM SERPL-MCNC: 3.9 MMOL/L — SIGNIFICANT CHANGE UP (ref 3.5–5)
POTASSIUM SERPL-SCNC: 3.6 MMOL/L — SIGNIFICANT CHANGE UP (ref 3.5–5)
POTASSIUM SERPL-SCNC: 3.9 MMOL/L — SIGNIFICANT CHANGE UP (ref 3.5–5)
PROT SERPL-MCNC: 4.3 G/DL — LOW (ref 6–8)
PROTHROM AB SERPL-ACNC: 13.7 SEC — HIGH (ref 9.95–12.87)
RBC # BLD: 2.22 M/UL — LOW (ref 4.7–6.1)
RBC # BLD: 2.27 M/UL — LOW (ref 4.7–6.1)
RBC # BLD: 2.81 M/UL — LOW (ref 4.7–6.1)
RBC # FLD: 13.5 % — SIGNIFICANT CHANGE UP (ref 11.5–14.5)
RBC # FLD: 14 % — SIGNIFICANT CHANGE UP (ref 11.5–14.5)
RBC # FLD: 16 % — HIGH (ref 11.5–14.5)
SODIUM SERPL-SCNC: 138 MMOL/L — SIGNIFICANT CHANGE UP (ref 135–146)
SODIUM SERPL-SCNC: 140 MMOL/L — SIGNIFICANT CHANGE UP (ref 135–146)
TROPONIN T, HIGH SENSITIVITY RESULT: 20 NG/L — SIGNIFICANT CHANGE UP (ref 6–21)
WBC # BLD: 4.44 K/UL — LOW (ref 4.8–10.8)
WBC # BLD: 4.98 K/UL — SIGNIFICANT CHANGE UP (ref 4.8–10.8)
WBC # BLD: 5.32 K/UL — SIGNIFICANT CHANGE UP (ref 4.8–10.8)
WBC # FLD AUTO: 4.44 K/UL — LOW (ref 4.8–10.8)
WBC # FLD AUTO: 4.98 K/UL — SIGNIFICANT CHANGE UP (ref 4.8–10.8)
WBC # FLD AUTO: 5.32 K/UL — SIGNIFICANT CHANGE UP (ref 4.8–10.8)

## 2025-04-24 PROCEDURE — 71045 X-RAY EXAM CHEST 1 VIEW: CPT | Mod: 26

## 2025-04-24 PROCEDURE — 99232 SBSQ HOSP IP/OBS MODERATE 35: CPT

## 2025-04-24 PROCEDURE — 99233 SBSQ HOSP IP/OBS HIGH 50: CPT

## 2025-04-24 RX ORDER — MAGNESIUM SULFATE 500 MG/ML
2 SYRINGE (ML) INJECTION ONCE
Refills: 0 | Status: COMPLETED | OUTPATIENT
Start: 2025-04-24 | End: 2025-04-24

## 2025-04-24 RX ORDER — MIRTAZAPINE 30 MG/1
15 TABLET, FILM COATED ORAL AT BEDTIME
Refills: 0 | Status: DISCONTINUED | OUTPATIENT
Start: 2025-04-24 | End: 2025-04-29

## 2025-04-24 RX ORDER — NALTREXONE HYDROCHLORIDE 50 MG/1
50 TABLET, FILM COATED ORAL DAILY
Refills: 0 | Status: DISCONTINUED | OUTPATIENT
Start: 2025-04-24 | End: 2025-04-29

## 2025-04-24 RX ORDER — HYDROXYZINE HYDROCHLORIDE 25 MG/1
25 TABLET, FILM COATED ORAL EVERY 6 HOURS
Refills: 0 | Status: DISCONTINUED | OUTPATIENT
Start: 2025-04-24 | End: 2025-04-29

## 2025-04-24 RX ORDER — POLYETHYLENE GLYCOL-3350 AND ELECTROLYTES 236; 6.74; 5.86; 2.97; 22.74 G/274.31G; G/274.31G; G/274.31G; G/274.31G; G/274.31G
4000 POWDER, FOR SOLUTION ORAL ONCE
Refills: 0 | Status: COMPLETED | OUTPATIENT
Start: 2025-04-24 | End: 2025-04-24

## 2025-04-24 RX ORDER — MIRTAZAPINE 30 MG/1
15 TABLET, FILM COATED ORAL DAILY
Refills: 0 | Status: DISCONTINUED | OUTPATIENT
Start: 2025-04-24 | End: 2025-04-24

## 2025-04-24 RX ORDER — BISACODYL 5 MG
20 TABLET, DELAYED RELEASE (ENTERIC COATED) ORAL AT BEDTIME
Refills: 0 | Status: COMPLETED | OUTPATIENT
Start: 2025-04-24 | End: 2025-04-24

## 2025-04-24 RX ORDER — SODIUM CHLORIDE 9 G/1000ML
500 INJECTION, SOLUTION INTRAVENOUS ONCE
Refills: 0 | Status: COMPLETED | OUTPATIENT
Start: 2025-04-24 | End: 2025-04-24

## 2025-04-24 RX ORDER — TRAZODONE HCL 100 MG
25 TABLET ORAL AT BEDTIME
Refills: 0 | Status: DISCONTINUED | OUTPATIENT
Start: 2025-04-24 | End: 2025-04-29

## 2025-04-24 RX ADMIN — POLYETHYLENE GLYCOL 3350 17 GRAM(S): 17 POWDER, FOR SOLUTION ORAL at 17:13

## 2025-04-24 RX ADMIN — FOLIC ACID 1 MILLIGRAM(S): 1 TABLET ORAL at 11:40

## 2025-04-24 RX ADMIN — INSULIN LISPRO 1: 100 INJECTION, SOLUTION INTRAVENOUS; SUBCUTANEOUS at 08:03

## 2025-04-24 RX ADMIN — SODIUM CHLORIDE 1000 MILLILITER(S): 9 INJECTION, SOLUTION INTRAVENOUS at 11:40

## 2025-04-24 RX ADMIN — Medication 40 MILLIGRAM(S): at 05:05

## 2025-04-24 RX ADMIN — NALTREXONE HYDROCHLORIDE 50 MILLIGRAM(S): 50 TABLET, FILM COATED ORAL at 11:40

## 2025-04-24 RX ADMIN — MIRTAZAPINE 15 MILLIGRAM(S): 30 TABLET, FILM COATED ORAL at 21:30

## 2025-04-24 RX ADMIN — Medication 1 APPLICATION(S): at 11:42

## 2025-04-24 RX ADMIN — INSULIN LISPRO 1: 100 INJECTION, SOLUTION INTRAVENOUS; SUBCUTANEOUS at 17:58

## 2025-04-24 RX ADMIN — Medication 102.5 MILLIGRAM(S): at 11:40

## 2025-04-24 RX ADMIN — MIRTAZAPINE 15 MILLIGRAM(S): 30 TABLET, FILM COATED ORAL at 11:40

## 2025-04-24 RX ADMIN — Medication 40 MILLIGRAM(S): at 17:13

## 2025-04-24 RX ADMIN — Medication 20 MILLIGRAM(S): at 21:30

## 2025-04-24 RX ADMIN — POLYETHYLENE GLYCOL-3350 AND ELECTROLYTES 4000 MILLILITER(S): 236; 6.74; 5.86; 2.97; 22.74 POWDER, FOR SOLUTION ORAL at 14:47

## 2025-04-24 RX ADMIN — POLYETHYLENE GLYCOL 3350 17 GRAM(S): 17 POWDER, FOR SOLUTION ORAL at 05:06

## 2025-04-24 NOTE — PROGRESS NOTE ADULT - SUBJECTIVE AND OBJECTIVE BOX
SUBJECTIVE/OVERNIGHT EVENTS  Today is hospital day 8d. This morning patient was seen and examined at bedside, resting comfortably in bed. No acute or major events overnight. Patient had bloody BMx3. Had another episode of syncope with shaking with return to baseline in a few min. Will transfer to tele      CODE STATUS:    FAMILY COMMUNICATION  Contact date:  Name of person contacted:  Relationship to patient:  Communication details:    MEDICATIONS  STANDING MEDICATIONS  chlorhexidine 2% Cloths 1 Application(s) Topical daily  dextrose 50% Injectable 25 Gram(s) IV Push once  dextrose 50% Injectable 12.5 Gram(s) IV Push once  dextrose 50% Injectable 25 Gram(s) IV Push once  folic acid 1 milliGRAM(s) Oral daily  glucagon  Injectable 1 milliGRAM(s) IntraMuscular once  influenza  Vaccine (HIGH DOSE) 0.5 milliLiter(s) IntraMuscular once  insulin lispro (ADMELOG) corrective regimen sliding scale   SubCutaneous three times a day before meals  lactated ringers Bolus 500 milliLiter(s) IV Bolus once  mirtazapine 15 milliGRAM(s) Oral daily  naltrexone 50 milliGRAM(s) Oral daily  pantoprazole    Tablet 40 milliGRAM(s) Oral two times a day  polyethylene glycol 3350 17 Gram(s) Oral two times a day  thiamine IVPB 250 milliGRAM(s) IV Intermittent daily    PRN MEDICATIONS  acetaminophen     Tablet .. 650 milliGRAM(s) Oral every 6 hours PRN  aluminum hydroxide/magnesium hydroxide/simethicone Suspension 30 milliLiter(s) Oral every 4 hours PRN  dextrose Oral Gel 15 Gram(s) Oral once PRN  hydrOXYzine hydrochloride 25 milliGRAM(s) Oral every 6 hours PRN  melatonin 3 milliGRAM(s) Oral at bedtime PRN  ondansetron Injectable 4 milliGRAM(s) IV Push every 8 hours PRN  traZODone 25 milliGRAM(s) Oral at bedtime PRN    VITALS  T(F): 98.2 (04-24-25 @ 07:00), Max: 98.9 (04-24-25 @ 04:33)  HR: 85 (04-24-25 @ 07:00) (83 - 91)  BP: 118/74 (04-24-25 @ 07:00) (110/52 - 134/80)  RR: 17 (04-24-25 @ 07:00) (17 - 18)  SpO2: 94% (04-24-25 @ 07:00) (94% - 98%)  POCT Blood Glucose.: 123 mg/dL (04-24-25 @ 11:02)  POCT Blood Glucose.: 169 mg/dL (04-24-25 @ 09:16)  POCT Blood Glucose.: 159 mg/dL (04-24-25 @ 07:23)  POCT Blood Glucose.: 155 mg/dL (04-23-25 @ 21:46)  POCT Blood Glucose.: 217 mg/dL (04-23-25 @ 16:34)  POCT Blood Glucose.: 270 mg/dL (04-23-25 @ 11:29)    PHYSICAL EXAM  GENERAL  ( x ) NAD, lying in bed comfortably     (  ) obtunded     (  ) lethargic     (  ) somnolent    HEAD  (  x) Atraumatic     (  ) hematoma     (  ) laceration (specify location:       )     NECK  (x  ) Supple     (  ) neck stiffness     (  ) nuchal rigidity     (  )  no JVD     (  ) JVD present ( -- cm)    HEART  Rate -->  ( x ) normal rate    (  ) bradycardic    (  ) tachycardic  Rhythm -->  (  ) regular    (  ) regularly irregular    (  ) irregularly irregular  Murmurs -->  (  ) normal s1/s2    (  ) systolic murmur    (  ) diastolic murmur    (  ) continuous murmur     (  ) S3 present    (  ) S4 present    LUNGS  (x  )Unlabored respirations     (  ) tachypnea  (  ) B/L air entry     (  ) decreased breath sounds in:  (location     )    (  ) no adventitious sound     (  ) crackles     (  ) wheezing      (  ) rhonchi      (specify location:       )  (  ) chest wall tenderness (specify location:       )    ABDOMEN  (x  ) Soft     (  ) tense   |   (  ) nondistended     (  ) distended   |   (  ) +BS     (  ) hypoactive bowel sounds     (  ) hyperactive bowel sounds  (  ) nontender     (  ) RUQ tenderness     (  ) RLQ tenderness     (  ) LLQ tenderness     (  ) epigastric tenderness     (  ) diffuse tenderness  (  ) Splenomegaly      (  ) Hepatomegaly      (  ) Jaundice     (  ) ecchymosis     EXTREMITIES  ( x ) Normal     (  ) Rash     (  ) ecchymosis     (  ) varicose veins      (  ) pitting edema     (  ) non-pitting edema   (  ) ulceration     (  ) gangrene:     (location:     )    NERVOUS SYSTEM  ( x ) A&Ox3     (  ) confused     (  ) lethargic  CN II-XII:     (  ) Intact     (  ) focal deficits  (Specify:     )   Upper extremities:     (  ) strength X/5     (  ) focal deficit (specify:    )  Lower extremities:     (  ) strength  X/5    (  ) focal deficit (specify:    )    SKIN  (x  ) No rashes or lesions     (  ) maculopapular rash     (  ) pustules     (  ) vesicles     (  ) ulcer     (  ) ecchymosis     (specify location:     )    (  ) Indwelling Pittman Catheter   Date insterted:    Reason (  ) Critical illness     (  ) urinary retention    (  ) Accurate Ins/Outs Monitoring     (  ) CMO patient    (  ) Central Line  Date inserted:  Location: (  ) Right IJ   (  ) Left IJ   (  ) Right Fem   (  ) Left Fem    (  ) SPC  (  ) pigtail  (  ) PEG tube  (  ) colostomy  (  ) jejunostomy  (  ) U-Dall    LABS             7.8    4.44  )-----------( 174      ( 04-24-25 @ 05:01 )             22.8     138  |  107  |  8   -------------------------<  157   04-24-25 @ 05:01  3.9  |  24  |  0.6    Ca      8.0     04-24-25 @ 05:01  Mg     1.8     04-24-25 @ 05:01    TPro  4.4  /  Alb  2.3  /  TBili  1.1  /  DBili  x   /  AST  92  /  ALT  30  /  AlkPhos  165  /  GGT  x     04-23-25 @ 05:50      Troponin T, High Sensitivity Result: 20 ng/L (04-24-25 @ 00:56)  Troponin T, High Sensitivity Result: 22 ng/L (04-23-25 @ 16:43)    Urinalysis Basic - ( 24 Apr 2025 05:01 )    Color: x / Appearance: x / SG: x / pH: x  Gluc: 157 mg/dL / Ketone: x  / Bili: x / Urobili: x   Blood: x / Protein: x / Nitrite: x   Leuk Esterase: x / RBC: x / WBC x   Sq Epi: x / Non Sq Epi: x / Bacteria: x          IMAGING

## 2025-04-24 NOTE — BH CONSULTATION LIAISON PROGRESS NOTE - NSBHCONSULTPRIMARYDISCUSSYES_PSY_A_CORE FT
- Increase Mirtazipine to 15mg QHS for mood and sleep   - Collaborate with addiction team to provide outpatient resources for patient and wife - Continue Mirtazipine to 15mg QHS for mood and sleep   - Collaborate with addiction team to provide outpatient resources for patient and wife

## 2025-04-24 NOTE — OCCUPATIONAL THERAPY INITIAL EVALUATION ADULT - IMPAIRMENTS CONTRIBUTING IMPAIRED BED MOBILITY, REHAB EVAL
poor endurance/impaired balance/cognition/impaired coordination/decreased flexibility/impaired motor control/decreased strength

## 2025-04-24 NOTE — BH CONSULTATION LIAISON PROGRESS NOTE - NSBHFUPINTERVALHXFT_PSY_A_CORE
Patient was seen today in bed, minimally engaged with improved eye contact during evaluation. Wife was present at bedside. When asked how he was doing, he did not respond. When provider asked if he knew where he was, he stated " You're going to kill me". Provider asked what he meant by that statement and he responded "you're the 10th person to ask me that today." The provider explained that these questions are important for his care. He declined to answer any further questions.  Patient was seen today in bed, minimally engaged with improved eye contact during evaluation. Wife was present at bedside. When asked how he was doing, he did not respond. When provider asked if he knew where he was, he stated " You're going to kill me". Provider asked what he meant by that statement and he responded "you're the 10th person to ask me that today." The provider explained that these questions are important for his care. He declined to answer any further questions. Wife with no questions or concerns at this time.  Patient was seen today in bed, minimally engaged with improved eye contact during evaluation. Wife was present at bedside. When asked how he was doing, he did not respond. When provider asked if he knew where he was, he stated " You're going to kill me". Provider asked what he meant by that statement and he responded "you're the 10th person to ask me that today." The provider explained that these questions are important for his care and attempted to engage with him further. He declined to answer any further questions. Wife with no questions or concerns at this time.

## 2025-04-24 NOTE — PROGRESS NOTE ADULT - ASSESSMENT
74-year-old male with past medical history of alcohol use disorder (Last use 4/16/25), history of necrotizing pancreatitis with WON s/p cystogastrostomy , hx of upper GI bleed sec to duodenal bulb ulcer (2022), hypertension, hyperlipidemia presenting to the ED accompanied by his wife for intoxication and a fall.  Wife states that the patient drinks approximately every day.  Patient has had a binge drinking episode for the past 4 days prior to presentation. Patient admitted to medicine on 4/16 for intoxication. This early am he had rapid response with seizure like activity. Today he had dark blood with clots with BM, hypotensive (RRT). GI consulted for further evaluation.     #Dark stool with clots likely sec to duodenal ulcer s/p EGD 4/23   #Acute Macrocytic anemia with low platelets  #AUD, elevated liver enzymes with alcoholic hepatitis (MDF 6.4)  #hx of duodenal ulcer bleed s/p EGD 2022  #history of necrotizing pancreatitis with WON s/p cystogastrostomy  - Hb on admission : 13.4>11.5 (4/18)>8.7 (4/21)  - s/p EGD 11/22: duodenal bulb healing ulcer  - s/p EGD 10/22: 3cm duo bulb ulcer s/p hemostasis  - s/p EGD 9/22: multiple ulcer in antrum, duo bulb, GE junc Ulcer (HP-)  - s/p colonoscopy 9/22: poor prep:   - 4/16: CTAP IC: distended GB, severe hepatic steatosis  - 4/17: RUQ US: CBD 6mm, hep steatosis  RRT 4.21 x2 for seizure like activity currently on vEEG, hypotensive briefly  - Not on AC, no cirrhosis on imaging  - s/p EGD 4/22: Impressions:  	Erythema in the antrum and stomach body compatible with non-erosive gastritis. (Biopsy).  	Hiatal Hernia.  	Erosions and ulceration in the duodenum compatible with duodenitis.  	A 5 mm clean based (Ra Class III) ulcer was noted in the duodenal sweep. No active bleeding seen in the examined portion of the duodenum.  	Normal mucosa in the whole esophagus.  - 4/24: recalled as pt having dark maroon BMs    RECS  - spoke with patient and wife at bedside for colonoscopy. He agreed to prep with NG tube ; will schedule for colonoscopy if adequately prep, NPO after midnight  - Clear liquid diet today; 4L golytely, 20mg at night  - Change PPI to PO 40mg BID, dc octreotide ,carafate  - CBC q8, keep above 7 , transfuse PRN  - Mg, folate, thiamine supplementation  - If any hypotension , will recommend STAT CT angio AP to rule out active bleed  - Alcohol cessation advised

## 2025-04-24 NOTE — PROGRESS NOTE ADULT - SUBJECTIVE AND OBJECTIVE BOX
Patient is a 74y old  Male who presents with a chief complaint of Fall (24 Apr 2025 11:11)    Patient was seen and examined.  Pt is a transfer from SDU   Patient continues to have  BRBPR - 4 episodes  He had another syncopal episode with whole body shaking today while sitting. Patient was hypotensive and not alert or oriented for a couple minutes. S/p IV fluid bolus was given and bp improved.     PAST MEDICAL & SURGICAL HISTORY:  HTN (hypertension)  High cholesterol  Gastric ulcer    Allergies  No Known Allergies    MEDICATIONS  (STANDING):  bisacodyl 20 milliGRAM(s) Oral at bedtime  chlorhexidine 2% Cloths 1 Application(s) Topical daily  dextrose 50% Injectable 25 Gram(s) IV Push once  dextrose 50% Injectable 12.5 Gram(s) IV Push once  dextrose 50% Injectable 25 Gram(s) IV Push once  folic acid 1 milliGRAM(s) Oral daily  glucagon  Injectable 1 milliGRAM(s) IntraMuscular once  influenza  Vaccine (HIGH DOSE) 0.5 milliLiter(s) IntraMuscular once  insulin lispro (ADMELOG) corrective regimen sliding scale   SubCutaneous three times a day before meals  mirtazapine 15 milliGRAM(s) Oral daily  naltrexone 50 milliGRAM(s) Oral daily  pantoprazole    Tablet 40 milliGRAM(s) Oral two times a day  polyethylene glycol 3350 17 Gram(s) Oral two times a day  polyethylene glycol/electrolyte Solution. 4000 milliLiter(s) Oral once  thiamine IVPB 250 milliGRAM(s) IV Intermittent daily    MEDICATIONS  (PRN):  acetaminophen     Tablet .. 650 milliGRAM(s) Oral every 6 hours PRN Temp greater or equal to 38C (100.4F), Mild Pain (1 - 3)  aluminum hydroxide/magnesium hydroxide/simethicone Suspension 30 milliLiter(s) Oral every 4 hours PRN Dyspepsia  dextrose Oral Gel 15 Gram(s) Oral once PRN Blood Glucose LESS THAN 70 milliGRAM(s)/deciliter  hydrOXYzine hydrochloride 25 milliGRAM(s) Oral every 6 hours PRN Anxiety  melatonin 3 milliGRAM(s) Oral at bedtime PRN Insomnia  ondansetron Injectable 4 milliGRAM(s) IV Push every 8 hours PRN Nausea and/or Vomiting  traZODone 25 milliGRAM(s) Oral at bedtime PRN Insomnia    Vital Signs Last 24 Hrs  T(C): 36.8  T(F): 98.2  HR: 85  BP: 118/74  BP(mean): --  RR: 17  SpO2: 94%    O/E:  Awake, alert, not in distress.  HEENT: atraumatic, EOMI.  Chest: clear.  CVS: SIS2 +, no murmur.  P/A: Soft, BS+  CNS: awake, alert  Ext: no edema feet.  All systems reviewed positive findings as above.      POCT Blood Glucose.: 123 mg/dL (24 Apr 2025 11:02)  POCT Blood Glucose.: 169 mg/dL (24 Apr 2025 09:16)  POCT Blood Glucose.: 159 mg/dL (24 Apr 2025 07:23)  POCT Blood Glucose.: 155 mg/dL (23 Apr 2025 21:46)  POCT Blood Glucose.: 217 mg/dL (23 Apr 2025 16:34)                        7.7[L]  5.32  )-----------( 196      ( 24 Apr 2025 11:46 )             22.5[L]                        7.8[L]  4.44[L] )-----------( 174      ( 24 Apr 2025 05:01 )             22.8[L]    04-24    138  |  107  |  8[L]  ----------------------------<  157[H]  3.9   |  24  |  0.6[L]  04-23    136  |  104  |  12  ----------------------------<  181[H]  4.1   |  23  |  0.7    Ca    8.0[L]      24 Apr 2025 05:01  Ca    8.0[L]      23 Apr 2025 05:50  Mg     1.8     04-24    TPro  4.4[L]  /  Alb  2.3[L]  /  TBili  1.1  /  DBili  x   /  AST  92[H]  /  ALT  30  /  AlkPhos  165[H]  04-23            Urinalysis Basic - ( 24 Apr 2025 05:01 )    Color: x / Appearance: x / SG: x / pH: x  Gluc: 157 mg/dL / Ketone: x  / Bili: x / Urobili: x   Blood: x / Protein: x / Nitrite: x   Leuk Esterase: x / RBC: x / WBC x   Sq Epi: x / Non Sq Epi: x / Bacteria: x

## 2025-04-24 NOTE — BH CONSULTATION LIAISON PROGRESS NOTE - NSBHMSEKNOW_PSY_A_CORE
How Severe Is Your Skin Lesion?: mild
Have Your Skin Lesions Been Treated?: not been treated
Is This A New Presentation, Or A Follow-Up?: Skin Lesions
Which Family Member (Optional)?: Father
Unable to assess

## 2025-04-24 NOTE — BH CONSULTATION LIAISON PROGRESS NOTE - NSBHATTESTTYPEVISIT_PSY_A_CORE
Attending evaluating patient with KRYSTINA (23089/[id number] code) On-site Attending supervising KRYSTINA ([id number] codes)

## 2025-04-24 NOTE — BH CONSULTATION LIAISON PROGRESS NOTE - NSBHASSESSMENTFT_PSY_ALL_CORE
The patient was uncooperative and minimally engaged during today's visit. He was more irritable today, accusing the provider of "killing him" when he was asked where he was because other team members asked the same question. Alcohol abuse appears to be the main  for his mood symptoms. IPP admission is unlikely to provide benefit to patient; outpatient chemical dependancy treatment would best support patient at this time. He seems to be tolerating Mirtazipine at this time. Please reconsult psychiatry as needed.

## 2025-04-24 NOTE — BH CONSULTATION LIAISON PROGRESS NOTE - ATTENDING COMMENTS
This patient was seen, evaluated, and treated by the nurse practitioner. I reviewed the chart and care seems appropriate. I was available to the NP for questions and consultation at the time patient was being evaluated.

## 2025-04-24 NOTE — BH CONSULTATION LIAISON PROGRESS NOTE - NSBHCHARTREVIEWVS_PSY_A_CORE FT
Vital Signs Last 24 Hrs  T(C): 36.8 (24 Apr 2025 12:58), Max: 37.2 (24 Apr 2025 04:33)  T(F): 98.3 (24 Apr 2025 12:58), Max: 98.9 (24 Apr 2025 04:33)  HR: 82 (24 Apr 2025 12:58) (82 - 91)  BP: 121/80 (24 Apr 2025 12:58) (110/52 - 121/80)  BP(mean): --  RR: 18 (24 Apr 2025 12:58) (17 - 18)  SpO2: 94% (24 Apr 2025 12:58) (94% - 98%)    Parameters below as of 24 Apr 2025 07:00  Patient On (Oxygen Delivery Method): room air

## 2025-04-24 NOTE — OCCUPATIONAL THERAPY INITIAL EVALUATION ADULT - ADDITIONAL COMMENTS
Pt endorses residing in 3rd floor apartment of walk up building, + tub and stall shower, primarily uses stall shower, no AD or DME, + driving. Pt states did not require assistance with self care however spouse at bedside reports assisting with LB dressing

## 2025-04-24 NOTE — PROGRESS NOTE ADULT - ATTENDING COMMENTS
Patient seen and examined during the GI rounds, case discussed with the GI fellow, plan communicated to the primary team, assessment, recommendations and plan as above. Reviewed all pertinent clinical information and reviewed all relevant imaging and diagnostic studies.  Counseled the patient /HCP about diagnostic testing and treatment plan. All questions were answered.  Discussed recommendations with the primary team and coordinated care.

## 2025-04-24 NOTE — BH CONSULTATION LIAISON PROGRESS NOTE - NSBHATTESTAPPBILLTIME_PSY_A_CORE
I attest my time as KRYSTINA is greater than 50% of the total combined time spent on qualifying patient care activities. I have reviewed and verified the documentation.

## 2025-04-24 NOTE — PROGRESS NOTE ADULT - SUBJECTIVE AND OBJECTIVE BOX
Gastroenterology progress note:     Patient is a 74y old  Male who presents with a chief complaint of Fall (24 Apr 2025 11:11)       Admitted on: 04-16-25    We are following the patient for: GI bleed       Interval History: patient had dark bloody stools x2 , hb stable and hemodynamically stable    PAST MEDICAL & SURGICAL HISTORY:  HTN (hypertension)      High cholesterol      Gastric ulcer    MEDICATIONS  (STANDING):  bisacodyl 20 milliGRAM(s) Oral at bedtime  chlorhexidine 2% Cloths 1 Application(s) Topical daily  dextrose 50% Injectable 25 Gram(s) IV Push once  dextrose 50% Injectable 12.5 Gram(s) IV Push once  dextrose 50% Injectable 25 Gram(s) IV Push once  folic acid 1 milliGRAM(s) Oral daily  glucagon  Injectable 1 milliGRAM(s) IntraMuscular once  influenza  Vaccine (HIGH DOSE) 0.5 milliLiter(s) IntraMuscular once  insulin lispro (ADMELOG) corrective regimen sliding scale   SubCutaneous three times a day before meals  mirtazapine 15 milliGRAM(s) Oral daily  naltrexone 50 milliGRAM(s) Oral daily  pantoprazole    Tablet 40 milliGRAM(s) Oral two times a day  polyethylene glycol 3350 17 Gram(s) Oral two times a day  polyethylene glycol/electrolyte Solution. 4000 milliLiter(s) Oral once  thiamine IVPB 250 milliGRAM(s) IV Intermittent daily    MEDICATIONS  (PRN):  acetaminophen     Tablet .. 650 milliGRAM(s) Oral every 6 hours PRN Temp greater or equal to 38C (100.4F), Mild Pain (1 - 3)  aluminum hydroxide/magnesium hydroxide/simethicone Suspension 30 milliLiter(s) Oral every 4 hours PRN Dyspepsia  dextrose Oral Gel 15 Gram(s) Oral once PRN Blood Glucose LESS THAN 70 milliGRAM(s)/deciliter  hydrOXYzine hydrochloride 25 milliGRAM(s) Oral every 6 hours PRN Anxiety  melatonin 3 milliGRAM(s) Oral at bedtime PRN Insomnia  ondansetron Injectable 4 milliGRAM(s) IV Push every 8 hours PRN Nausea and/or Vomiting  traZODone 25 milliGRAM(s) Oral at bedtime PRN Insomnia      Allergies  No Known Allergies      Review of Systems:   Cardiovascular:  No Chest Pain, No Palpitations  Respiratory:  No Cough, No Dyspnea  Gastrointestinal:  As described in HPI  Skin:  No Skin Lesions, No Jaundice  Neuro:  No Syncope, No Dizziness    Physical Examination:  T(C): 36.8 (04-24-25 @ 12:58), Max: 37.2 (04-24-25 @ 04:33)  HR: 82 (04-24-25 @ 12:58) (82 - 91)  BP: 121/80 (04-24-25 @ 12:58) (110/52 - 121/80)  RR: 18 (04-24-25 @ 12:58) (17 - 18)  SpO2: 94% (04-24-25 @ 12:58) (94% - 98%)      04-23-25 @ 07:01  -  04-24-25 @ 07:00  --------------------------------------------------------  IN: 100 mL / OUT: 500 mL / NET: -400 mL        GENERAL: AAOx3, no acute distress.  HEAD:  Atraumatic, Normocephalic  EYES: conjunctiva and sclera clear  NECK: Supple, no JVD or thyromegaly  CHEST/LUNG: Clear to auscultation bilaterally; No wheeze, rhonchi, or rales  HEART: Regular rate and rhythm; normal S1, S2, No murmurs.  ABDOMEN: Soft, nontender, nondistended; Bowel sounds present  NEUROLOGY: No asterixis or tremor.   SKIN: Intact, no jaundice     Data:                        7.7    5.32  )-----------( 196      ( 24 Apr 2025 11:46 )             22.5     Hgb trend:  7.7  04-24-25 @ 11:46  7.8  04-24-25 @ 05:01  7.6  04-23-25 @ 18:30  7.7  04-23-25 @ 05:50  8.0  04-22-25 @ 04:46  8.1  04-21-25 @ 20:38  8.3  04-21-25 @ 14:09        04-24    138  |  107  |  8[L]  ----------------------------<  157[H]  3.9   |  24  |  0.6[L]    Ca    8.0[L]      24 Apr 2025 05:01  Mg     1.8     04-24    TPro  4.4[L]  /  Alb  2.3[L]  /  TBili  1.1  /  DBili  x   /  AST  92[H]  /  ALT  30  /  AlkPhos  165[H]  04-23    Liver panel trend:  TBili 1.1   /   AST 92   /   ALT 30   /   AlkP 165   /   Tptn 4.4   /   Alb 2.3    /   DBili --      04-23  TBili 1.4   /      /   ALT 32   /   AlkP 152   /   Tptn 4.3   /   Alb 2.3    /   DBili --      04-22  TBili 1.4   /   AST 72   /   ALT 29   /   AlkP 161   /   Tptn 4.3   /   Alb 2.5    /   DBili --      04-21  TBili 1.6   /   AST 69   /   ALT 29   /   AlkP 165   /   Tptn 4.4   /   Alb 2.4    /   DBili --      04-21  TBili 2.2   /      /   ALT 41   /   AlkP 175   /   Tptn 4.8   /   Alb 2.6    /   DBili --      04-19  TBili 2.5   /      /   ALT 50   /   AlkP 196   /   Tptn 5.4   /   Alb 2.8    /   DBili --      04-18  TBili 1.5   /      /   ALT 58   /   AlkP 192   /   Tptn 5.6   /   Alb 3.1    /   DBili --      04-17  TBili 1.6   /      /   ALT 75   /   AlkP 224   /   Tptn 6.1   /   Alb 3.5    /   DBili --      04-16   Gastroenterology progress note:     Patient is a 74y old  Male who presents with a chief complaint of Fall (24 Apr 2025 11:11)       Admitted on: 04-16-25    We are following the patient for: GI bleed       Interval History: patient had dark bloody stools x2 , hb stable and hemodynamically stable    PAST MEDICAL & SURGICAL HISTORY:  HTN (hypertension)      High cholesterol      Gastric ulcer    MEDICATIONS  (STANDING):  bisacodyl 20 milliGRAM(s) Oral at bedtime  chlorhexidine 2% Cloths 1 Application(s) Topical daily  dextrose 50% Injectable 25 Gram(s) IV Push once  dextrose 50% Injectable 12.5 Gram(s) IV Push once  dextrose 50% Injectable 25 Gram(s) IV Push once  folic acid 1 milliGRAM(s) Oral daily  glucagon  Injectable 1 milliGRAM(s) IntraMuscular once  influenza  Vaccine (HIGH DOSE) 0.5 milliLiter(s) IntraMuscular once  insulin lispro (ADMELOG) corrective regimen sliding scale   SubCutaneous three times a day before meals  mirtazapine 15 milliGRAM(s) Oral daily  naltrexone 50 milliGRAM(s) Oral daily  pantoprazole    Tablet 40 milliGRAM(s) Oral two times a day  polyethylene glycol 3350 17 Gram(s) Oral two times a day  polyethylene glycol/electrolyte Solution. 4000 milliLiter(s) Oral once  thiamine IVPB 250 milliGRAM(s) IV Intermittent daily    MEDICATIONS  (PRN):  acetaminophen     Tablet .. 650 milliGRAM(s) Oral every 6 hours PRN Temp greater or equal to 38C (100.4F), Mild Pain (1 - 3)  aluminum hydroxide/magnesium hydroxide/simethicone Suspension 30 milliLiter(s) Oral every 4 hours PRN Dyspepsia  dextrose Oral Gel 15 Gram(s) Oral once PRN Blood Glucose LESS THAN 70 milliGRAM(s)/deciliter  hydrOXYzine hydrochloride 25 milliGRAM(s) Oral every 6 hours PRN Anxiety  melatonin 3 milliGRAM(s) Oral at bedtime PRN Insomnia  ondansetron Injectable 4 milliGRAM(s) IV Push every 8 hours PRN Nausea and/or Vomiting  traZODone 25 milliGRAM(s) Oral at bedtime PRN Insomnia      Allergies  No Known Allergies      Review of Systems:   Cardiovascular:  No Chest Pain, No Palpitations  Respiratory:  No Cough, No Dyspnea  Gastrointestinal:  As described in HPI  Skin:  No Skin Lesions, No Jaundice  Neuro:  No Syncope, No Dizziness    Physical Examination:  T(C): 36.8 (04-24-25 @ 12:58), Max: 37.2 (04-24-25 @ 04:33)  HR: 82 (04-24-25 @ 12:58) (82 - 91)  BP: 121/80 (04-24-25 @ 12:58) (110/52 - 121/80)  RR: 18 (04-24-25 @ 12:58) (17 - 18)  SpO2: 94% (04-24-25 @ 12:58) (94% - 98%)      04-23-25 @ 07:01  -  04-24-25 @ 07:00  --------------------------------------------------------  IN: 100 mL / OUT: 500 mL / NET: -400 mL        GENERAL: no acute distress.  HEAD:  Atraumatic, Normocephalic  EYES: conjunctiva and sclera clear  NECK: Supple, no JVD or thyromegaly  CHEST/LUNG: Clear to auscultation bilaterally; No wheeze, rhonchi, or rales  HEART: Regular rate and rhythm; normal S1, S2, No murmurs.  ABDOMEN: Soft, nontender, nondistended; Bowel sounds present  NEUROLOGY: No asterixis or tremor.   SKIN: Intact, no jaundice     Data:                        7.7    5.32  )-----------( 196      ( 24 Apr 2025 11:46 )             22.5     Hgb trend:  7.7  04-24-25 @ 11:46  7.8  04-24-25 @ 05:01  7.6  04-23-25 @ 18:30  7.7  04-23-25 @ 05:50  8.0  04-22-25 @ 04:46  8.1  04-21-25 @ 20:38  8.3  04-21-25 @ 14:09        04-24    138  |  107  |  8[L]  ----------------------------<  157[H]  3.9   |  24  |  0.6[L]    Ca    8.0[L]      24 Apr 2025 05:01  Mg     1.8     04-24    TPro  4.4[L]  /  Alb  2.3[L]  /  TBili  1.1  /  DBili  x   /  AST  92[H]  /  ALT  30  /  AlkPhos  165[H]  04-23    Liver panel trend:  TBili 1.1   /   AST 92   /   ALT 30   /   AlkP 165   /   Tptn 4.4   /   Alb 2.3    /   DBili --      04-23  TBili 1.4   /      /   ALT 32   /   AlkP 152   /   Tptn 4.3   /   Alb 2.3    /   DBili --      04-22  TBili 1.4   /   AST 72   /   ALT 29   /   AlkP 161   /   Tptn 4.3   /   Alb 2.5    /   DBili --      04-21  TBili 1.6   /   AST 69   /   ALT 29   /   AlkP 165   /   Tptn 4.4   /   Alb 2.4    /   DBili --      04-21  TBili 2.2   /      /   ALT 41   /   AlkP 175   /   Tptn 4.8   /   Alb 2.6    /   DBili --      04-19  TBili 2.5   /      /   ALT 50   /   AlkP 196   /   Tptn 5.4   /   Alb 2.8    /   DBili --      04-18  TBili 1.5   /      /   ALT 58   /   AlkP 192   /   Tptn 5.6   /   Alb 3.1    /   DBili --      04-17  TBili 1.6   /      /   ALT 75   /   AlkP 224   /   Tptn 6.1   /   Alb 3.5    /   DBili --      04-16

## 2025-04-24 NOTE — PROGRESS NOTE ADULT - ASSESSMENT
4-year-old male with past medical history of alcohol use disorder, history of necrotizing pancreatitis, hypertension, hyperlipidemia presenting to the ED accompanied by his wife for intoxication. He was on CIWA and valium PRN, s/p RRT for fall while attempting to go to the bathroom with worsening confusion, with shaking episodes. Another RRT called for similar shaking while pt on toilet. Patient was initially altered and regained consciousness within minutes. PAtient was found to have BRBPR at that time, started on an octreotide drip, protonix 40 BID aand upgraded to SDU.    #Alcohol withdrawal wit history of alcohol use disorder  #Alcoholic and lactic Ketoacidosis, improved  #Transaminitis/Hepatic steatosis  #suspected folate and thiamine deficiency  #shaking episodes, r/o seizure   #Hypokalemia/hypomagnesemia   - currently calm, mental status at baseline  -  VEEG negative  - c/w CIWA monitoring, valium PRN  - addiction med consulted, concern for wernickes, recs  IV thiamine 500 mg q 8h for 3 days, followed by 250 mg IV daily up to additional 5 days   - catch team following  - monitor a replete electrolytes  - neuro rec appreciated  - repeat orthostats      #BRBPR  #Syncope  - RRT x 2 for whole body shaking and syncope.   - BRBPRnoted during RRT   - Patient had another episode of BRBPR yesterday, and overnight and then this AM (4 in total this admission) and then 20 min after his last episode, he had another syncopal episode with whole body shaking today while sitting. Patient was hypotensive and not alert or oriented for a couple minutes. A bolus was given and bp improved. Will transfer to tele. Follow up CBC and EKG and CXR  - Hb downtrending since admission  - did not receive PRBC  - was planned for EGD/colono but refused to drink prep  - now planned for EGD only  - monitor CBC, keep active T&S, transfuse if <7  - EGD: clean base ulcer - GI recs noted PPI BID, carafate, dc octreotide   - CTAP does show diverticuli so may be a source of the bleed  - vEEG was neg  - Appreciate Neuro recs      #Fall likely due to intoxication and orthostasis   - repeat orthostatics after procedure  - echo noted: EF > 55% and could not assess diastolic function   - PT eval   - patient will need psychiatry and naltrexone upon discharge, down grade to floor,       DVT: holding for possible bleed  Gi ppx: protonix  diet: full liquid  Code: full    pending: CBC, orthostats, type and screen, lactate, EKG, CXR

## 2025-04-24 NOTE — PROGRESS NOTE ADULT - ASSESSMENT
74-year-old male with past medical history of alcohol use disorder, history of necrotizing pancreatitis, hypertension, hyperlipidemia presenting to the ED accompanied by his wife for intoxication.  Wife states that the patient drinks approximately every day.  Patient has had a binge drinking episode for the past 4 days.  Today patient fell and was unable to ambulate after the fall.      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  - evaluated by neurology    # Syncope   # Orthostatic hypotension  # GI bleeding sec to duodenal ulcer  # Macrocytic anemia  # Hiatal hernia   # H/o  duodenal ulcer bleed s/p EGD 2022  # H/o necrotizing pancreatitis with WON s/p cystogastrostomy  -  TTE Echo Complete w/ Contrast w/o Doppler (04.21.25 @ 12:11) >Left ventricular ejection fraction, by visual estimation, is >55%.  - S/p IV fluids  - CT Abdomen and Pelvis w/ IV Cont (04.16.25 @ 17:22) >Severe hepatic steatosis. Nonspecific distended gallbladder.  Left adrenal gland 6 cm myelolipoma  - S/p EGD 4/22: Erythema in the antrum and stomach body compatible with non-erosive gastritis. (Biopsy). Hiatal Hernia. Erosions and ulceration in the duodenum compatible with duodenitis. A 5 mm clean based (Ra Class III) ulcer was noted in the duodenal sweep. No active bleeding seen in the examined portion of the duodenum.  - Discussed with wife regarding colonoscopy but she likes to hold off  - monitor CBC, keep active T&S, transfuse if <7  - S/p octrotide  - c/w  PPI BID  - Pt on Full liquid diet  - GI f/u   - IV fluid bolus today  - EKG  - cardiac monitoring  - transfer to telemetry for cardiac monitoring - 3 episode this hospitalization  - PT eval    # DVT prophylaxis    # Full code    # Pending: monitor  CBC,  lactate, EKG, CXR, GI f/u, PT eval  # Disposition: transfer to3c for cardiac monitoring

## 2025-04-24 NOTE — BH CONSULTATION LIAISON PROGRESS NOTE - CURRENT MEDICATION
MEDICATIONS  (STANDING):  bisacodyl 20 milliGRAM(s) Oral at bedtime  chlorhexidine 2% Cloths 1 Application(s) Topical daily  dextrose 50% Injectable 25 Gram(s) IV Push once  dextrose 50% Injectable 12.5 Gram(s) IV Push once  dextrose 50% Injectable 25 Gram(s) IV Push once  folic acid 1 milliGRAM(s) Oral daily  glucagon  Injectable 1 milliGRAM(s) IntraMuscular once  influenza  Vaccine (HIGH DOSE) 0.5 milliLiter(s) IntraMuscular once  insulin lispro (ADMELOG) corrective regimen sliding scale   SubCutaneous three times a day before meals  mirtazapine 15 milliGRAM(s) Oral daily  naltrexone 50 milliGRAM(s) Oral daily  pantoprazole    Tablet 40 milliGRAM(s) Oral two times a day  polyethylene glycol 3350 17 Gram(s) Oral two times a day  polyethylene glycol/electrolyte Solution. 4000 milliLiter(s) Oral once  thiamine IVPB 250 milliGRAM(s) IV Intermittent daily    MEDICATIONS  (PRN):  acetaminophen     Tablet .. 650 milliGRAM(s) Oral every 6 hours PRN Temp greater or equal to 38C (100.4F), Mild Pain (1 - 3)  aluminum hydroxide/magnesium hydroxide/simethicone Suspension 30 milliLiter(s) Oral every 4 hours PRN Dyspepsia  dextrose Oral Gel 15 Gram(s) Oral once PRN Blood Glucose LESS THAN 70 milliGRAM(s)/deciliter  hydrOXYzine hydrochloride 25 milliGRAM(s) Oral every 6 hours PRN Anxiety  melatonin 3 milliGRAM(s) Oral at bedtime PRN Insomnia  ondansetron Injectable 4 milliGRAM(s) IV Push every 8 hours PRN Nausea and/or Vomiting  traZODone 25 milliGRAM(s) Oral at bedtime PRN Insomnia

## 2025-04-24 NOTE — OCCUPATIONAL THERAPY INITIAL EVALUATION ADULT - IMPAIRED TRANSFERS: BED/CHAIR, REHAB EVAL
poor endurance/impaired balance/cognition/impaired coordination/decreased flexibility/impaired motor control

## 2025-04-24 NOTE — OCCUPATIONAL THERAPY INITIAL EVALUATION ADULT - GENERAL OBSERVATIONS, REHAB EVAL
Pt encountered reclined in bed, + IV locked. Pt agreeable to bedside OT assessment, may be seen as confirmed with RN. Pt returned to bedside chair, + IV locked, + chair alarm, + spouse present at bedside

## 2025-04-25 LAB
ALBUMIN SERPL ELPH-MCNC: 2.5 G/DL — LOW (ref 3.5–5.2)
ALP SERPL-CCNC: 155 U/L — HIGH (ref 30–115)
ALT FLD-CCNC: 23 U/L — SIGNIFICANT CHANGE UP (ref 0–41)
ANION GAP SERPL CALC-SCNC: 10 MMOL/L — SIGNIFICANT CHANGE UP (ref 7–14)
AST SERPL-CCNC: 64 U/L — HIGH (ref 0–41)
BASOPHILS # BLD AUTO: 0.05 K/UL — SIGNIFICANT CHANGE UP (ref 0–0.2)
BASOPHILS NFR BLD AUTO: 1.1 % — HIGH (ref 0–1)
BILIRUB SERPL-MCNC: 1.5 MG/DL — HIGH (ref 0.2–1.2)
BUN SERPL-MCNC: 8 MG/DL — LOW (ref 10–20)
CALCIUM SERPL-MCNC: 8.5 MG/DL — SIGNIFICANT CHANGE UP (ref 8.4–10.5)
CHLORIDE SERPL-SCNC: 105 MMOL/L — SIGNIFICANT CHANGE UP (ref 98–110)
CO2 SERPL-SCNC: 23 MMOL/L — SIGNIFICANT CHANGE UP (ref 17–32)
CREAT SERPL-MCNC: 0.7 MG/DL — SIGNIFICANT CHANGE UP (ref 0.7–1.5)
EGFR: 96 ML/MIN/1.73M2 — SIGNIFICANT CHANGE UP
EGFR: 96 ML/MIN/1.73M2 — SIGNIFICANT CHANGE UP
EOSINOPHIL # BLD AUTO: 0.07 K/UL — SIGNIFICANT CHANGE UP (ref 0–0.7)
EOSINOPHIL NFR BLD AUTO: 1.6 % — SIGNIFICANT CHANGE UP (ref 0–8)
GLUCOSE BLDC GLUCOMTR-MCNC: 136 MG/DL — HIGH (ref 70–99)
GLUCOSE BLDC GLUCOMTR-MCNC: 151 MG/DL — HIGH (ref 70–99)
GLUCOSE BLDC GLUCOMTR-MCNC: 163 MG/DL — HIGH (ref 70–99)
GLUCOSE BLDC GLUCOMTR-MCNC: 181 MG/DL — HIGH (ref 70–99)
GLUCOSE SERPL-MCNC: 134 MG/DL — HIGH (ref 70–99)
HCT VFR BLD CALC: 28 % — LOW (ref 42–52)
HGB BLD-MCNC: 9.5 G/DL — LOW (ref 14–18)
IMM GRANULOCYTES NFR BLD AUTO: 0.2 % — SIGNIFICANT CHANGE UP (ref 0.1–0.3)
LYMPHOCYTES # BLD AUTO: 1.42 K/UL — SIGNIFICANT CHANGE UP (ref 1.2–3.4)
LYMPHOCYTES # BLD AUTO: 31.8 % — SIGNIFICANT CHANGE UP (ref 20.5–51.1)
MAGNESIUM SERPL-MCNC: 2.2 MG/DL — SIGNIFICANT CHANGE UP (ref 1.8–2.4)
MCHC RBC-ENTMCNC: 33.5 PG — HIGH (ref 27–31)
MCHC RBC-ENTMCNC: 33.9 G/DL — SIGNIFICANT CHANGE UP (ref 32–37)
MCV RBC AUTO: 98.6 FL — HIGH (ref 80–94)
MONOCYTES # BLD AUTO: 0.46 K/UL — SIGNIFICANT CHANGE UP (ref 0.1–0.6)
MONOCYTES NFR BLD AUTO: 10.3 % — HIGH (ref 1.7–9.3)
NEUTROPHILS # BLD AUTO: 2.45 K/UL — SIGNIFICANT CHANGE UP (ref 1.4–6.5)
NEUTROPHILS NFR BLD AUTO: 55 % — SIGNIFICANT CHANGE UP (ref 42.2–75.2)
NRBC BLD AUTO-RTO: 0 /100 WBCS — SIGNIFICANT CHANGE UP (ref 0–0)
PLATELET # BLD AUTO: 215 K/UL — SIGNIFICANT CHANGE UP (ref 130–400)
PMV BLD: 10.5 FL — HIGH (ref 7.4–10.4)
POTASSIUM SERPL-MCNC: 4 MMOL/L — SIGNIFICANT CHANGE UP (ref 3.5–5)
POTASSIUM SERPL-SCNC: 4 MMOL/L — SIGNIFICANT CHANGE UP (ref 3.5–5)
PROT SERPL-MCNC: 4.3 G/DL — LOW (ref 6–8)
RBC # BLD: 2.84 M/UL — LOW (ref 4.7–6.1)
RBC # FLD: 16.7 % — HIGH (ref 11.5–14.5)
SODIUM SERPL-SCNC: 138 MMOL/L — SIGNIFICANT CHANGE UP (ref 135–146)
WBC # BLD: 4.46 K/UL — LOW (ref 4.8–10.8)
WBC # FLD AUTO: 4.46 K/UL — LOW (ref 4.8–10.8)

## 2025-04-25 PROCEDURE — 93010 ELECTROCARDIOGRAM REPORT: CPT

## 2025-04-25 PROCEDURE — 99233 SBSQ HOSP IP/OBS HIGH 50: CPT

## 2025-04-25 PROCEDURE — 99232 SBSQ HOSP IP/OBS MODERATE 35: CPT

## 2025-04-25 RX ORDER — POLYETHYLENE GLYCOL 3350 17 G/17G
17 POWDER, FOR SOLUTION ORAL ONCE
Refills: 0 | Status: COMPLETED | OUTPATIENT
Start: 2025-04-25 | End: 2025-04-25

## 2025-04-25 RX ORDER — METOPROLOL SUCCINATE 50 MG/1
12.5 TABLET, EXTENDED RELEASE ORAL
Refills: 0 | Status: DISCONTINUED | OUTPATIENT
Start: 2025-04-25 | End: 2025-04-29

## 2025-04-25 RX ADMIN — FOLIC ACID 1 MILLIGRAM(S): 1 TABLET ORAL at 11:49

## 2025-04-25 RX ADMIN — Medication 25 GRAM(S): at 00:39

## 2025-04-25 RX ADMIN — METOPROLOL SUCCINATE 12.5 MILLIGRAM(S): 50 TABLET, EXTENDED RELEASE ORAL at 17:21

## 2025-04-25 RX ADMIN — Medication 1 APPLICATION(S): at 11:49

## 2025-04-25 RX ADMIN — INSULIN LISPRO 1: 100 INJECTION, SOLUTION INTRAVENOUS; SUBCUTANEOUS at 11:48

## 2025-04-25 RX ADMIN — Medication 20 MILLIEQUIVALENT(S): at 00:38

## 2025-04-25 RX ADMIN — POLYETHYLENE GLYCOL 3350 17 GRAM(S): 17 POWDER, FOR SOLUTION ORAL at 08:28

## 2025-04-25 RX ADMIN — MIRTAZAPINE 15 MILLIGRAM(S): 30 TABLET, FILM COATED ORAL at 21:57

## 2025-04-25 RX ADMIN — INSULIN LISPRO 1: 100 INJECTION, SOLUTION INTRAVENOUS; SUBCUTANEOUS at 17:22

## 2025-04-25 RX ADMIN — POLYETHYLENE GLYCOL 3350 17 GRAM(S): 17 POWDER, FOR SOLUTION ORAL at 09:14

## 2025-04-25 RX ADMIN — Medication 40 MILLIGRAM(S): at 17:21

## 2025-04-25 RX ADMIN — Medication 102.5 MILLIGRAM(S): at 11:49

## 2025-04-25 RX ADMIN — POLYETHYLENE GLYCOL 3350 17 GRAM(S): 17 POWDER, FOR SOLUTION ORAL at 08:27

## 2025-04-25 RX ADMIN — NALTREXONE HYDROCHLORIDE 50 MILLIGRAM(S): 50 TABLET, FILM COATED ORAL at 11:49

## 2025-04-25 RX ADMIN — POLYETHYLENE GLYCOL 3350 17 GRAM(S): 17 POWDER, FOR SOLUTION ORAL at 08:14

## 2025-04-25 NOTE — PROGRESS NOTE ADULT - ASSESSMENT
4-year-old male with past medical history of alcohol use disorder, history of necrotizing pancreatitis, hypertension, hyperlipidemia presenting to the ED accompanied by his wife for intoxication. He was on CIWA and valium PRN, s/p RRT for fall while attempting to go to the bathroom with worsening confusion, with shaking episodes. Another RRT called for similar shaking while pt on toilet. Patient was initially altered and regained consciousness within minutes. PAtient was found to have BRBPR at that time, started on an octreotide drip, protonix 40 BID and upgraded to SDU. Downgraded to 4b and transferred to tele to r/o cardiac causes of RRs.    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  - evaluated by neurology    #Syncope  2/2 Orthostatic hypotension possibly 2/2 GI bleeding   #Macrocytic anemia  #Hiatal hernia   #H/o duodenal ulcer bleed s/p EGD 2022  #H/o necrotizing pancreatitis with WON s/p cystogastrostomy  - TTE Echo Complete w/ Contrast w/o Doppler (04.21.25 @ 12:11) >Left ventricular ejection fraction, by visual estimation, is >55%.  - S/p IV fluids  - CT Abdomen and Pelvis w/ IV Cont (04.16.25 @ 17:22) >Severe hepatic steatosis. Nonspecific distended gallbladder.  Left adrenal gland 6 cm myelolipoma  - S/p EGD 4/22: Erythema in the antrum and stomach body compatible with non-erosive gastritis. (Biopsy). Hiatal Hernia. Erosions and ulceration in the duodenum compatible with duodenitis. A 5 mm clean based (Ra Class III) ulcer was noted in the duodenal sweep. No active bleeding seen in the examined portion of the duodenum.  - Discussed with wife regarding colonoscopy but she likes to hold off  - monitor CBC, keep active T&S, transfuse if <7  - S/p octrotide  - c/w protonix BID  - Pt on Full liquid diet  - GI recs appreciated       - Colonoscopy today pending adequate bowel prep  - C/w tele- PACs present  - PT/OT- rehab    #MISC  - DVT ppx: none 2/2 GI bleed  - GI ppx: protonix  - Diet: Full Liquid  - Activity: as tolerated    Pending: colonoscopy today, possibly start metoprolol if PACs continue 4-year-old male with past medical history of alcohol use disorder, history of necrotizing pancreatitis, hypertension, hyperlipidemia presenting to the ED accompanied by his wife for intoxication. He was on CIWA and valium PRN, s/p RRT for fall while attempting to go to the bathroom with worsening confusion, with shaking episodes. Another RRT called for similar shaking while pt on toilet. Patient was initially altered and regained consciousness within minutes. PAtient was found to have BRBPR at that time, started on an octreotide drip, protonix 40 BID and upgraded to SDU. Downgraded to 4b and transferred to tele to r/o cardiac causes of RRs.    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  - evaluated by neurology    #Syncope  2/2 Orthostatic hypotension possibly 2/2 GI bleeding   #Macrocytic anemia  #Hiatal hernia   #H/o duodenal ulcer bleed s/p EGD 2022  #H/o necrotizing pancreatitis with WON s/p cystogastrostomy  - TTE Echo Complete w/ Contrast w/o Doppler (04.21.25 @ 12:11) >Left ventricular ejection fraction, by visual estimation, is >55%.  - S/p IV fluids  - CT Abdomen and Pelvis w/ IV Cont (04.16.25 @ 17:22) >Severe hepatic steatosis. Nonspecific distended gallbladder.  Left adrenal gland 6 cm myelolipoma  - S/p EGD 4/22: Erythema in the antrum and stomach body compatible with non-erosive gastritis. (Biopsy). Hiatal Hernia. Erosions and ulceration in the duodenum compatible with duodenitis. A 5 mm clean based (Ra Class III) ulcer was noted in the duodenal sweep. No active bleeding seen in the examined portion of the duodenum.  - Discussed with wife regarding colonoscopy but she likes to hold off  - monitor CBC, keep active T&S, transfuse if <7  - S/p octrotide  - c/w protonix BID  - GI recs appreciated       - Colonoscopy canceled 2/2 poor prep       - F/u for further recs  - Advanced diet to regular  - C/w tele- PACs present  - Started lopressor 12.5 BID  - PT/OT- rehab    #MISC  - DVT ppx: none 2/2 GI bleed  - GI ppx: protonix  - Diet: Full Liquid  - Activity: as tolerated    Pending: GI f/u

## 2025-04-25 NOTE — PROGRESS NOTE ADULT - ASSESSMENT
74-year-old male with past medical history of alcohol use disorder (Last use 4/16/25), history of necrotizing pancreatitis with WON s/p cystogastrostomy , hx of upper GI bleed sec to duodenal bulb ulcer (2022), hypertension, hyperlipidemia presenting to the ED accompanied by his wife for intoxication and a fall.  Wife states that the patient drinks approximately every day.  Patient has had a binge drinking episode for the past 4 days prior to presentation. Patient admitted to medicine on 4/16 for intoxication. This early am he had rapid response with seizure like activity. Today he had dark blood with clots with BM, hypotensive (RRT). GI consulted for further evaluation.     #Dark stool with clots likely sec to duodenal ulcer s/p EGD 4/23   #Acute Macrocytic anemia with low platelets  #AUD, elevated liver enzymes with alcoholic hepatitis (MDF 6.4)  #hx of duodenal ulcer bleed s/p EGD 2022  #history of necrotizing pancreatitis with WON s/p cystogastrostomy  - Hb on admission : 13.4>11.5 (4/18)>8.7 (4/21)  - s/p EGD 11/22: duodenal bulb healing ulcer  - s/p EGD 10/22: 3cm duo bulb ulcer s/p hemostasis  - s/p EGD 9/22: multiple ulcer in antrum, duo bulb, GE junc Ulcer (HP-)  - s/p colonoscopy 9/22: poor prep:   - 4/16: CTAP IC: distended GB, severe hepatic steatosis  - 4/17: RUQ US: CBD 6mm, hep steatosis  RRT 4.21 x2 for seizure like activity currently on vEEG, hypotensive briefly  - Not on AC, no cirrhosis on imaging  - s/p EGD 4/22: Impressions:  	Erythema in the antrum and stomach body compatible with non-erosive gastritis. (Biopsy).  	Hiatal Hernia.  	Erosions and ulceration in the duodenum compatible with duodenitis.  	A 5 mm clean based (Ra Class III) ulcer was noted in the duodenal sweep. No active bleeding seen in the examined portion of the duodenum.  	Normal mucosa in the whole esophagus.  - 4/24: recalled as pt having dark maroon BMs  - 4/23: was scheduled for colonoscopy but patient drank only 3/4 th prep and having brown BMs    RECS  - spoke with wife at bedside for colonoscopy for next week once able to clearly prep  - Low fiber diet for now  - c/w PPI to PO 40mg BID,   - CBC q8, keep above 7 , transfuse PRN  - Mg, folate, thiamine supplementation  - If any hypotension , will recommend STAT CT angio AP to rule out active bleed  - Alcohol cessation advised  - We will follow

## 2025-04-25 NOTE — PROGRESS NOTE ADULT - ATTENDING SUPERVISION STATEMENT
Cooperative/Awake/Alert
Fellow
Fellow
Resident
Fellow
Fellow
Resident

## 2025-04-25 NOTE — PROGRESS NOTE ADULT - ATTENDING COMMENTS
pt seen this am   Vital Signs Last 24 Hrs  T(C): 36.8 (25 Apr 2025 12:00), Max: 36.8 (25 Apr 2025 12:00)  T(F): 98.3 (25 Apr 2025 12:00), Max: 98.3 (25 Apr 2025 12:00)  HR: 86 (25 Apr 2025 12:00) (83 - 93)  BP: 124/81 (25 Apr 2025 12:00) (124/81 - 152/90)  BP(mean): --  RR: 18 (25 Apr 2025 12:00) (18 - 18)  SpO2: 97% (25 Apr 2025 12:00) (96% - 97%)    Parameters below as of 25 Apr 2025 08:04  Patient On (Oxygen Delivery Method): room air                          9.5    4.46  )-----------( 215      ( 25 Apr 2025 07:37 )             28.0   04-25    138  |  105  |  8[L]  ----------------------------<  134[H]  4.0   |  23  |  0.7    Ca    8.5      25 Apr 2025 07:37  Mg     2.2     04-25    TPro  4.3[L]  /  Alb  2.5[L]  /  TBili  1.5[H]  /  DBili  x   /  AST  64[H]  /  ALT  23  /  AlkPhos  155[H]  04-25    # ETOH dependence  # Syncope - orthostatic hypotension   # GI bleed    -first encounter with him   -colonoscopy today - had poor bowel prep - Miralax doses added - will follow   -monitor cbc, keep active type and screen   -c/w tele - with PVCs  -updated wife at bedside   -above note reviewed    DISPO: not discharge ready - cbc, tele monitoring, colonoscopy   -spoke to wife at bedside     Attending Physician Dr. Paradise Toribio # [id number]

## 2025-04-25 NOTE — PROGRESS NOTE ADULT - SUBJECTIVE AND OBJECTIVE BOX
Gastroenterology progress note:     Patient is a 74y old  Male who presents with a chief complaint of Fall (25 Apr 2025 10:30)       Admitted on: 04-16-25    We are following the patient for:       Interval History:    No acute events overnight.   - Diet -   - last BM -   - Abdominal pain -       PAST MEDICAL & SURGICAL HISTORY:  HTN (hypertension)      High cholesterol      Gastric ulcer          MEDICATIONS  (STANDING):  chlorhexidine 2% Cloths 1 Application(s) Topical daily  dextrose 50% Injectable 25 Gram(s) IV Push once  dextrose 50% Injectable 12.5 Gram(s) IV Push once  dextrose 50% Injectable 25 Gram(s) IV Push once  folic acid 1 milliGRAM(s) Oral daily  glucagon  Injectable 1 milliGRAM(s) IntraMuscular once  influenza  Vaccine (HIGH DOSE) 0.5 milliLiter(s) IntraMuscular once  insulin lispro (ADMELOG) corrective regimen sliding scale   SubCutaneous three times a day before meals  metoprolol tartrate 12.5 milliGRAM(s) Oral two times a day  mirtazapine 15 milliGRAM(s) Oral at bedtime  naltrexone 50 milliGRAM(s) Oral daily  pantoprazole    Tablet 40 milliGRAM(s) Oral two times a day  polyethylene glycol 3350 17 Gram(s) Oral two times a day  thiamine IVPB 250 milliGRAM(s) IV Intermittent daily    MEDICATIONS  (PRN):  acetaminophen     Tablet .. 650 milliGRAM(s) Oral every 6 hours PRN Temp greater or equal to 38C (100.4F), Mild Pain (1 - 3)  aluminum hydroxide/magnesium hydroxide/simethicone Suspension 30 milliLiter(s) Oral every 4 hours PRN Dyspepsia  dextrose Oral Gel 15 Gram(s) Oral once PRN Blood Glucose LESS THAN 70 milliGRAM(s)/deciliter  hydrOXYzine hydrochloride 25 milliGRAM(s) Oral every 6 hours PRN Anxiety  melatonin 3 milliGRAM(s) Oral at bedtime PRN Insomnia  ondansetron Injectable 4 milliGRAM(s) IV Push every 8 hours PRN Nausea and/or Vomiting  traZODone 25 milliGRAM(s) Oral at bedtime PRN Insomnia      Allergies  No Known Allergies      Review of Systems:   Cardiovascular:  No Chest Pain, No Palpitations  Respiratory:  No Cough, No Dyspnea  Gastrointestinal:  As described in HPI  Skin:  No Skin Lesions, No Jaundice  Neuro:  No Syncope, No Dizziness    Physical Examination:  T(C): 36.8 (04-25-25 @ 12:00), Max: 36.8 (04-25-25 @ 12:00)  HR: 86 (04-25-25 @ 12:00) (83 - 93)  BP: 124/81 (04-25-25 @ 12:00) (124/81 - 152/90)  RR: 18 (04-25-25 @ 12:00) (18 - 18)  SpO2: 97% (04-25-25 @ 12:00) (96% - 97%)        GENERAL: AAOx3, no acute distress.  HEAD:  Atraumatic, Normocephalic  EYES: conjunctiva and sclera clear  NECK: Supple, no JVD or thyromegaly  CHEST/LUNG: Clear to auscultation bilaterally; No wheeze, rhonchi, or rales  HEART: Regular rate and rhythm; normal S1, S2, No murmurs.  ABDOMEN: Soft, nontender, nondistended; Bowel sounds present  NEUROLOGY: No asterixis or tremor.   SKIN: Intact, no jaundice     Data:                        9.5    4.46  )-----------( 215      ( 25 Apr 2025 07:37 )             28.0     Hgb trend:  9.5  04-25-25 @ 07:37  9.4  04-24-25 @ 20:00  7.7  04-24-25 @ 11:46  7.8  04-24-25 @ 05:01  7.6  04-23-25 @ 18:30  7.7  04-23-25 @ 05:50        04-25    138  |  105  |  8[L]  ----------------------------<  134[H]  4.0   |  23  |  0.7    Ca    8.5      25 Apr 2025 07:37  Mg     2.2     04-25    TPro  4.3[L]  /  Alb  2.5[L]  /  TBili  1.5[H]  /  DBili  x   /  AST  64[H]  /  ALT  23  /  AlkPhos  155[H]  04-25    Liver panel trend:  TBili 1.5   /   AST 64   /   ALT 23   /   AlkP 155   /   Tptn 4.3   /   Alb 2.5    /   DBili --      04-25  TBili 1.8   /   AST 66   /   ALT 25   /   AlkP 148   /   Tptn 4.3   /   Alb 2.3    /   DBili --      04-24  TBili 1.1   /   AST 92   /   ALT 30   /   AlkP 165   /   Tptn 4.4   /   Alb 2.3    /   DBili --      04-23  TBili 1.4   /      /   ALT 32   /   AlkP 152   /   Tptn 4.3   /   Alb 2.3    /   DBili --      04-22  TBili 1.4   /   AST 72   /   ALT 29   /   AlkP 161   /   Tptn 4.3   /   Alb 2.5    /   DBili --      04-21  TBili 1.6   /   AST 69   /   ALT 29   /   AlkP 165   /   Tptn 4.4   /   Alb 2.4    /   DBili --      04-21  TBili 2.2   /      /   ALT 41   /   AlkP 175   /   Tptn 4.8   /   Alb 2.6    /   DBili --      04-19  TBili 2.5   /      /   ALT 50   /   AlkP 196   /   Tptn 5.4   /   Alb 2.8    /   DBili --      04-18  TBili 1.5   /      /   ALT 58   /   AlkP 192   /   Tptn 5.6   /   Alb 3.1    /   DBili --      04-17  TBili 1.6   /      /   ALT 75   /   AlkP 224   /   Tptn 6.1   /   Alb 3.5    /   DBili --      04-16      PT/INR - ( 24 Apr 2025 20:00 )   PT: 13.70 sec;   INR: 1.16 ratio         PTT - ( 24 Apr 2025 20:00 )  PTT:27.2 sec       Radiology:       Gastroenterology progress note:     Patient is a 74y old  Male who presents with a chief complaint of Fall (25 Apr 2025 10:30)       Admitted on: 04-16-25    We are following the patient for: bloody Bms       Interval History: patient drank 3/4 prep with brown stools, no bleeding      PAST MEDICAL & SURGICAL HISTORY:  HTN (hypertension)      High cholesterol      Gastric ulcer          MEDICATIONS  (STANDING):  chlorhexidine 2% Cloths 1 Application(s) Topical daily  dextrose 50% Injectable 25 Gram(s) IV Push once  dextrose 50% Injectable 12.5 Gram(s) IV Push once  dextrose 50% Injectable 25 Gram(s) IV Push once  folic acid 1 milliGRAM(s) Oral daily  glucagon  Injectable 1 milliGRAM(s) IntraMuscular once  influenza  Vaccine (HIGH DOSE) 0.5 milliLiter(s) IntraMuscular once  insulin lispro (ADMELOG) corrective regimen sliding scale   SubCutaneous three times a day before meals  metoprolol tartrate 12.5 milliGRAM(s) Oral two times a day  mirtazapine 15 milliGRAM(s) Oral at bedtime  naltrexone 50 milliGRAM(s) Oral daily  pantoprazole    Tablet 40 milliGRAM(s) Oral two times a day  polyethylene glycol 3350 17 Gram(s) Oral two times a day  thiamine IVPB 250 milliGRAM(s) IV Intermittent daily    MEDICATIONS  (PRN):  acetaminophen     Tablet .. 650 milliGRAM(s) Oral every 6 hours PRN Temp greater or equal to 38C (100.4F), Mild Pain (1 - 3)  aluminum hydroxide/magnesium hydroxide/simethicone Suspension 30 milliLiter(s) Oral every 4 hours PRN Dyspepsia  dextrose Oral Gel 15 Gram(s) Oral once PRN Blood Glucose LESS THAN 70 milliGRAM(s)/deciliter  hydrOXYzine hydrochloride 25 milliGRAM(s) Oral every 6 hours PRN Anxiety  melatonin 3 milliGRAM(s) Oral at bedtime PRN Insomnia  ondansetron Injectable 4 milliGRAM(s) IV Push every 8 hours PRN Nausea and/or Vomiting  traZODone 25 milliGRAM(s) Oral at bedtime PRN Insomnia      Allergies  No Known Allergies      Review of Systems:   Cardiovascular:  No Chest Pain, No Palpitations  Respiratory:  No Cough, No Dyspnea  Gastrointestinal:  As described in HPI  Skin:  No Skin Lesions, No Jaundice  Neuro:  No Syncope, No Dizziness    Physical Examination:  T(C): 36.8 (04-25-25 @ 12:00), Max: 36.8 (04-25-25 @ 12:00)  HR: 86 (04-25-25 @ 12:00) (83 - 93)  BP: 124/81 (04-25-25 @ 12:00) (124/81 - 152/90)  RR: 18 (04-25-25 @ 12:00) (18 - 18)  SpO2: 97% (04-25-25 @ 12:00) (96% - 97%)        GENERAL: AAOx3, no acute distress.  HEAD:  Atraumatic, Normocephalic  EYES: conjunctiva and sclera clear  NECK: Supple, no JVD or thyromegaly  CHEST/LUNG: Clear to auscultation bilaterally; No wheeze, rhonchi, or rales  HEART: Regular rate and rhythm; normal S1, S2, No murmurs.  ABDOMEN: Soft, nontender, nondistended; Bowel sounds present  NEUROLOGY: No asterixis or tremor.   SKIN: Intact, no jaundice     Data:                        9.5    4.46  )-----------( 215      ( 25 Apr 2025 07:37 )             28.0     Hgb trend:  9.5  04-25-25 @ 07:37  9.4  04-24-25 @ 20:00  7.7  04-24-25 @ 11:46  7.8  04-24-25 @ 05:01  7.6  04-23-25 @ 18:30  7.7  04-23-25 @ 05:50        04-25    138  |  105  |  8[L]  ----------------------------<  134[H]  4.0   |  23  |  0.7    Ca    8.5      25 Apr 2025 07:37  Mg     2.2     04-25    TPro  4.3[L]  /  Alb  2.5[L]  /  TBili  1.5[H]  /  DBili  x   /  AST  64[H]  /  ALT  23  /  AlkPhos  155[H]  04-25    Liver panel trend:  TBili 1.5   /   AST 64   /   ALT 23   /   AlkP 155   /   Tptn 4.3   /   Alb 2.5    /   DBili --      04-25  TBili 1.8   /   AST 66   /   ALT 25   /   AlkP 148   /   Tptn 4.3   /   Alb 2.3    /   DBili --      04-24  TBili 1.1   /   AST 92   /   ALT 30   /   AlkP 165   /   Tptn 4.4   /   Alb 2.3    /   DBili --      04-23  TBili 1.4   /      /   ALT 32   /   AlkP 152   /   Tptn 4.3   /   Alb 2.3    /   DBili --      04-22  TBili 1.4   /   AST 72   /   ALT 29   /   AlkP 161   /   Tptn 4.3   /   Alb 2.5    /   DBili --      04-21  TBili 1.6   /   AST 69   /   ALT 29   /   AlkP 165   /   Tptn 4.4   /   Alb 2.4    /   DBili --      04-21  TBili 2.2   /      /   ALT 41   /   AlkP 175   /   Tptn 4.8   /   Alb 2.6    /   DBili --      04-19  TBili 2.5   /      /   ALT 50   /   AlkP 196   /   Tptn 5.4   /   Alb 2.8    /   DBili --      04-18  TBili 1.5   /      /   ALT 58   /   AlkP 192   /   Tptn 5.6   /   Alb 3.1    /   DBili --      04-17  TBili 1.6   /      /   ALT 75   /   AlkP 224   /   Tptn 6.1   /   Alb 3.5    /   DBili --      04-16      PT/INR - ( 24 Apr 2025 20:00 )   PT: 13.70 sec;   INR: 1.16 ratio         PTT - ( 24 Apr 2025 20:00 )  PTT:27.2 sec

## 2025-04-25 NOTE — PROGRESS NOTE ADULT - SUBJECTIVE AND OBJECTIVE BOX
SUBJECTIVE/OVERNIGHT EVENTS  Today is hospital day 9d. This morning patient was seen and examined at bedside, resting comfortably in bed. No acute or major events overnight. Patient endorses lightheadedness and persistent bloody bowel movements. Denies fever, chills, nausea, vomiting, diarrhea, constipation, hematochezia, dysuria, hematuria, chest pain, palpitations, sob. Patient was informed of their plan of care at this time.    CODE STATUS: FULL    MEDICATIONS  STANDING MEDICATIONS  chlorhexidine 2% Cloths 1 Application(s) Topical daily  dextrose 50% Injectable 25 Gram(s) IV Push once  dextrose 50% Injectable 12.5 Gram(s) IV Push once  dextrose 50% Injectable 25 Gram(s) IV Push once  folic acid 1 milliGRAM(s) Oral daily  glucagon  Injectable 1 milliGRAM(s) IntraMuscular once  influenza  Vaccine (HIGH DOSE) 0.5 milliLiter(s) IntraMuscular once  insulin lispro (ADMELOG) corrective regimen sliding scale   SubCutaneous three times a day before meals  mirtazapine 15 milliGRAM(s) Oral at bedtime  naltrexone 50 milliGRAM(s) Oral daily  pantoprazole    Tablet 40 milliGRAM(s) Oral two times a day  polyethylene glycol 3350 17 Gram(s) Oral two times a day  thiamine IVPB 250 milliGRAM(s) IV Intermittent daily    PRN MEDICATIONS  acetaminophen     Tablet .. 650 milliGRAM(s) Oral every 6 hours PRN  aluminum hydroxide/magnesium hydroxide/simethicone Suspension 30 milliLiter(s) Oral every 4 hours PRN  dextrose Oral Gel 15 Gram(s) Oral once PRN  hydrOXYzine hydrochloride 25 milliGRAM(s) Oral every 6 hours PRN  melatonin 3 milliGRAM(s) Oral at bedtime PRN  ondansetron Injectable 4 milliGRAM(s) IV Push every 8 hours PRN  traZODone 25 milliGRAM(s) Oral at bedtime PRN    VITALS  T(F): 98 (04-25-25 @ 08:04), Max: 98.3 (04-24-25 @ 12:58)  HR: 83 (04-25-25 @ 08:04) (82 - 93)  BP: 138/76 (04-25-25 @ 08:04) (121/80 - 152/90)  RR: 18 (04-25-25 @ 08:04) (18 - 18)  SpO2: 96% (04-25-25 @ 08:04) (94% - 97%)  POCT Blood Glucose.: 136 mg/dL (04-25-25 @ 07:28)  POCT Blood Glucose.: 156 mg/dL (04-24-25 @ 17:55)  POCT Blood Glucose.: 123 mg/dL (04-24-25 @ 11:02)    PHYSICAL EXAM  GENERAL  ( X ) NAD, lying in bed comfortably     (  ) obtunded     (  ) lethargic     (  ) somnolent    HEAD  ( X ) Atraumatic     (  ) hematoma     (  ) laceration (specify location:       )     NECK  ( X ) Supple     (  ) neck stiffness     (  ) nuchal rigidity     (  )  no JVD     (  ) JVD present ( -- cm)    HEART  Rate -->  ( X ) normal rate    (  ) bradycardic    (  ) tachycardic  Rhythm -->  ( X ) regular    (  ) regularly irregular    (  ) irregularly irregular  Murmurs -->  ( X ) normal s1/s2    (  ) systolic murmur    (  ) diastolic murmur    (  ) continuous murmur     (  ) S3 present    (  ) S4 present    LUNGS  ( X )Unlabored respirations     (  ) tachypnea  ( X ) B/L air entry     (  ) decreased breath sounds in:  (location     )    (  ) no adventitious sound     (  ) crackles     (  ) wheezing      (  ) rhonchi      (specify location:       )  (  ) chest wall tenderness (specify location:       )    ABDOMEN  ( X ) Soft     (  ) tense   |   ( X ) nondistended     (  ) distended   |   ( X ) +BS     (  ) hypoactive bowel sounds     (  ) hyperactive bowel sounds  ( X ) nontender     (  ) RUQ tenderness     (  ) RLQ tenderness     (  ) LLQ tenderness     (  ) epigastric tenderness     (  ) diffuse tenderness  (  ) Splenomegaly      (  ) Hepatomegaly      (  ) Jaundice     (  ) ecchymosis     EXTREMITIES  ( X ) Normal     (  ) Rash     (  ) ecchymosis     (  ) varicose veins      (  ) pitting edema     (  ) non-pitting edema   (  ) ulceration     (  ) gangrene:     (location:     )    NERVOUS SYSTEM  ( X ) A&Ox3     (  ) confused     (  ) lethargic  CN II-XII:     (  ) Intact     (  ) focal deficits  (Specify:     )   Upper extremities:     (  ) strength X/5     (  ) focal deficit (specify:    )  Lower extremities:     (  ) strength  X/5    (  ) focal deficit (specify:    )    SKIN  ( X ) No rashes or lesions     (  ) maculopapular rash     (  ) pustules     (  ) vesicles     (  ) ulcer     (  ) ecchymosis     (specify location:     )    LABS             9.5    4.46  )-----------( 215      ( 04-25-25 @ 07:37 )             28.0     138  |  105  |  8   -------------------------<  134   04-25-25 @ 07:37  4.0  |  23  |  0.7    Ca      8.5     04-25-25 @ 07:37  Mg     2.2     04-25-25 @ 07:37    TPro  4.3  /  Alb  2.5  /  TBili  1.5  /  DBili  x   /  AST  64  /  ALT  23  /  AlkPhos  155  /  GGT  x     04-25-25 @ 07:37    PT/INR - ( 04-24-25 @ 20:00 )   PT: 13.70 sec[H];   INR: 1.16 ratio  PTT - ( 04-24-25 @ 20:00 )  PTT:27.2 sec    Troponin T, High Sensitivity Result: 20 ng/L (04-24-25 @ 00:56)  Troponin T, High Sensitivity Result: 22 ng/L (04-23-25 @ 16:43)    Urinalysis Basic - ( 25 Apr 2025 07:37 )    Color: x / Appearance: x / SG: x / pH: x  Gluc: 134 mg/dL / Ketone: x  / Bili: x / Urobili: x   Blood: x / Protein: x / Nitrite: x   Leuk Esterase: x / RBC: x / WBC x   Sq Epi: x / Non Sq Epi: x / Bacteria: x          IMAGING  < from: Xray Chest 1 View-PORTABLE IMMEDIATE (Xray Chest 1 View-PORTABLE IMMEDIATE .) (04.24.25 @ 11:31) >  IMPRESSION:    Left basilar opacity, new finding.    < end of copied text >

## 2025-04-26 LAB
ALBUMIN SERPL ELPH-MCNC: 2.3 G/DL — LOW (ref 3.5–5.2)
ALP SERPL-CCNC: 158 U/L — HIGH (ref 30–115)
ALT FLD-CCNC: 19 U/L — SIGNIFICANT CHANGE UP (ref 0–41)
ANION GAP SERPL CALC-SCNC: 9 MMOL/L — SIGNIFICANT CHANGE UP (ref 7–14)
AST SERPL-CCNC: 58 U/L — HIGH (ref 0–41)
BASOPHILS # BLD AUTO: 0.07 K/UL — SIGNIFICANT CHANGE UP (ref 0–0.2)
BASOPHILS NFR BLD AUTO: 1.7 % — HIGH (ref 0–1)
BILIRUB DIRECT SERPL-MCNC: 0.7 MG/DL — HIGH (ref 0–0.3)
BILIRUB INDIRECT FLD-MCNC: 0.5 MG/DL — SIGNIFICANT CHANGE UP (ref 0.2–1.2)
BILIRUB SERPL-MCNC: 1.2 MG/DL — SIGNIFICANT CHANGE UP (ref 0.2–1.2)
BILIRUB SERPL-MCNC: 1.2 MG/DL — SIGNIFICANT CHANGE UP (ref 0.2–1.2)
BLD GP AB SCN SERPL QL: SIGNIFICANT CHANGE UP
BUN SERPL-MCNC: 10 MG/DL — SIGNIFICANT CHANGE UP (ref 10–20)
CALCIUM SERPL-MCNC: 8.3 MG/DL — LOW (ref 8.4–10.5)
CHLORIDE SERPL-SCNC: 107 MMOL/L — SIGNIFICANT CHANGE UP (ref 98–110)
CO2 SERPL-SCNC: 22 MMOL/L — SIGNIFICANT CHANGE UP (ref 17–32)
CREAT SERPL-MCNC: 0.7 MG/DL — SIGNIFICANT CHANGE UP (ref 0.7–1.5)
EGFR: 96 ML/MIN/1.73M2 — SIGNIFICANT CHANGE UP
EGFR: 96 ML/MIN/1.73M2 — SIGNIFICANT CHANGE UP
EOSINOPHIL # BLD AUTO: 0.13 K/UL — SIGNIFICANT CHANGE UP (ref 0–0.7)
EOSINOPHIL NFR BLD AUTO: 3.1 % — SIGNIFICANT CHANGE UP (ref 0–8)
GLUCOSE BLDC GLUCOMTR-MCNC: 125 MG/DL — HIGH (ref 70–99)
GLUCOSE BLDC GLUCOMTR-MCNC: 138 MG/DL — HIGH (ref 70–99)
GLUCOSE BLDC GLUCOMTR-MCNC: 183 MG/DL — HIGH (ref 70–99)
GLUCOSE BLDC GLUCOMTR-MCNC: 208 MG/DL — HIGH (ref 70–99)
GLUCOSE SERPL-MCNC: 130 MG/DL — HIGH (ref 70–99)
HCT VFR BLD CALC: 27.8 % — LOW (ref 42–52)
HGB BLD-MCNC: 9.3 G/DL — LOW (ref 14–18)
IMM GRANULOCYTES NFR BLD AUTO: 0.2 % — SIGNIFICANT CHANGE UP (ref 0.1–0.3)
LYMPHOCYTES # BLD AUTO: 1.57 K/UL — SIGNIFICANT CHANGE UP (ref 1.2–3.4)
LYMPHOCYTES # BLD AUTO: 37.8 % — SIGNIFICANT CHANGE UP (ref 20.5–51.1)
MAGNESIUM SERPL-MCNC: 1.8 MG/DL — SIGNIFICANT CHANGE UP (ref 1.8–2.4)
MCHC RBC-ENTMCNC: 33.5 G/DL — SIGNIFICANT CHANGE UP (ref 32–37)
MCHC RBC-ENTMCNC: 33.5 PG — HIGH (ref 27–31)
MCV RBC AUTO: 100 FL — HIGH (ref 80–94)
MONOCYTES # BLD AUTO: 0.45 K/UL — SIGNIFICANT CHANGE UP (ref 0.1–0.6)
MONOCYTES NFR BLD AUTO: 10.8 % — HIGH (ref 1.7–9.3)
NEUTROPHILS # BLD AUTO: 1.92 K/UL — SIGNIFICANT CHANGE UP (ref 1.4–6.5)
NEUTROPHILS NFR BLD AUTO: 46.4 % — SIGNIFICANT CHANGE UP (ref 42.2–75.2)
NRBC BLD AUTO-RTO: 0 /100 WBCS — SIGNIFICANT CHANGE UP (ref 0–0)
PLATELET # BLD AUTO: 226 K/UL — SIGNIFICANT CHANGE UP (ref 130–400)
PMV BLD: 10.3 FL — SIGNIFICANT CHANGE UP (ref 7.4–10.4)
POTASSIUM SERPL-MCNC: 3.8 MMOL/L — SIGNIFICANT CHANGE UP (ref 3.5–5)
POTASSIUM SERPL-SCNC: 3.8 MMOL/L — SIGNIFICANT CHANGE UP (ref 3.5–5)
PROT SERPL-MCNC: 4.4 G/DL — LOW (ref 6–8)
RBC # BLD: 2.78 M/UL — LOW (ref 4.7–6.1)
RBC # FLD: 15.7 % — HIGH (ref 11.5–14.5)
SODIUM SERPL-SCNC: 138 MMOL/L — SIGNIFICANT CHANGE UP (ref 135–146)
WBC # BLD: 4.15 K/UL — LOW (ref 4.8–10.8)
WBC # FLD AUTO: 4.15 K/UL — LOW (ref 4.8–10.8)

## 2025-04-26 PROCEDURE — 99232 SBSQ HOSP IP/OBS MODERATE 35: CPT

## 2025-04-26 RX ADMIN — INSULIN LISPRO 2: 100 INJECTION, SOLUTION INTRAVENOUS; SUBCUTANEOUS at 11:42

## 2025-04-26 RX ADMIN — POLYETHYLENE GLYCOL 3350 17 GRAM(S): 17 POWDER, FOR SOLUTION ORAL at 06:14

## 2025-04-26 RX ADMIN — METOPROLOL SUCCINATE 12.5 MILLIGRAM(S): 50 TABLET, EXTENDED RELEASE ORAL at 17:13

## 2025-04-26 RX ADMIN — Medication 102.5 MILLIGRAM(S): at 11:43

## 2025-04-26 RX ADMIN — MIRTAZAPINE 15 MILLIGRAM(S): 30 TABLET, FILM COATED ORAL at 21:25

## 2025-04-26 RX ADMIN — Medication 40 MILLIGRAM(S): at 17:13

## 2025-04-26 RX ADMIN — METOPROLOL SUCCINATE 12.5 MILLIGRAM(S): 50 TABLET, EXTENDED RELEASE ORAL at 06:14

## 2025-04-26 RX ADMIN — FOLIC ACID 1 MILLIGRAM(S): 1 TABLET ORAL at 11:42

## 2025-04-26 RX ADMIN — Medication 40 MILLIGRAM(S): at 06:14

## 2025-04-26 RX ADMIN — NALTREXONE HYDROCHLORIDE 50 MILLIGRAM(S): 50 TABLET, FILM COATED ORAL at 11:42

## 2025-04-26 RX ADMIN — Medication 1 APPLICATION(S): at 11:46

## 2025-04-26 NOTE — PROGRESS NOTE ADULT - SUBJECTIVE AND OBJECTIVE BOX
BILLMARTYCHRIS  74y  Male      Patient is a 74y old  Male who presents with a chief complaint of Fall (2025 16:07)      INTERVAL HPI/OVERNIGHT EVENTS:  pt seen this am   -no overnight events notified   -no active bleed    Vital Signs Last 24 Hrs  T(C): 36.7 (2025 04:30), Max: 36.7 (2025 20:15)  T(F): 98.1 (2025 04:30), Max: 98.1 (2025 04:30)  HR: 74 (2025 04:30) (74 - 80)  BP: 119/69 (2025 04:30) (117/71 - 125/69)  BP(mean): 86 (2025 04:30) (86 - 86)  RR: 18 (2025 04:30) (18 - 18)  SpO2: 94% (2025 20:15) (94% - 94%)        PHYSICAL EXAM:  GENERAL: Not in distress - wife at bedside   HEAD:  Atraumatic, Normocephalic  EYES: EOMI, PERRLA, conjunctiva and sclera clear  NERVOUS SYSTEM:  Alert & Oriented X 3  CHEST/LUNG: Clear air entry   CV/HEART: Regular rate and rhythm   GI/ABDOMEN: Soft abdomen       LAB:                        9.3    4.15  )-----------( 226      ( 2025 07:59 )             27.8         138  |  107  |  10  ----------------------------<  130[H]  3.8   |  22  |  0.7    Ca    8.3[L]      2025 07:59  Mg     1.8         TPro  4.4[L]  /  Alb  2.3[L]  /  TBili  1.2  /  DBili  0.7[H]  /  AST  58[H]  /  ALT  19  /  AlkPhos  158[H]          Daily     Daily Weight in k.7 (2025 04:30)  CAPILLARY BLOOD GLUCOSE      POCT Blood Glucose.: 208 mg/dL (2025 11:31)  POCT Blood Glucose.: 138 mg/dL (2025 07:30)  POCT Blood Glucose.: 181 mg/dL (2025 21:18)  POCT Blood Glucose.: 151 mg/dL (2025 16:52)    Urinalysis Basic - ( 2025 07:59 )    Color: x / Appearance: x / SG: x / pH: x  Gluc: 130 mg/dL / Ketone: x  / Bili: x / Urobili: x   Blood: x / Protein: x / Nitrite: x   Leuk Esterase: x / RBC: x / WBC x   Sq Epi: x / Non Sq Epi: x / Bacteria: x      LIVER FUNCTIONS - ( 2025 07:59 )  Alb: 2.3 g/dL / Pro: 4.4 g/dL / ALK PHOS: 158 U/L / ALT: 19 U/L / AST: 58 U/L / GGT: x                     acetaminophen     Tablet .. 650 milliGRAM(s) Oral every 6 hours PRN  aluminum hydroxide/magnesium hydroxide/simethicone Suspension 30 milliLiter(s) Oral every 4 hours PRN  chlorhexidine 2% Cloths 1 Application(s) Topical daily  dextrose 50% Injectable 25 Gram(s) IV Push once  dextrose 50% Injectable 12.5 Gram(s) IV Push once  dextrose 50% Injectable 25 Gram(s) IV Push once  dextrose Oral Gel 15 Gram(s) Oral once PRN  folic acid 1 milliGRAM(s) Oral daily  glucagon  Injectable 1 milliGRAM(s) IntraMuscular once  hydrOXYzine hydrochloride 25 milliGRAM(s) Oral every 6 hours PRN  influenza  Vaccine (HIGH DOSE) 0.5 milliLiter(s) IntraMuscular once  insulin lispro (ADMELOG) corrective regimen sliding scale   SubCutaneous three times a day before meals  melatonin 3 milliGRAM(s) Oral at bedtime PRN  metoprolol tartrate 12.5 milliGRAM(s) Oral two times a day  mirtazapine 15 milliGRAM(s) Oral at bedtime  naltrexone 50 milliGRAM(s) Oral daily  ondansetron Injectable 4 milliGRAM(s) IV Push every 8 hours PRN  pantoprazole    Tablet 40 milliGRAM(s) Oral two times a day  polyethylene glycol 3350 17 Gram(s) Oral two times a day  traZODone 25 milliGRAM(s) Oral at bedtime PRN

## 2025-04-26 NOTE — PROGRESS NOTE ADULT - ASSESSMENT
# ETOH dependence  # Syncope - orthostatic hypotension   # GI bleed  # Macrocytic anemia  # Hiatal hernia   # H/o duodenal ulcer bleed s/p EGD 2022  # H/o necrotizing pancreatitis with WON s/p cystogastrostomy    -colonoscopy cancelled 4/25 due to poor bowel prep - possible c scope early  next week - if for Monday, will bowel prep tomorrow   -GI follow up    -monitor cbc, keep active type and screen (stable h/h with no signs of active bleeding)  -c/w tele - with PVCs  -updated wife at bedside this am     DISPO: not discharge ready over the weekend - cbc, tele monitoring, colonoscopy   -spoke to wife at bedside     Attending Physician Dr. Paradise Toribio # [id number] .

## 2025-04-27 LAB
ALBUMIN SERPL ELPH-MCNC: 2.4 G/DL — LOW (ref 3.5–5.2)
ALP SERPL-CCNC: 182 U/L — HIGH (ref 30–115)
ALT FLD-CCNC: 21 U/L — SIGNIFICANT CHANGE UP (ref 0–41)
ANION GAP SERPL CALC-SCNC: 9 MMOL/L — SIGNIFICANT CHANGE UP (ref 7–14)
AST SERPL-CCNC: 63 U/L — HIGH (ref 0–41)
BASOPHILS # BLD AUTO: 0.08 K/UL — SIGNIFICANT CHANGE UP (ref 0–0.2)
BASOPHILS NFR BLD AUTO: 1.6 % — HIGH (ref 0–1)
BILIRUB SERPL-MCNC: 1.1 MG/DL — SIGNIFICANT CHANGE UP (ref 0.2–1.2)
BUN SERPL-MCNC: 11 MG/DL — SIGNIFICANT CHANGE UP (ref 10–20)
CALCIUM SERPL-MCNC: 8.6 MG/DL — SIGNIFICANT CHANGE UP (ref 8.4–10.5)
CHLORIDE SERPL-SCNC: 105 MMOL/L — SIGNIFICANT CHANGE UP (ref 98–110)
CO2 SERPL-SCNC: 24 MMOL/L — SIGNIFICANT CHANGE UP (ref 17–32)
CREAT SERPL-MCNC: 0.7 MG/DL — SIGNIFICANT CHANGE UP (ref 0.7–1.5)
EGFR: 96 ML/MIN/1.73M2 — SIGNIFICANT CHANGE UP
EGFR: 96 ML/MIN/1.73M2 — SIGNIFICANT CHANGE UP
EOSINOPHIL # BLD AUTO: 0.12 K/UL — SIGNIFICANT CHANGE UP (ref 0–0.7)
EOSINOPHIL NFR BLD AUTO: 2.4 % — SIGNIFICANT CHANGE UP (ref 0–8)
GLUCOSE BLDC GLUCOMTR-MCNC: 122 MG/DL — HIGH (ref 70–99)
GLUCOSE BLDC GLUCOMTR-MCNC: 141 MG/DL — HIGH (ref 70–99)
GLUCOSE BLDC GLUCOMTR-MCNC: 171 MG/DL — HIGH (ref 70–99)
GLUCOSE BLDC GLUCOMTR-MCNC: 232 MG/DL — HIGH (ref 70–99)
GLUCOSE SERPL-MCNC: 118 MG/DL — HIGH (ref 70–99)
HCT VFR BLD CALC: 30.3 % — LOW (ref 42–52)
HCT VFR BLD CALC: 31.4 % — LOW (ref 42–52)
HGB BLD-MCNC: 10 G/DL — LOW (ref 14–18)
HGB BLD-MCNC: 10.4 G/DL — LOW (ref 14–18)
IMM GRANULOCYTES NFR BLD AUTO: 0.2 % — SIGNIFICANT CHANGE UP (ref 0.1–0.3)
LYMPHOCYTES # BLD AUTO: 2.35 K/UL — SIGNIFICANT CHANGE UP (ref 1.2–3.4)
LYMPHOCYTES # BLD AUTO: 46.4 % — SIGNIFICANT CHANGE UP (ref 20.5–51.1)
MAGNESIUM SERPL-MCNC: 1.9 MG/DL — SIGNIFICANT CHANGE UP (ref 1.8–2.4)
MCHC RBC-ENTMCNC: 33 G/DL — SIGNIFICANT CHANGE UP (ref 32–37)
MCHC RBC-ENTMCNC: 33.1 G/DL — SIGNIFICANT CHANGE UP (ref 32–37)
MCHC RBC-ENTMCNC: 33.1 PG — HIGH (ref 27–31)
MCHC RBC-ENTMCNC: 33.4 PG — HIGH (ref 27–31)
MCV RBC AUTO: 100 FL — HIGH (ref 80–94)
MCV RBC AUTO: 101.3 FL — HIGH (ref 80–94)
MONOCYTES # BLD AUTO: 0.54 K/UL — SIGNIFICANT CHANGE UP (ref 0.1–0.6)
MONOCYTES NFR BLD AUTO: 10.7 % — HIGH (ref 1.7–9.3)
NEUTROPHILS # BLD AUTO: 1.97 K/UL — SIGNIFICANT CHANGE UP (ref 1.4–6.5)
NEUTROPHILS NFR BLD AUTO: 38.7 % — LOW (ref 42.2–75.2)
NRBC BLD AUTO-RTO: 0 /100 WBCS — SIGNIFICANT CHANGE UP (ref 0–0)
NRBC BLD AUTO-RTO: 0 /100 WBCS — SIGNIFICANT CHANGE UP (ref 0–0)
PLATELET # BLD AUTO: 251 K/UL — SIGNIFICANT CHANGE UP (ref 130–400)
PLATELET # BLD AUTO: 272 K/UL — SIGNIFICANT CHANGE UP (ref 130–400)
PMV BLD: 10.5 FL — HIGH (ref 7.4–10.4)
PMV BLD: 10.6 FL — HIGH (ref 7.4–10.4)
POTASSIUM SERPL-MCNC: 4.3 MMOL/L — SIGNIFICANT CHANGE UP (ref 3.5–5)
POTASSIUM SERPL-SCNC: 4.3 MMOL/L — SIGNIFICANT CHANGE UP (ref 3.5–5)
PROT SERPL-MCNC: 4.4 G/DL — LOW (ref 6–8)
RBC # BLD: 2.99 M/UL — LOW (ref 4.7–6.1)
RBC # BLD: 3.14 M/UL — LOW (ref 4.7–6.1)
RBC # FLD: 14.8 % — HIGH (ref 11.5–14.5)
RBC # FLD: 15.4 % — HIGH (ref 11.5–14.5)
SODIUM SERPL-SCNC: 138 MMOL/L — SIGNIFICANT CHANGE UP (ref 135–146)
WBC # BLD: 4.82 K/UL — SIGNIFICANT CHANGE UP (ref 4.8–10.8)
WBC # BLD: 5.07 K/UL — SIGNIFICANT CHANGE UP (ref 4.8–10.8)
WBC # FLD AUTO: 4.82 K/UL — SIGNIFICANT CHANGE UP (ref 4.8–10.8)
WBC # FLD AUTO: 5.07 K/UL — SIGNIFICANT CHANGE UP (ref 4.8–10.8)

## 2025-04-27 PROCEDURE — 99232 SBSQ HOSP IP/OBS MODERATE 35: CPT

## 2025-04-27 RX ORDER — BISACODYL 5 MG
20 TABLET, DELAYED RELEASE (ENTERIC COATED) ORAL AT BEDTIME
Refills: 0 | Status: DISCONTINUED | OUTPATIENT
Start: 2025-04-27 | End: 2025-04-29

## 2025-04-27 RX ORDER — POLYETHYLENE GLYCOL-3350 AND ELECTROLYTES 236; 6.74; 5.86; 2.97; 22.74 G/274.31G; G/274.31G; G/274.31G; G/274.31G; G/274.31G
4000 POWDER, FOR SOLUTION ORAL ONCE
Refills: 0 | Status: COMPLETED | OUTPATIENT
Start: 2025-04-27 | End: 2025-04-27

## 2025-04-27 RX ADMIN — INSULIN LISPRO 2: 100 INJECTION, SOLUTION INTRAVENOUS; SUBCUTANEOUS at 11:46

## 2025-04-27 RX ADMIN — Medication 1 APPLICATION(S): at 11:50

## 2025-04-27 RX ADMIN — Medication 20 MILLIGRAM(S): at 23:00

## 2025-04-27 RX ADMIN — METOPROLOL SUCCINATE 12.5 MILLIGRAM(S): 50 TABLET, EXTENDED RELEASE ORAL at 17:34

## 2025-04-27 RX ADMIN — NALTREXONE HYDROCHLORIDE 50 MILLIGRAM(S): 50 TABLET, FILM COATED ORAL at 11:46

## 2025-04-27 RX ADMIN — INSULIN LISPRO 1: 100 INJECTION, SOLUTION INTRAVENOUS; SUBCUTANEOUS at 17:33

## 2025-04-27 RX ADMIN — METOPROLOL SUCCINATE 12.5 MILLIGRAM(S): 50 TABLET, EXTENDED RELEASE ORAL at 05:58

## 2025-04-27 RX ADMIN — MIRTAZAPINE 15 MILLIGRAM(S): 30 TABLET, FILM COATED ORAL at 22:59

## 2025-04-27 RX ADMIN — POLYETHYLENE GLYCOL 3350 17 GRAM(S): 17 POWDER, FOR SOLUTION ORAL at 05:57

## 2025-04-27 RX ADMIN — POLYETHYLENE GLYCOL-3350 AND ELECTROLYTES 4000 MILLILITER(S): 236; 6.74; 5.86; 2.97; 22.74 POWDER, FOR SOLUTION ORAL at 15:50

## 2025-04-27 RX ADMIN — Medication 40 MILLIGRAM(S): at 17:34

## 2025-04-27 RX ADMIN — FOLIC ACID 1 MILLIGRAM(S): 1 TABLET ORAL at 11:46

## 2025-04-27 RX ADMIN — Medication 40 MILLIGRAM(S): at 05:57

## 2025-04-27 NOTE — PROGRESS NOTE ADULT - ASSESSMENT
# ETOH dependence  # Syncope - orthostatic hypotension   # GI bleed  # Macrocytic anemia  # Hiatal hernia   # H/o duodenal ulcer bleed s/p EGD 2022  # H/o necrotizing pancreatitis with WON s/p cystogastrostomy    -colonoscopy cancelled 4/25 due to poor bowel prep - scheduled for Monday, bowel prep ordered   -GI following  -monitor cbc, stable h/h, keep active type and screen   -c/w tele - with PVCs  -updated wife at bedside this am     DISPO: not discharge ready over the weekend - cbc, tele monitoring, colonoscopy tomorrow   -spoke to wife at bedside     Attending Physician Dr. Paradise Toribio # [id number] .

## 2025-04-27 NOTE — PROGRESS NOTE ADULT - ASSESSMENT
4-year-old male with past medical history of alcohol use disorder, history of necrotizing pancreatitis, hypertension, hyperlipidemia presenting to the ED accompanied by his wife for intoxication. He was on CIWA and valium PRN, s/p RRT for fall while attempting to go to the bathroom with worsening confusion, with shaking episodes. Another RRT called for similar shaking while pt on toilet. Patient was initially altered and regained consciousness within minutes. PAtient was found to have BRBPR at that time, started on an octreotide drip, protonix 40 BID and upgraded to SDU. Downgraded to 4b and transferred to tele to r/o cardiac causes of RRs.    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  - evaluated by neurology    #Syncope  2/2 Orthostatic hypotension possibly 2/2 GI bleeding   #Macrocytic anemia  #Hiatal hernia   #H/o duodenal ulcer bleed s/p EGD 2022  #H/o necrotizing pancreatitis with WON s/p cystogastrostomy  - TTE Echo Complete w/ Contrast w/o Doppler (04.21.25 @ 12:11) >Left ventricular ejection fraction, by visual estimation, is >55%.  - S/p IV fluids  - CT Abdomen and Pelvis w/ IV Cont (04.16.25 @ 17:22) >Severe hepatic steatosis. Nonspecific distended gallbladder.  Left adrenal gland 6 cm myelolipoma  - S/p EGD 4/22: Erythema in the antrum and stomach body compatible with non-erosive gastritis. (Biopsy). Hiatal Hernia. Erosions and ulceration in the duodenum compatible with duodenitis. A 5 mm clean based (Ra Class III) ulcer was noted in the duodenal sweep. No active bleeding seen in the examined portion of the duodenum.  - Discussed with wife regarding colonoscopy but she likes to hold off  - monitor CBC, keep active T&S, transfuse if <7  - S/p octrotide  - c/w protonix BID  - GI recs appreciated       - Colonoscopy add-on for Monday and Tuesday (if needed 2/2 poor prep)       - Start Golytely, dulcolax 20mg at bedtime, NPO after midnight, CLD, 8pm preop labs  - Advanced diet to regular  - C/w tele- PACs present  - C/w lopressor 12.5 BID  - PT/OT- rehab    #MISC  - DVT ppx: none 2/2 GI bleed  - GI ppx: protonix  - Diet: Full Liquid  - Activity: as tolerated    Pending: Colonoscopy tomorrow add-on

## 2025-04-27 NOTE — PROGRESS NOTE ADULT - SUBJECTIVE AND OBJECTIVE BOX
CHRIS KHAN  74y  Male      Patient is a 74y old  Male who presents with a chief complaint of Fall (25 Apr 2025 16:07)      INTERVAL HPI/OVERNIGHT EVENTS:  pt seen this am   -no overnight events notified   -no active bleed  -colonoscopy tomorrow - bowel prep ordered - updated wife at bedside     Vital Signs Last 24 Hrs  T(C): 36.7 (27 Apr 2025 04:11), Max: 36.9 (26 Apr 2025 20:10)  T(F): 98 (27 Apr 2025 04:11), Max: 98.5 (26 Apr 2025 20:10)  HR: 75 (27 Apr 2025 04:11) (75 - 84)  BP: 105/66 (27 Apr 2025 04:11) (105/66 - 122/75)  BP(mean): 79 (27 Apr 2025 04:11) (79 - 86)  RR: 18 (27 Apr 2025 04:11) (18 - 18)  SpO2: 92% (27 Apr 2025 04:11) (92% - 96%)    Parameters below as of 26 Apr 2025 20:10  Patient On (Oxygen Delivery Method): room air      PHYSICAL EXAM:  GENERAL: Not in distress - wife at bedside   HEAD:  Atraumatic, Normocephalic  EYES: EOMI, PERRLA, conjunctiva and sclera clear  NERVOUS SYSTEM:  Alert & Oriented X 3  CHEST/LUNG: Clear air entry   CV/HEART: Regular rate and rhythm   GI/ABDOMEN: Soft abdomen       LAB:                                 10.0   5.07  )-----------( 251      ( 27 Apr 2025 06:30 )             30.3           04-27    138  |  105  |  11  ----------------------------<  118[H]  4.3   |  24  |  0.7    Ca    8.6      27 Apr 2025 06:30  Mg     1.9     04-27    TPro  4.4[L]  /  Alb  2.4[L]  /  TBili  1.1  /  DBili  x   /  AST  63[H]  /  ALT  21  /  AlkPhos  182[H]  04-27    CAPILLARY BLOOD GLUCOSE      POCT Blood Glucose.: 232 mg/dL (27 Apr 2025 11:26)  POCT Blood Glucose.: 141 mg/dL (27 Apr 2025 07:43)  POCT Blood Glucose.: 183 mg/dL (26 Apr 2025 21:28)  POCT Blood Glucose.: 125 mg/dL (26 Apr 2025 16:44)      Urinalysis Basic - ( 26 Apr 2025 07:59 )    Color: x / Appearance: x / SG: x / pH: x  Gluc: 130 mg/dL / Ketone: x  / Bili: x / Urobili: x   Blood: x / Protein: x / Nitrite: x   Leuk Esterase: x / RBC: x / WBC x   Sq Epi: x / Non Sq Epi: x / Bacteria: x      LIVER FUNCTIONS - ( 26 Apr 2025 07:59 )  Alb: 2.3 g/dL / Pro: 4.4 g/dL / ALK PHOS: 158 U/L / ALT: 19 U/L / AST: 58 U/L / GGT: x               MEDICATIONS  (STANDING):  bisacodyl 20 milliGRAM(s) Oral at bedtime  chlorhexidine 2% Cloths 1 Application(s) Topical daily  dextrose 50% Injectable 25 Gram(s) IV Push once  dextrose 50% Injectable 12.5 Gram(s) IV Push once  dextrose 50% Injectable 25 Gram(s) IV Push once  folic acid 1 milliGRAM(s) Oral daily  glucagon  Injectable 1 milliGRAM(s) IntraMuscular once  influenza  Vaccine (HIGH DOSE) 0.5 milliLiter(s) IntraMuscular once  insulin lispro (ADMELOG) corrective regimen sliding scale   SubCutaneous three times a day before meals  metoprolol tartrate 12.5 milliGRAM(s) Oral two times a day  mirtazapine 15 milliGRAM(s) Oral at bedtime  naltrexone 50 milliGRAM(s) Oral daily  pantoprazole    Tablet 40 milliGRAM(s) Oral two times a day  polyethylene glycol 3350 17 Gram(s) Oral two times a day  polyethylene glycol/electrolyte Solution. 4000 milliLiter(s) Oral once    MEDICATIONS  (PRN):  acetaminophen     Tablet .. 650 milliGRAM(s) Oral every 6 hours PRN Temp greater or equal to 38C (100.4F), Mild Pain (1 - 3)  aluminum hydroxide/magnesium hydroxide/simethicone Suspension 30 milliLiter(s) Oral every 4 hours PRN Dyspepsia  dextrose Oral Gel 15 Gram(s) Oral once PRN Blood Glucose LESS THAN 70 milliGRAM(s)/deciliter  hydrOXYzine hydrochloride 25 milliGRAM(s) Oral every 6 hours PRN Anxiety  melatonin 3 milliGRAM(s) Oral at bedtime PRN Insomnia  ondansetron Injectable 4 milliGRAM(s) IV Push every 8 hours PRN Nausea and/or Vomiting  traZODone 25 milliGRAM(s) Oral at bedtime PRN Insomnia

## 2025-04-27 NOTE — PROGRESS NOTE ADULT - SUBJECTIVE AND OBJECTIVE BOX
SUBJECTIVE/OVERNIGHT EVENTS  Today is hospital day 11d. This morning patient was seen and examined at bedside, resting comfortably in bed. No acute or major events overnight. Patient complains of persistent diarrhea. Denies fever, chills, nausea, vomiting, constipation, hematochezia, dysuria, hematuria, chest pain, palpitations, sob. Patient was informed of their plan of care at this time.    CODE STATUS: FULL    MEDICATIONS  STANDING MEDICATIONS  bisacodyl 20 milliGRAM(s) Oral at bedtime  chlorhexidine 2% Cloths 1 Application(s) Topical daily  dextrose 50% Injectable 25 Gram(s) IV Push once  dextrose 50% Injectable 12.5 Gram(s) IV Push once  dextrose 50% Injectable 25 Gram(s) IV Push once  folic acid 1 milliGRAM(s) Oral daily  glucagon  Injectable 1 milliGRAM(s) IntraMuscular once  influenza  Vaccine (HIGH DOSE) 0.5 milliLiter(s) IntraMuscular once  insulin lispro (ADMELOG) corrective regimen sliding scale   SubCutaneous three times a day before meals  metoprolol tartrate 12.5 milliGRAM(s) Oral two times a day  mirtazapine 15 milliGRAM(s) Oral at bedtime  naltrexone 50 milliGRAM(s) Oral daily  pantoprazole    Tablet 40 milliGRAM(s) Oral two times a day  polyethylene glycol 3350 17 Gram(s) Oral two times a day  polyethylene glycol/electrolyte Solution. 4000 milliLiter(s) Oral once    PRN MEDICATIONS  acetaminophen     Tablet .. 650 milliGRAM(s) Oral every 6 hours PRN  aluminum hydroxide/magnesium hydroxide/simethicone Suspension 30 milliLiter(s) Oral every 4 hours PRN  dextrose Oral Gel 15 Gram(s) Oral once PRN  hydrOXYzine hydrochloride 25 milliGRAM(s) Oral every 6 hours PRN  melatonin 3 milliGRAM(s) Oral at bedtime PRN  ondansetron Injectable 4 milliGRAM(s) IV Push every 8 hours PRN  traZODone 25 milliGRAM(s) Oral at bedtime PRN    VITALS  T(F): 98 (04-27-25 @ 04:11), Max: 98.7 (04-26-25 @ 12:26)  HR: 75 (04-27-25 @ 04:11) (75 - 84)  BP: 105/66 (04-27-25 @ 04:11) (105/66 - 122/75)  RR: 18 (04-27-25 @ 04:11) (18 - 18)  SpO2: 92% (04-27-25 @ 04:11) (92% - 96%)  POCT Blood Glucose.: 141 mg/dL (04-27-25 @ 07:43)  POCT Blood Glucose.: 183 mg/dL (04-26-25 @ 21:28)  POCT Blood Glucose.: 125 mg/dL (04-26-25 @ 16:44)  POCT Blood Glucose.: 208 mg/dL (04-26-25 @ 11:31)    PHYSICAL EXAM  GENERAL  ( X ) NAD, lying in bed comfortably     (  ) obtunded     (  ) lethargic     (  ) somnolent    HEAD  ( X ) Atraumatic     (  ) hematoma     (  ) laceration (specify location:       )     NECK  ( X ) Supple     (  ) neck stiffness     (  ) nuchal rigidity     (  )  no JVD     (  ) JVD present ( -- cm)    HEART  Rate -->  ( X ) normal rate    (  ) bradycardic    (  ) tachycardic  Rhythm -->  ( X ) regular    (  ) regularly irregular    (  ) irregularly irregular  Murmurs -->  ( X ) normal s1/s2    (  ) systolic murmur    (  ) diastolic murmur    (  ) continuous murmur     (  ) S3 present    (  ) S4 present    LUNGS  ( X )Unlabored respirations     (  ) tachypnea  ( X ) B/L air entry     (  ) decreased breath sounds in:  (location     )    (  ) no adventitious sound     (  ) crackles     (  ) wheezing      (  ) rhonchi      (specify location:       )  (  ) chest wall tenderness (specify location:       )    ABDOMEN  ( X ) Soft     (  ) tense   |   ( X ) nondistended     (  ) distended   |   ( X ) +BS     (  ) hypoactive bowel sounds     (  ) hyperactive bowel sounds  ( X ) nontender     (  ) RUQ tenderness     (  ) RLQ tenderness     (  ) LLQ tenderness     (  ) epigastric tenderness     (  ) diffuse tenderness  (  ) Splenomegaly      (  ) Hepatomegaly      (  ) Jaundice     (  ) ecchymosis     EXTREMITIES  ( X ) Normal     (  ) Rash     (  ) ecchymosis     (  ) varicose veins      (  ) pitting edema     (  ) non-pitting edema   (  ) ulceration     (  ) gangrene:     (location:     )    NERVOUS SYSTEM  ( X ) A&Ox3     (  ) confused     (  ) lethargic  CN II-XII:     (  ) Intact     (  ) focal deficits  (Specify:     )   Upper extremities:     (  ) strength X/5     (  ) focal deficit (specify:    )  Lower extremities:     (  ) strength  X/5    (  ) focal deficit (specify:    )    SKIN  ( X ) No rashes or lesions     (  ) maculopapular rash     (  ) pustules     (  ) vesicles     (  ) ulcer     (  ) ecchymosis     (specify location:     )    LABS             10.0   5.07  )-----------( 251      ( 04-27-25 @ 06:30 )             30.3     138  |  105  |  11  -------------------------<  118   04-27-25 @ 06:30  4.3  |  24  |  0.7    Ca      8.6     04-27-25 @ 06:30  Mg     1.9     04-27-25 @ 06:30    TPro  4.4  /  Alb  2.4  /  TBili  1.1  /  DBili  x   /  AST  63  /  ALT  21  /  AlkPhos  182  /  GGT  x     04-27-25 @ 06:30        Urinalysis Basic - ( 27 Apr 2025 06:30 )    Color: x / Appearance: x / SG: x / pH: x  Gluc: 118 mg/dL / Ketone: x  / Bili: x / Urobili: x   Blood: x / Protein: x / Nitrite: x   Leuk Esterase: x / RBC: x / WBC x   Sq Epi: x / Non Sq Epi: x / Bacteria: x

## 2025-04-28 ENCOUNTER — RESULT REVIEW (OUTPATIENT)
Age: 75
End: 2025-04-28

## 2025-04-28 LAB
ALBUMIN SERPL ELPH-MCNC: 2.3 G/DL — LOW (ref 3.5–5.2)
ALP SERPL-CCNC: 148 U/L — HIGH (ref 30–115)
ALT FLD-CCNC: 19 U/L — SIGNIFICANT CHANGE UP (ref 0–41)
ANION GAP SERPL CALC-SCNC: 9 MMOL/L — SIGNIFICANT CHANGE UP (ref 7–14)
ANION GAP SERPL CALC-SCNC: 9 MMOL/L — SIGNIFICANT CHANGE UP (ref 7–14)
APTT BLD: 31.3 SEC — SIGNIFICANT CHANGE UP (ref 27–39.2)
AST SERPL-CCNC: 57 U/L — HIGH (ref 0–41)
BASOPHILS # BLD AUTO: 0.07 K/UL — SIGNIFICANT CHANGE UP (ref 0–0.2)
BASOPHILS NFR BLD AUTO: 1.5 % — HIGH (ref 0–1)
BILIRUB SERPL-MCNC: 1.1 MG/DL — SIGNIFICANT CHANGE UP (ref 0.2–1.2)
BUN SERPL-MCNC: 10 MG/DL — SIGNIFICANT CHANGE UP (ref 10–20)
BUN SERPL-MCNC: 9 MG/DL — LOW (ref 10–20)
CALCIUM SERPL-MCNC: 8.1 MG/DL — LOW (ref 8.4–10.5)
CALCIUM SERPL-MCNC: 8.4 MG/DL — SIGNIFICANT CHANGE UP (ref 8.4–10.5)
CHLORIDE SERPL-SCNC: 104 MMOL/L — SIGNIFICANT CHANGE UP (ref 98–110)
CHLORIDE SERPL-SCNC: 105 MMOL/L — SIGNIFICANT CHANGE UP (ref 98–110)
CO2 SERPL-SCNC: 24 MMOL/L — SIGNIFICANT CHANGE UP (ref 17–32)
CO2 SERPL-SCNC: 25 MMOL/L — SIGNIFICANT CHANGE UP (ref 17–32)
CREAT SERPL-MCNC: 0.6 MG/DL — LOW (ref 0.7–1.5)
CREAT SERPL-MCNC: 0.7 MG/DL — SIGNIFICANT CHANGE UP (ref 0.7–1.5)
EGFR: 101 ML/MIN/1.73M2 — SIGNIFICANT CHANGE UP
EGFR: 101 ML/MIN/1.73M2 — SIGNIFICANT CHANGE UP
EGFR: 96 ML/MIN/1.73M2 — SIGNIFICANT CHANGE UP
EGFR: 96 ML/MIN/1.73M2 — SIGNIFICANT CHANGE UP
EOSINOPHIL # BLD AUTO: 0.06 K/UL — SIGNIFICANT CHANGE UP (ref 0–0.7)
EOSINOPHIL NFR BLD AUTO: 1.3 % — SIGNIFICANT CHANGE UP (ref 0–8)
GLUCOSE BLDC GLUCOMTR-MCNC: 116 MG/DL — HIGH (ref 70–99)
GLUCOSE BLDC GLUCOMTR-MCNC: 122 MG/DL — HIGH (ref 70–99)
GLUCOSE BLDC GLUCOMTR-MCNC: 141 MG/DL — HIGH (ref 70–99)
GLUCOSE BLDC GLUCOMTR-MCNC: 143 MG/DL — HIGH (ref 70–99)
GLUCOSE SERPL-MCNC: 115 MG/DL — HIGH (ref 70–99)
GLUCOSE SERPL-MCNC: 161 MG/DL — HIGH (ref 70–99)
HCT VFR BLD CALC: 29.6 % — LOW (ref 42–52)
HGB BLD-MCNC: 9.7 G/DL — LOW (ref 14–18)
IMM GRANULOCYTES NFR BLD AUTO: 0.2 % — SIGNIFICANT CHANGE UP (ref 0.1–0.3)
INR BLD: 1.07 RATIO — SIGNIFICANT CHANGE UP (ref 0.65–1.3)
LYMPHOCYTES # BLD AUTO: 1.92 K/UL — SIGNIFICANT CHANGE UP (ref 1.2–3.4)
LYMPHOCYTES # BLD AUTO: 40 % — SIGNIFICANT CHANGE UP (ref 20.5–51.1)
MAGNESIUM SERPL-MCNC: 1.7 MG/DL — LOW (ref 1.8–2.4)
MAGNESIUM SERPL-MCNC: 1.9 MG/DL — SIGNIFICANT CHANGE UP (ref 1.8–2.4)
MCHC RBC-ENTMCNC: 32.8 G/DL — SIGNIFICANT CHANGE UP (ref 32–37)
MCHC RBC-ENTMCNC: 33 PG — HIGH (ref 27–31)
MCV RBC AUTO: 100.7 FL — HIGH (ref 80–94)
MONOCYTES # BLD AUTO: 0.46 K/UL — SIGNIFICANT CHANGE UP (ref 0.1–0.6)
MONOCYTES NFR BLD AUTO: 9.6 % — HIGH (ref 1.7–9.3)
NEUTROPHILS # BLD AUTO: 2.28 K/UL — SIGNIFICANT CHANGE UP (ref 1.4–6.5)
NEUTROPHILS NFR BLD AUTO: 47.4 % — SIGNIFICANT CHANGE UP (ref 42.2–75.2)
NRBC BLD AUTO-RTO: 0 /100 WBCS — SIGNIFICANT CHANGE UP (ref 0–0)
PLATELET # BLD AUTO: 265 K/UL — SIGNIFICANT CHANGE UP (ref 130–400)
PMV BLD: 10.3 FL — SIGNIFICANT CHANGE UP (ref 7.4–10.4)
POTASSIUM SERPL-MCNC: 3.7 MMOL/L — SIGNIFICANT CHANGE UP (ref 3.5–5)
POTASSIUM SERPL-MCNC: 3.8 MMOL/L — SIGNIFICANT CHANGE UP (ref 3.5–5)
POTASSIUM SERPL-SCNC: 3.7 MMOL/L — SIGNIFICANT CHANGE UP (ref 3.5–5)
POTASSIUM SERPL-SCNC: 3.8 MMOL/L — SIGNIFICANT CHANGE UP (ref 3.5–5)
PROT SERPL-MCNC: 4.3 G/DL — LOW (ref 6–8)
PROTHROM AB SERPL-ACNC: 12.7 SEC — SIGNIFICANT CHANGE UP (ref 9.95–12.87)
RBC # BLD: 2.94 M/UL — LOW (ref 4.7–6.1)
RBC # FLD: 14.8 % — HIGH (ref 11.5–14.5)
SODIUM SERPL-SCNC: 138 MMOL/L — SIGNIFICANT CHANGE UP (ref 135–146)
SODIUM SERPL-SCNC: 138 MMOL/L — SIGNIFICANT CHANGE UP (ref 135–146)
WBC # BLD: 4.8 K/UL — SIGNIFICANT CHANGE UP (ref 4.8–10.8)
WBC # FLD AUTO: 4.8 K/UL — SIGNIFICANT CHANGE UP (ref 4.8–10.8)

## 2025-04-28 PROCEDURE — 88305 TISSUE EXAM BY PATHOLOGIST: CPT | Mod: 26

## 2025-04-28 PROCEDURE — 99232 SBSQ HOSP IP/OBS MODERATE 35: CPT

## 2025-04-28 PROCEDURE — 45380 COLONOSCOPY AND BIOPSY: CPT | Mod: XU

## 2025-04-28 PROCEDURE — 45385 COLONOSCOPY W/LESION REMOVAL: CPT

## 2025-04-28 RX ORDER — MAGNESIUM SULFATE 500 MG/ML
2 SYRINGE (ML) INJECTION ONCE
Refills: 0 | Status: COMPLETED | OUTPATIENT
Start: 2025-04-28 | End: 2025-04-28

## 2025-04-28 RX ADMIN — Medication 40 MILLIGRAM(S): at 05:58

## 2025-04-28 RX ADMIN — Medication 25 GRAM(S): at 00:37

## 2025-04-28 RX ADMIN — NALTREXONE HYDROCHLORIDE 50 MILLIGRAM(S): 50 TABLET, FILM COATED ORAL at 11:32

## 2025-04-28 RX ADMIN — FOLIC ACID 1 MILLIGRAM(S): 1 TABLET ORAL at 11:32

## 2025-04-28 RX ADMIN — METOPROLOL SUCCINATE 12.5 MILLIGRAM(S): 50 TABLET, EXTENDED RELEASE ORAL at 05:58

## 2025-04-28 RX ADMIN — MIRTAZAPINE 15 MILLIGRAM(S): 30 TABLET, FILM COATED ORAL at 22:29

## 2025-04-28 RX ADMIN — Medication 1 APPLICATION(S): at 11:33

## 2025-04-28 RX ADMIN — POLYETHYLENE GLYCOL 3350 17 GRAM(S): 17 POWDER, FOR SOLUTION ORAL at 05:59

## 2025-04-28 NOTE — PROGRESS NOTE ADULT - ASSESSMENT
# ETOH dependence  # Syncope - orthostatic hypotension   # GI bleed  # Macrocytic anemia  # Hiatal hernia   # H/o duodenal ulcer bleed s/p EGD 2022  # H/o necrotizing pancreatitis with WON s/p cystogastrostomy    -colonoscopy today   -GI following  -monitor cbc, stable h/h, keep active type and screen   -c/w tele - with PVCs  -updated wife at bedside this am     DISPO: not discharge ready today - cbc, tele monitoring, colonoscopy today  -spoke to wife at bedside     Attending Physician Dr. Paradise Toribio # [id number] .

## 2025-04-28 NOTE — PROGRESS NOTE ADULT - TIME BILLING
direct patient care and chart review  -coordinated current plan of care with medical staff on MDR
Direct patient care. Discussed on rounds with Housestaff
Reviewed all pertinent clinical information and reviewed all relevant imaging and diagnostic studies.  Counseled the patient /HCP about diagnostic testing and treatment plan. All questions were answered.  Discussed recommendations with the primary team and coordinated care.
Review of imaging and chart; obtaining history; examination of patient; discussion and coordination of care.
time spent on review of labs, imaging studies, old records, obtaining history, personally examining patient, counselling and communicating with patient, entering orders for medications/tests/etc, discussions with other health care providers, documentation in electronic health records, independent interpretation of labs, imaging/procedure results and care coordination.    Time spent excludes teaching

## 2025-04-28 NOTE — PROGRESS NOTE ADULT - ASSESSMENT
4-year-old male with past medical history of alcohol use disorder, history of necrotizing pancreatitis, hypertension, hyperlipidemia presenting to the ED accompanied by his wife for intoxication. He was on CIWA and valium PRN, s/p RRT for fall while attempting to go to the bathroom with worsening confusion, with shaking episodes. Another RRT called for similar shaking while pt on toilet. Patient was initially altered and regained consciousness within minutes. PAtient was found to have BRBPR at that time, started on an octreotide drip, protonix 40 BID and upgraded to SDU. Downgraded to 4b and transferred to tele to r/o cardiac causes of RRs.    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  - evaluated by neurology    #Syncope  2/2 Orthostatic hypotension possibly 2/2 GI bleeding   #Macrocytic anemia  #Hiatal hernia   #H/o duodenal ulcer bleed s/p EGD 2022  #H/o necrotizing pancreatitis with WON s/p cystogastrostomy  - TTE Echo Complete w/ Contrast w/o Doppler (04.21.25 @ 12:11) >Left ventricular ejection fraction, by visual estimation, is >55%.  - S/p IV fluids  - CT Abdomen and Pelvis w/ IV Cont (04.16.25 @ 17:22) >Severe hepatic steatosis. Nonspecific distended gallbladder.  Left adrenal gland 6 cm myelolipoma  - S/p EGD 4/22: Erythema in the antrum and stomach body compatible with non-erosive gastritis. (Biopsy). Hiatal Hernia. Erosions and ulceration in the duodenum compatible with duodenitis. A 5 mm clean based (Ra Class III) ulcer was noted in the duodenal sweep. No active bleeding seen in the examined portion of the duodenum.  - monitor CBC, keep active T&S, transfuse if <7  - S/p octrotide  - c/w protonix BID  - GI recs appreciated       - Colonoscopy add-on for Monday and Tuesday (if needed 2/2 poor prep)       - Start Golytely, dulcolax 20mg at bedtime, NPO after midnight, CLD, 8pm preop labs  - Advanced diet to regular  - C/w tele- PACs present  - C/w lopressor 12.5 BID  - PT/OT- rehab  - Planned for Colonscopy today, but patient only     #MISC  - DVT ppx: none 2/2 GI bleed  - GI ppx: protonix  - Diet: Full Liquid  - Activity: as tolerated    Pending: Colonoscopy tomorrow add-on 4-year-old male with past medical history of alcohol use disorder, history of necrotizing pancreatitis, hypertension, hyperlipidemia presenting to the ED accompanied by his wife for intoxication. He was on CIWA and valium PRN, s/p RRT for fall while attempting to go to the bathroom with worsening confusion, with shaking episodes. Another RRT called for similar shaking while pt on toilet. Patient was initially altered and regained consciousness within minutes. PAtient was found to have BRBPR at that time, started on an octreotide drip, protonix 40 BID and upgraded to SDU. Downgraded to 4b and transferred to tele to r/o cardiac causes of RRs.    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  - evaluated by neurology    #Syncope  2/2 Orthostatic hypotension possibly 2/2 GI bleeding   #Macrocytic anemia  #Hiatal hernia   #H/o duodenal ulcer bleed s/p EGD 2022  #H/o necrotizing pancreatitis with WON s/p cystogastrostomy  - TTE Echo Complete w/ Contrast w/o Doppler (04.21.25 @ 12:11) >Left ventricular ejection fraction, by visual estimation, is >55%.  - S/p IV fluids  - CT Abdomen and Pelvis w/ IV Cont (04.16.25 @ 17:22) >Severe hepatic steatosis. Nonspecific distended gallbladder.  Left adrenal gland 6 cm myelolipoma  - S/p EGD 4/22: Erythema in the antrum and stomach body compatible with non-erosive gastritis. (Biopsy). Hiatal Hernia. Erosions and ulceration in the duodenum compatible with duodenitis. A 5 mm clean based (Ra Class III) ulcer was noted in the duodenal sweep. No active bleeding seen in the examined portion of the duodenum.  - monitor CBC, keep active T&S, transfuse if <7  - S/p octrotide  - c/w protonix BID  - GI recs appreciated       - Colonoscopy add-on for Monday and Tuesday (if needed 2/2 poor prep)       - Start Golytely, dulcolax 20mg at bedtime, NPO after midnight, CLD, 8pm preop labs  - Advanced diet to regular  - C/w tele- PACs present  - C/w lopressor 12.5 BID  - PT/OT- rehab  - Planned for Colonoscopy today, but patient only finished 3/4 of Prep. Will follow up with GI to see if candidate still.     #MISC  - DVT ppx: none 2/2 GI bleed  - GI ppx: protonix  - Diet: Full Liquid  - Activity: as tolerated    Pending: Colonoscopy

## 2025-04-28 NOTE — PROGRESS NOTE ADULT - SUBJECTIVE AND OBJECTIVE BOX
BILL CHRIS  74y  Male      Patient is a 74y old  Male who presents with a chief complaint of Fall (25 Apr 2025 16:07)      INTERVAL HPI/OVERNIGHT EVENTS:  pt seen this am   -no overnight events notified   -no active bleed  -colonoscopy today    Vital Signs Last 24 Hrs  T(C): 36.4 (28 Apr 2025 12:26), Max: 37.2 (27 Apr 2025 21:05)  T(F): 97.6 (28 Apr 2025 12:26), Max: 98.9 (27 Apr 2025 21:05)  HR: 81 (28 Apr 2025 12:26) (72 - 81)  BP: 106/72 (28 Apr 2025 12:26) (106/72 - 127/82)  BP(mean): 69 (27 Apr 2025 21:05) (69 - 97)  RR: 18 (28 Apr 2025 12:26) (16 - 19)  SpO2: 97% (28 Apr 2025 12:26) (95% - 97%)    Parameters below as of 28 Apr 2025 05:55  Patient On (Oxygen Delivery Method): room air      PHYSICAL EXAM:  GENERAL: Not in distress - wife at bedside   HEAD:  Atraumatic, Normocephalic  EYES: EOMI, PERRLA, conjunctiva and sclera clear  NERVOUS SYSTEM:  Alert & Oriented X 3  CHEST/LUNG: Clear air entry   CV/HEART: Regular rate and rhythm   GI/ABDOMEN: Soft abdomen       LAB:                                   9.7    4.80  )-----------( 265      ( 28 Apr 2025 07:37 )             29.6                 04-28    138  |  104  |  9[L]  ----------------------------<  161[H]  3.7   |  25  |  0.7    Ca    8.1[L]      28 Apr 2025 07:37  Mg     1.9     04-28    TPro  4.3[L]  /  Alb  2.3[L]  /  TBili  1.1  /  DBili  x   /  AST  57[H]  /  ALT  19  /  AlkPhos  148[H]  04-28    CAPILLARY BLOOD GLUCOSE      POCT Blood Glucose.: 232 mg/dL (27 Apr 2025 11:26)  POCT Blood Glucose.: 141 mg/dL (27 Apr 2025 07:43)  POCT Blood Glucose.: 183 mg/dL (26 Apr 2025 21:28)  POCT Blood Glucose.: 125 mg/dL (26 Apr 2025 16:44)      Urinalysis Basic - ( 26 Apr 2025 07:59 )    Color: x / Appearance: x / SG: x / pH: x  Gluc: 130 mg/dL / Ketone: x  / Bili: x / Urobili: x   Blood: x / Protein: x / Nitrite: x   Leuk Esterase: x / RBC: x / WBC x   Sq Epi: x / Non Sq Epi: x / Bacteria: x      LIVER FUNCTIONS - ( 26 Apr 2025 07:59 )  Alb: 2.3 g/dL / Pro: 4.4 g/dL / ALK PHOS: 158 U/L / ALT: 19 U/L / AST: 58 U/L / GGT: x               MEDICATIONS  (STANDING):  bisacodyl 20 milliGRAM(s) Oral at bedtime  chlorhexidine 2% Cloths 1 Application(s) Topical daily  dextrose 50% Injectable 25 Gram(s) IV Push once  dextrose 50% Injectable 12.5 Gram(s) IV Push once  dextrose 50% Injectable 25 Gram(s) IV Push once  folic acid 1 milliGRAM(s) Oral daily  glucagon  Injectable 1 milliGRAM(s) IntraMuscular once  influenza  Vaccine (HIGH DOSE) 0.5 milliLiter(s) IntraMuscular once  insulin lispro (ADMELOG) corrective regimen sliding scale   SubCutaneous three times a day before meals  metoprolol tartrate 12.5 milliGRAM(s) Oral two times a day  mirtazapine 15 milliGRAM(s) Oral at bedtime  naltrexone 50 milliGRAM(s) Oral daily  pantoprazole    Tablet 40 milliGRAM(s) Oral two times a day  polyethylene glycol 3350 17 Gram(s) Oral two times a day  polyethylene glycol/electrolyte Solution. 4000 milliLiter(s) Oral once    MEDICATIONS  (PRN):  acetaminophen     Tablet .. 650 milliGRAM(s) Oral every 6 hours PRN Temp greater or equal to 38C (100.4F), Mild Pain (1 - 3)  aluminum hydroxide/magnesium hydroxide/simethicone Suspension 30 milliLiter(s) Oral every 4 hours PRN Dyspepsia  dextrose Oral Gel 15 Gram(s) Oral once PRN Blood Glucose LESS THAN 70 milliGRAM(s)/deciliter  hydrOXYzine hydrochloride 25 milliGRAM(s) Oral every 6 hours PRN Anxiety  melatonin 3 milliGRAM(s) Oral at bedtime PRN Insomnia  ondansetron Injectable 4 milliGRAM(s) IV Push every 8 hours PRN Nausea and/or Vomiting  traZODone 25 milliGRAM(s) Oral at bedtime PRN Insomnia

## 2025-04-28 NOTE — PROGRESS NOTE ADULT - SUBJECTIVE AND OBJECTIVE BOX
SUBJECTIVE/OVERNIGHT EVENTS  Today is hospital day 12d. This morning patient was seen and examined at bedside, resting comfortably in bed. No acute or major events overnight.  Patient only compeleted 3/4 of Prep Golytely     CODE STATUS: FULL     MEDICATIONS  STANDING MEDICATIONS  bisacodyl 20 milliGRAM(s) Oral at bedtime  chlorhexidine 2% Cloths 1 Application(s) Topical daily  dextrose 50% Injectable 25 Gram(s) IV Push once  dextrose 50% Injectable 12.5 Gram(s) IV Push once  dextrose 50% Injectable 25 Gram(s) IV Push once  folic acid 1 milliGRAM(s) Oral daily  glucagon  Injectable 1 milliGRAM(s) IntraMuscular once  influenza  Vaccine (HIGH DOSE) 0.5 milliLiter(s) IntraMuscular once  insulin lispro (ADMELOG) corrective regimen sliding scale   SubCutaneous three times a day before meals  metoprolol tartrate 12.5 milliGRAM(s) Oral two times a day  mirtazapine 15 milliGRAM(s) Oral at bedtime  naltrexone 50 milliGRAM(s) Oral daily  pantoprazole    Tablet 40 milliGRAM(s) Oral two times a day  polyethylene glycol 3350 17 Gram(s) Oral two times a day    PRN MEDICATIONS  acetaminophen     Tablet .. 650 milliGRAM(s) Oral every 6 hours PRN  aluminum hydroxide/magnesium hydroxide/simethicone Suspension 30 milliLiter(s) Oral every 4 hours PRN  dextrose Oral Gel 15 Gram(s) Oral once PRN  hydrOXYzine hydrochloride 25 milliGRAM(s) Oral every 6 hours PRN  melatonin 3 milliGRAM(s) Oral at bedtime PRN  ondansetron Injectable 4 milliGRAM(s) IV Push every 8 hours PRN  traZODone 25 milliGRAM(s) Oral at bedtime PRN    VITALS  T(F): 98.2 (04-28-25 @ 05:55), Max: 98.9 (04-27-25 @ 21:05)  HR: 78 (04-28-25 @ 05:55) (72 - 78)  BP: 115/75 (04-28-25 @ 05:55) (100/65 - 127/82)  RR: 18 (04-28-25 @ 05:55) (16 - 19)  SpO2: 96% (04-28-25 @ 05:55) (94% - 96%)  POCT Blood Glucose.: 143 mg/dL (04-28-25 @ 07:48)  POCT Blood Glucose.: 122 mg/dL (04-27-25 @ 21:10)  POCT Blood Glucose.: 171 mg/dL (04-27-25 @ 16:47)  POCT Blood Glucose.: 232 mg/dL (04-27-25 @ 11:26)    PHYSICAL EXAM  GENERAL  ( x ) NAD, lying in bed comfortably     (  ) obtunded     (  ) lethargic     (  ) somnolent    HEAD  ( x ) Atraumatic     (  ) hematoma     (  ) laceration (specify location:       )     NECK  ( x ) Supple     (  ) neck stiffness     (  ) nuchal rigidity     (  )  no JVD     (  ) JVD present ( -- cm)    HEART  Rate -->  ( x ) normal rate    (  ) bradycardic    (  ) tachycardic  Rhythm -->  (  x) regular    (  ) regularly irregular    (  ) irregularly irregular  Murmurs -->  (  ) normal s1/s2    (  ) systolic murmur    (  ) diastolic murmur    (  ) continuous murmur     (  ) S3 present    (  ) S4 present    LUNGS  (x  )Unlabored respirations     (  ) tachypnea  (  ) B/L air entry     (  ) decreased breath sounds in:  (location     )    (  ) no adventitious sound     (  ) crackles     (  ) wheezing      (  ) rhonchi      (specify location:       )  (  ) chest wall tenderness (specify location:       )    ABDOMEN  ( x ) Soft     (  ) tense   |   ( x ) nondistended     (  ) distended   |   (  ) +BS     (  ) hypoactive bowel sounds     (  ) hyperactive bowel sounds  (  ) nontender     (  ) RUQ tenderness     (  ) RLQ tenderness     (  ) LLQ tenderness     (  ) epigastric tenderness     (  ) diffuse tenderness  (  ) Splenomegaly      (  ) Hepatomegaly      (  ) Jaundice     (  ) ecchymosis     EXTREMITIES  ( x ) Normal     (  ) Rash     (  ) ecchymosis     (  ) varicose veins      (  ) pitting edema     (  ) non-pitting edema   (  ) ulceration     (  ) gangrene:     (location:     )    NERVOUS SYSTEM  ( x ) A&Ox3     (  ) confused     (  ) lethargic  CN II-XII:     (  ) Intact     (  ) focal deficits  (Specify:     )   Upper extremities:     (  ) strength X/5     (  ) focal deficit (specify:    )  Lower extremities:     (  ) strength  X/5    (  ) focal deficit (specify:    )    SKIN  ( x ) No rashes or lesions     (  ) maculopapular rash     (  ) pustules     (  ) vesicles     (  ) ulcer     (  ) ecchymosis     (specify location:     )    LABS             9.7    4.80  )-----------( 265      ( 04-28-25 @ 07:37 )             29.6     138  |  104  |  9   -------------------------<  161   04-28-25 @ 07:37  3.7  |  25  |  0.7    Ca      8.1     04-28-25 @ 07:37  Mg     1.9     04-28-25 @ 07:37    TPro  4.3  /  Alb  2.3  /  TBili  1.1  /  DBili  x   /  AST  57  /  ALT  19  /  AlkPhos  148  /  GGT  x     04-28-25 @ 07:37    PT/INR - ( 04-27-25 @ 22:33 )   PT: 12.70 sec;   INR: 1.07 ratio  PTT - ( 04-27-25 @ 22:33 )  PTT:31.3 sec      Urinalysis Basic - ( 28 Apr 2025 07:37 )    Color: x / Appearance: x / SG: x / pH: x  Gluc: 161 mg/dL / Ketone: x  / Bili: x / Urobili: x   Blood: x / Protein: x / Nitrite: x   Leuk Esterase: x / RBC: x / WBC x   Sq Epi: x / Non Sq Epi: x / Bacteria: x          IMAGING

## 2025-04-29 ENCOUNTER — TRANSCRIPTION ENCOUNTER (OUTPATIENT)
Age: 75
End: 2025-04-29

## 2025-04-29 VITALS
TEMPERATURE: 98 F | HEART RATE: 77 BPM | DIASTOLIC BLOOD PRESSURE: 75 MMHG | SYSTOLIC BLOOD PRESSURE: 109 MMHG | OXYGEN SATURATION: 96 %

## 2025-04-29 LAB
ANION GAP SERPL CALC-SCNC: 10 MMOL/L — SIGNIFICANT CHANGE UP (ref 7–14)
BASOPHILS # BLD AUTO: 0.09 K/UL — SIGNIFICANT CHANGE UP (ref 0–0.2)
BASOPHILS NFR BLD AUTO: 1.8 % — HIGH (ref 0–1)
BUN SERPL-MCNC: 9 MG/DL — LOW (ref 10–20)
CALCIUM SERPL-MCNC: 8.2 MG/DL — LOW (ref 8.4–10.5)
CHLORIDE SERPL-SCNC: 103 MMOL/L — SIGNIFICANT CHANGE UP (ref 98–110)
CO2 SERPL-SCNC: 23 MMOL/L — SIGNIFICANT CHANGE UP (ref 17–32)
CREAT SERPL-MCNC: 0.7 MG/DL — SIGNIFICANT CHANGE UP (ref 0.7–1.5)
EGFR: 96 ML/MIN/1.73M2 — SIGNIFICANT CHANGE UP
EGFR: 96 ML/MIN/1.73M2 — SIGNIFICANT CHANGE UP
EOSINOPHIL # BLD AUTO: 0.07 K/UL — SIGNIFICANT CHANGE UP (ref 0–0.7)
EOSINOPHIL NFR BLD AUTO: 1.4 % — SIGNIFICANT CHANGE UP (ref 0–8)
GLUCOSE BLDC GLUCOMTR-MCNC: 172 MG/DL — HIGH (ref 70–99)
GLUCOSE BLDC GLUCOMTR-MCNC: 199 MG/DL — HIGH (ref 70–99)
GLUCOSE BLDC GLUCOMTR-MCNC: 99 MG/DL — SIGNIFICANT CHANGE UP (ref 70–99)
GLUCOSE SERPL-MCNC: 98 MG/DL — SIGNIFICANT CHANGE UP (ref 70–99)
HCT VFR BLD CALC: 30.7 % — LOW (ref 42–52)
HGB BLD-MCNC: 10.1 G/DL — LOW (ref 14–18)
IMM GRANULOCYTES NFR BLD AUTO: 0.2 % — SIGNIFICANT CHANGE UP (ref 0.1–0.3)
LYMPHOCYTES # BLD AUTO: 1.91 K/UL — SIGNIFICANT CHANGE UP (ref 1.2–3.4)
LYMPHOCYTES # BLD AUTO: 38.5 % — SIGNIFICANT CHANGE UP (ref 20.5–51.1)
MAGNESIUM SERPL-MCNC: 1.8 MG/DL — SIGNIFICANT CHANGE UP (ref 1.8–2.4)
MCHC RBC-ENTMCNC: 32.9 G/DL — SIGNIFICANT CHANGE UP (ref 32–37)
MCHC RBC-ENTMCNC: 33.1 PG — HIGH (ref 27–31)
MCV RBC AUTO: 100.7 FL — HIGH (ref 80–94)
MONOCYTES # BLD AUTO: 0.44 K/UL — SIGNIFICANT CHANGE UP (ref 0.1–0.6)
MONOCYTES NFR BLD AUTO: 8.9 % — SIGNIFICANT CHANGE UP (ref 1.7–9.3)
NEUTROPHILS # BLD AUTO: 2.44 K/UL — SIGNIFICANT CHANGE UP (ref 1.4–6.5)
NEUTROPHILS NFR BLD AUTO: 49.2 % — SIGNIFICANT CHANGE UP (ref 42.2–75.2)
NRBC BLD AUTO-RTO: 0 /100 WBCS — SIGNIFICANT CHANGE UP (ref 0–0)
PLATELET # BLD AUTO: 242 K/UL — SIGNIFICANT CHANGE UP (ref 130–400)
PMV BLD: 10.8 FL — HIGH (ref 7.4–10.4)
POTASSIUM SERPL-MCNC: 3.8 MMOL/L — SIGNIFICANT CHANGE UP (ref 3.5–5)
POTASSIUM SERPL-SCNC: 3.8 MMOL/L — SIGNIFICANT CHANGE UP (ref 3.5–5)
RBC # BLD: 3.05 M/UL — LOW (ref 4.7–6.1)
RBC # FLD: 14.8 % — HIGH (ref 11.5–14.5)
SODIUM SERPL-SCNC: 136 MMOL/L — SIGNIFICANT CHANGE UP (ref 135–146)
WBC # BLD: 4.96 K/UL — SIGNIFICANT CHANGE UP (ref 4.8–10.8)
WBC # FLD AUTO: 4.96 K/UL — SIGNIFICANT CHANGE UP (ref 4.8–10.8)

## 2025-04-29 PROCEDURE — 99239 HOSP IP/OBS DSCHRG MGMT >30: CPT

## 2025-04-29 RX ORDER — LISINOPRIL 5 MG/1
1 TABLET ORAL
Refills: 0 | DISCHARGE

## 2025-04-29 RX ORDER — METOPROLOL SUCCINATE 50 MG/1
0.5 TABLET, EXTENDED RELEASE ORAL
Qty: 30 | Refills: 0
Start: 2025-04-29 | End: 2025-05-28

## 2025-04-29 RX ADMIN — INSULIN LISPRO 1: 100 INJECTION, SOLUTION INTRAVENOUS; SUBCUTANEOUS at 17:40

## 2025-04-29 RX ADMIN — Medication 1 APPLICATION(S): at 11:21

## 2025-04-29 RX ADMIN — POLYETHYLENE GLYCOL 3350 17 GRAM(S): 17 POWDER, FOR SOLUTION ORAL at 17:50

## 2025-04-29 RX ADMIN — METOPROLOL SUCCINATE 12.5 MILLIGRAM(S): 50 TABLET, EXTENDED RELEASE ORAL at 05:14

## 2025-04-29 RX ADMIN — NALTREXONE HYDROCHLORIDE 50 MILLIGRAM(S): 50 TABLET, FILM COATED ORAL at 11:21

## 2025-04-29 RX ADMIN — FOLIC ACID 1 MILLIGRAM(S): 1 TABLET ORAL at 11:20

## 2025-04-29 RX ADMIN — Medication 40 MILLIGRAM(S): at 05:13

## 2025-04-29 RX ADMIN — METOPROLOL SUCCINATE 12.5 MILLIGRAM(S): 50 TABLET, EXTENDED RELEASE ORAL at 17:50

## 2025-04-29 RX ADMIN — Medication 40 MILLIGRAM(S): at 17:50

## 2025-04-29 NOTE — PROGRESS NOTE ADULT - SUBJECTIVE AND OBJECTIVE BOX
SUBJECTIVE/OVERNIGHT EVENTS  Today is hospital day 13d. This morning patient was seen and examined at bedside, resting comfortably in bed. No acute or major events overnight.    CODE STATUS: FULL     MEDICATIONS  STANDING MEDICATIONS  bisacodyl 20 milliGRAM(s) Oral at bedtime  chlorhexidine 2% Cloths 1 Application(s) Topical daily  dextrose 50% Injectable 25 Gram(s) IV Push once  dextrose 50% Injectable 12.5 Gram(s) IV Push once  dextrose 50% Injectable 25 Gram(s) IV Push once  folic acid 1 milliGRAM(s) Oral daily  glucagon  Injectable 1 milliGRAM(s) IntraMuscular once  influenza  Vaccine (HIGH DOSE) 0.5 milliLiter(s) IntraMuscular once  insulin lispro (ADMELOG) corrective regimen sliding scale   SubCutaneous three times a day before meals  metoprolol tartrate 12.5 milliGRAM(s) Oral two times a day  mirtazapine 15 milliGRAM(s) Oral at bedtime  naltrexone 50 milliGRAM(s) Oral daily  pantoprazole    Tablet 40 milliGRAM(s) Oral two times a day  polyethylene glycol 3350 17 Gram(s) Oral two times a day    PRN MEDICATIONS  acetaminophen     Tablet .. 650 milliGRAM(s) Oral every 6 hours PRN  aluminum hydroxide/magnesium hydroxide/simethicone Suspension 30 milliLiter(s) Oral every 4 hours PRN  dextrose Oral Gel 15 Gram(s) Oral once PRN  hydrOXYzine hydrochloride 25 milliGRAM(s) Oral every 6 hours PRN  melatonin 3 milliGRAM(s) Oral at bedtime PRN  ondansetron Injectable 4 milliGRAM(s) IV Push every 8 hours PRN  traZODone 25 milliGRAM(s) Oral at bedtime PRN    VITALS  T(F): 98.2 (04-29-25 @ 04:44), Max: 98.2 (04-29-25 @ 04:44)  HR: 79 (04-29-25 @ 04:44) (72 - 107)  BP: 125/75 (04-29-25 @ 04:44) (106/72 - 164/79)  RR: 17 (04-29-25 @ 04:44) (17 - 18)  SpO2: 94% (04-29-25 @ 08:32) (91% - 98%)  POCT Blood Glucose.: 99 mg/dL (04-29-25 @ 07:28)  POCT Blood Glucose.: 122 mg/dL (04-28-25 @ 21:41)  POCT Blood Glucose.: 116 mg/dL (04-28-25 @ 16:43)  POCT Blood Glucose.: 141 mg/dL (04-28-25 @ 11:19)    PHYSICAL EXAM  GENERAL  ( x ) NAD, lying in bed comfortably     (  ) obtunded     (  ) lethargic     (  ) somnolent    HEAD  ( x ) Atraumatic     (  ) hematoma     (  ) laceration (specify location:       )     NECK  ( x ) Supple     (  ) neck stiffness     (  ) nuchal rigidity     (  )  no JVD     (  ) JVD present ( -- cm)    HEART  Rate -->  ( x ) normal rate    (  ) bradycardic    (  ) tachycardic  Rhythm -->  ( x ) regular    (  ) regularly irregular    (  ) irregularly irregular  Murmurs -->  (  ) normal s1/s2    (  ) systolic murmur    (  ) diastolic murmur    (  ) continuous murmur     (  ) S3 present    (  ) S4 present    LUNGS  ( x )Unlabored respirations     (  ) tachypnea  ( x ) B/L air entry     (  ) decreased breath sounds in:  (location     )    (  ) no adventitious sound     (  ) crackles     (  ) wheezing      (  ) rhonchi      (specify location:       )  (  ) chest wall tenderness (specify location:       )    ABDOMEN  ( x ) Soft     (  ) tense   |   (  ) nondistended     (  ) distended   |   (  ) +BS     (  ) hypoactive bowel sounds     (  ) hyperactive bowel sounds  (  ) nontender     (  ) RUQ tenderness     (  ) RLQ tenderness     (  ) LLQ tenderness     (  ) epigastric tenderness     (  ) diffuse tenderness  (  ) Splenomegaly      (  ) Hepatomegaly      (  ) Jaundice     (  ) ecchymosis     EXTREMITIES  ( x ) Normal     (  ) Rash     (  ) ecchymosis     (  ) varicose veins      (  ) pitting edema     (  ) non-pitting edema   (  ) ulceration     (  ) gangrene:     (location:     )    NERVOUS SYSTEM  ( x ) A&Ox3     (  ) confused     (  ) lethargic  CN II-XII:     (  ) Intact     (  ) focal deficits  (Specify:     )   Upper extremities:     (  ) strength X/5     (  ) focal deficit (specify:    )  Lower extremities:     (  ) strength  X/5    (  ) focal deficit (specify:    )    SKIN  ( x ) No rashes or lesions     (  ) maculopapular rash     (  ) pustules     (  ) vesicles     (  ) ulcer     (  ) ecchymosis     (specify location:     )    LABS             10.1   4.96  )-----------( 242      ( 04-29-25 @ 07:22 )             30.7     136  |  103  |  9   -------------------------<  98   04-29-25 @ 07:22  3.8  |  23  |  0.7    Ca      8.2     04-29-25 @ 07:22  Mg     1.8     04-29-25 @ 07:22    TPro  4.3  /  Alb  2.3  /  TBili  1.1  /  DBili  x   /  AST  57  /  ALT  19  /  AlkPhos  148  /  GGT  x     04-28-25 @ 07:37    PT/INR - ( 04-27-25 @ 22:33 )   PT: 12.70 sec;   INR: 1.07 ratio  PTT - ( 04-27-25 @ 22:33 )  PTT:31.3 sec      Urinalysis Basic - ( 29 Apr 2025 07:22 )    Color: x / Appearance: x / SG: x / pH: x  Gluc: 98 mg/dL / Ketone: x  / Bili: x / Urobili: x   Blood: x / Protein: x / Nitrite: x   Leuk Esterase: x / RBC: x / WBC x   Sq Epi: x / Non Sq Epi: x / Bacteria: x          IMAGING

## 2025-04-29 NOTE — PROGRESS NOTE ADULT - SUBJECTIVE AND OBJECTIVE BOX
Gastroenterology progress note:     Patient is a 74y old  Male who presents with a chief complaint of Fall (28 Apr 2025 16:08)       Admitted on: 04-16-25    We are following the patient for:  anemia / dark stool    Interval History:    No acute events overnight.   s/p colonoscopy yesterday       PAST MEDICAL & SURGICAL HISTORY:  HTN (hypertension)      High cholesterol      Gastric ulcer          MEDICATIONS  (STANDING):  bisacodyl 20 milliGRAM(s) Oral at bedtime  chlorhexidine 2% Cloths 1 Application(s) Topical daily  dextrose 50% Injectable 25 Gram(s) IV Push once  dextrose 50% Injectable 12.5 Gram(s) IV Push once  dextrose 50% Injectable 25 Gram(s) IV Push once  folic acid 1 milliGRAM(s) Oral daily  glucagon  Injectable 1 milliGRAM(s) IntraMuscular once  influenza  Vaccine (HIGH DOSE) 0.5 milliLiter(s) IntraMuscular once  insulin lispro (ADMELOG) corrective regimen sliding scale   SubCutaneous three times a day before meals  metoprolol tartrate 12.5 milliGRAM(s) Oral two times a day  mirtazapine 15 milliGRAM(s) Oral at bedtime  naltrexone 50 milliGRAM(s) Oral daily  pantoprazole    Tablet 40 milliGRAM(s) Oral two times a day  polyethylene glycol 3350 17 Gram(s) Oral two times a day    MEDICATIONS  (PRN):  acetaminophen     Tablet .. 650 milliGRAM(s) Oral every 6 hours PRN Temp greater or equal to 38C (100.4F), Mild Pain (1 - 3)  aluminum hydroxide/magnesium hydroxide/simethicone Suspension 30 milliLiter(s) Oral every 4 hours PRN Dyspepsia  dextrose Oral Gel 15 Gram(s) Oral once PRN Blood Glucose LESS THAN 70 milliGRAM(s)/deciliter  hydrOXYzine hydrochloride 25 milliGRAM(s) Oral every 6 hours PRN Anxiety  melatonin 3 milliGRAM(s) Oral at bedtime PRN Insomnia  ondansetron Injectable 4 milliGRAM(s) IV Push every 8 hours PRN Nausea and/or Vomiting  traZODone 25 milliGRAM(s) Oral at bedtime PRN Insomnia      Allergies  No Known Allergies      Review of Systems:   Cardiovascular:  No Chest Pain, No Palpitations  Respiratory:  No Cough, No Dyspnea  Gastrointestinal:  As described in HPI  Skin:  No Skin Lesions, No Jaundice  Neuro:  No Syncope, No Dizziness    Physical Examination:  T(C): 36.8 (04-29-25 @ 04:44), Max: 36.8 (04-29-25 @ 04:44)  HR: 79 (04-29-25 @ 04:44) (72 - 107)  BP: 125/75 (04-29-25 @ 04:44) (106/72 - 164/79)  RR: 17 (04-29-25 @ 04:44) (17 - 18)  SpO2: 94% (04-29-25 @ 08:32) (91% - 98%)  Weight (kg): 63.5 (04-28-25 @ 18:32)    04-28-25 @ 07:01  -  04-29-25 @ 07:00  --------------------------------------------------------  IN: 0 mL / OUT: 200 mL / NET: -200 mL        GENERAL: AAOx3, no acute distress.  HEAD:  Atraumatic, Normocephalic  EYES: conjunctiva and sclera clear  NECK: Supple, no JVD or thyromegaly  CHEST/LUNG: Clear to auscultation bilaterally; No wheeze, rhonchi, or rales  HEART: Regular rate and rhythm; normal S1, S2, No murmurs.  ABDOMEN: Soft, nontender, nondistended; Bowel sounds present  NEUROLOGY: No asterixis or tremor.   SKIN: Intact, no jaundice     Data:                        10.1   4.96  )-----------( 242      ( 29 Apr 2025 07:22 )             30.7     Hgb trend:  10.1  04-29-25 @ 07:22  9.7  04-28-25 @ 07:37  10.4  04-27-25 @ 22:33  10.0  04-27-25 @ 06:30        04-28    138  |  104  |  9[L]  ----------------------------<  161[H]  3.7   |  25  |  0.7    Ca    8.1[L]      28 Apr 2025 07:37  Mg     1.9     04-28    TPro  4.3[L]  /  Alb  2.3[L]  /  TBili  1.1  /  DBili  x   /  AST  57[H]  /  ALT  19  /  AlkPhos  148[H]  04-28    Liver panel trend:  TBili 1.1   /   AST 57   /   ALT 19   /   AlkP 148   /   Tptn 4.3   /   Alb 2.3    /   DBili --      04-28  TBili 1.1   /   AST 63   /   ALT 21   /   AlkP 182   /   Tptn 4.4   /   Alb 2.4    /   DBili --      04-27  TBili 1.2   /   AST 58   /   ALT 19   /   AlkP 158   /   Tptn 4.4   /   Alb 2.3    /   DBili 0.7      04-26  TBili 1.5   /   AST 64   /   ALT 23   /   AlkP 155   /   Tptn 4.3   /   Alb 2.5    /   DBili --      04-25  TBili 1.8   /   AST 66   /   ALT 25   /   AlkP 148   /   Tptn 4.3   /   Alb 2.3    /   DBili --      04-24  TBili 1.1   /   AST 92   /   ALT 30   /   AlkP 165   /   Tptn 4.4   /   Alb 2.3    /   DBili --      04-23  TBili 1.4   /      /   ALT 32   /   AlkP 152   /   Tptn 4.3   /   Alb 2.3    /   DBili --      04-22  TBili 1.4   /   AST 72   /   ALT 29   /   AlkP 161   /   Tptn 4.3   /   Alb 2.5    /   DBili --      04-21  TBili 1.6   /   AST 69   /   ALT 29   /   AlkP 165   /   Tptn 4.4   /   Alb 2.4    /   DBili --      04-21  TBili 2.2   /      /   ALT 41   /   AlkP 175   /   Tptn 4.8   /   Alb 2.6    /   DBili --      04-19      PT/INR - ( 27 Apr 2025 22:33 )   PT: 12.70 sec;   INR: 1.07 ratio         PTT - ( 27 Apr 2025 22:33 )  PTT:31.3 sec       Radiology:

## 2025-04-29 NOTE — DISCHARGE NOTE PROVIDER - NSDCFUADDAPPT_GEN_ALL_CORE_FT
APPTS ARE READY TO BE MADE: [x ] YES    Best Family or Patient Contact (if needed):    Additional Information about above appointments (if needed):    1: Follow up in [facility] for Colonoscopy Pathology results   2:   3:     Other comments or requests:

## 2025-04-29 NOTE — DISCHARGE NOTE PROVIDER - CARE PROVIDER_API CALL
NELLY COVARRUBIAS  [address] [place] [address]  Phone: [phone]  Fax: ([phone]  Established Patient  Follow Up Time: 2 weeks   NELLY COVARRUBIAS  [address] [place] [address]  Phone: [phone]  Fax: ()-  Established Patient  Follow Up Time: 2 weeks    PMD in [place],   Phone: (   )    -  Fax: (   )    -  Follow Up Time:     Liboroi Crain  Gastroenterology  [address] [place] [address]  Phone: [phone]  Fax: [phone]  Follow Up Time:

## 2025-04-29 NOTE — DISCHARGE NOTE PROVIDER - PROVIDER TOKENS
PROVIDER:[TOKEN:[[id number]],FOLLOWUP:[2 weeks],ESTABLISHEDPATIENT:[T]] PROVIDER:[TOKEN:[[id number]],FOLLOWUP:[2 weeks],ESTABLISHEDPATIENT:[T]],FREE:[LAST:[PMD in NH],PHONE:[(   )    -],FAX:[(   )    -]],PROVIDER:[TOKEN:[[id number]]]

## 2025-04-29 NOTE — DISCHARGE NOTE PROVIDER - DETAILS OF MALNUTRITION DIAGNOSIS/DIAGNOSES
This patient has been assessed with a concern for Malnutrition and was treated during this hospitalization for the following Nutrition diagnosis/diagnoses:     -  04/23/2025: Severe protein-calorie malnutrition

## 2025-04-29 NOTE — PROGRESS NOTE ADULT - PROVIDER SPECIALTY LIST ADULT
Critical Care
Internal Medicine
Neurology
Addiction Medicine
Gastroenterology
Gastroenterology
Hospitalist
Critical Care
Gastroenterology
Gastroenterology
Hospitalist
Internal Medicine
Addiction Medicine
Hospitalist
Internal Medicine
Hospitalist

## 2025-04-29 NOTE — PROGRESS NOTE ADULT - NUTRITIONAL ASSESSMENT
This patient has been assessed with a concern for Malnutrition and has been determined to have a diagnosis/diagnoses of Severe protein-calorie malnutrition.    This patient is being managed with:   Diet Full Liquid-  Consistent Carbohydrate {Evening Snack} (CSTCHOSN)  Supplement Feeding Modality:  Oral  Glucerna Shake Cans or Servings Per Day:  1       Frequency:  Two Times a day  Entered: Apr 23 2025  1:16PM    Diet NPO after Midnight-     NPO Start Date: 21-Apr-2025   NPO Start Time: 23:59  Entered: Apr 21 2025  2:19PM  
This patient has been assessed with a concern for Malnutrition and has been determined to have a diagnosis/diagnoses of Severe protein-calorie malnutrition.    This patient is being managed with:   Diet Full Liquid-  Consistent Carbohydrate {Evening Snack} (CSTCHOSN)  Supplement Feeding Modality:  Oral  Glucerna Shake Cans or Servings Per Day:  1       Frequency:  Two Times a day  Entered: Apr 24 2025 11:29AM    Diet NPO after Midnight-     NPO Start Date: 21-Apr-2025   NPO Start Time: 23:59  Entered: Apr 21 2025  2:19PM  
This patient has been assessed with a concern for Malnutrition and has been determined to have a diagnosis/diagnoses of Severe protein-calorie malnutrition.    This patient is being managed with:   Diet NPO after Midnight-     NPO Start Date: 27-Apr-2025   NPO Start Time: 23:59  Except Medications  With Ice Chips/Sips of Water  Entered: Apr 27 2025 10:19AM    Diet Clear Liquid-  Entered: Apr 27 2025  9:28AM  
This patient has been assessed with a concern for Malnutrition and has been determined to have a diagnosis/diagnoses of Severe protein-calorie malnutrition.    This patient is being managed with:   Diet Soft and Bite Sized-  DASH/TLC {Sodium & Cholesterol Restricted} (DASH)  Entered: Apr 29 2025 10:18AM  
This patient has been assessed with a concern for Malnutrition and has been determined to have a diagnosis/diagnoses of Severe protein-calorie malnutrition.    This patient is being managed with:   Diet Consistent Carbohydrate/No Snacks-  Supplement Feeding Modality:  Oral  Glucerna Shake Cans or Servings Per Day:  1       Frequency:  Two Times a day  Entered: Apr 25 2025 12:11PM

## 2025-04-29 NOTE — DISCHARGE NOTE PROVIDER - HOSPITAL COURSE
A 74-year-old male with a past medical history of alcohol use disorder, necrotizing pancreatitis, hypertension, hyperlipidemia, and a history of gastrointestinal bleeding presented to the emergency department accompanied by his wife due to intoxication. The patient experienced episodes of confusion, shaking episodes suggestive of convulsive syncope, and was found to have bright red blood per rectum. Initial management involved stabilization on valium for potential alcohol withdrawal, an octreotide drip, and protonix for GI protection, and he was monitored on telemetry to rule out any cardiac causes of his symptoms.    An extensive evaluation was conducted due to his complex presentation. He was diagnosed with alcohol withdrawal, lactic ketoacidosis (now resolved), transaminitis secondary to alcoholic hepatitis and hepatic steatosis. He received thiamine, folate, and naltrexone under the care of addiction medicine, the catch team, and psychiatry. 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    The patient was also evaluated for syncope associated with orthostatic hypotension potentially related to his GI bleeding, and investigations like a TTE echo showed normal cardiac function (ejection fraction >55%). A CT of the abdomen and pelas revealed severe hepatic steatosis and other non-specific findings, prompting further gastroenterological evaluation via EGD which showed gastritis, a hiatal hernia, and a clean-based ulcer in the duodenum without active bleeding.    Management included continuation of protonix, fluid resuscitation, and close monitoring of his hematological parameters with instructions to transfuse if hemoglobin dropped below 7. The diet was upgraded to a regular diet as tolerated given the stability of his GI condition. Cardiac monitoring showed premature atrial contractions, managed with a low-dose beta-blocker (lopressor).    His recent colonoscopy showed mild diverticulosis, polyps which were removed, and hemorrhoids but no other significant pathology with results still pending. Due to the risks associated with his recent GI bleeding, DVT prophylaxis was not initiated.    Overall, the patient's care plan on discharge includes follow-up in the [facility], and outpatient follow-up for his alcohol use disorder and other chronic conditions.     Discussion of discharge plan of care, including discharge diagnoses, medication reconciliation, and follow-ups was conducted with Dr. Toribio on April 29,2025 at 10:43AM, and discharge was approved.

## 2025-04-29 NOTE — PROGRESS NOTE ADULT - REASON FOR ADMISSION
Fall
Fall iso alcohol intoxication
Fall

## 2025-04-29 NOTE — PROGRESS NOTE ADULT - ASSESSMENT
74-year-old male with past medical history of alcohol use disorder, history of necrotizing pancreatitis, hypertension, hyperlipidemia presenting to the ED accompanied by his wife for intoxication. He was on CIWA and valium PRN, s/p RRT for fall while attempting to go to the bathroom with worsening confusion, with shaking episodes. Another RRT called for similar shaking while pt on toilet. Patient was initially altered and regained consciousness within minutes. PAtient was found to have BRBPR at that time, started on an octreotide drip, protonix 40 BID and upgraded to SDU. Downgraded to 4b and transferred to tele to r/o cardiac causes of RRs.    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  - evaluated by neurology    #Syncope  2/2 Orthostatic hypotension possibly 2/2 GI bleeding   #Macrocytic anemia  #Hiatal hernia   #H/o duodenal ulcer bleed s/p EGD 2022  #H/o necrotizing pancreatitis with WON s/p cystogastrostomy  - TTE Echo Complete w/ Contrast w/o Doppler (04.21.25 @ 12:11) >Left ventricular ejection fraction, by visual estimation, is >55%.  - S/p IV fluids  - CT Abdomen and Pelvis w/ IV Cont (04.16.25 @ 17:22) >Severe hepatic steatosis. Nonspecific distended gallbladder.  Left adrenal gland 6 cm myelolipoma  - S/p EGD 4/22: Erythema in the antrum and stomach body compatible with non-erosive gastritis. (Biopsy). Hiatal Hernia. Erosions and ulceration in the duodenum compatible with duodenitis. A 5 mm clean based (Ra Class III) ulcer was noted in the duodenal sweep. No active bleeding seen in the examined portion of the duodenum.  - monitor CBC, keep active T&S, transfuse if <7  - S/p octrotide  - Advanced diet to regular  - C/w tele- PACs present  - C/w lopressor 12.5 BID  - PT/OT- rehab  - s/p Colonoscopy, Polyp (3 mm) in the descending colon. (Polypectomy). Polyp (6 mm) in the descending colon. (Polypectomy). Mild diverticulosis of the whole colon. Internal and external hemorrhoids. Normal mucosa in the terminal ileum. The colon was otherwise unremarkable.   - Pathology- PENDING  - c/w PPI to PO 40mg BID for 8 weeks per GI  - Follow up in [facility]     #MISC  - DVT ppx: none 2/2 GI bleed  - GI ppx: protonix  - Diet: Soft Bite Size DASH  - Activity: as tolerated    Pending: Colonoscopy Pathology

## 2025-04-29 NOTE — DISCHARGE NOTE PROVIDER - NSDCMRMEDTOKEN_GEN_ALL_CORE_FT
Metoprolol Tartrate 25 mg oral tablet: 0.5 tab(s) orally 2 times a day  pantoprazole 40 mg oral delayed release tablet: 1 tab(s) orally 2 times a day Take twice a day for 8 weeks post colonoscopy, then once a day after. Follow up with GI

## 2025-04-29 NOTE — DISCHARGE NOTE PROVIDER - NSDCCPCAREPLAN_GEN_ALL_CORE_FT
PRINCIPAL DISCHARGE DIAGNOSIS  Diagnosis: Alcoholic ketoacidosis  Assessment and Plan of Treatment: You were admitted for evaluation and management of intoxication and related symptoms, including confusion, shaking episodes suggestive of convulsive syncope, and bright red blood per rectum. . Upon admission, initial management included stabilization with Valium for potential alcohol withdrawal, an octreotide drip for gastrointestinal protection, and Protonix.   During your hospitalization, you experienced alcohol withdrawal, lactic ketoacidosis (which has now resolved), and transaminitis secondary to alcoholic hepatitis and hepatic steatosis. You received thiamine, folate, and naltrexone with the involvement of addiction medicine, the catch team, and psychiatry. Neurological evaluation, including a CT head and video EEG, confirmed likely convulsive syncope thought to be non-epileptic, ruling out epilepsy.  A CT of your abdomen and pelvis showed severe hepatic steatosis along with other non-specific findings, prompting gastroenterological evaluation through an EGD, which identified gastritis, a hiatal hernia, and a clean-based ulcer in the duodenum without active bleeding.  Management included continuation of Protonix, fluid resuscitation, and close monitoring of your hematological parameters, including transfusion if hemoglobin drops below 7.   A recent colonoscopy showed mild diverticulosis, polyps which were removed, and hemorrhoids but no other significant pathology. Results are still pending. Given the risks associated with your recent GI bleeding, DVT prophylaxis was not initiated.

## 2025-04-29 NOTE — PROGRESS NOTE ADULT - ASSESSMENT
74-year-old male with past medical history of alcohol use disorder (Last use 4/16/25), history of necrotizing pancreatitis with WON s/p cystogastrostomy , hx of upper GI bleed sec to duodenal bulb ulcer (2022), hypertension, hyperlipidemia presenting to the ED accompanied by his wife for intoxication and a fall.  Wife states that the patient drinks approximately every day.  Patient has had a binge drinking episode for the past 4 days prior to presentation. Patient admitted to medicine on 4/16 for intoxication. This early am he had rapid response with seizure like activity. Today he had dark blood with clots with BM, hypotensive (RRT). GI consulted for further evaluation.     #Dark stool with clots likely sec to duodenal ulcer s/p EGD 4/23   #Acute Macrocytic anemia with low platelets  #AUD, elevated liver enzymes with alcoholic hepatitis (MDF 6.4)  #hx of duodenal ulcer bleed s/p EGD 2022  #history of necrotizing pancreatitis with WON s/p cystogastrostomy  - Hb on admission : 13.4>11.5 (4/18)>8.7 (4/21)  - s/p EGD 11/22: duodenal bulb healing ulcer  - s/p EGD 10/22: 3cm duo bulb ulcer s/p hemostasis  - s/p EGD 9/22: multiple ulcer in antrum, duo bulb, GE junc Ulcer (HP-)  - s/p colonoscopy 9/22: poor prep:   - 4/16: CTAP IC: distended GB, severe hepatic steatosis  - 4/17: RUQ US: CBD 6mm, hep steatosis  RRT 4.21 x2 for seizure like activity currently on vEEG, hypotensive briefly  - Not on AC, no cirrhosis on imaging  - s/p EGD 4/22: Impressions:  	Erythema in the antrum and stomach body compatible with non-erosive gastritis. (Biopsy).  	Hiatal Hernia.  	Erosions and ulceration in the duodenum compatible with duodenitis.  	A 5 mm clean based (Ra Class III) ulcer was noted in the duodenal sweep. No active bleeding seen in the examined portion of the duodenum.  	Normal mucosa in the whole esophagus.  - Colonoscopy 4/28:  Polyp (3 mm) in the descending colon. (Polypectomy).  	Polyp (6 mm) in the descending colon. (Polypectomy).  	Mild diverticulosis of the whole colon.  	Internal and external hemorrhoids.  	Normal mucosa in the terminal ileum.  	The colon was otherwise unremarkable.    RECS  Await pathology   Diet as tolerated  Avoid constipation  Repeat colonoscopy in 5 years (polyps)  c/w PPI to PO 40mg BID for 8 weeks  Alcohol cessation advised  Follow up in [facility]

## 2025-04-29 NOTE — DISCHARGE NOTE PROVIDER - ATTENDING DISCHARGE PHYSICAL EXAMINATION:
Vital Signs Last 24 Hrs  T(C): 36.8 (29 Apr 2025 04:44), Max: 36.8 (29 Apr 2025 04:44)  T(F): 98.2 (29 Apr 2025 04:44), Max: 98.2 (29 Apr 2025 04:44)  HR: 79 (29 Apr 2025 04:44) (72 - 107)  BP: 125/75 (29 Apr 2025 04:44) (106/72 - 164/79)  BP(mean): 92 (29 Apr 2025 04:44) (69 - 95)  RR: 17 (29 Apr 2025 04:44) (17 - 18)  SpO2: 94% (29 Apr 2025 08:32) (91% - 98%)    Parameters below as of 29 Apr 2025 08:32  Patient On (Oxygen Delivery Method): room air    PHYSICAL EXAM:  GENERAL: Not in distress - wife at bedside   HEAD:  Atraumatic, Normocephalic  EYES: EOMI, PERRLA, conjunctiva and sclera clear  NERVOUS SYSTEM:  Alert & Oriented X 3  CHEST/LUNG: Clear air entry  HEART: Regular rate and rhythm   ABDOMEN: Soft abdomen   EXTREMITIES:  moves all extrem     -pt seen this am - no overnight events notified   -stable for d/c to NH today - updated wife at bedside

## 2025-04-30 LAB — SURGICAL PATHOLOGY STUDY: SIGNIFICANT CHANGE UP

## 2025-05-01 NOTE — CHART NOTE - NSCHARTNOTEFT_GEN_A_CORE
Colonoscopy  Polyp (3 mm) in the descending colon. (Polypectomy).  	Polyp (6 mm) in the descending colon. (Polypectomy).  	Mild diverticulosis of the whole colon.  	Internal and external hemorrhoids.  	Normal mucosa in the terminal ileum.  	The colon was otherwise unremarkable.    Rec  Await pathology    Diet as tolerated    Avoid constipation    Repeat colonoscopy in 5 years (polyps)    Follow up in [facility] MRN [id number] / Left message 4/30 & 5/1 - CJ    SPECIALTY: gastroenterology
Patient did not drink bowel prep and does not want to; no BMs since yesterday hb stable  Wife at bedside  Will schedule for EGD  Please contact neuro team if ok to be off vEEG
Transfer CEU to 4B    74-year-old male with past medical history of alcohol use disorder, history of necrotizing pancreatitis, hypertension, hyperlipidemia presenting to the ED accompanied by his wife for intoxication.  Wife states that the patient drinks approximately every day.  Patient has had a binge drinking episode for the past 4 days.  Today patient fell and was unable to ambulate after the fall.  Wife states this happens often.  Wife states she is concerned and wanted to get him checked out.  Patient is not contributing to history at this time.  Wife states patient is acting at baseline when intoxicated.     Vital Signs:  · BP Systolic	144 mm Hg  · BP Diastolic	79 mm Hg  · Heart Rate	89 /min  · Respiration Rate (breaths/min)	16 /min  · Temp (F)	98 Degrees F  · Temp (C)	36.7 Degrees C  · Temp site	oral  · SpO2 (%)	97 %  · O2 Delivery/Oxygen Delivery Method	room air  · Temp at ED Arrival (C)	36.7 Degrees C    Labs: Hb 13.2, Na 133, AG 24, Bili 1.6, ALP: 224, AST/ALT: 258/75, beta-hyroxy: 3.4, lipase 65, Vbg: lactate 6.1    CTAP: 1.  Severe hepatic steatosis.  2.  Nonspecific distended gallbladder.  3.  Left adrenal gland 6 cm myelolipoma    CT HEAD:  No evidence for acute intracranial pathology.    CT CERVICAL SPINE:  No evidence for acute fracture or subluxation.    s/p 1L LR, folic acid, thiamine     4b course:  4B Course: Patient was admitted for alcohol intoxication. He was on a CIWA protocol with Valium PRN. He was not needing any Valium. He had a RR yesterday night for: Rapid response called by RN at bedside. Per RN, patient was attempting to get up to go to bathroom, when he fell back to bed and was off baseline and became AOx0 (baseline AOx3), after which the patient was shaking in seizure-like motions and RR was called. Upon arrival to bedside, patient was responsive to verbal stimuli and responded in full sentence stating his need to go to bathroom. Vitals within normal limits, blood sugar 165. Pt was examined and motor function was intact. CT head, EEG and neurology consult placed to r/o any underlying cause for AMS.  Per nursing patient had positive orthostatics earlier in the day, he had no gradual increase in CIWA and wasn't tachycardic before or after event.  Possibly syncope from orthostatic hypotension also considering patient was not post-ictal, patient received fluids f/u orthostatics.    Another RR was called today at 11am: Patient went to the bathroom and a RR was called because patient had an episode of whole body shaking for 10 seconds x2 while on the toilet. BP initially was 79/65. Repeat was 119/71. He was initially unresponsive but came back with it within 3 mins. The toilet was filled with bright red blood with questionable melena. A zoll was placed on him and showed an irregular beat. GI was consulted. EKG did not show afib as there were p waves- more sinus with APB. He was started on an octreotide drip, protonix 40 BID and is scheduled for colonoscopy tmw. Addiction med is worried about Wernicke's and so IV thiamine was started.    CEU course:  Neurology following for vEEG which was normal. GI took patient for EGD/Colonscopy. Patient had been refusing bowel prep so only EGD was done which showed Erythema in the antrum and stomach body compatible with non-erosive gastritis. Hiatal Hernia. Erosions and ulceration in the duodenum compatible with duodenitis. A 5 mm clean based (Ra Class III) ulcer was noted in the duodenal sweep.  No active bleeding seen in the examined portion of the duodenum. Normal mucosa in the whole esophagus. Patient started on PPI IV BID and octreotide was dc'ed. Patient no longer having melena. Stable for downgrade to floor.       REVIEW OF SYSTEMS:  patient non cooperative, unable to perform      PHYSICAL EXAM:  General: comfortable  Lungs: dec bs both bases  Cardiovascular: RYLEE 2.6  Abdomen: Soft, Positive BS  Extremities: No clubbing   Neurological: Non focal         Assessment and Plan    74-year-old male with past medical history of alcohol use disorder, history of necrotizing pancreatitis, hypertension, hyperlipidemia presenting to the ED accompanied by his wife for intoxication. He was on CIWA and valium PRN, s/p RRT for fall while attempting to go to the bathroom with worsening confusion, with shaking episodes. Another RRT called for similar shaking while pt on toilet. Patient was initially altered and regained consciousness within minutes. PAtient was found to have BRBPR at that time, started on an octreotide drip, protonix 40 BID aand upgraded to SDU    #Alcohol withdrawal wit history of alcohol use disorder  #Alcoholic and lactic Ketoacidosis, improved  #Transaminitis/Hepatic steatosis  #suspected folate and thiamine deficiency  #shaking episodes, r/o seizure   #Hypokalemia/hypomagnesemia   - currently calm, mental status at baseline  -  VEEG negative  - c/w CIWA monitoring, valium PRN  - addiction med consulted, concern for wernickes, recs  IV thiamine 500 mg q 8h for 3 days, followed by 250 mg IV daily up to additional 5 days   - catch team following  - monitor a replete electrolytes  - neuro fu post EEG  - repeat orthostats after 1L LR bolus george stocking and abdominal binder.     #BRBPR  - noted during RRT  - Hb downtrending since admission  - did not receive PRBC  - was planned for EGD/colono today but refused to drink prep  - now planned for EGD only  - monitor CBC, keep active T&S, transfuse if <7  - EGD: clean base ulcer - GI recs noted PPI BID, carafate, dc octreotide     #Fall likely due to intoxication and orthostasis   - repeat orthostatics after procedure  - check echo  - PT eval   - patient will need psychiatry and naltrexone upon discharge, down grade to floor,       DVT: holding for possible bleed  Gi ppx: protonix  diet: full liquid  Code: full    pending: gi f/u, valium prn possible alcohol withdrawal, 11am trops, advance diet as per GI
PACU ANESTHESIA ADMISSION NOTE      Procedure: Colonoscopy  Post op diagnosis:      ____  Intubated  TV:______       Rate: ______      FiO2: ______    __x__  Patent Airway    ___x_  Full return of protective reflexes    __x__  Full recovery from anesthesia / back to baseline     Vitals:   T:    97.1       R:   14               BP:  142/85                Sat:   97%                P: 96      Mental Status:  _x___ Awake   ___x__ Alert   _____ Drowsy   _____ Sedated    Nausea/Vomiting:  _x___ NO  ______Yes,   See Post - Op Orders          Pain Scale (0-10):  ___0__    Treatment: ____ None    __x__ See Post - Op/PCA Orders    Post - Operative Fluids:   ____ Oral   __x__ See Post - Op Orders    Plan: Discharge:   ____Home       ___x__Floor     _____Critical Care    _____  Other:_________________    Comments: patient hemodynamically stable. Handoff endorsed to PACU RN. No anesthesia related issues present. To be transferred back to floor when criteria met
PACU ANESTHESIA ADMISSION NOTE      Procedure: EGD  Post op diagnosis:      ____  Intubated  TV:______       Rate: ______      FiO2: ______    __X__  Patent Airway    __X__  Full return of protective reflexes    ____  Full recovery from anesthesia / back to baseline status    Vitals:  T 98F  HR: 92  BP:113/69  RR: 20  SpO2:98%    Mental Status:  ____ Awake   _____ Alert   ___X__ Drowsy   _____ Sedated    Nausea/Vomiting:  _X___ NO  ______Yes,   See Post - Op Orders          Pain Scale (0-10):  _____    Treatment: ____ None    ___X_ See Post - Op/PCA Orders    Post - Operative Fluids:   ____ Oral   _X___ See Post - Op Orders    Plan: Discharge:   ____Home       ___X__Floor     _____Critical Care    _____  Other:_________________    Comments: NO anesthetic related complications noted.
Patient transferred to:  Patient transferred from     4B Course: Patient was admitted for alcohol intoxication. He was on a CIWA protocol with Valium PRN. He was not needing any Valium. He had a RR yesterday night for: Rapid response called by RN at bedside. Per RN, patient was attempting to get up to go to bathroom, when he fell back to bed and was off baseline and became AOx0 (baseline AOx3), after which the patient was shaking in seizure-like motions and RR was called. Upon arrival to bedside, patient was responsive to verbal stimuli and responded in full sentence stating his need to go to bathroom. Vitals within normal limits, blood sugar 165. Pt was examined and motor function was intact. CT head, EEG and neurology consult placed to r/o any underlying cause for AMS.  Per nursing patient had positive orthostatics earlier in the day, he had no gradual increase in CIWA and wasn't tachycardic before or after event.  Possibly syncope from orthostatic hypotension also considering patient was not post-ictal, patient received fluids f/u orthostatics.    Another RR was called today at 11am: Patient went to the bathroom and a RR was called because patient had an episode of whole body shaking for 10 seconds x2 while on the toilet. BP initially was 79/65. Repeat was 119/71. He was initially unresponsive but came back with it within 3 mins. The toilet was filled with bright red blood with questionable melena. A zoll was placed on him and showed an irregular beat. GI was consulted. EKG did not show afib as there were p waves- more sinus with APB. He was started on an octreotide drip, protonix 40 BID and is scheduled for colonoscopy tmw. Addiction med is worried about Wernicke's and so IV thiamine was started    74-year-old male with past medical history of alcohol use disorder, history of necrotizing pancreatitis, hypertension, hyperlipidemia presenting to the ED accompanied by his wife for intoxication. Admitted for after a fall  (trauma w/u neg) for Alcoholic Ketoacidosis and lactic acidosis.  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     A&P    # metabolic acidosis - Alcoholic ketoacidosis and Lactic acidosis - improved--   # Alcohol use disorder with mild withdrawal symptoms  # Transaminitis   # Severe hepatic steatosis  # Distended GB on CT  # Witnessed mechanical fall sec to intoxication and orthostatic symptoms  # orthostatic hypotension  # hypokalemia-- better now  # hypomagnesemia-- replete  - denies any h/o seizures  - trauma w/u negative   - lactate 6.2>6.1>3.9>1.5  - CIWA Valium PRN  - addiction medicine consult, CATCH team,social work consult, follow recommendations   - RUQ US report- No acute sonographic abnormality., Hepatic steatosis.  - IV thiamine x 3 days, PO folate  - PT consult- recommended Rehab; patient and family interested- care management to follow  check orthostatics  replete potassium- monitor bmp, mg, lactic acid levels  fall precautions    #seizure episode  overnight rapid response for seizure episode   neurology was consulted  veeg started   follow neurology recommendationsx     #GI bleed  rapid response in am  ordered stat labs.  GI consulyted  iv fluids bolus      follow up: acute  two rapid response one overnight for seizure, neurology consulted v eeg  another episode in am, gi bleed, gi consulted fluid bolus.   also d/w critical care during rapid response. as per critical care as patient stable for now no need of transfer as yet.   monitor lfts, daily labs  follow addiction.   hgb Q8 check     Pending:  GI  neurology follow up  vEEG  Colonoscopy tmw

## 2025-05-01 NOTE — CHART NOTE - NSCHARTNOTESELECT_GEN_ALL_CORE
PACU Note/Transfer Note
PACU/Transfer Note
Transfer Note
Colonoscopy/Event Note
GI/Event Note
Transfer Note
non-clinical appt info/Event Note

## 2025-05-13 DIAGNOSIS — D35.02 BENIGN NEOPLASM OF LEFT ADRENAL GLAND: ICD-10-CM

## 2025-05-13 DIAGNOSIS — F32.A DEPRESSION, UNSPECIFIED: ICD-10-CM

## 2025-05-13 DIAGNOSIS — E51.2 WERNICKE'S ENCEPHALOPATHY: ICD-10-CM

## 2025-05-13 DIAGNOSIS — E87.6 HYPOKALEMIA: ICD-10-CM

## 2025-05-13 DIAGNOSIS — I95.1 ORTHOSTATIC HYPOTENSION: ICD-10-CM

## 2025-05-13 DIAGNOSIS — E43 UNSPECIFIED SEVERE PROTEIN-CALORIE MALNUTRITION: ICD-10-CM

## 2025-05-13 DIAGNOSIS — E78.5 HYPERLIPIDEMIA, UNSPECIFIED: ICD-10-CM

## 2025-05-13 DIAGNOSIS — K70.10 ALCOHOLIC HEPATITIS WITHOUT ASCITES: ICD-10-CM

## 2025-05-13 DIAGNOSIS — D62 ACUTE POSTHEMORRHAGIC ANEMIA: ICD-10-CM

## 2025-05-13 DIAGNOSIS — K64.8 OTHER HEMORRHOIDS: ICD-10-CM

## 2025-05-13 DIAGNOSIS — I10 ESSENTIAL (PRIMARY) HYPERTENSION: ICD-10-CM

## 2025-05-13 DIAGNOSIS — I49.1 ATRIAL PREMATURE DEPOLARIZATION: ICD-10-CM

## 2025-05-13 DIAGNOSIS — K57.30 DIVERTICULOSIS OF LARGE INTESTINE WITHOUT PERFORATION OR ABSCESS WITHOUT BLEEDING: ICD-10-CM

## 2025-05-13 DIAGNOSIS — K76.0 FATTY (CHANGE OF) LIVER, NOT ELSEWHERE CLASSIFIED: ICD-10-CM

## 2025-05-13 DIAGNOSIS — D69.6 THROMBOCYTOPENIA, UNSPECIFIED: ICD-10-CM

## 2025-05-13 DIAGNOSIS — K63.5 POLYP OF COLON: ICD-10-CM

## 2025-05-13 DIAGNOSIS — K44.9 DIAPHRAGMATIC HERNIA WITHOUT OBSTRUCTION OR GANGRENE: ICD-10-CM

## 2025-05-13 DIAGNOSIS — K92.2 GASTROINTESTINAL HEMORRHAGE, UNSPECIFIED: ICD-10-CM

## 2025-05-13 DIAGNOSIS — F10.229 ALCOHOL DEPENDENCE WITH INTOXICATION, UNSPECIFIED: ICD-10-CM

## 2025-05-13 DIAGNOSIS — F10.20 ALCOHOL DEPENDENCE, UNCOMPLICATED: ICD-10-CM

## 2025-05-13 DIAGNOSIS — K29.70 GASTRITIS, UNSPECIFIED, WITHOUT BLEEDING: ICD-10-CM

## 2025-05-13 DIAGNOSIS — E83.42 HYPOMAGNESEMIA: ICD-10-CM

## 2025-05-13 DIAGNOSIS — E87.29 OTHER ACIDOSIS: ICD-10-CM

## 2025-05-13 DIAGNOSIS — R74.01 ELEVATION OF LEVELS OF LIVER TRANSAMINASE LEVELS: ICD-10-CM

## 2025-05-13 DIAGNOSIS — F10.239 ALCOHOL DEPENDENCE WITH WITHDRAWAL, UNSPECIFIED: ICD-10-CM

## 2025-05-13 DIAGNOSIS — K82.8 OTHER SPECIFIED DISEASES OF GALLBLADDER: ICD-10-CM

## 2025-05-13 DIAGNOSIS — K26.4 CHRONIC OR UNSPECIFIED DUODENAL ULCER WITH HEMORRHAGE: ICD-10-CM

## 2025-05-13 DIAGNOSIS — K64.4 RESIDUAL HEMORRHOIDAL SKIN TAGS: ICD-10-CM

## 2025-05-13 DIAGNOSIS — R55 SYNCOPE AND COLLAPSE: ICD-10-CM

## 2025-06-25 ENCOUNTER — APPOINTMENT (OUTPATIENT)
Dept: GASTROENTEROLOGY | Facility: CLINIC | Age: 75
End: 2025-06-25